# Patient Record
Sex: FEMALE | Race: WHITE | NOT HISPANIC OR LATINO | Employment: FULL TIME | URBAN - METROPOLITAN AREA
[De-identification: names, ages, dates, MRNs, and addresses within clinical notes are randomized per-mention and may not be internally consistent; named-entity substitution may affect disease eponyms.]

---

## 2017-01-03 ENCOUNTER — TRANSFERRED RECORDS (OUTPATIENT)
Dept: HEALTH INFORMATION MANAGEMENT | Facility: CLINIC | Age: 47
End: 2017-01-03

## 2017-01-05 ENCOUNTER — OFFICE VISIT (OUTPATIENT)
Dept: OTOLARYNGOLOGY | Facility: CLINIC | Age: 47
End: 2017-01-05
Payer: COMMERCIAL

## 2017-01-05 ENCOUNTER — TELEPHONE (OUTPATIENT)
Dept: OTOLARYNGOLOGY | Facility: CLINIC | Age: 47
End: 2017-01-05

## 2017-01-05 DIAGNOSIS — Z98.890 MOHS DEFECT OF NOSE: Primary | ICD-10-CM

## 2017-01-05 DIAGNOSIS — M95.0 MOHS DEFECT OF NOSE: Primary | ICD-10-CM

## 2017-01-05 PROCEDURE — 99204 OFFICE O/P NEW MOD 45 MIN: CPT | Performed by: OTOLARYNGOLOGY

## 2017-01-05 ASSESSMENT — PAIN SCALES - GENERAL: PAINLEVEL: NO PAIN (0)

## 2017-01-05 NOTE — PATIENT INSTRUCTIONS
Please contact our clinic if you have questions or if problems arise:    [place] office: [phone]  [facility] office: [phone]    If it is an urgent matter when the clinic is closed, please contact the [facility]  [phone] and ask to have Dr. Jorge Eric, or the ENT resident on-call paged. If it is a serious matter requiring immediate attention, please call 911 or go to your nearest emergency department.

## 2017-01-05 NOTE — Clinical Note
Please send letter in progress note section  to referring physician and PCP with appropriate letterhead.

## 2017-01-05 NOTE — PROGRESS NOTES
Homer Mendoza MD   [facility]  [address] [place] [address]    Dear Doctor Kelly,    Thank you for asking me to see your patient, Ms. Yenny Johnson, in consultation to evaluate her nasal tip defect after Mohs surgery.  Today I had the pleasure of seeing her at the Facial Plastic and Reconstructive Surgery Clinic in the Department of Otolaryngology at [facility].    CLINICAL SUMMARY:   Diagnoses:  Left nasal tip defect from basal cell carcinoma, s/p Mohs surgery by Dr. Mendoza.     Comorbidities: None  Pertinent medications: None  Photographs: UM consents signed January 5, 2017.  Other: Mother of 4, 2 grandchildren 1 and 5 yo, boy and girl. MA in lung transplantation at [employer] [facility].    Care Checklist:   _She requested time to consider her options but is leaning towards a skin graft or local flap (bilobe flap).    _Mrs. Johnson will call us to initiate surgery scheduling.     Wound Care Instructions:     Please keep your facial wound covered at all times with ointment to keep the wound healthy. Dryness and crusting means the wound is unhealthy. You many need to apply ointment every 2 hours while you are awake to keep the wound constantly moist. If the wound is near your eyes, use Erythromycin Ophthalmic Ointment (ointment that is safe to get into the eyes). Otherwise, if it is away from your eyes, use Vaseline on the wound. Make sure the wound is always moist and covered with ointment.    You can choose to wear a dressing or bandage to camoflauge the wound, but a dressing or bandage is not necessary unless you work in a dirty or penny environment. Covering the wound at all times with ointment to maintain moisture is necessary. If you wear a dressing or bandage, check under the dressing frequently to make sure the wound does not dry out.      You can shower and let the wound get wet in 48 hours. Do not scrub the wound. After your  shower, gently pat the wound dry with a clean towel and apply more ointment.    Patients are often concerned about whether an open facial wound could get infected. It is very rare that an open wound gets a severe bacterial infection because bacteria can only sit on the surface of the wound and cannot penetrate into the deeper tissue leading to problems. Wounds commonly build up yellow or gray debris and appear infected even when they are not. This yellow or gray debris are waste products from the healing process combined with ointment. The best way to stop even the appearance of an infection is to keep your wound constantly moist and let the shower gently remove this debris once a day.      A more common type of infection is a minor fungal infection that results from keeping the wound moist with ointment. This is like diaper rash around your wound. Patients know that they have a fungal infection when they start experiencing severe itching, and discomfort around the wound, and see discrete red patches away from the wound (called satellite lesions). If you suspect you have any type of infection, please call us.     Please contact our clinic if you have questions or if problems arise: [facility] office: [phone]. If it is an urgent matter when the clinic is closed, please contact the [facility]  [phone] and ask to have Dr. Jorge Eric, or the ENT resident on-call paged. If it is a serious matter requiring immediate attention, please call [phone] or go to your nearest emergency department.      MEDICAL DECISION MAKING:      Nasal defect after Mohs surgery. The reconstructive options of healing by secondary intention versus skin grafting versus local flap reconstruction were described in detail.  After carefully considering the options, she requested time to consider her options but is leaning towards a skin graft or local flap (bilobe flap). She did not find the aesthetic result from healing  by secondary intention, nor the time needed to fully heal a wound secondarily to be acceptable. She will call us to initiate surgery scheduling.      It has been a pleasure to participate in the care of Ms. Johnson.  Thank you for this kind referral.     Sincerely,    Jorge Eric MD    Division of Facial Plastic and Reconstructive Surgery,   Department of Otolaryngology  [facility]   ____________________________________________________          HISTORY OF PRESENT ILLNESS and SKIN CANCER QUESTIONAAIRE:  As you know, Ms. Johnson is an46 year old-year-old female who presents with a facial defect after Mohs surgery.     Review of the Mohs or cancer removal documentation shows:   Date of Mohs surgery or skin cancer removal: 1/3/17.  Cancer type: basal cell carcinoma.  Margins: tumor free margins were obtained,  How would you rate your pain since your surgery? 0 (No pain)  Provider- How would you rate your pain since surgery? 0 (No pain)    Have you had any significant bleeding since your surgery? No  Provider- Have you had any significant bleeding since your surgery? No    Have you had any significant discharge since your surgery? No  Provider- Have you had any significant discharge since your surgery? No    Have you had any fevers since your surgery? No  Provider- Have you had any fevers since your surgery? No    No: Do you currently smoke?   Provider- Do you currently smoke? No    No: Have you had previous facial reconstruction?   Provider- Have you had previous facial reconstruction? No    What was at the cancer site before the removal? bleeding sore  How long had you noticed the lesion or growth prior to having the removal? 4 month(s)  Did that lesion grow? No  Have you had a lot of sun exposure in the past? Yes    Do you remember blistering sunburns anywhere on your body? Yes  Have you used a tanning bed in the past? No    Have you smoked in the past?Yes  Have you had  radiation to your head or neck in the past? No  Have you had previous skin cancers? Yes    For a defect site near or on the nose:   No: Did you have troubles breathing through your nose before Mohs surgery or cancer removal?   No: Any problems breathing through your nose after Mohs surgery or cancer removal?    Provider- Any problems breathing through your nose after Mohs surgery or cancer removal?  No but you have a cold right now. But she is pretty sure she has had no change in nasal function.        REVIEW OF SYSTEMS: a 12 system review was performed by the patient care staff:    Do you currently have or have you ever had in the past:    No: Complications with sedation or anesthesia.  No: Use blood thinners. No   No: Any heart problems.   No: Chest pain.   No: A pacemaker.  No: Problems with excessive bleeding or a bleeding disorder.  No: Problems with blood clots or a clotting disorder.   No: Sleep apnea or sleep with a CPAP machine.       No: Excessive scarring.   No: Night sweats.   No: Fevers.   No: Double vision.   No: Vision loss.   No: Snoring.   No: Difficulty breathing through your nose.   No: Runny nose.   No: Sneezing.   No: Itchy eyes.   No: Itchy throat.   No: Face pain.   No: Face weakness.   No: Face numbness.   No: Difficulty swallowing.   No: Pain with swallowing.,   No: Difficulty hearing or hearing loss.   No: Difficulty urinating.   No: Anxiety.   No: Depression.     FAMILY HISTORY:  No: Family history of excessive bleeding or a bleeding disorder.    No: Family history of blood clots or a clotting disorder.    Yes: Family history of skin cancer.    Family History   Problem Relation Age of Onset     C.A.D. Father 67     MI     Hypertension Father      Alcohol/Drug Father      Allergies Maternal Grandmother      DIABETES Maternal Grandmother      DIABETES Paternal Grandfather      Allergies Brother      Asthma Brother      CANCER No family hx of      Cardiovascular Mother      CHF      Coronary  Artery Disease Mother      Hyperlipidemia Mother      DIABETES Brother      Coronary Artery Disease Brother      Asthma Brother      Obesity Sister      DIABETES Brother      Coronary Artery Disease Brother      Asthma Brother      Obesity Sister        PAST MEDICAL HISTORY:   Past Medical History   Diagnosis Date     GERD (gastroesophageal reflux disease) 11/2005     EGD     PMDD (premenstrual dysphoric disorder)      HDL lipoprotein deficiency      Anemia      Obesity 3/29/2012     Stress incontinence, female 3/29/2012     Elevated rheumatoid factor 12/12/2012     Hypothyroidism      Lumbar disc herniation      Degeneration of lumbar or lumbosacral intervertebral disc      Depressive disorder      Cancer (H)      BCC of nose       PAST SURGICAL HISTORY:   Past Surgical History   Procedure Laterality Date     Cholecystectomy, laporoscopic  1995     Tubal ligation       C/section, classical       Biopsy       Hysterectomy, pap no longer indicated  09/23/2015       SOCIAL HISTORY:   Social History   Substance Use Topics     Smoking status: Former Smoker -- 1.00 packs/day for 14 years     Types: Cigarettes     Quit date: 01/01/1998     Smokeless tobacco: Never Used     Alcohol Use: No       ALLERGIES: Codeine sulfate and Gabapentin    MEDICATIONS:   Current Outpatient Prescriptions   Medication Sig Dispense Refill     cyclobenzaprine (FLEXERIL) 10 MG tablet Take 1 tablet (10 mg) by mouth 3 times daily as needed for muscle spasms . No further refills until follow-up appointment 30 tablet 0     albuterol (PROAIR HFA, PROVENTIL HFA, VENTOLIN HFA) 108 (90 BASE) MCG/ACT inhaler Inhale 1-2 puffs into the lungs every 4 hours as needed for shortness of breath / dyspnea 1 Inhaler 0     FLUoxetine (PROZAC) 20 MG capsule Take 3 capsules (60 mg) by mouth daily 270 capsule 3     levothyroxine (SYNTHROID, LEVOTHROID) 50 MCG tablet Take 1 tablet (50 mcg) by mouth daily 90 tablet 3     Cholecalciferol (VITAMIN D) 2000 UNITS CAPS Take  1 tablet by mouth daily.         Defect size per Mohs record or operative report: 11mm x 7mm    Patient Care Staff Signature: Tanisha Medeiros RN  ______________________________________________    Provider- Review of systems, FH, PMH, PSH, SH, ALL, and Medications taken by the patient care staff was reviewed by me: Jorge Eric      Provider- PHYSICAL EXAMINATION:  CONSTITUTIONAL:  No apparent distress.  Pleasant affect.  Normal ability to communicate.  CRANIOFACIAL:  Normocephalic, atraumatic.    SKIN:  11x7mm defect.   Subunits involved: nasal tip subunit centered to the left of midline.   Nearby landmarks: 5 mm from left soft tissue triangle nostril edge.   Depth: through dermis.  Surrounding skin is viable. No bleeding.    EYES:  Extraocular muscles intact.  EARS:  Normal auricles.  Tympanic membranes clear bilaterally.  NOSE: No external nasal valve collapse.  Slight septal deviation.  No polyps or purulence.  ORAL CAVITY AND OROPHARYNX: no lesions on inspection.  NECK:  The parotid is soft, without masses.  Supple laryngeal landmarks.  LYMPHATIC:  No palpable lymphadenopathy.  CARDIOVASCULAR:  Carotid pulses are palpable bilaterally.  NEUROLOGIC:  Facial nerve intact.  RESPIRATORY:  Normal respiratory effort.  No stridor.  Voice strong.      Provider-: PREOPERATIVE COUNSELING: An extensive preoperative discussion was held. The patient stated she understood the risks, benefits, alternatives and limitations of the procedure. The risks including but not limited to bleeding, infection, damage to surrounding structures, numbness, weakness, cancer recurrence, chronic pain, poor aesthetic result, partial or total skin loss, distortion of surrounding facial structures, and unforeseen complications related to surgery or anesthesia were described. I emphasized the risks of partial or total skin loss at the surgical sites. She also understands the possibility of distortion of surrounding facial structures including her  nostril margin which may result in alar retraction or nasal congestion. I discussed the limitations of this procedure in that the donor site will have anticipated scars and complications can occur at the donor site.  She understands that more skin may need to be excised during the reconstruction. We discussed how additional surgeries may be needed to obtain an optimal result.    I described how no reconstructive effort will be able to restore her to her exact precancer condition. We also acknowledged that facial asymmetries will be present after the reconstruction. The patient stated she had her questions answered to her satisfaction.

## 2017-01-05 NOTE — NURSING NOTE
"Yenny Johnson's goals for this visit include:   Chief Complaint   Patient presents with     Nose Problem     nasal mohs 1/3 ref Mendoza       She requests these members of her care team be copied on today's visit information:  Cee Biggs      PCP: Cee Biggs    Referring Provider:  No referring provider defined for this encounter.    Chief Complaint   Patient presents with     Nose Problem     nasal mohs 1/3 ref Mendoza       Initial LMP 08/07/2015 Estimated body mass index is 31.78 kg/(m^2) as calculated from the following:    Height as of 10/27/16: 1.664 m (5' 5.5\").    Weight as of 10/27/16: 87.998 kg (194 lb).  BP completed using cuff size: NA (Not Taken)    Do you need any medication refills at today's visit? No    Tanisha Medeiros RN    "

## 2017-01-06 NOTE — TELEPHONE ENCOUNTER
"Procedure: left Stage 1 left Nasal  reconstruction with local flaps, full thickness skin graft: from either neck or post auricular skin, complex closure and wound preperation  Facility: [facility], [facility] or [facility]  Length: 3.5 hour(s)  Surgeon:Jorge Eric Jr, MD  Anesthesia: Local with MAC  Diagnosis: Mohs Defect  Out Patient or AM admit:  (Same day)  BMI:Estimated body mass index is 31.78 kg/(m^2) as calculated from the following:    Height as of 10/27/16: 1.664 m (5' 5.5\").    Weight as of 10/27/16: 87.998 kg (194 lb). (If over 43 for general or 45 for MAC cannot be scheduled at [facility])   Pre-op Appointments needed: History & Physical within 30 days of surgery  Post-op appointments needed: Mohs Defect1 week   needed:No   Surgery packet/instructions given to patient?:  Yes  Pre op teaching done:  Yes  Lens Orders Needed: No   Referring provider:   Special Considerations:           "

## 2017-01-06 NOTE — TELEPHONE ENCOUNTER
Date Scheduled: 1-19-17 at 7:00am  Facility: [facility]  Surgeon: Dr. Eric   Post-op appointment scheduled: 1-26-17   scheduled?: No  Surgery packet/instructions confirmed received?  Yes, mailed  Special Considerations:

## 2017-01-10 ENCOUNTER — OFFICE VISIT (OUTPATIENT)
Dept: FAMILY MEDICINE | Facility: CLINIC | Age: 47
End: 2017-01-10
Payer: COMMERCIAL

## 2017-01-10 VITALS
OXYGEN SATURATION: 99 % | HEART RATE: 107 BPM | HEIGHT: 66 IN | WEIGHT: 197 LBS | BODY MASS INDEX: 31.66 KG/M2 | DIASTOLIC BLOOD PRESSURE: 80 MMHG | RESPIRATION RATE: 16 BRPM | SYSTOLIC BLOOD PRESSURE: 122 MMHG | TEMPERATURE: 97.1 F

## 2017-01-10 DIAGNOSIS — M95.0 MOHS DEFECT OF NASAL TIP: ICD-10-CM

## 2017-01-10 DIAGNOSIS — Z01.818 PREOP GENERAL PHYSICAL EXAM: Primary | ICD-10-CM

## 2017-01-10 DIAGNOSIS — Z98.890 MOHS DEFECT OF NASAL TIP: ICD-10-CM

## 2017-01-10 DIAGNOSIS — Z12.31 VISIT FOR SCREENING MAMMOGRAM: ICD-10-CM

## 2017-01-10 PROCEDURE — 99214 OFFICE O/P EST MOD 30 MIN: CPT | Performed by: FAMILY MEDICINE

## 2017-01-10 NOTE — MR AVS SNAPSHOT
After Visit Summary   1/10/2017    Yenny Johnson    MRN: [id number]           Patient Information     Date Of Birth          1970        Visit Information        Provider Department      1/10/2017 1:40 PM Cee Biggs MD [facility]        Today's Diagnoses     Preop general physical exam    -  1     Mohs defect of nasal tip         Visit for screening mammogram           Care Instructions    [facility]    If you have any questions regarding to your visit please contact your care team:       Team Red:   Clinic Hours Telephone Number   Dr. Cee Pulliam  (pediatrics)  Enedina Coello NP 7am-7pm  Monday - Thursday   7am-5pm  Fridays  [phone] (fax)    Stephanie CASAS  [phone]   Urgent Care - [place] and [place] Monday-Friday  [place] - 11am-8pm  Saturday-Sunday  Both sites - 9am-5pm  [phone] - [facility]  [phone] - [place]       What options do I have for visits at the clinic other than the traditional office visit?  To expand how we care for you, many of our providers are utilizing electronic visits (e-visits) and telephone visits, when medically appropriate, for interactions with their patients rather than a visit in the clinic.   We also offer nurse visits for many medical concerns. Just like any other service, we will bill your insurance company for this type of visit based on time spent on the phone with your provider. Not all insurance companies cover these visits. Please check with your medical insurance if this type of visit is covered. You will be responsible for any charges that are not paid by your insurance.      E-visits via [employer]:  generally incur a $35.00 fee.  Telephone visits:  Time spent on the phone: *charged based on time that is spent on the phone in increments of 10 minutes. Estimated cost:   5-10 mins $30.00   11-20 mins. $59.00   21-30 mins. $85.00     As  always, Thank you for trusting us with your health care needs!              Before Your Surgery      Call your surgeon if there is any change in your health. This includes signs of a cold or flu (such as a sore throat, runny nose, cough, rash or fever).    Do not smoke, drink alcohol or take over the counter medicine (unless your surgeon or primary care doctor tells you to) for the 24 hours before and after surgery.    If you take prescribed drugs: Follow your doctor s orders about which medicines to take and which to stop until after surgery.    Eating and drinking prior to surgery: follow the instructions from your surgeon    Take a shower or bath the night before surgery. Use the soap your surgeon gave you to gently clean your skin. If you do not have soap from your surgeon, use your regular soap. Do not shave or scrub the surgery site.  Wear clean pajamas and have clean sheets on your bed.         Follow-ups after your visit        Follow-up notes from your care team     Return in about 8 months (around 9/10/2017) for physical (fasting labs up to one week prior).      Your next 10 appointments already scheduled     Jan 19, 2017   Procedure with Jorge Eric MD   [facility] (--)    [address] [place] [id number] [phone]            Jan 26, 2017 12:30 PM   [facility] Visit with Jorge Eric MD   [facility]    [address] [place] [id number] [phone]              Future tests that were ordered for you today     Open Future Orders        Priority Expected Expires Ordered    *MA Screening Digital Bilateral Routine  1/10/2018 1/10/2017            Who to contact     If you have questions or need follow up information about today's clinic visit or your schedule please contact [facility] directly at [phone].  Normal or non-critical lab and imaging results will be communicated to you by  "MyChart, letter or phone within 4 business days after the clinic has received the results. If you do not hear from us within 7 days, please contact the clinic through [employer] or phone. If you have a critical or abnormal lab result, we will notify you by phone as soon as possible.  Submit refill requests through [employer] or call your pharmacy and they will forward the refill request to us. Please allow 3 business days for your refill to be completed.          Additional Information About Your Visit        [employer] Information     [employer] gives you secure access to your electronic health record. If you see a primary care provider, you can also send messages to your care team and make appointments. If you have questions, please call your primary care clinic.  If you do not have a primary care provider, please call [phone] and they will assist you.        Care EveryWhere ID     This is your Care EveryWhere ID. This could be used by other organizations to access your [facility] medical records  [id number]        Your Vitals Were     Pulse Temperature Respirations    107 97.1  F (36.2  C) 16    Height BMI (Body Mass Index) Pulse Oximetry    5' 5.5\" (1.664 m) 32.27 kg/m2 99%    Last Period Breastfeeding?       08/07/2015 No        Blood Pressure from Last 3 Encounters:   01/10/17 122/80   10/27/16 126/78   05/12/16 132/86    Weight from Last 3 Encounters:   01/10/17 197 lb (89.359 kg)   10/27/16 194 lb (87.998 kg)   05/12/16 198 lb (89.812 kg)               Primary Care Provider Office Phone # Fax #    Cee Biggs [phone]       [facility] [address] [facility] [address]        Thank you!     Thank you for choosing [facility]  for your care. Our goal is always to provide you with excellent care. Hearing back from our patients is one way we can continue to improve our services. Please take a few minutes to complete the written survey that you may receive in the " mail after your visit with us. Thank you!             Your Updated Medication List - Protect others around you: Learn how to safely use, store and throw away your medicines at www.disposemymeds.org.          This list is accurate as of: 1/10/17  2:12 PM.  Always use your most recent med list.                   Brand Name Dispense Instructions for use    albuterol 108 (90 BASE) MCG/ACT Inhaler    PROAIR HFA/PROVENTIL HFA/VENTOLIN HFA    1 Inhaler    Inhale 1-2 puffs into the lungs every 4 hours as needed for shortness of breath / dyspnea       cyclobenzaprine 10 MG tablet    FLEXERIL    30 tablet    Take 1 tablet (10 mg) by mouth 3 times daily as needed for muscle spasms . No further refills until follow-up appointment       FLUoxetine 20 MG capsule    PROzac    270 capsule    Take 3 capsules (60 mg) by mouth daily       levothyroxine 50 MCG tablet    SYNTHROID/LEVOTHROID    90 tablet    Take 1 tablet (50 mcg) by mouth daily       vitamin D 2000 UNITS Caps      Take 1 tablet by mouth daily.

## 2017-01-10 NOTE — PROGRESS NOTES
[facility]  [address] [place] [id number]  [phone]  Dept: [phone]    PRE-OP EVALUATION:  Today's date: 1/10/2017    Yenny Johnson (: 1970) presents for pre-operative evaluation assessment as requested by Dr. Jorge Eric.  She requires evaluation and anesthesia risk assessment prior to undergoing surgery/procedure for treatment of Mohs nasal tip defect.  Proposed procedure: Mohs repair    Date of Surgery/ Procedure: 17  Time of Surgery/ Procedure: 6:00 am  Hospital/Surgical Facility: [facility]  Fax number for surgical facility:   Primary Physician: Cee Biggs  Type of Anesthesia Anticipated: Local    Patient has a Health Care Directive or Living Will:  NO    1. NO - Do you have a history of heart attack, stroke, stent, bypass or surgery on an artery in the head, neck, heart or legs?  2. NO - Do you ever have any pain or discomfort in your chest?  3. NO - Do you have a history of  Heart Failure?  4. NO - Are you troubled by shortness of breath when: walking on the level, up a slight hill or at night?  5. NO - Do you currently have a cold, bronchitis or other respiratory infection?  6. NO - Do you have a cough, shortness of breath or wheezing?  7. NO - Do you sometimes get pains in the calves of your legs when you walk?  8. NO - Do you or anyone in your family have previous history of blood clots?  9. NO - Do you or does anyone in your family have a serious bleeding problem such as prolonged bleeding following surgeries or cuts?  10. YES - HAVE YOU EVER HAD PROBLEMS WITH ANEMIA OR BEEN TOLD TO TAKE IRON PILLS?    11. YES - HAVE YOU HAD ANY ABNORMAL BLOOD LOSS SUCH AS BLACK, TARRY OR BLOODY STOOLS, OR ABNORMAL VAGINAL BLEEDING?    12. NO - Have you ever had a blood transfusion?  13. NO - Have you or any of your relatives ever had problems with anesthesia?  14. NO - Do you have sleep apnea, excessive snoring or daytime drowsiness?  15. NO - Do you  have any prosthetic heart valves?  16. NO - Do you have prosthetic joints?  17. NO - Is there any chance that you may be pregnant?    HPI:                                                    Yenny Johnson is a 46 year old female who presents with nasal tip defect from prior Mohs surgery. Symptom onset has been sudden, unchanged for a time period of 7  days. Severity is described as mild. Course of her symptoms over time is unchanged.    See problem list for active medical problems.  Problems all longstanding and stable, except as noted/documented.  See ROS for pertinent symptoms related to these conditions.                                                                                                  .    MEDICAL HISTORY:                                                      Patient Active Problem List    Diagnosis Date Noted     Stress incontinence, female 03/29/2012     Priority: High     Mohs defect of nose 01/05/2017     Priority: Medium     Lumbar disc herniation      Priority: Medium     Trial of injection       Hypothyroidism      Priority: Medium     Obesity 03/29/2012     Priority: Medium     Hypertriglyceridemia 05/13/2013     Priority: Low     CARDIOVASCULAR SCREENING; LDL GOAL LESS THAN 160 10/31/2010     Priority: Low     PMDD (premenstrual dysphoric disorder)      Priority: Low     GERD (gastroesophageal reflux disease) 11/01/2005     Priority: Low     EGD        Past Medical History   Diagnosis Date     GERD (gastroesophageal reflux disease) 11/2005     EGD     PMDD (premenstrual dysphoric disorder)      HDL lipoprotein deficiency      Anemia      Obesity 3/29/2012     Stress incontinence, female 3/29/2012     Elevated rheumatoid factor 12/12/2012     Hypothyroidism      Lumbar disc herniation      Degeneration of lumbar or lumbosacral intervertebral disc      Depressive disorder      BCC (basal cell carcinoma of skin)      Past Surgical History   Procedure Laterality Date     Cholecystectomy,  laporoscopic  1995     Tubal ligation       C/section, classical       Biopsy       Hysterectomy, pap no longer indicated  09/23/2015     Current Outpatient Prescriptions   Medication Sig Dispense Refill     cyclobenzaprine (FLEXERIL) 10 MG tablet Take 1 tablet (10 mg) by mouth 3 times daily as needed for muscle spasms . No further refills until follow-up appointment 30 tablet 0     albuterol (PROAIR HFA, PROVENTIL HFA, VENTOLIN HFA) 108 (90 BASE) MCG/ACT inhaler Inhale 1-2 puffs into the lungs every 4 hours as needed for shortness of breath / dyspnea 1 Inhaler 0     FLUoxetine (PROZAC) 20 MG capsule Take 3 capsules (60 mg) by mouth daily 270 capsule 3     levothyroxine (SYNTHROID, LEVOTHROID) 50 MCG tablet Take 1 tablet (50 mcg) by mouth daily 90 tablet 3     Cholecalciferol (VITAMIN D) 2000 UNITS CAPS Take 1 tablet by mouth daily.       OTC products: None, except as noted above    Allergies   Allergen Reactions     Codeine Sulfate      Disorientation, muscle weakness     Gabapentin      sleepiness      Latex Allergy: NO    Social History   Substance Use Topics     Smoking status: Former Smoker -- 1.00 packs/day for 14 years     Types: Cigarettes     Quit date: 01/01/1998     Smokeless tobacco: Never Used     Alcohol Use: No     History   Drug Use No     Social History     Social History     Marital Status:      Spouse Name: Pasquale     Number of Children: 4     Years of Education: 15     Occupational History     sales, floor, overnight stocking [employer]      [employer]  Other     MA Student     Social History Main Topics     Smoking status: Former Smoker -- 1.00 packs/day for 14 years     Types: Cigarettes     Quit date: 01/01/1998     Smokeless tobacco: Never Used     Alcohol Use: No     Drug Use: No     Sexual Activity:     Partners: Male     Birth Control/ Protection: Female Surgical      Comment: tubal ligation     Other Topics Concern     Parent/Sibling W/ Cabg, Mi Or Angioplasty Before 65f 55m?  "Yes      Service No     Blood Transfusions No     Caffeine Concern No     Occupational Exposure No     Hobby Hazards No     Sleep Concern No     Stress Concern No     Weight Concern No     Special Diet No     Back Care No     Exercise Yes     Bike Helmet No     Seat Belt Yes     Self-Exams Yes     Social History Narrative    .    4 children.    Does .     Family History   Problem Relation Age of Onset     C.A.D. Father 67     MI     Hypertension Father      Alcohol/Drug Father      Allergies Maternal Grandmother      DIABETES Maternal Grandmother      DIABETES Paternal Grandfather      Allergies Brother      Asthma Brother      CANCER No family hx of      Cardiovascular Mother      CHF      Coronary Artery Disease Mother      Hyperlipidemia Mother      DIABETES Brother      Coronary Artery Disease Brother      Asthma Brother      Obesity Sister      DIABETES Brother      Coronary Artery Disease Brother      Asthma Brother      Obesity Sister       REVIEW OF SYSTEMS:                                                    C: NEGATIVE for fever, chills, change in weight  INTEGUMENTARY/SKIN: see HPI   E: NEGATIVE for vision changes or irritation  E/M: NEGATIVE for ear, mouth and throat problems  R: NEGATIVE for significant cough or SOB  CV: NEGATIVE for chest pain, palpitations or peripheral edema  GI: NEGATIVE for nausea, abdominal pain, heartburn, or change in bowel habits  : NEGATIVE for frequency, dysuria, or hematuria  MUSCULOSKELETAL: low back pain   N: NEGATIVE for weakness, dizziness or paresthesias  E: NEGATIVE for temperature intolerance, skin/hair changes  H: NEGATIVE for bleeding problems  P: NEGATIVE for changes in mood or affect  Complete ROS otherwise negative.     EXAM:                                                    /80 mmHg  Pulse 107  Temp(Src) 97.1  F (36.2  C)  Resp 16  Ht 5' 5.5\" (1.664 m)  Wt 197 lb (89.359 kg)  BMI 32.27 kg/m2  SpO2 99%  LMP 08/07/2015  " Breastfeeding? No    GENERAL APPEARANCE: alert and no distress     EYES: EOMI,- PERRL     HENT: ear canals and TM's normal and nose and mouth without ulcers or lesions     NECK: no adenopathy, no asymmetry, masses, or scars and thyroid normal to palpation     RESP: lungs clear to auscultation - no rales, rhonchi or wheezes     CV: regular rates and rhythm, normal S1 S2, no S3 or S4 and no murmur, click or rub -     ABDOMEN:  soft, nontender, no HSM or masses and bowel sounds normal     MS: extremities normal- no gross deformities noted, no evidence of inflammation in joints, FROM in all extremities.     SKIN: no suspicious lesions or rashes     NEURO: Normal strength and tone, sensory exam grossly normal, mentation intact and speech normal     PSYCH: mentation appears normal. and affect normal/bright     LYMPHATICS: No axillary, cervical, inguinal, or supraclavicular nodes    DIAGNOSTICS:                                                    EKG: 10/27/16  Sinus  Rhythm   WITHIN NORMAL LIMITS  Cee Biggs MD    Recent Labs   Lab Test  10/27/16   1527  05/12/16   1222   11/18/14   1618   HGB  12.5  12.4   < >  10.9*   PLT  234  281   --   335   NA  139   --    --   135   POTASSIUM  4.0   --    --   4.5   CR  1.08*   --    --   0.94    < > = values in this interval not displayed.        IMPRESSION:                                                    Reason for surgery/procedure: Moh's defect of nasal tip   Diagnosis/reason for consult: same     The proposed surgical procedure is considered LOW risk.    REVISED CARDIAC RISK INDEX  The patient has the following serious cardiovascular risks for perioperative complications such as (MI, PE, VFib and 3  AV Block):  No serious cardiac risks  INTERPRETATION: 0 risks: Class I (very low risk - 0.4% complication rate)    The patient has the following additional risks for perioperative complications:  No identified additional risks      ICD-10-CM    1. Preop general physical  exam Z01.818    2. Mohs defect of nasal tip M95.0    3. Visit for screening mammogram Z12.31 *MA Screening Digital Bilateral       RECOMMENDATIONS:                                                    --Patient is to take all scheduled medications on the day of surgery EXCEPT for modifications listed below.    APPROVAL GIVEN to proceed with proposed procedure, without further diagnostic evaluation       Signed Electronically by: Cee Biggs MD    Copy of this evaluation report is provided to requesting physician.    [facility] Preop Guidelines

## 2017-01-10 NOTE — NURSING NOTE
"Chief Complaint   Patient presents with     Pre-Op Exam       Initial /80 mmHg  Pulse 107  Temp(Src) 97.1  F (36.2  C)  Resp 16  Ht 5' 5.5\" (1.664 m)  Wt 197 lb (89.359 kg)  BMI 32.27 kg/m2  SpO2 99%  LMP 08/07/2015 Estimated body mass index is 32.27 kg/(m^2) as calculated from the following:    Height as of this encounter: 5' 5.5\" (1.664 m).    Weight as of this encounter: 197 lb (89.359 kg).  BP completed using cuff size: large right arm    Jackie Zepeda. MA      "

## 2017-01-10 NOTE — PATIENT INSTRUCTIONS
[facility]    If you have any questions regarding to your visit please contact your care team:       Team Red:   Clinic Hours Telephone Number   Dr. Cee Pulliam  (pediatrics)  Enedina Coello NP 7am-7pm  Monday - Thursday   7am-5pm  Fridays  [phone] (fax)    Stephanie CASAS  [phone]   Urgent Care - [place] and [place] Monday-Friday  [place] - 11am-8pm  Saturday-Sunday  Both sites - 9am-5pm  [phone] - [facility]  [phone] - [place]       What options do I have for visits at the clinic other than the traditional office visit?  To expand how we care for you, many of our providers are utilizing electronic visits (e-visits) and telephone visits, when medically appropriate, for interactions with their patients rather than a visit in the clinic.   We also offer nurse visits for many medical concerns. Just like any other service, we will bill your insurance company for this type of visit based on time spent on the phone with your provider. Not all insurance companies cover these visits. Please check with your medical insurance if this type of visit is covered. You will be responsible for any charges that are not paid by your insurance.      E-visits via [employer]:  generally incur a $35.00 fee.  Telephone visits:  Time spent on the phone: *charged based on time that is spent on the phone in increments of 10 minutes. Estimated cost:   5-10 mins $30.00   11-20 mins. $59.00   21-30 mins. $85.00     As always, Thank you for trusting us with your health care needs!              Before Your Surgery      Call your surgeon if there is any change in your health. This includes signs of a cold or flu (such as a sore throat, runny nose, cough, rash or fever).    Do not smoke, drink alcohol or take over the counter medicine (unless your surgeon or primary care doctor tells you to) for the 24 hours before and after surgery.    If you take  prescribed drugs: Follow your doctor s orders about which medicines to take and which to stop until after surgery.    Eating and drinking prior to surgery: follow the instructions from your surgeon    Take a shower or bath the night before surgery. Use the soap your surgeon gave you to gently clean your skin. If you do not have soap from your surgeon, use your regular soap. Do not shave or scrub the surgery site.  Wear clean pajamas and have clean sheets on your bed.

## 2017-01-18 ENCOUNTER — ANESTHESIA EVENT (OUTPATIENT)
Dept: SURGERY | Facility: AMBULATORY SURGERY CENTER | Age: 47
End: 2017-01-18
Payer: COMMERCIAL

## 2017-01-18 ASSESSMENT — LIFESTYLE VARIABLES: TOBACCO_USE: 1

## 2017-01-19 ENCOUNTER — SURGERY (OUTPATIENT)
Age: 47
End: 2017-01-19
Payer: COMMERCIAL

## 2017-01-19 ENCOUNTER — HOSPITAL ENCOUNTER (OUTPATIENT)
Facility: AMBULATORY SURGERY CENTER | Age: 47
Discharge: HOME | End: 2017-01-19
Attending: OTOLARYNGOLOGY | Admitting: OTOLARYNGOLOGY
Payer: COMMERCIAL

## 2017-01-19 ENCOUNTER — ANESTHESIA (OUTPATIENT)
Dept: SURGERY | Facility: AMBULATORY SURGERY CENTER | Age: 47
End: 2017-01-19
Payer: COMMERCIAL

## 2017-01-19 VITALS
TEMPERATURE: 97.7 F | DIASTOLIC BLOOD PRESSURE: 66 MMHG | RESPIRATION RATE: 18 BRPM | OXYGEN SATURATION: 97 % | SYSTOLIC BLOOD PRESSURE: 107 MMHG

## 2017-01-19 DIAGNOSIS — M95.0 MOHS DEFECT OF NOSE: Primary | ICD-10-CM

## 2017-01-19 DIAGNOSIS — Z98.890 MOHS DEFECT OF NOSE: Primary | ICD-10-CM

## 2017-01-19 PROCEDURE — 15004 WOUND PREP F/N/HF/G: CPT

## 2017-01-19 PROCEDURE — G8907 PT DOC NO EVENTS ON DISCHARG: HCPCS

## 2017-01-19 PROCEDURE — 15260 FTH/GFT FR N/E/E/L 20 SQCM/<: CPT | Performed by: OTOLARYNGOLOGY

## 2017-01-19 PROCEDURE — 15004 WOUND PREP F/N/HF/G: CPT | Mod: 51 | Performed by: OTOLARYNGOLOGY

## 2017-01-19 PROCEDURE — G8916 PT W IV AB GIVEN ON TIME: HCPCS

## 2017-01-19 PROCEDURE — 88302 TISSUE EXAM BY PATHOLOGIST: CPT | Performed by: OTOLARYNGOLOGY

## 2017-01-19 PROCEDURE — 15260 FTH/GFT FR N/E/E/L 20 SQCM/<: CPT

## 2017-01-19 RX ORDER — HYDROMORPHONE HYDROCHLORIDE 1 MG/ML
.3-.5 INJECTION, SOLUTION INTRAMUSCULAR; INTRAVENOUS; SUBCUTANEOUS EVERY 10 MIN PRN
Status: CANCELLED | OUTPATIENT
Start: 2017-01-19

## 2017-01-19 RX ORDER — PROPOFOL 10 MG/ML
INJECTION, EMULSION INTRAVENOUS CONTINUOUS PRN
Status: DISCONTINUED | OUTPATIENT
Start: 2017-01-19 | End: 2017-01-19

## 2017-01-19 RX ORDER — SODIUM CHLORIDE, SODIUM LACTATE, POTASSIUM CHLORIDE, CALCIUM CHLORIDE 600; 310; 30; 20 MG/100ML; MG/100ML; MG/100ML; MG/100ML
INJECTION, SOLUTION INTRAVENOUS CONTINUOUS
Status: DISCONTINUED | OUTPATIENT
Start: 2017-01-19 | End: 2017-01-19 | Stop reason: HOSPADM

## 2017-01-19 RX ORDER — MEPERIDINE HYDROCHLORIDE 25 MG/ML
12.5 INJECTION INTRAMUSCULAR; INTRAVENOUS; SUBCUTANEOUS
Status: CANCELLED | OUTPATIENT
Start: 2017-01-19

## 2017-01-19 RX ORDER — ONDANSETRON 4 MG/1
4 TABLET, ORALLY DISINTEGRATING ORAL EVERY 30 MIN PRN
Status: CANCELLED | OUTPATIENT
Start: 2017-01-19

## 2017-01-19 RX ORDER — FENTANYL CITRATE 50 UG/ML
INJECTION, SOLUTION INTRAMUSCULAR; INTRAVENOUS PRN
Status: DISCONTINUED | OUTPATIENT
Start: 2017-01-19 | End: 2017-01-19

## 2017-01-19 RX ORDER — LIDOCAINE HYDROCHLORIDE 20 MG/ML
INJECTION, SOLUTION INFILTRATION; PERINEURAL PRN
Status: DISCONTINUED | OUTPATIENT
Start: 2017-01-19 | End: 2017-01-19

## 2017-01-19 RX ORDER — MUPIROCIN 20 MG/G
OINTMENT TOPICAL
Qty: 22 G | Refills: 1 | Status: SHIPPED | OUTPATIENT
Start: 2017-01-19 | End: 2017-06-12

## 2017-01-19 RX ORDER — LABETALOL HYDROCHLORIDE 5 MG/ML
10 INJECTION, SOLUTION INTRAVENOUS
Status: CANCELLED | OUTPATIENT
Start: 2017-01-19

## 2017-01-19 RX ORDER — NALOXONE HYDROCHLORIDE 0.4 MG/ML
.1-.4 INJECTION, SOLUTION INTRAMUSCULAR; INTRAVENOUS; SUBCUTANEOUS
Status: CANCELLED | OUTPATIENT
Start: 2017-01-19 | End: 2017-01-20

## 2017-01-19 RX ORDER — SODIUM CHLORIDE, SODIUM LACTATE, POTASSIUM CHLORIDE, CALCIUM CHLORIDE 600; 310; 30; 20 MG/100ML; MG/100ML; MG/100ML; MG/100ML
INJECTION, SOLUTION INTRAVENOUS CONTINUOUS
Status: CANCELLED | OUTPATIENT
Start: 2017-01-19

## 2017-01-19 RX ORDER — LIDOCAINE 40 MG/G
CREAM TOPICAL
Status: DISCONTINUED | OUTPATIENT
Start: 2017-01-19 | End: 2017-01-19 | Stop reason: HOSPADM

## 2017-01-19 RX ORDER — ALBUTEROL SULFATE 0.83 MG/ML
2.5 SOLUTION RESPIRATORY (INHALATION) EVERY 4 HOURS PRN
Status: CANCELLED | OUTPATIENT
Start: 2017-01-19

## 2017-01-19 RX ORDER — OXYCODONE HYDROCHLORIDE 5 MG/1
5 TABLET ORAL EVERY 4 HOURS PRN
Qty: 30 TABLET | Refills: 0 | Status: SHIPPED | OUTPATIENT
Start: 2017-01-19 | End: 2017-04-03

## 2017-01-19 RX ORDER — ONDANSETRON 2 MG/ML
4 INJECTION INTRAMUSCULAR; INTRAVENOUS EVERY 30 MIN PRN
Status: CANCELLED | OUTPATIENT
Start: 2017-01-19

## 2017-01-19 RX ORDER — ONDANSETRON 2 MG/ML
INJECTION INTRAMUSCULAR; INTRAVENOUS PRN
Status: DISCONTINUED | OUTPATIENT
Start: 2017-01-19 | End: 2017-01-19

## 2017-01-19 RX ORDER — MUPIROCIN 20 MG/G
OINTMENT TOPICAL PRN
Status: DISCONTINUED | OUTPATIENT
Start: 2017-01-19 | End: 2017-01-19 | Stop reason: HOSPADM

## 2017-01-19 RX ORDER — CEPHALEXIN 500 MG/1
500 CAPSULE ORAL 4 TIMES DAILY
Qty: 28 CAPSULE | Refills: 0 | Status: SHIPPED | OUTPATIENT
Start: 2017-01-19 | End: 2017-01-26

## 2017-01-19 RX ORDER — FENTANYL CITRATE 50 UG/ML
25-50 INJECTION, SOLUTION INTRAMUSCULAR; INTRAVENOUS
Status: CANCELLED | OUTPATIENT
Start: 2017-01-19

## 2017-01-19 RX ORDER — CEFAZOLIN SODIUM 2 G/100ML
2 INJECTION, SOLUTION INTRAVENOUS
Status: COMPLETED | OUTPATIENT
Start: 2017-01-19 | End: 2017-01-19

## 2017-01-19 RX ORDER — DEXAMETHASONE SODIUM PHOSPHATE 10 MG/ML
10 INJECTION, SOLUTION INTRAMUSCULAR; INTRAVENOUS ONCE
Status: COMPLETED | OUTPATIENT
Start: 2017-01-19 | End: 2017-01-19

## 2017-01-19 RX ORDER — PROPOFOL 10 MG/ML
INJECTION, EMULSION INTRAVENOUS PRN
Status: DISCONTINUED | OUTPATIENT
Start: 2017-01-19 | End: 2017-01-19

## 2017-01-19 RX ADMIN — FENTANYL CITRATE 50 MCG: 50 INJECTION, SOLUTION INTRAMUSCULAR; INTRAVENOUS at 07:30

## 2017-01-19 RX ADMIN — PROPOFOL 50 MG: 10 INJECTION, EMULSION INTRAVENOUS at 07:41

## 2017-01-19 RX ADMIN — LIDOCAINE HYDROCHLORIDE 40 MG: 20 INJECTION, SOLUTION INFILTRATION; PERINEURAL at 07:28

## 2017-01-19 RX ADMIN — DEXAMETHASONE SODIUM PHOSPHATE 10 MG: 10 INJECTION, SOLUTION INTRAMUSCULAR; INTRAVENOUS at 07:56

## 2017-01-19 RX ADMIN — SODIUM CHLORIDE, SODIUM LACTATE, POTASSIUM CHLORIDE, CALCIUM CHLORIDE: 600; 310; 30; 20 INJECTION, SOLUTION INTRAVENOUS at 06:37

## 2017-01-19 RX ADMIN — MUPIROCIN 2 G: 20 OINTMENT TOPICAL at 09:35

## 2017-01-19 RX ADMIN — PROPOFOL 50 MG: 10 INJECTION, EMULSION INTRAVENOUS at 07:28

## 2017-01-19 RX ADMIN — CEFAZOLIN SODIUM 2 G: 2 INJECTION, SOLUTION INTRAVENOUS at 07:22

## 2017-01-19 RX ADMIN — PROPOFOL 50 MG: 10 INJECTION, EMULSION INTRAVENOUS at 07:47

## 2017-01-19 RX ADMIN — ONDANSETRON 4 MG: 2 INJECTION INTRAMUSCULAR; INTRAVENOUS at 09:36

## 2017-01-19 RX ADMIN — Medication 1 MEQ: at 07:50

## 2017-01-19 RX ADMIN — PROPOFOL 45 MCG/KG/MIN: 10 INJECTION, EMULSION INTRAVENOUS at 07:50

## 2017-01-19 RX ADMIN — PROPOFOL 50 MG: 10 INJECTION, EMULSION INTRAVENOUS at 07:44

## 2017-01-19 NOTE — PROGRESS NOTES
CLINICAL SUMMARY:   Diagnoses:  Left nasal tip defect from basal cell carcinoma, s/p Mohs surgery by Dr. Mendoza.   -1/19/17: stage 1- full thickness skin graft, donor = left postauricular (path = pending).    Comorbidities: None  Pertinent medications: None  Photographs:  consents signed January 5, 2017.  Connection: Mother of 4, 2 grandchildren 1 and 3 yo, boy and girl. [place] in [place] [facility] [place]    Other: anterior midline half mattress sutures. Postauricular dermabond.  Care Checklist:    _RTC next week for bolster removal.  _Path from 1/19/17.    Jorge Eric

## 2017-01-19 NOTE — ANESTHESIA POSTPROCEDURE EVALUATION
Patient: Yenny Johnson    REPAIR MOHS (Left Face)  Additional InformationProcedure(s):  Stage 1 left nasal reconstruction with wound preparation, local flaps, full thickness skin graft from or posauricular skin, complex closure - Wound Class: I-Clean    Diagnosis:mohs defect  Diagnosis Additional Information: No value filed.    Anesthesia Type:  MAC    Note:  Anesthesia Post Evaluation    Patient location during evaluation: Phase 2  Patient participation: Able to fully participate in evaluation  Level of consciousness: awake  Pain management: adequate  Airway patency: patent  Cardiovascular status: acceptable  Respiratory status: acceptable  Hydration status: acceptable  PONV: none     Anesthetic complications: None    Comments: Tolerating PO intake well.        Last vitals:  Filed Vitals:    01/19/17 1000 01/19/17 1015 01/19/17 1025   BP: 129/79 107/65 107/66   Temp:      Resp: 18 18 18   SpO2: 96% 95% 97%       Electronically Signed By: Lucio Aguilar MD  January 19, 2017  10:55 AM

## 2017-01-19 NOTE — PROGRESS NOTES
She has considered what surgery she wants. We reviewed the options of skin graft vs bilobe flap in detail. She wants a skin graft and understands the risks and trade-offs. I emphasized the risk of pin-cushioning of small surface area skin grafts and the expected outcome of skin with different color and texture. She accepts these limitations and risks. Jorge Eric

## 2017-01-19 NOTE — IP AVS SNAPSHOT
[facility] [place]    [address] [place] [address]    Phone:  [phone]                                       After Visit Summary   1/19/2017    Yenny Johnson    MRN: [id number]           After Visit Summary Signature Page     I have received my discharge instructions, and my questions have been answered. I have discussed any challenges I see with this plan with the nurse or doctor.    ..........................................................................................................................................  Patient/Patient Representative Signature      ..........................................................................................................................................  Patient Representative Print Name and Relationship to Patient    ..................................................               ................................................  Date                                            Time    ..........................................................................................................................................  Reviewed by Signature/Title    ...................................................              ..............................................  Date                                                            Time

## 2017-01-19 NOTE — DISCHARGE INSTRUCTIONS
"Please review Dr. Eric's Discharge Packet \"Patient Instructions for Facial Surgery.\"    Follow up with Dr. Eric in about 1 week at [facility]. Call [phone] to make an appointment.    Please contact Dr. Eric directly on his cell phone [phone] if you have questions or concerns.    [facility]  Same-Day Surgery   Adult Discharge Orders & Instructions   For 24 hours after surgery  1. Get plenty of rest.  A responsible adult must stay with you for at least 24 hours after you leave the hospital.   2. Do not drive or use heavy equipment.  If you have weakness or tingling, don't drive or use heavy equipment until this feeling goes away.  3. Do not drink alcohol.  4. Avoid strenuous or risky activities.  Ask for help when climbing stairs.   5. You may feel lightheaded.  IF so, sit for a few minutes before standing.  Have someone help you get up.   6. If you have nausea (feel sick to your stomach): Drink only clear liquids such as apple juice, ginger ale, broth or 7-Up.  Rest may also help.  Be sure to drink enough fluids.  Move to a regular diet as you feel able.  7. You may have a slight fever. Call the doctor if your fever is over 100 F (37.7 C) (taken under the tongue) or lasts longer than 24 hours.  8. You may have a dry mouth, a sore throat, muscle aches or trouble sleeping.  These should go away after 24 hours.  9. Do not make important or legal decisions.   Call your doctor for any of the followin.  Signs of infection (fever, growing tenderness at the surgery site, a large amount of drainage or bleeding, severe pain, foul-smelling drainage, redness, swelling).    2. It has been over 8 to 10 hours since surgery and you are still not able to urinate (pass water).    3.  Headache for over 24 hours.    4.  Numbness, tingling or weakness the day after surgery (if you had spinal anesthesia).  To contact Dr. Eric call his cell phone at [phone]    "

## 2017-01-19 NOTE — BRIEF OP NOTE
[facility] Brief Operative Note    Pre-operative diagnosis: Left nasal wound   Post-operative diagnosis: Same   Procedure: Stage 1 left nasal reconstruction with wound preparation and skin graft from left postauricular skin   Surgeon: Jorge Eric   Assistant(s): None   Anesthesia: Conscious sedation   Estimated blood loss: Minimal   Total IV fluids: (See anesthesia record)   Blood transfusion: No transfusion was given during surgery   Total urine output: (See anesthesia record)   Drains: None   Specimens: To path   Implants: None   Findings: See dictated operative report for full details   Complications: None.   Condition: Stable.   Comments: See dictated operative report for full details

## 2017-01-19 NOTE — OP NOTE
DATE: 1/19/17  ATTENDING SURGEON: Jorge Eric MD     PREOPERATIVE DIAGNOSES:   1. Left nasal tip defect 9x8 mm.     POSTOPERATIVE DIAGNOSES:   1. Left nasal tip defect 9x8 mm.   2. Left nasal tip defect after wound preparation for reconstruction 12x13 mm.     PROCEDURE:  1. Left nasal tip wound preparation for reconstruction [id number].  2. Full thickness skin graft harvested from the left postauricular skin and transferred to the left nasal tip wound [id number].    OPERATIVE FINDINGS: Left nasal tip defect with viable surrounding skin and granulation tissue at the base of the defect.  At the end of the procedure the bolster was secure. Complete hemostasis was obtained. No evidence of hematoma.    INDICATIONS: Ms. Yenny Johnson is a 46 year old-year-old female who recently underwent excision of a cutaneous malignancy using the Mohs micrographic technique. Tumor-free margins were obtained. He was referred for reconstruction of this defect. An extensive preoperative discussion was held. The patient stated she understood the risks, benefits, alternatives and limitations of the procedure. Ms. Johnson also stated she had her questions answered to her satisfaction. She wished to proceed with surgery.     DESCRIPTION OF PROCEDURE: After informed consent was obtained and placed in the chart, the patient was brought to operating room, placed in a supine position. After successful induction of conscious sedation, the patient was prepped and draped in the standard sterile fashion, which included injection of 1% lidocaine with 1:100,000 epinephrine at the proposed operative sites. The left postauricular skin was chosen as the donor site because it had no hair-bearing skin and had a relatively close color match to her recipient site skin color and tone with adequate skin laxity for primary closure.   A timeout was performed. The correct patient and procedure were verified. It was also confirmed that she was wearing  functional pneumo boots, antibiotics were given, her pressure points were adequately protected, and her eyes were prepared with ophthalmic preparation and protected with wet Ray-Karla gauze throughout the procedure.   Procedure began with preparation of the left nasal tip wound for reconstruction. A 15 blade scalpel was used to perform circumferential scar release around the periphery of the wound. Additional left nasal tip subunit skin was also excised and sent for routine pathology. Excess granulation tissue was excised from the base of the defect. This resulted in a vascularized base and periphery of the wound, which was ready for reconstruction.   A template was then made of the wound. It was transferred to the left postauricular donor site. A fusiform design was marked around the template oriented along the relaxed skin tension lines of this region. A 15 blade scalpel was used to incise around the periphery of the fusiform marking. The skin graft was elevated in the subcutaneous plane with a 15 blade scalpel. This skin graft was placed in saline on the back bench. Attention then turned towards closure of the donor site. Complete hemostasis was obtained with bipolar cautery. The incision was then closed in multiple layers using multiple interrupted 3-0 Vicryl sutures in the deepest layer, multiple interrupted 4-0 Vicryl sutures in the dermal layer, and running subcuticular 4-0 Monocryl sutures in the superficial layer and then dermabond. Steri-Strips were applied over the wound. At the end of this portion of the procedure, there was no evidence of hematoma. Complete hemostasis was obtained. All tissue was viable.   The fatty tissue on the deep aspect of the graft was thinned until viable dermis was observed on the deep aspect. This was transferred to the left nasal tip defect. It was trimmed to the appropriate size and inset around the periphery using multiple interrupted 6-0 chromic sutures. The midline closure  used half mattress sutures. A bolster was then applied using Xeroform gauze and 5-0 nylon tie-over sutures. The bolster was secure. Complete hemostasis was obtained. There is no evidence of hematoma. The patient was returned to the care of the anesthesia service in stable condition.   Postoperative written and verbal instructions were given in detail. She will follow up next week for bolster removal. She has my direct number to call if questions or problems arise.   JUDI BARBA MD

## 2017-01-19 NOTE — IP AVS SNAPSHOT
"                  MRN:[id number]                      After Visit Summary   1/19/2017    Yenny Johnson    MRN: [id number]           Thank you!     Thank you for choosing [facility] for your care. Our goal is always to provide you with excellent care. Hearing back from our patients is one way we can continue to improve our services. Please take a few minutes to complete the written survey that you may receive in the mail after you visit with us. Thank you!        Patient Information     Date Of Birth          1970        About your hospital stay     You were admitted on:  January 19, 2017 You last received care in the:  [facility]    You were discharged on:  January 19, 2017       Who to Call     For medical emergencies, please call 911.  For non-urgent questions about your medical care, please call your primary care provider or clinic, [phone]  For questions related to your surgery, please call your surgery clinic        Attending Provider     Provider    Jorge Eric MD       Primary Care Provider Office Phone # Fax #    Cee Biggs [phone]       [address] [address]        Your next 10 appointments already scheduled     Jan 26, 2017 12:30 PM   Return Visit with Jorge Eric MD   [facility] ([facility])    [address] [place] [id number] [phone]              Further instructions from your care team       Please review Dr. Eric's Discharge Packet \"Patient Instructions for Facial Surgery.\"    Follow up with Dr. Eric in about 1 week at [facility]. Call [phone] to make an appointment.    Please contact Dr. Eric directly on his cell phone [phone] if you have questions or concerns.    [facility]  Same-Day Surgery   Adult Discharge Orders & Instructions   For 24 hours after surgery  1. Get plenty of " rest.  A responsible adult must stay with you for at least 24 hours after you leave the hospital.   2. Do not drive or use heavy equipment.  If you have weakness or tingling, don't drive or use heavy equipment until this feeling goes away.  3. Do not drink alcohol.  4. Avoid strenuous or risky activities.  Ask for help when climbing stairs.   5. You may feel lightheaded.  IF so, sit for a few minutes before standing.  Have someone help you get up.   6. If you have nausea (feel sick to your stomach): Drink only clear liquids such as apple juice, ginger ale, broth or 7-Up.  Rest may also help.  Be sure to drink enough fluids.  Move to a regular diet as you feel able.  7. You may have a slight fever. Call the doctor if your fever is over 100 F (37.7 C) (taken under the tongue) or lasts longer than 24 hours.  8. You may have a dry mouth, a sore throat, muscle aches or trouble sleeping.  These should go away after 24 hours.  9. Do not make important or legal decisions.   Call your doctor for any of the followin.  Signs of infection (fever, growing tenderness at the surgery site, a large amount of drainage or bleeding, severe pain, foul-smelling drainage, redness, swelling).    2. It has been over 8 to 10 hours since surgery and you are still not able to urinate (pass water).    3.  Headache for over 24 hours.    4.  Numbness, tingling or weakness the day after surgery (if you had spinal anesthesia).  To contact Dr. Eric call his cell phone at [phone]      Pending Results     No orders found from 2017 to 2017.            Admission Information        Provider Department Dept Phone    2017 Jorge Eric MD Mg Preop/Phase [phone]      Your Vitals Were     Blood Pressure Temperature Respirations Pulse Oximetry Last Period       92/67 mmHg 97.7  F (36.5  C) (Temporal) 18 97% 2015       [employer] Information     [employer] gives you secure access to your electronic health record. If you  see a primary care provider, you can also send messages to your care team and make appointments. If you have questions, please call your primary care clinic.  If you do not have a primary care provider, please call [phone] and they will assist you.        Care EveryWhere ID     This is your Care EveryWhere ID. This could be used by other organizations to access your [facility] medical records  [id number]           Review of your medicines      START taking        Dose / Directions    cephALEXin 500 MG capsule   Commonly known as:  KEFLEX   Used for:  Mohs defect of nose        Dose:  500 mg   Take 1 capsule (500 mg) by mouth 4 times daily for 7 days   Quantity:  28 capsule   Refills:  0       mupirocin 2 % ointment   Commonly known as:  BACTROBAN   Used for:  Mohs defect of nose        Apply topically every 2 hours (while awake) Follow instructions for ointment use on your discharge instructions from Dr. Eric   Quantity:  22 g   Refills:  1       oxyCODONE 5 MG IR tablet   Commonly known as:  ROXICODONE   Used for:  Mohs defect of nose        Dose:  5 mg   Take 1 tablet (5 mg) by mouth every 4 hours as needed for moderate to severe pain   Quantity:  30 tablet   Refills:  0         CONTINUE these medicines which have NOT CHANGED        Dose / Directions    albuterol 108 (90 BASE) MCG/ACT Inhaler   Commonly known as:  PROAIR HFA/PROVENTIL HFA/VENTOLIN HFA   Used for:  SOB (shortness of breath)        Dose:  1-2 puff   Inhale 1-2 puffs into the lungs every 4 hours as needed for shortness of breath / dyspnea   Quantity:  1 Inhaler   Refills:  0       cyclobenzaprine 10 MG tablet   Commonly known as:  FLEXERIL   Used for:  Lumbar disc herniation        Dose:  10 mg   Take 1 tablet (10 mg) by mouth 3 times daily as needed for muscle spasms . No further refills until follow-up appointment   Quantity:  30 tablet   Refills:  0       FLUoxetine 20 MG capsule   Commonly known as:  PROzac   Used for:  PMDD (premenstrual  dysphoric disorder)        Dose:  60 mg   Take 3 capsules (60 mg) by mouth daily   Quantity:  270 capsule   Refills:  3       levothyroxine 50 MCG tablet   Commonly known as:  SYNTHROID/LEVOTHROID   Used for:  Goiter, Acquired hypothyroidism        Dose:  50 mcg   Take 1 tablet (50 mcg) by mouth daily   Quantity:  90 tablet   Refills:  3       vitamin D 2000 UNITS Caps        Dose:  1 tablet   Take 1 tablet by mouth daily.   Refills:  0            Where to get your medicines      Some of these will need a paper prescription and others can be bought over the counter. Ask your nurse if you have questions.     Bring a paper prescription for each of these medications    - cephALEXin 500 MG capsule  - mupirocin 2 % ointment  - oxyCODONE 5 MG IR tablet             Protect others around you: Learn how to safely use, store and throw away your medicines at www.disposemymeds.org.             Medication List: This is a list of all your medications and when to take them. Check marks below indicate your daily home schedule. Keep this list as a reference.      Medications           Morning Afternoon Evening Bedtime As Needed    albuterol 108 (90 BASE) MCG/ACT Inhaler   Commonly known as:  PROAIR HFA/PROVENTIL HFA/VENTOLIN HFA   Inhale 1-2 puffs into the lungs every 4 hours as needed for shortness of breath / dyspnea                                cephALEXin 500 MG capsule   Commonly known as:  KEFLEX   Take 1 capsule (500 mg) by mouth 4 times daily for 7 days                                cyclobenzaprine 10 MG tablet   Commonly known as:  FLEXERIL   Take 1 tablet (10 mg) by mouth 3 times daily as needed for muscle spasms . No further refills until follow-up appointment                                FLUoxetine 20 MG capsule   Commonly known as:  PROzac   Take 3 capsules (60 mg) by mouth daily                                levothyroxine 50 MCG tablet   Commonly known as:  SYNTHROID/LEVOTHROID   Take 1 tablet (50 mcg) by mouth  daily                                mupirocin 2 % ointment   Commonly known as:  BACTROBAN   Apply topically every 2 hours (while awake) Follow instructions for ointment use on your discharge instructions from Dr. Eric   Last time this was given:  2 g on 1/19/2017  9:35 AM                                oxyCODONE 5 MG IR tablet   Commonly known as:  ROXICODONE   Take 1 tablet (5 mg) by mouth every 4 hours as needed for moderate to severe pain                                vitamin D 2000 UNITS Caps   Take 1 tablet by mouth daily.

## 2017-01-19 NOTE — ANESTHESIA CARE TRANSFER NOTE
Patient: Yenny Johnson    REPAIR MOHS (Left Face)  Additional InformationProcedure(s):  Stage 1 left nasal reconstruction with wound preparation, local flaps, full thickness skin graft from or posauricular skin, complex closure - Wound Class: I-Clean    Diagnosis: mohs defect  Diagnosis Additional Information: No value filed.    Anesthesia Type:   MAC     Note:  Airway :Room Air  Patient transferred to:Phase II  Comments: To Phase II. Report to RN.  VSS Resp status stable.      Vitals: (Last set prior to Anesthesia Care Transfer)              Electronically Signed By: KAY Bartlett CRNA  January 19, 2017  9:44 AM

## 2017-01-23 LAB — COPATH REPORT: NORMAL

## 2017-01-26 ENCOUNTER — OFFICE VISIT (OUTPATIENT)
Dept: OTOLARYNGOLOGY | Facility: CLINIC | Age: 47
End: 2017-01-26
Payer: COMMERCIAL

## 2017-01-26 DIAGNOSIS — M95.0 MOHS DEFECT OF NOSE: Primary | ICD-10-CM

## 2017-01-26 DIAGNOSIS — Z98.890 MOHS DEFECT OF NOSE: Primary | ICD-10-CM

## 2017-01-26 PROCEDURE — 99024 POSTOP FOLLOW-UP VISIT: CPT | Performed by: OTOLARYNGOLOGY

## 2017-01-26 ASSESSMENT — PAIN SCALES - GENERAL: PAINLEVEL: NO PAIN (0)

## 2017-01-26 NOTE — PATIENT INSTRUCTIONS
Patient Instructions:   Discuss the following instructions with the patient:    Now that the bolster is removed, keep the skin graft and any other areas of reconstruction covered at all times with ointment. If the reconstruction site is near your eyes, use Erythromycin Ophthalmic Ointment (ointment that is safe to get into the eyes). Otherwise, use Vaseline on the reconstruction site. Make sure the skin graft is constantly moist. Dryness could injure the skin graft.    If the incision of the donor site is completely sealed, you can stop putting Vaseline on this incision    Avoid any trauma to the reconstructed area including letting glasses or sunglasses rest on the reconstructed skin or laying on the surgery sites.     Continue to limit activities that would raise your blood pressure (bending over, heavy lifting, or strenuous activity) until 1 month after surgery in order to avoid bleeding under your reconstructed skin. One month after surgery, you can gradually resume exercise and strenuous activity, starting slow at first and increasing to full activities over two weeks.     Do not put a dressing over the reconstructed areas, especially the skin graft. One concern is that the skin graft would dry to the dressing and would be torn off with removal of the dressing.     No showering or letting the skin graft get wet for 2 weeks after the date of your surgery.     The very top layer of the skin graft often sloughs-off (a process known as superficial epidermolysis). This sloughed skin is very thin and looks like wet toilet paper.But the underlying layers are alive and healthy. Please contact our office if a thicker area of skin has sloughed off.    Also contact us if your pain suddenly worsens, the skin bubbles up, becomes hard, or darkens/bruises suddenly.    Please contact our clinic if you have questions or if problems arise: [place] office: [phone]. If it is an urgent matter when the clinic is closed,  please contact the [facility]  [phone] and ask to have Dr. Jorge Eric, or the ENT resident on-call paged. If it is a serious matter requiring immediate attention, please call 911 or go to your nearest emergency department.    We usually arrange a visit to check your reconstruction site in 1-2 weeks, and then Dr. Eric will check your reconstruction 6-8 weeks after your surgery. We can schedule those visits at this time. If you would like Dr. Eric to see your reconstruction sooner or if you have specific concerns that are best addressed by Dr. Eric, please let us know so we can arrange a sooner visit with Dr. Eric.

## 2017-01-26 NOTE — PROGRESS NOTES
"CLINICAL SUMMARY:   Diagnoses:  Left nasal tip defect from basal cell carcinoma, s/p Mohs surgery by Dr. Mendoza.    -1/19/17: stage 1- full thickness skin graft, donor = left postauricular (path = benign skin).    Comorbidities: None  Pertinent medications: None  Photographs: [facility] consents signed January 5, 2017.  Connection: Mother of 4, 2 grandchildren 1 and 5 yo, boy and girl. MA in lung transplantation at [employer] [facility] [employer].     Other: anterior midline half mattress sutures. Postauricular dermabond.  Care Checklist:    _RTC next week.        Protocol for visit 5-11 days after Skin Graft Reconstruction    \"How have you felt since surgery?\" fine.    \"Any concerns or issues since surgery?\"  1) Patient concern #1: none  2) Patient concern #2: none  3) Patient concern #3: none    No: Has your overall health changed since surgery?    No: Chest pain.  No: Trouble breathing through your mouth.  No: Calf or leg pain.    No: Significant bleeding that has not stopped?  No: Discharge from your wounds?  No: Fevers?    Pain: \"After the first 24-48 hours after surgery, we expect your pain to stay the same or slowly decrease,\"   No: \"has your pain suddenly increased?\" (this is a concerning for a hematoma.)  How would you rate your pain at this time? 0 (No pain)    Swelling: \"Some swelling is expected after surgery,\"  No: \"has your swelling suddenly increased?\" (this is concerning for a hematoma.)    Bruising: \"Some bruising is expected after surgery,\"  No: \"has bruising suddenly appeared or worsened quickly?\" (this is concerning for a hematoma.)    Functional Changes:  No: Troubles closing your eyes, eye dryness, or eye pain.   No: Nasal congestion that is new since surgery.  No: Worsened hearing since surgery.    Assess the patient's overall appearance:  No: The patient appears in distress.  No: The patient has abnormal noisy breathing (stridor).  No: The patients voice is hoarse or abnormal.    [facility] Protocol Instructions:    If the Care " Checklist instructs you to remove the bolster, remove the bolster at this time.     Remove the sutures that hold on the bolster (usually black nylon sutures).     Do not remove the sutures that hold the skin graft itself to the surrounding skin (usually resorbable sutures).     With the bolster removed,  No: The skin graft appears to have lifted off with the bolster or is missing.  No: There appears to be fluid or a bubble under the graft.  No: The skin graft has lifted off the wound bed.  No: There is bleeding from the skin graft site.  No: There is discharge or signs of infection at the skin graft site.    Assess the skin surrounding the skin graft and any additional areas of reconstruction.  No: Incisions from additional reconstructed areas have pulled apart.  No: There is bleeding.  No: There is discharge or signs of infection.    Assess all surgical sites for proper wound care.  No: The surgical site(s) appears dry and crusted indicating the need for more ointment use.    Assess for a hematoma around the skin graft or areas of additional reconstruction:  No: The skin has dark black or purple bruising.  No: The skin is hard or tense to the touch.  No: The skin is extremely tender.    Donor Site Protocol Instructions:    If there is wound tape or steri-strips over the donor site, remove the tape.    If there are visible sutures in at the donor site, remove the sutures.      Assess the donor site:  No: The donor site incision has pulled apart.  No: There is bleeding from the donor site.  No: There is discharge or signs of infection at the donor site.    Assess for a hematoma at the donor site:  No: The skin of the donor site has dark black or purple bruising.  No: The skin of the donor site is hard or tense to the touch.  No: The skin of the donor site is extremely tender.    Assess all skin (donor and reconstruction site) for signs of fungal infection or allergic reaction:  No: Have you had severe itching?  No:  "The skin has satellite areas of redness (separate and distinct red spots beyond the usual redness at the edges of every reconstruction site.)     Pathology Review Protocol Instructions:    Review any pathology from the time of surgery. May need Care Everywhere to access the pathology report from another electronic medical record.    Paste the pathology result immediately below this line.        SPECIMEN(S):   A: Skin, left nasal tip posterior   B: Skin, left nasal tip anterior inferior     FINAL DIAGNOSIS:   A.  Skin, nasal tip, left, posterior:   - Ulcer, scar and granulomatous inflammation, negative for malignancy -   (see description)     B.  Skin, nasal tip, left, anterior inferior:   - Ulcer and scar, negative for malignancy - (see description)     No: The pathology result showed a malignancy or cancer.      No: \"Would you like to see Dr. Eric sooner than the planned 6-8 week visit?\"    Ask the patient: \"How do these instructions sound? Do I need to add anything or make any changes to the instructions to make them more understandable? (Add or make changes to the instructions section now.)      Reporting Instructions:    If \"YES\" was answered to any of the questions above or you have other concerns, page Dr. Eric.to report these findings.    Document the discussion below:  Discussion with Dr. Eric: yes    Signature: Tanisha Medeiros RN  I have seen and examined the patient and agree with the nursing assessment. Any additions or addendums by me to the assessment are documented above.    Overall the patient appears healthy: NAD, no stridor, voice strong.  The surgical site(s): all tissue is 100% viable, no evidence of hematoma or infection, incisions c/d/i.  The graft appears to have a full take and the donor site is very healthy.  Jorge Eric      "

## 2017-01-26 NOTE — NURSING NOTE
"Yenny Johnson's goals for this visit include:   Chief Complaint   Patient presents with     Nose Problem     1 week postop nasal repair       She requests these members of her care team be copied on today's visit information: Cee Biggs      PCP: Cee Biggs    Referring Provider:  No referring provider defined for this encounter.    Chief Complaint   Patient presents with     Nose Problem     1 week postop nasal repair       Initial LMP 08/07/2015 Estimated body mass index is 32.27 kg/(m^2) as calculated from the following:    Height as of 1/10/17: 1.664 m (5' 5.5\").    Weight as of 1/10/17: 89.359 kg (197 lb).  BP completed using cuff size: NA (Not Taken)    Do you need any medication refills at today's visit? No    Tanisha Medeiros RN    "

## 2017-01-30 ENCOUNTER — TELEPHONE (OUTPATIENT)
Dept: OTOLARYNGOLOGY | Facility: CLINIC | Age: 47
End: 2017-01-30

## 2017-02-02 ENCOUNTER — OFFICE VISIT (OUTPATIENT)
Dept: OTOLARYNGOLOGY | Facility: CLINIC | Age: 47
End: 2017-02-02
Payer: COMMERCIAL

## 2017-02-02 DIAGNOSIS — M95.0 MOHS DEFECT OF NOSE: Primary | ICD-10-CM

## 2017-02-02 DIAGNOSIS — Z98.890 MOHS DEFECT OF NOSE: Primary | ICD-10-CM

## 2017-02-02 PROCEDURE — 99024 POSTOP FOLLOW-UP VISIT: CPT | Performed by: OTOLARYNGOLOGY

## 2017-02-02 ASSESSMENT — PAIN SCALES - GENERAL: PAINLEVEL: NO PAIN (0)

## 2017-02-02 NOTE — PROGRESS NOTES
"CLINICAL SUMMARY:   Diagnoses:  Left nasal tip defect from basal cell carcinoma, s/p Mohs surgery by Dr. Mendoza.    -1/19/17: stage 1- full thickness skin graft, donor = left postauricular (path = benign skin).    Comorbidities: None  Pertinent medications: None  Photographs: [facility] consents signed January 5, 2017.  Connection: Mother of 4, 2 grandchildren 1 and 5 yo, boy and girl. MA in lung transplantation at [facility] [place].     Other: anterior midline half mattress sutures. Postauricular dermabond.  Care Checklist:    _RTC 6-8 weeks.          Protocol for visit 12-24 days after Skin Graft Reconstruction    \"How have you felt since our last visit?\" \"Just fine, no problems at all\"..    \"Any concerns or issues since our last visit?\"  1) Patient concern #1: none  2) Patient concern #2: none  3) Patient concern #3: none    No: Has your overall health changed since our last visit?    No: Significant bleeding that has not stopped?  No: Discharge from your wounds?  No: Fevers?    Pain: \"After the first 24-48 hours after surgery, we expect your pain to stay the same or slowly decrease,\"   No: \"has your pain suddenly increased since our last visit?\" (this is concerning for a hematoma.)  How would you rate your pain at this time? 0 (No pain)    Swelling: \"Some swelling is still expected after surgery,\"  No: \"has your swelling suddenly increased since our last visit?\" (this is concerning for a hematoma.)    Bruising: \"Some bruising is expected after surgery and we expect that bruising to change colors as the bruising goes away,\"  No: \"has bruising suddenly darkened or worsened quickly?\" (this is concerning for a hematoma.)    Functional Changes:  No: Troubles closing your eyes, eye dryness, or eye pain.   No: Nasal congestion that is new since surgery.  No: Worsened hearing since surgery.    Assess the patient's overall appearance:  No: The patient appears in distress.  No: The patient has abnormal noisy breathing (stridor).  No: The " patients voice is hoarse or abnormal.    Skin Graft Suture Protocol Instructions:    Resorbable sutures hold the skin graft to the surrounding skin. If those sutures have not resorbed, remove them at this visit.    No: The skin graft is missing.  No: There appears to be fluid or a bubble under the graft.  No: The skin graft has lifted off the wound bed.  No: There is bleeding from the skin graft site.  No: There is discharge or signs of infection at the skin graft site.    Assess the skin surrounding the skin graft and any additional reconstructed areas.  No: Incisions from additional reconstructed areas have pulled apart.  No: There is bleeding.  No: There is discharge or signs of infection.    Assess for a hematoma around the skin graft or areas of additional reconstruction:  No: The skin has dark black or purple bruising.  No: The skin is hard or tense to the touch.  No: The skin is extremely tender.    Donor Site Protocol Instructions:    If there is wound tape or steri-strips over the donor site, remove the tape.    If there are visible sutures in at the donor site, remove the sutures.      Assess the donor site:  No: The donor site incision has pulled apart.  No: There is bleeding from the donor site.  No: There is discharge or signs of infection at the donor site.    Assess for a hematoma at the donor site:  No: The skin of the donor site has dark black or purple bruising.  No: The skin of the donor site is hard or tense to the touch.  No: mildly tender at lowest point of incision: The skin of the donor site is extremely tender.    Assess all surgical sites for proper wound care.  No: The surgical site(s) appears dry and crusted indicating the need for more ointment use.    Assess all skin (donor and reconstruction site) for signs of fungal infection or allergic reaction:  No: Have you had severe itching?  No: The skin has satellite areas of redness (separate and distinct red spots beyond the usual redness at  "the edges of every reconstruction site.)         No: \"Would you like to see Dr. Eric sooner than the planned 6-8 week visit?\"    Ask the patient: \"How do these instructions sound? Do I need to add anything or make any changes to the instructions to make them more understandable? (Add or make changes to the instructions section now.)        Reporting Instructions:    If \"YES\" was answered to any of the questions above or you have other concerns, page Dr. Eric.to report these findings.    Document the discussion below:  Discussion with Dr. Eric: N/A    Signature: Sosa Cowan        I have seen and examined the patient and agree with the nursing assessment. Any additions or addendums by me to the assessment are documented above.    Overall the patient appears healthy: NAD, no stridor, voice strong.  The surgical site(s): all tissue is 100% viable, no evidence of hematoma or infection, incisions c/d/i.  Overall is a 100% take with good contour. RTC 6-8 weeks. See checklist.   Jorge Eric      "

## 2017-02-02 NOTE — PATIENT INSTRUCTIONS
Patient Instructions:   Discuss the following instructions with the patient:    Keep the skin graft covered at all times with ointment. If the reconstruction site is near your eyes, use Erythromycin Ophthalmic Ointment (ointment that is safe to get into the eyes). Otherwise, use Vaseline on the skin graft. Make sure the skin graft is constantly moist. Dryness could injure the skin graft.    Keep the donor site covered at all times with Vaseline for 1 more week.    Avoid any trauma to the reconstructed area including letting glasses or sunglasses rest on the reconstructed skin or laying on the surgery sites.     Continue to limit activities that would raise your blood pressure (bending over, heavy lifting, or strenuous activity) until 1 month after surgery in order to avoid bleeding under your reconstructed skin. One month after surgery, you can gradually resume exercise and strenuous activity, starting slow at first and increasing to full activities over two weeks.     Do not put a dressing over the reconstructed areas, especially the skin graft. One concern is that the skin graft would dry to the dressing and would be torn off with removal of the dressing.     You can shower or letting the skin graft get wet. Do not scrub the skin graft or any incisions. After your shower, gently pat the skin graft dry with a clean towel and apply more Vaseline.    The very top layer of the skin graft often sloughs-off (a process known as superficial epidermolysis). This sloughed skin is very thin and looks like wet toilet paper.But the underlying layers are alive and healthy. Please contact our office if a thicker area of skin has sloughed off.    Also contact us if your pain suddenly worsens, the skin bubbles up, becomes hard, or darkens/bruises suddenly.    Please contact our clinic if you have questions or if problems arise: [place] office: [phone]. If it is an urgent matter when the clinic is closed, please  contact the [facility]  [phone] and ask to have Dr. Jorge Eric, or the ENT resident on-call paged. If it is a serious matter requiring immediate attention, please call 911 or go to your nearest emergency department.    We usually arrange for Dr. Eric to check your reconstruction 6-8 weeks after your surgery. We can schedule that visit today. If you would like Dr. Eric to see your reconstruction sooner or if you have specific concerns that are best addressed by Dr. Eric, please let us know so we can arrange a sooner visit with Dr. Eric.

## 2017-03-17 ENCOUNTER — RADIANT APPOINTMENT (OUTPATIENT)
Dept: MAMMOGRAPHY | Facility: CLINIC | Age: 47
End: 2017-03-17
Payer: COMMERCIAL

## 2017-03-17 DIAGNOSIS — E78.1 HYPERTRIGLYCERIDEMIA: ICD-10-CM

## 2017-03-17 DIAGNOSIS — Z12.31 VISIT FOR SCREENING MAMMOGRAM: ICD-10-CM

## 2017-03-17 DIAGNOSIS — R79.82 ELEVATED C-REACTIVE PROTEIN: ICD-10-CM

## 2017-03-17 DIAGNOSIS — R06.02 SOB (SHORTNESS OF BREATH): ICD-10-CM

## 2017-03-17 LAB
ANION GAP SERPL CALCULATED.3IONS-SCNC: 8 MMOL/L (ref 3–14)
BUN SERPL-MCNC: 15 MG/DL (ref 7–30)
CALCIUM SERPL-MCNC: 8.5 MG/DL (ref 8.5–10.1)
CHLORIDE SERPL-SCNC: 105 MMOL/L (ref 94–109)
CHOLEST SERPL-MCNC: 209 MG/DL
CO2 SERPL-SCNC: 27 MMOL/L (ref 20–32)
CREAT SERPL-MCNC: 1.15 MG/DL (ref 0.52–1.04)
GFR SERPL CREATININE-BSD FRML MDRD: 51 ML/MIN/1.7M2
GLUCOSE SERPL-MCNC: 93 MG/DL (ref 70–99)
HDLC SERPL-MCNC: 37 MG/DL
LDLC SERPL CALC-MCNC: ABNORMAL MG/DL
LDLC SERPL DIRECT ASSAY-MCNC: 120 MG/DL
NONHDLC SERPL-MCNC: 172 MG/DL
POTASSIUM SERPL-SCNC: 3.8 MMOL/L (ref 3.4–5.3)
SODIUM SERPL-SCNC: 140 MMOL/L (ref 133–144)
TRIGL SERPL-MCNC: 405 MG/DL

## 2017-03-17 PROCEDURE — 86140 C-REACTIVE PROTEIN: CPT | Performed by: FAMILY MEDICINE

## 2017-03-17 PROCEDURE — 80061 LIPID PANEL: CPT | Performed by: FAMILY MEDICINE

## 2017-03-17 PROCEDURE — G0202 SCR MAMMO BI INCL CAD: HCPCS | Mod: TC

## 2017-03-17 PROCEDURE — 36415 COLL VENOUS BLD VENIPUNCTURE: CPT | Performed by: FAMILY MEDICINE

## 2017-03-17 PROCEDURE — 83721 ASSAY OF BLOOD LIPOPROTEIN: CPT | Mod: 59 | Performed by: FAMILY MEDICINE

## 2017-03-17 PROCEDURE — 80048 BASIC METABOLIC PNL TOTAL CA: CPT | Performed by: FAMILY MEDICINE

## 2017-03-20 ENCOUNTER — MYC MEDICAL ADVICE (OUTPATIENT)
Dept: FAMILY MEDICINE | Facility: CLINIC | Age: 47
End: 2017-03-20

## 2017-03-20 DIAGNOSIS — R79.82 ELEVATED C-REACTIVE PROTEIN: Primary | ICD-10-CM

## 2017-03-20 PROBLEM — M95.0 MOHS DEFECT OF NOSE: Status: RESOLVED | Noted: 2017-01-05 | Resolved: 2017-03-20

## 2017-03-20 PROBLEM — Z98.890 MOHS DEFECT OF NOSE: Status: RESOLVED | Noted: 2017-01-05 | Resolved: 2017-03-20

## 2017-03-21 LAB — CRP SERPL-MCNC: 3.1 MG/L (ref 0–8)

## 2017-03-21 NOTE — TELEPHONE ENCOUNTER
Yes we can add, please future test and we will release and process ..thank you.  Tova CRYSTAL/andre

## 2017-03-21 NOTE — PROGRESS NOTES
"Yenny,    Your kidney test is some worse. Avoid medications like ibuprofen, aleve, Excedrin, or higher doses than 81 mg of aspirin. You do not have diabetes. Exercise and low-calorie balanced diet including at least 5 servings of fruits and/or vegetables daily can lower total cholesterol and improve \"good\" HDL cholesterol. Let's discuss these results at your upcoming appointment.     Cee Biggs MD    The 10-year ASCVD risk score (Luis Albertorodolfo HAWKINS Jr., et al., 2013) is: 1.2%    Values used to calculate the score:      Age: 46 years      Sex: Female      Is Non- : No      Diabetic: No      Tobacco smoker: No      Systolic Blood Pressure: 107 mmHg      Is Prescribed Antihypertensives: No      HDL Cholesterol: 37 mg/dL      Total Cholesterol: 209 mg/dL  "

## 2017-04-03 ENCOUNTER — TELEPHONE (OUTPATIENT)
Dept: FAMILY MEDICINE | Facility: CLINIC | Age: 47
End: 2017-04-03

## 2017-04-03 ENCOUNTER — MYC MEDICAL ADVICE (OUTPATIENT)
Dept: FAMILY MEDICINE | Facility: CLINIC | Age: 47
End: 2017-04-03

## 2017-04-03 DIAGNOSIS — M51.26 LUMBAR DISC HERNIATION: Primary | ICD-10-CM

## 2017-04-03 DIAGNOSIS — M51.26 LUMBAR DISC HERNIATION: ICD-10-CM

## 2017-04-03 RX ORDER — METHYLPREDNISOLONE 4 MG
4 TABLET, DOSE PACK ORAL 2 TIMES DAILY
Qty: 21 TABLET | Refills: 0 | Status: SHIPPED | OUTPATIENT
Start: 2017-04-03 | End: 2017-04-03

## 2017-04-03 RX ORDER — CYCLOBENZAPRINE HCL 10 MG
10 TABLET ORAL 3 TIMES DAILY PRN
Qty: 30 TABLET | Refills: 2 | Status: SHIPPED | OUTPATIENT
Start: 2017-04-03 | End: 2017-12-27

## 2017-04-03 RX ORDER — METHYLPREDNISOLONE 4 MG
TABLET, DOSE PACK ORAL
Qty: 21 TABLET | Refills: 0 | Status: SHIPPED | OUTPATIENT
Start: 2017-04-03 | End: 2017-06-12

## 2017-04-03 NOTE — TELEPHONE ENCOUNTER
Signed Prescriptions:                        Disp   Refills    methylPREDNISolone (MEDROL DOSEPAK) 4 MG t*21 tab*0        Sig: Follow package instructions  Authorizing Provider: JERAMY FIELDS

## 2017-04-03 NOTE — TELEPHONE ENCOUNTER
Reason for Call:  Other prescription    Detailed comments: [facility] called to get clarification on the directions for the prescription Medrol Dosepak_ [facility] pharmacy is having problems with their phones please resend via E-scribe    Phone Number Patient can be reached at: Other phone number:  [phone]    Best Time: anytime    Can we leave a detailed message on this number? No    Call taken on 4/3/2017 at 12:46 PM by Cristina Kaba

## 2017-04-04 NOTE — TELEPHONE ENCOUNTER
Called pharmacy and medication was filled and patient has already picked it up.    Re Blackwood CMA ([facility])

## 2017-04-05 ENCOUNTER — MYC MEDICAL ADVICE (OUTPATIENT)
Dept: FAMILY MEDICINE | Facility: CLINIC | Age: 47
End: 2017-04-05

## 2017-04-06 ENCOUNTER — RADIANT APPOINTMENT (OUTPATIENT)
Dept: MRI IMAGING | Facility: CLINIC | Age: 47
End: 2017-04-06
Attending: FAMILY MEDICINE
Payer: COMMERCIAL

## 2017-04-06 DIAGNOSIS — M51.26 LUMBAR DISC HERNIATION: ICD-10-CM

## 2017-04-06 PROCEDURE — 72148 MRI LUMBAR SPINE W/O DYE: CPT | Mod: TC

## 2017-05-29 DIAGNOSIS — E03.9 ACQUIRED HYPOTHYROIDISM: ICD-10-CM

## 2017-05-29 DIAGNOSIS — E04.9 GOITER: ICD-10-CM

## 2017-05-30 RX ORDER — LEVOTHYROXINE SODIUM 50 UG/1
TABLET ORAL
Qty: 90 TABLET | Refills: 0 | Status: SHIPPED | OUTPATIENT
Start: 2017-05-30 | End: 2017-08-06

## 2017-05-30 NOTE — TELEPHONE ENCOUNTER
levothyroxine (SYNTHROID, LEVOTHROID) 50 MCG tablet     Last Written Prescription Date: 5/12/16  Last Quantity: 90, # refills: 3  Last Office Visit with [facility], [facility] or [facility] prescribing provider: 1/10/17        TSH   Date Value Ref Range Status   09/22/2016 1.79 0.40 - 4.00 mU/L Final

## 2017-05-30 NOTE — TELEPHONE ENCOUNTER
Prescription approved per [facility] [facility].  Maddy Galvan, RN - BC

## 2017-06-12 ENCOUNTER — TELEPHONE (OUTPATIENT)
Dept: ANESTHESIOLOGY | Facility: CLINIC | Age: 47
End: 2017-06-12

## 2017-06-12 ENCOUNTER — PRE VISIT (OUTPATIENT)
Dept: ANESTHESIOLOGY | Facility: CLINIC | Age: 47
End: 2017-06-12

## 2017-06-12 ENCOUNTER — OFFICE VISIT (OUTPATIENT)
Dept: NEUROSURGERY | Facility: CLINIC | Age: 47
End: 2017-06-12

## 2017-06-12 VITALS
HEIGHT: 66 IN | HEART RATE: 92 BPM | WEIGHT: 198 LBS | DIASTOLIC BLOOD PRESSURE: 81 MMHG | SYSTOLIC BLOOD PRESSURE: 115 MMHG | BODY MASS INDEX: 31.82 KG/M2

## 2017-06-12 DIAGNOSIS — M53.3 CHRONIC LEFT SI JOINT PAIN: Primary | ICD-10-CM

## 2017-06-12 DIAGNOSIS — G89.29 CHRONIC LEFT SI JOINT PAIN: Primary | ICD-10-CM

## 2017-06-12 RX ORDER — OXYCODONE HYDROCHLORIDE 5 MG/1
TABLET ORAL
COMMUNITY
Start: 2017-01-19 | End: 2017-06-12

## 2017-06-12 ASSESSMENT — PAIN SCALES - GENERAL: PAINLEVEL: MODERATE PAIN (4)

## 2017-06-12 ASSESSMENT — ENCOUNTER SYMPTOMS
DECREASED APPETITE: 0
BACK PAIN: 1
POLYDIPSIA: 0
NIGHT SWEATS: 0
INCREASED ENERGY: 1
ALTERED TEMPERATURE REGULATION: 0
FEVER: 0
CHILLS: 0
WEAKNESS: 0
STIFFNESS: 1
NUMBNESS: 0
LOSS OF CONSCIOUSNESS: 0
DIZZINESS: 0
TINGLING: 0
MUSCLE CRAMPS: 1
FATIGUE: 1
WEIGHT GAIN: 1
HALLUCINATIONS: 0
MUSCLE WEAKNESS: 0
MYALGIAS: 1
POLYPHAGIA: 0
DISTURBANCES IN COORDINATION: 1
WEIGHT LOSS: 0
SPEECH CHANGE: 0
SEIZURES: 0
ARTHRALGIAS: 1
PARALYSIS: 0
MEMORY LOSS: 0
NECK PAIN: 1
HEADACHES: 1
TREMORS: 0
JOINT SWELLING: 0

## 2017-06-12 NOTE — MR AVS SNAPSHOT
After Visit Summary   6/12/2017    Yenny Johnson    MRN: [id number]           Patient Information     Date Of Birth          1970        Visit Information        Provider Department      6/12/2017 7:45 AM Adonay Kee MD [facility] Neurosurgery        Today's Diagnoses     Chronic left SI joint pain    -  1       Follow-ups after your visit        Additional Services     [facility]       Your provider has referred you to: [facility]. Please call [phone] to make an appointment.     Clinic is located: [facility] [address] [place] [address] [id number] 4th [place] [address]      Please complete the following questions:    Procedure/Referral: Procedure Only -  Procedure or injection only - please call the [facility] at [phone] to schedule:   Joint Injection: Left SI Joint    Please be aware that coverage of these services is subject to the terms and limitations of your health insurance plan.  Call member services at your health plan with any benefit or coverage questions.      Please bring the following with you to your appointment or have sent to the [facility]:    (1) Any X-Rays, CTs or MRIs which have been performed that are not in Epic.  Contact the facility where they were done to arrange for  prior to your scheduled appointment.  Any new CT, MRI or other procedures ordered by your specialist must be performed at a [facility] facility or coordinated by your clinic's referral office.    (2) List of current medications   (3) This referral request   (4) Any documents/labs given to you for this referral                  Your next 10 appointments already scheduled     Jun 28, 2017   Procedure with Benigno Willams MD   [facility] ([facility])    [address] [place] [address]  5th Floor  [facility] [id number] [phone]     "       Located in the [facility] at [address] [place], [place] [id number].   parking is very convenient and highly recommended.  is a $6 flat rate fee.  Both  and self parkers should enter the main arrival plaza from [facility]; parking attendants will direct you based on your parking preference.              Who to contact     Please call your clinic at [phone] to:    Ask questions about your health    Make or cancel appointments    Discuss your medicines    Learn about your test results    Speak to your doctor   If you have compliments or concerns about an experience at your clinic, or if you wish to file a complaint, please contact [facility] Patient Relations at [phone] or email us at [email or web address]         Additional Information About Your Visit        WanderflyharBluechilli Information     Julep gives you secure access to your electronic health record. If you see a primary care provider, you can also send messages to your care team and make appointments. If you have questions, please call your primary care clinic.  If you do not have a primary care provider, please call [phone] and they will assist you.      Julep is an electronic gateway that provides easy, online access to your medical records. With Julep, you can request a clinic appointment, read your test results, renew a prescription or communicate with your care team.     To access your existing account, please contact your [facility] Clinic or call [phone] for assistance.        Care EveryWhere ID     This is your Care EveryWhere ID. This could be used by other organizations to access your [place] medical records  [id number]        Your Vitals Were     Pulse Height Last Period BMI (Body Mass Index)          92 1.676 m (5' 6\") 08/07/2015 31.96 kg/m2         Blood Pressure from Last 3 Encounters:   06/13/17 (!) 136/97   06/12/17 115/81 "   01/19/17 107/66    Weight from Last 3 Encounters:   06/13/17 89.8 kg (198 lb)   06/12/17 89.8 kg (198 lb)   01/10/17 89.4 kg (197 lb)                 Today's Medication Changes          These changes are accurate as of: 6/12/17 11:59 PM.  If you have any questions, ask your nurse or doctor.               Stop taking these medicines if you haven't already. Please contact your care team if you have questions.     methylPREDNISolone 4 MG tablet   Commonly known as:  MEDROL DOSEPAK   Stopped by:  Adonay Kee MD           mupirocin 2 % ointment   Commonly known as:  BACTROBAN   Stopped by:  Adonay Kee MD           oxyCODONE 5 MG IR tablet   Commonly known as:  ROXICODONE   Stopped by:  Adonay Kee MD           vitamin D 2000 UNITS Caps   Stopped by:  Adonay Kee MD                    Primary Care Provider Office Phone # Fax #    Cee Biggs [phone]       [address] [address]        Equal Access to Services     [facility]: Hadii aad ku hadasho Soomaali, waaxda luqadaha, qaybta kaalmada adeegyada, mercedes varma hayahsann kita paige . So [facility] [phone].    ATENCIÓN: Si habla español, tiene a victoria disposición servicios gratuitos de asistencia lingüística. Llame al [phone].    We comply with applicable federal civil rights laws and [place] laws. We do not discriminate on the basis of race, color, national origin, age, disability sex, sexual orientation or gender identity.            Thank you!     Thank you for choosing [facility] NEUROSURGERY  for your care. Our goal is always to provide you with excellent care. Hearing back from our patients is one way we can continue to improve our services. Please take a few minutes to complete the written survey that you may receive in the mail after your visit with us. Thank you!             Your Updated Medication List - Protect others around you: Learn how to  safely use, store and throw away your medicines at www.disposemymeds.org.          This list is accurate as of: 6/12/17 11:59 PM.  Always use your most recent med list.                   Brand Name Dispense Instructions for use Diagnosis    albuterol 108 (90 BASE) MCG/ACT Inhaler    PROAIR HFA/PROVENTIL HFA/VENTOLIN HFA    1 Inhaler    Inhale 1-2 puffs into the lungs every 4 hours as needed for shortness of breath / dyspnea    SOB (shortness of breath)       cyclobenzaprine 10 MG tablet    FLEXERIL    30 tablet    Take 1 tablet (10 mg) by mouth 3 times daily as needed for muscle spasms    Lumbar disc herniation       levothyroxine 50 MCG tablet    SYNTHROID/LEVOTHROID    90 tablet    TAKE ONE TABLET BY MOUTH ONE TIME DAILY    Goiter, Acquired hypothyroidism

## 2017-06-12 NOTE — TELEPHONE ENCOUNTER
1.  Date/reason for appt: 6/13/17 8:40AM SI Joint pain  2.  Referring provider: Dr. Kee   3.  Call to patient (Yes / No - short description): No, pt was referred.   4.  Previous care at / records requested from:  [facility] Chilango MILLER -- Recs are in Epic / Images in PACS   [facility] Laisha Biggs MD -- Recs are in Epic / Images in PACS

## 2017-06-12 NOTE — PROGRESS NOTES
Please see dictated note #[id number].  Answers for HPI/ROS submitted by the patient on 6/12/2017   General Symptoms: Yes  Skin Symptoms: No  HENT Symptoms: No  EYE SYMPTOMS: No  HEART SYMPTOMS: No  LUNG SYMPTOMS: No  INTESTINAL SYMPTOMS: No  URINARY SYMPTOMS: No  GYNECOLOGIC SYMPTOMS: No  BREAST SYMPTOMS: No  SKELETAL SYMPTOMS: Yes  BLOOD SYMPTOMS: No  NERVOUS SYSTEM SYMPTOMS: Yes  MENTAL HEALTH SYMPTOMS: No  Fever: No  Loss of appetite: No  Weight loss: No  Weight gain: Yes  Fatigue: Yes  Night sweats: No  Chills: No  Increased stress: No  Excessive hunger: No  Excessive thirst: No  Feeling hot or cold when others believe the temperature is normal: No  Loss of height: No  Post-operative complications: No  Surgical site pain: No  Hallucinations: No  Change in or Loss of Energy: Yes  Hyperactivity: No  Confusion: No  Back pain: Yes  Muscle aches: Yes  Neck pain: Yes  Swollen joints: No  Joint pain: Yes  Bone pain: Yes  Muscle cramps: Yes  Muscle weakness: No  Joint stiffness: Yes  Bone fracture: No  Trouble with coordination: Yes  Dizziness or trouble with balance: No  Fainting or black-out spells: No  Memory loss: No  Headache: Yes  Seizures: No  Speech problems: No  Tingling: No  Tremor: No  Weakness: No  Difficulty walking: No  Paralysis: No  Numbness: No

## 2017-06-12 NOTE — TELEPHONE ENCOUNTER
Reminder call placed to pt.   Pt reminded of Provider, date and time of the appointment.   Pt was asked to arrive 15 minutes early to fill out the questionnaires.   Clinic phone number provided if pt needed to reschedule.     Mailbox was full unable to leave .     Abby Cortes, BAIRON

## 2017-06-12 NOTE — LETTER
2017       RE: Yenny Johnson  [address] [facility] [address] [facility] [address]     Dear Colleague,    Thank you for referring your patient, Yenny Johnson, to the [facility] NEUROSURGERY at [facility]. Please see a copy of my visit note below.    Please see dictated note #[id number].    HISTORY OF PRESENT ILLNESS:  Ms. Johnson is a 46-year-old female seen in Neurosurgery Clinic today for evaluation of low back pain.  The pain started approximately 2 years ago when she was getting out of a car and experienced acute onset of low back pain that radiates in varying degrees of intensity into her left thigh.  She also experiences intermittent episodes of shooting pain into her left buttock.  She has not experienced any weakness, though she does experience intermittent paresthesias in her bilateral feet in a stocking glove distribution.  In addition of the low back pain, which is her most prominent symptom, she experiences intermittent bouts of what she describes as aching pain in her left femur to her knee. This is a deep-seated pain.      She has undergone epidural steroid injections which may or may not have been helpful, as well as physical therapy and chiropractic therapy, which have slightly alleviated her symptoms.  She notes that NSAIDs are particularly effective in alleviated her pain; however, given her worsening renal function, she was advised not to take this medication.      PAST MEDICAL HISTORY:   1.  Stress incontinence.   2.  Obesity.   3.  Hypothyroidism.   4.  Hyperlipidemia.   5.  GERD,   6.  Elevated rheumatoid factor   7.  Elevated fasting glucose   8.  Depressive disorder.   9.  CKD stage III.   10.  Basal cell carcinoma of the skin.   11.  Anemia.      PAST SURGICAL HISTORY:   1.  Tubal ligation.   2.  Mohs surgery.   3.  Hysterectomy   4.  Cholecystectomy.   5.  , classical.      SOCIAL HISTORY:  She has a 14-pack-year history of smoking, denies  alcohol use, denies drug use.  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      MEDICATIONS:   1.  Levothyroxine.   2.  Cyclobenzaprine.   3.  Albuterol.   4.  Fluoxetine.      ALLERGIES:  Codeine, gabapentin.      PHYSICAL EXAMINATION:   GENERAL:  This is a middle-aged female sitting in exam chair in no acute distress.   STRENGTH:  5/5 and symmetric in the lower extremities.   REFLEXES:  2/4 and symmetric throughout the lower extremities.   SENSATION:  Intact to pinprick throughout the lower extremities.   GAIT:  Normal.   STRAIGHT LEG RAISE:  Negative.   CHARITO:  Negative.    MUSCULOSKELETAL:  Palpation of the midline and the paraspinous region of the lumbar spine demonstrates no focal tenderness nor does palpation in the left or right SI joints.      IMAGING:  MRI imaging of the lumbar spine acquired 04/06/2017 was reviewed and demonstrates a disk herniation at the level of L5-S1 with no clear impingement of the traversing nerve roots.   Moderate foraminal stenosis is noted at L5-S1, left greater than right.  Otherwise, no significant central stenosis or other levels of significant foraminal stenosis.      ASSESSMENT:  Ms. Johnson is a 46-year-old female with low back pain and pain in her buttock which is not clearly radicular in nature.  Given the location of her symptoms, as well as lack of findings on MRI imaging, we recommend further evaluation to determine what subsequent therapies may be most beneficial to her      PLAN:   1.  Referral for left SI joint injection.  If it is helpful, we will consider referral to Dr. Desir for further evaluation.   2.  If SI joint injection is not helpful, we will refer for left L5-S1 facet block.     I saw the patient with the resident.  I have reviewed and edited the resident note and agree with the plan of care.        SHEYLA BAUM MD       As dictated by SRINIVAS MELTON MD, PHD, PGY1 neurosurgery resident.

## 2017-06-12 NOTE — NURSING NOTE
Chief Complaint   Patient presents with     Consult     Lumbar disc herniation/EPIC/PACS     Eugenie horan

## 2017-06-13 ENCOUNTER — OFFICE VISIT (OUTPATIENT)
Dept: ANESTHESIOLOGY | Facility: CLINIC | Age: 47
End: 2017-06-13

## 2017-06-13 ENCOUNTER — TELEPHONE (OUTPATIENT)
Dept: ANESTHESIOLOGY | Facility: CLINIC | Age: 47
End: 2017-06-13

## 2017-06-13 VITALS
WEIGHT: 198 LBS | HEIGHT: 66 IN | HEART RATE: 75 BPM | SYSTOLIC BLOOD PRESSURE: 136 MMHG | BODY MASS INDEX: 31.82 KG/M2 | OXYGEN SATURATION: 99 % | DIASTOLIC BLOOD PRESSURE: 97 MMHG

## 2017-06-13 DIAGNOSIS — M79.18 MYOFASCIAL PAIN SYNDROME: ICD-10-CM

## 2017-06-13 DIAGNOSIS — M47.819 FACET ARTHROPATHY: Primary | ICD-10-CM

## 2017-06-13 RX ORDER — METHOCARBAMOL 500 MG/1
500-1000 TABLET, FILM COATED ORAL 3 TIMES DAILY PRN
Qty: 240 TABLET | Refills: 1 | Status: SHIPPED | OUTPATIENT
Start: 2017-06-13 | End: 2018-01-25

## 2017-06-13 ASSESSMENT — ENCOUNTER SYMPTOMS
LOSS OF CONSCIOUSNESS: 0
DISTURBANCES IN COORDINATION: 1
TINGLING: 0
SPEECH CHANGE: 0
WEAKNESS: 0
HEADACHES: 1
NUMBNESS: 0
PARALYSIS: 0
SEIZURES: 0
DIZZINESS: 0
MEMORY LOSS: 0

## 2017-06-13 ASSESSMENT — ANXIETY QUESTIONNAIRES
4. TROUBLE RELAXING: NOT AT ALL
GAD7 TOTAL SCORE: 0
2. NOT BEING ABLE TO STOP OR CONTROL WORRYING: NOT AT ALL
6. BECOMING EASILY ANNOYED OR IRRITABLE: NOT AT ALL
GAD7 TOTAL SCORE: 0
5. BEING SO RESTLESS THAT IT IS HARD TO SIT STILL: NOT AT ALL
3. WORRYING TOO MUCH ABOUT DIFFERENT THINGS: NOT AT ALL
7. FEELING AFRAID AS IF SOMETHING AWFUL MIGHT HAPPEN: NOT AT ALL
GAD7 TOTAL SCORE: 0
7. FEELING AFRAID AS IF SOMETHING AWFUL MIGHT HAPPEN: NOT AT ALL
1. FEELING NERVOUS, ANXIOUS, OR ON EDGE: NOT AT ALL

## 2017-06-13 ASSESSMENT — PAIN SCALES - GENERAL: PAINLEVEL: SEVERE PAIN (7)

## 2017-06-13 NOTE — MR AVS SNAPSHOT
After Visit Summary   6/13/2017    Yenny Johnson    MRN: [id number]           Patient Information     Date Of Birth          1970        Visit Information        Provider Department      6/13/2017 8:40 AM Benigno Willams MD [facility]        Today's Diagnoses     Facet arthropathy (H)    -  1    Myofascial pain syndrome          Care Instructions    1. Schedule for procedure    2. Stop taking flexeril. Start taking muscle relaxer robaxin    Take 500-1000mg up to three times per day as needed.    3. Stop diclofenac gel    4. Make an appointment with Antonio Srinivasan for pain physical therapy      Please call Daya at [phone] to schedule your procedure. She is available Monday - Friday from 9-5:30pm, if she is unavailable to take your call, please leave her a voice message with your name, birth date, and phone number and she will call you back.    Your procedure: Bilateral lumbar medial branch nerve blocks    On the day of the procedure  1. Arrive 1 hour earlier than your scheduled time, to the [facility]  Address: [address] [place] [address], [place] [address]  2. Check in on the 5th floor for your procedure    A nurse will call you 1 hour after your procedure to follow up.    If you must reschedule your procedure more than two times, you must follow up in clinic before rescheduling again.      Patient Pre-Procedure Instructions    CAUTION - FAILURE TO FOLLOW THESE PRE-PROCEDURE INSTRUCTIONS WILL RESULT IN YOUR PROCEDURE BEING RESCHEDULED.      Pregnancy  If you are pregnant, or think that you may be pregnant, please notify our staff. This may or may not affect the ability to perform the procedure.     You MUST have a  TO TAKE YOU HOME after your procedure. Transportation by Taxi or Para-transit must have a responsible adult accompany you home (other than the ). Travel by bus or light rail is not acceptable  transportation. You must provide your 's full name and contact number at time of check in.   Fasting Protocol You may have NOTHING SOLID TO EAT FOR 8 HOURS prior to arrival at the procedure area.   Broth and candy are considered solid food and require an eight hour fast.   You may have CLEAR LIQUIDS UP TO 2 HOURS prior to arrival at the clinic.   Clear liquids include water, clear fruit juice (no pulp) carbonated beverages, ice, black coffee, black tea (no milk or cream), chewing gum (un-swallowed), and/or clear jello (no fruit or milk). No alcohol containing beverages.   Medications If you take any medications,  DO NOT STOP. Take your medications as usual the morning of your procedure with a sip of water AT LEAST 2 HOURS PRIOR TO ARRIVAL.    Antibiotics If you are currently taking antibiotics, you must complete the entire dose 7 days prior to your scheduled procedure. You must be clear of any signs or symptoms of infection. If you begin antibiotics, please contact our clinic for instructions.   Fever, Chills, or Rash If you experience a fever of higher than 100 degrees, chills, rash, or open wounds during the one week prior to your procedure, please call the clinic.         Medication Hold List  **Patients under Cardiology/Neurology care should consult their provider prior to the pain procedure to verify pre-procedure medication instructions. The information below contains general guidelines.**    Blood Thinners If you are taking daily ASPIRIN, PLAVIX, OR OTHER BLOOD THINNERS SUCH AS COUMADIN/WARFARIN, we will need your prescribing doctor to sign a release permitting you to stop these medications. Once approved by your prescribing doctor - STOP ALL BLOOD THINNERS BASED ON THE TIME TABLE BELOW PRIOR TO YOUR PROCEDURE. If you have been instructed to stop WARFARIN(COUMADIN), you must have an INR lab drawn the day before your procedure. . Your INR must be within normal limits before we can perform your injection.  MEDICATIONS CAN BE RESTARTED AFTER YOUR PROCEDURE.    14 DAY HOLD  Ticlid (ticlopidine)    10 DAY HOLD  Effient (Prasugel)    3 DAY HOLD  Xarelto (rivaroxaban) 7 DAY HOLD  Anacin, Bufferin, Ecotrin, Excedrin, Aggrenox (Aspirin)  Brilinta (ticagrelor)  Coumadin (Warfarin)  Pradexa (Dabigatran)  Elmiron (Pentosan)  Plavix (Clopidogrel Bisulfate)  Pletal (Cilostazol)    24 DAY HOLD  Lovenox (enoxaparin)  Agrylin (Anagrelide)        Non-steroidal Anti-inflammatories (NSAIDs) DO NOT TAKE any non-steroidal anti-inflammatory medications (NSAIDs) listed on the table below. MEDICATIONS CAN BE RESTARTED AFTER YOUR PROCEDURE. Celebrex is OK to take and does not need to be discontinued.     Medications to stop:  3 DAY HOLD  Advil, Motrin (Ibuprofen)  Arthrotec (diciofenac sodium/misoprostol)  Clinoril (Sulindac)  Indocin (Indomethacin)  Lodine (Etodolac)  Toradol (Ketorolac)  Vicoprofen (Hydrocodone and Ibuprofen)  Voltaren (Diclotenac)    14 DAY HOLD  Daypro (Oxaprozin)  Feldene (Piroxicam)   7 DAY HOLD  Aleve (Naproxen sodium)  Darvon compound (contains aspirin)  Naprosyn (Naproxen)  Norgesic Forte (contains aspirin)  Mobic (Meloxicam)  Oruvall (Ketoprofen)  Percodan (contains aspirin)  Relafen (Nabumetone)  Salsalate  Trilisate  Vitamin E (more than 400 mg per day)  Any medication containing aspirin                To speak with a nurse, schedule/reschedule/cancel a clinic appointment, or request a medication refill call: [phone]     You can also reach us by [employer]: [email or web address]            Follow-ups after your visit        Additional Services     PAIN PT EVAL AND TREAT                 Future tests that were ordered for you today     Open Future Orders        Priority Expected Expires Ordered    [facility] Routine 6/16/2017 6/12/2018 6/12/2017            Who to contact     Please call your clinic at [phone] to:    Ask questions about your health    Make or  "cancel appointments    Discuss your medicines    Learn about your test results    Speak to your doctor   If you have compliments or concerns about an experience at your clinic, or if you wish to file a complaint, please contact [facility] Patient Relations at [phone] or email us at [email or web address]         Additional Information About Your Visit        [employer] Information     [employer] gives you secure access to your electronic health record. If you see a primary care provider, you can also send messages to your care team and make appointments. If you have questions, please call your primary care clinic.  If you do not have a primary care provider, please call [phone] and they will assist you.      [employer] is an electronic gateway that provides easy, online access to your medical records. With [employer], you can request a clinic appointment, read your test results, renew a prescription or communicate with your care team.     To access your existing account, please contact your [facility] or call [phone] for assistance.        Care EveryWhere ID     This is your Care EveryWhere ID. This could be used by other organizations to access your [place] medical records  [id number]        Your Vitals Were     Pulse Height Last Period Pulse Oximetry BMI (Body Mass Index)       75 1.676 m (5' 6\") 08/07/2015 99% 31.96 kg/m2        Blood Pressure from Last 3 Encounters:   06/13/17 (!) 136/97   06/12/17 115/81   01/19/17 107/66    Weight from Last 3 Encounters:   06/13/17 89.8 kg (198 lb)   06/12/17 89.8 kg (198 lb)   01/10/17 89.4 kg (197 lb)              We Performed the Following     PAIN PT EVAL AND TREAT     [facility]ody-Operative Worksheet - Lumbar Medial Branch Nerve Block Injection          Today's Medication Changes          These changes are accurate as of: 6/13/17  9:30 AM.  If you have any questions, ask your nurse or doctor.             "   Start taking these medicines.        Dose/Directions    diclofenac 1 % Gel topical gel   Commonly known as:  VOLTAREN   Used for:  Myofascial pain syndrome, Facet arthropathy (H)        Apply 4 grams to knees or 2 grams to hands four times daily using enclosed dosing card.   Quantity:  100 g   Refills:  1       methocarbamol 500 MG tablet   Commonly known as:  ROBAXIN   Used for:  Facet arthropathy (H), Myofascial pain syndrome        Dose:  500-1000 mg   Take 1-2 tablets (500-1,000 mg) by mouth 3 times daily as needed for muscle spasms   Quantity:  240 tablet   Refills:  1            Where to get your medicines      These medications were sent to [id number] [place] - [address]  [address] [place] [address]     Phone:  [phone]     methocarbamol 500 MG tablet         Call your pharmacy to confirm that your medication is ready for pickup. It may take up to 24 hours for them to receive the prescription. If the prescription is not ready within 3 business days, please contact your clinic or your provider.     We will let you know when these medications are ready. If you don't hear back within 3 business days, please contact us.     diclofenac 1 % Gel topical gel                Primary Care Provider Office Phone # Fax #    Cee Biggs [phone]       [facility] [address] [address] [address]        Thank you!     Thank you for choosing [facility]  for your care. Our goal is always to provide you with excellent care. Hearing back from our patients is one way we can continue to improve our services. Please take a few minutes to complete the written survey that you may receive in the mail after your visit with us. Thank you!             Your Updated Medication List - Protect others around you: Learn how to safely use, store and throw away your medicines at www.disposemymeds.org.          This list is  accurate as of: 6/13/17  9:30 AM.  Always use your most recent med list.                   Brand Name Dispense Instructions for use    albuterol 108 (90 BASE) MCG/ACT Inhaler    PROAIR HFA/PROVENTIL HFA/VENTOLIN HFA    1 Inhaler    Inhale 1-2 puffs into the lungs every 4 hours as needed for shortness of breath / dyspnea       cyclobenzaprine 10 MG tablet    FLEXERIL    30 tablet    Take 1 tablet (10 mg) by mouth 3 times daily as needed for muscle spasms       diclofenac 1 % Gel topical gel    VOLTAREN    100 g    Apply 4 grams to knees or 2 grams to hands four times daily using enclosed dosing card.       FLUoxetine 20 MG capsule    PROzac    270 capsule    Take 3 capsules (60 mg) by mouth daily       levothyroxine 50 MCG tablet    SYNTHROID/LEVOTHROID    90 tablet    TAKE ONE TABLET BY MOUTH ONE TIME DAILY       methocarbamol 500 MG tablet    ROBAXIN    240 tablet    Take 1-2 tablets (500-1,000 mg) by mouth 3 times daily as needed for muscle spasms

## 2017-06-13 NOTE — PATIENT INSTRUCTIONS
1. Schedule for procedure    2. Stop taking flexeril. Start taking muscle relaxer robaxin    Take 500-1000mg up to three times per day as needed.    3. Start diclofenac gel    4. Make an appointment with Antonio Srinivasan for pain physical therapy, [phone]      Please call Daya at [phone] to schedule your procedure. She is available Monday - Friday from 9-5:30pm, if she is unavailable to take your call, please leave her a voice message with your name, birth date, and phone number and she will call you back.    Your procedure: Bilateral lumbar medial branch nerve blocks    On the day of the procedure  1. Arrive 1 hour earlier than your scheduled time, to the [facility]  Address: [address] [place] [address], [place] [address]  2. Check in on the 5th floor for your procedure    A nurse will call you 1 hour after your procedure to follow up.    If you must reschedule your procedure more than two times, you must follow up in clinic before rescheduling again.      Patient Pre-Procedure Instructions    CAUTION - FAILURE TO FOLLOW THESE PRE-PROCEDURE INSTRUCTIONS WILL RESULT IN YOUR PROCEDURE BEING RESCHEDULED.      Pregnancy  If you are pregnant, or think that you may be pregnant, please notify our staff. This may or may not affect the ability to perform the procedure.     You MUST have a  TO TAKE YOU HOME after your procedure. Transportation by Taxi or Para-transit must have a responsible adult accompany you home (other than the ). Travel by bus or light rail is not acceptable transportation. You must provide your 's full name and contact number at time of check in.   Fasting Protocol You may have NOTHING SOLID TO EAT FOR 8 HOURS prior to arrival at the procedure area.   Broth and candy are considered solid food and require an eight hour fast.   You may have CLEAR LIQUIDS UP TO 2 HOURS prior to arrival at the clinic.   Clear liquids include water, clear fruit juice (no  pulp) carbonated beverages, ice, black coffee, black tea (no milk or cream), chewing gum (un-swallowed), and/or clear jello (no fruit or milk). No alcohol containing beverages.   Medications If you take any medications,  DO NOT STOP. Take your medications as usual the morning of your procedure with a sip of water AT LEAST 2 HOURS PRIOR TO ARRIVAL.    Antibiotics If you are currently taking antibiotics, you must complete the entire dose 7 days prior to your scheduled procedure. You must be clear of any signs or symptoms of infection. If you begin antibiotics, please contact our clinic for instructions.   Fever, Chills, or Rash If you experience a fever of higher than 100 degrees, chills, rash, or open wounds during the one week prior to your procedure, please call the clinic.         Medication Hold List  **Patients under Cardiology/Neurology care should consult their provider prior to the pain procedure to verify pre-procedure medication instructions. The information below contains general guidelines.**    Blood Thinners If you are taking daily ASPIRIN, PLAVIX, OR OTHER BLOOD THINNERS SUCH AS COUMADIN/WARFARIN, we will need your prescribing doctor to sign a release permitting you to stop these medications. Once approved by your prescribing doctor - STOP ALL BLOOD THINNERS BASED ON THE TIME TABLE BELOW PRIOR TO YOUR PROCEDURE. If you have been instructed to stop WARFARIN(COUMADIN), you must have an INR lab drawn the day before your procedure. . Your INR must be within normal limits before we can perform your injection. MEDICATIONS CAN BE RESTARTED AFTER YOUR PROCEDURE.    14 DAY HOLD  Ticlid (ticlopidine)    10 DAY HOLD  Effient (Prasugel)    3 DAY HOLD  Xarelto (rivaroxaban) 7 DAY HOLD  Anacin, Bufferin, Ecotrin, Excedrin, Aggrenox (Aspirin)  Brilinta (ticagrelor)  Coumadin (Warfarin)  Pradexa (Dabigatran)  Elmiron (Pentosan)  Plavix (Clopidogrel Bisulfate)  Pletal (Cilostazol)    24 DAY HOLD  Lovenox  (enoxaparin)  Agrylin (Anagrelide)        Non-steroidal Anti-inflammatories (NSAIDs) DO NOT TAKE any non-steroidal anti-inflammatory medications (NSAIDs) listed on the table below. MEDICATIONS CAN BE RESTARTED AFTER YOUR PROCEDURE. Celebrex is OK to take and does not need to be discontinued.     Medications to stop:  3 DAY HOLD  Advil, Motrin (Ibuprofen)  Arthrotec (diciofenac sodium/misoprostol)  Clinoril (Sulindac)  Indocin (Indomethacin)  Lodine (Etodolac)  Toradol (Ketorolac)  Vicoprofen (Hydrocodone and Ibuprofen)  Voltaren (Diclotenac)    14 DAY HOLD  Daypro (Oxaprozin)  Feldene (Piroxicam)   7 DAY HOLD  Aleve (Naproxen sodium)  Darvon compound (contains aspirin)  Naprosyn (Naproxen)  Norgesic Forte (contains aspirin)  Mobic (Meloxicam)  Oruvall (Ketoprofen)  Percodan (contains aspirin)  Relafen (Nabumetone)  Salsalate  Trilisate  Vitamin E (more than 400 mg per day)  Any medication containing aspirin                To speak with a nurse, schedule/reschedule/cancel a clinic appointment, or request a medication refill call: [phone]     You can also reach us by [employer]: [email or web address]

## 2017-06-13 NOTE — LETTER
6/13/2017       RE: Yenny Johnson  [address] [place] [address]     Dear Colleague,    Thank you for referring your patient, Yenny Johnson, to the [facility] [place] [facility] FOR [facility]HENSIVE PAIN MANAGEMENT at [facility]. Please see a copy of my visit note below.    Pain Management Center Consultation    Date of visit: 6/13/2017    Primary Care Provider: Dr. Cee Biggs    Consulting Physician: Dr. Kee     Reason for consultation: Sacroiliac joint pain    History of Present Illness: Yenny Johnson is a 46 year old female with history of obesity, GERD, hypothyroid, CKD, stress incontinence, who presents for initial evaluation. The patient reports that her pain started insidiously several years ago and worsened 2-3 years ago. She had 2x L5 TFESI on 1/4/16 and 3/14/16 by Dr. Buckner that were not beneficial. She got chiropractic and PT which were somewhat beneficial. She saw Russ German yesterday who referred her to our clinic to consider a Left SI joint injection.    Pain location: low back, L>R. Radiates on left side to buttocks and rarely thigh  Quality and timing of pain: aching  Pain rating: intensity ranges from 4/10 to 7/10, and Averages 5/10 on a 0-10 scale.  Aggravating factors include: walking and twisting  Relieving factors include: rest  Denies red flags including: bowel or bladder symptoms, fever, chills, saddle anesthesia, profound motor loss, history of cancer, history of immune compromise, weight loss.     Current pain treatments include:   1. Flexeril 10 TID, not beneficial, causes drowsiness  2. Ibuprofen prn    Previous pain treatments included:  Yenny Johnson has been seen at a pain clinic in the past.  Ger Reyes for procedure  Medications: Lidocaine patch-not beneficial  PT: yes  Psychology: no  Acupuncture: no  Chiropractic care: yes  TENS Unit: no  Injections: 2x L5 TFESI on 1/4/16 and 3/14/16 by Dr. Buckner that were not  beneficial  Surgery: no    Diagnostic Tests:  Lumbar MRI completed on 4/6/17 showed:    FINDINGS:  Five lumbar vertebrae are assumed. Fairly prominent  degenerative disc disease at L5-S1.      Findings by specific level:     There is facet degenerative change as follows: Minimal left L4-L5,  mild bilateral L5-S1.     L2-L3, L3-L4: No disc herniation or stenosis.     L4-L5: Disc bulging without central stenosis. The right neural foramen  appears adequate. Mild left foraminal narrowing.     L5-S1: Mild-moderate central disc protrusion. There is disc bulging  elsewhere and some osteophytosis. There is congenital tapering of the  thecal sac without significant thecal sac compression. There is  moderate stenosis at the medial aspects of both neural foramina.      IMPRESSION:  1. The findings appear similar to the previous exam, except for  increased disc space narrowing at L5-S1.  2. L5-S1: Mild-moderate central disc protrusion. Moderate bilateral  foraminal stenosis medially.    Review of [place] Prescription Monitoring Program (): Today I have also reviewed the patient's history of controlled substance use, as provided by [place] licensed pharmacies and prescriber dispensers.     Review of Pain Questionnaire: Please see the [facility] [place] health questionnaire, which the patient completed and reviewed with me in detail.    Review of Electronic Chart: Today I have also reviewed available medical information in the patient's medical record at [facility] ([facility]), including relevant provider notes, laboratory work, and imaging.     Past Medical History:  Past Medical History:   Diagnosis Date     Anemia      BCC (basal cell carcinoma of skin)      CKD (chronic kidney disease) stage 3, GFR 30-59 ml/min      Degeneration of lumbar or lumbosacral intervertebral disc      Depressive disorder      Elevated fasting glucose      Elevated rheumatoid factor 12/12/2012     GERD (gastroesophageal reflux disease)  11/2005    EGD     Hyperlipidemia LDL goal <100      Hypothyroidism      Lumbar disc herniation      Obesity 3/29/2012     PMDD (premenstrual dysphoric disorder)      Stress incontinence, female 3/29/2012       Past Surgical History:  Past Surgical History:   Procedure Laterality Date     BIOPSY       C/SECTION, CLASSICAL       CHOLECYSTECTOMY, LAPOROSCOPIC  1995     HYSTERECTOMY, PAP NO LONGER INDICATED  09/23/2015     REPAIR MOHS Left 1/19/2017    Procedure: REPAIR MOHS;  Surgeon: Jorge Eric MD;  Location: [facility] OR     TUBAL LIGATION         Medications:  Current Outpatient Prescriptions   Medication Sig Dispense Refill     levothyroxine (SYNTHROID/LEVOTHROID) 50 MCG tablet TAKE ONE TABLET BY MOUTH ONE TIME DAILY 90 tablet 0     FLUoxetine (PROZAC) 20 MG capsule Take 3 capsules (60 mg) by mouth daily 270 capsule 3     cyclobenzaprine (FLEXERIL) 10 MG tablet Take 1 tablet (10 mg) by mouth 3 times daily as needed for muscle spasms 30 tablet 2     albuterol (PROAIR HFA, PROVENTIL HFA, VENTOLIN HFA) 108 (90 BASE) MCG/ACT inhaler Inhale 1-2 puffs into the lungs every 4 hours as needed for shortness of breath / dyspnea 1 Inhaler 0       Allergies:     Allergies   Allergen Reactions     Codeine Sulfate      Disorientation, muscle weakness     Gabapentin      sleepiness       Social History:  Social History     Social History     Marital status:      Spouse name: Pasquale     Number of children: 4     Years of education: 15     Occupational History     sales, floor, overnight stocking [employer]      [employer]  Other     [employer] Student     Social History Main Topics     Smoking status: Former Smoker     Packs/day: 1.00     Years: 14.00     Types: Cigarettes     Quit date: 1/1/1998     Smokeless tobacco: Never Used     Alcohol use No     Drug use: No     Sexual activity: Yes     Partners: Male     Birth control/ protection: Female Surgical      Comment: tubal ligation     Other Topics Concern     Parent/Sibling  W/ Cabg, Mi Or Angioplasty Before 65f 55m? Yes      Service No     Blood Transfusions No     Caffeine Concern No     Occupational Exposure No     Hobby Hazards No     Sleep Concern No     Stress Concern No     Weight Concern No     Special Diet No     Back Care No     Exercise Yes     Bike Helmet No     Seat Belt Yes     Self-Exams Yes     Social History Narrative    .    4 children.    Does .     History of chemical dependency treatment: no  Home situation: lives with their family    Family history:  Family History   Problem Relation Age of Onset     C.A.D. Father 67     MI     Hypertension Father      Alcohol/Drug Father      Cardiovascular Mother      CHF      Coronary Artery Disease Mother      Hyperlipidemia Mother      Allergies Maternal Grandmother      DIABETES Maternal Grandmother      DIABETES Paternal Grandfather      Allergies Brother      Asthma Brother      DIABETES Brother      Coronary Artery Disease Brother      Asthma Brother      Obesity Sister      CANCER No family hx of        Review of Systems:   General: negative for fever or chills, weight loss  Eyes and Head: negative for headache, dizziness, vision changes  Ears/Nose/Throat: negative for nose bleeds, hearing loss, sinus infection, earache, and ringing in ears  Immune system: negative for immune deficiency, infections  Skin: negative for itching, rash  Hematologic: negative for anemia, bleeding problems  Respiratory: negative for cough, shortness of breath  Cardiovascular: negative for leg swelling, high blood pressure, chest pain  Gastrointestinal: negative for nausea or vomiting, constipation, diarrhea  Endocrine: negative for diabetes, osteoporosis  Musculoskeletal: negative except as discussed in the HPI above  Urologic: negative for urinary urgency, incontinence  Neurologic: negative for seizure, tremor  Mental health:Negative for: suicidal thoughts    Physical Exam:  Vitals:    06/13/17 0854   BP: (!) 136/97  "  Pulse: 75   SpO2: 99%   Weight: 89.8 kg (198 lb)   Height: 1.676 m (5' 6\")     Exam:  Constitutional: healthy, alert and no distress  Head: normocephalic. Atraumatic.   Eyes: no redness or jaundice noted   ENT: oropharnx normal.  MMM.  Neck supple.    Cardiovascular: RRR no m/g/r   Respiratory: clear   Gastrointestinal: soft, non-tender, normoactive bowel sounds   : deferred  Skin: no suspicious lesions or rashes  Psychiatric: mentation appears normal and affect normal/bright    Musculoskeletal exam:  Gait/Station/Posture: normal  Lumbar spine: normal  Bilateral lumbar paraspinal tenderness  Lumbar facet loading test positive biolateral  Straight leg exam: negative  Jaspal's maneuver: negative  Sacroiliac (SI) joint tenderness: minimal positve left  Piriformis tenderness: mild positive left    Neurologic exam:  Motor:  5/5 UE and LE strength    Reflexes:     Biceps:     R:  2/4 L: 2/4   Brachioradialis   R:  2/4 L: 2/4   Triceps:  R:  2/4 L: 2/4   Patella:  R:  2/4 L: 2/4   Achilles:  R:  2/4 L: 2/4   Sensory:  (upper and lower extremities):   Light touch: normal    Allodynia: absent    Hyperalgesia: absent       Assessment:  1. Chronic lower back predominantly axial pain likely related to lumbar spondylosis with facet arthropathy and myofascial pain syndrome. There is also a minor component of left SI joint dysfunction and piriformis muscle syndrome.  2. CKD  3. GERD  4. Hypothyroid    Plan:  Diagnosis reviewed, treatment option addressed, and risk/benefits discussed.  Self-care instructions given.  I am recommending a multidisciplinary treatment plan to help this patient better manage her pain.      1. Physical Therapy: Ordered Pain PT.   2. Clinical Health Psychologist to address issues of relaxation, behavioral change, coping style, and other factors important to improvement: NO  3. Medication Management:   1. Stop flexeril due to side-effect of drowsiness.  2. Start Robaxin 500-1000 mg TID PRN  3. Start " Voltaren gel  4. Further procedures recommended: (1)Ordered bilateral L3,4,5 MBB/RFA (2) Consider left SIJ injection and piriformis injection if MBB are not diagnostic.  5. Follow up: as scheduled for MBB.      Holger Ford MD  Pain Fellow  [facility]    Total time spent was 60 minutes, and more than 50% of face to face time was spent in counseling and/or coordination of care regarding principles of multidisciplinary care, medication management, and treatment options as discussed above.  I saw and examined the patient with this fellow and agree with the findings and the plan of care as documented in the fellow's note.  Benigno Willams IV, MD  Attending Pain Physician

## 2017-06-13 NOTE — NURSING NOTE
After visit summary given and reviewed with Yenny.    We discussed pre-procedure instructions for a bilateral lumbar medial branch nerve blocks. Patient advised to hold medications according to protocol, NPO, .    Yenny states her understanding.

## 2017-06-13 NOTE — PROGRESS NOTES
HISTORY OF PRESENT ILLNESS:  Ms. Johnson is a 46-year-old female seen in Neurosurgery Clinic today for evaluation of low back pain.  The pain started approximately 2 years ago when she was getting out of a car and experienced acute onset of low back pain that radiates in varying degrees of intensity into her left thigh.  She also experiences intermittent episodes of shooting pain into her left buttock.  She has not experienced any weakness, though she does experience intermittent paresthesias in her bilateral feet in a stocking glove distribution.  In addition of the low back pain, which is her most prominent symptom, she experiences intermittent bouts of what she describes as aching pain in her left femur to her knee. This is a deep-seated pain.      She has undergone epidural steroid injections which may or may not have been helpful, as well as physical therapy and chiropractic therapy, which have slightly alleviated her symptoms.  She notes that NSAIDs are particularly effective in alleviated her pain; however, given her worsening renal function, she was advised not to take this medication.      PAST MEDICAL HISTORY:   1.  Stress incontinence.   2.  Obesity.   3.  Hypothyroidism.   4.  Hyperlipidemia.   5.  GERD,   6.  Elevated rheumatoid factor   7.  Elevated fasting glucose   8.  Depressive disorder.   9.  CKD stage III.   10.  Basal cell carcinoma of the skin.   11.  Anemia.      PAST SURGICAL HISTORY:   1.  Tubal ligation.   2.  Mohs surgery.   3.  Hysterectomy   4.  Cholecystectomy.   5.  , classical.      SOCIAL HISTORY:  She has a 14-pack-year history of smoking, denies alcohol use, denies drug use.  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      MEDICATIONS:   1.  Levothyroxine.   2.  Cyclobenzaprine.   3.  Albuterol.   4.  Fluoxetine.      ALLERGIES:  Codeine, gabapentin.      PHYSICAL EXAMINATION:   GENERAL:  This is a middle-aged female sitting in exam  chair in no acute distress.   STRENGTH:  5/5 and symmetric in the lower extremities.   REFLEXES:  2/4 and symmetric throughout the lower extremities.   SENSATION:  Intact to pinprick throughout the lower extremities.   GAIT:  Normal.   STRAIGHT LEG RAISE:  Negative.   CHARITO:  Negative.    MUSCULOSKELETAL:  Palpation of the midline and the paraspinous region of the lumbar spine demonstrates no focal tenderness nor does palpation in the left or right SI joints.      IMAGING:  MRI imaging of the lumbar spine acquired 2017 was reviewed and demonstrates a disk herniation at the level of L5-S1 with no clear impingement of the traversing nerve roots.   Moderate foraminal stenosis is noted at L5-S1, left greater than right.  Otherwise, no significant central stenosis or other levels of significant foraminal stenosis.      ASSESSMENT:  Ms. Correa is a 46-year-old female with low back pain and pain in her buttock which is not clearly radicular in nature.  Given the location of her symptoms, as well as lack of findings on MRI imaging, we recommend further evaluation to determine what subsequent therapies may be most beneficial to her      PLAN:   1.  Referral for left SI joint injection.  If it is helpful, we will consider referral to Dr. Desir for further evaluation.   2.  If SI joint injection is not helpful, we will refer for left L5-S1 facet block.     I saw the patient with the resident.  I have reviewed and edited the resident note and agree with the plan of care.      MD SHEYLA Henderson MD       As dictated by SRINIVAS MELTON MD, PHD, PGY1 neurosurgery resident.             D: 2017 17:44   T: 2017 08:11   MT: NOREEN      Name:     TIBURCIO CORREA   MRN:      [id number]        Account:      [id number]   :      1970           Service Date: 2017      Document: [id number]

## 2017-06-13 NOTE — PROGRESS NOTES
[facility] Consultation    Date of visit: 6/13/2017    Primary Care Provider: Dr. Cee Biggs    Consulting Physician: Dr. Kee     Reason for consultation: Sacroiliac joint pain    History of Present Illness: Yenny Johnson is a 46 year old female with history of obesity, GERD, hypothyroid, CKD, stress incontinence, who presents for initial evaluation. The patient reports that her pain started insidiously several years ago and worsened 2-3 years ago. She had 2x L5 TFESI on 1/4/16 and 3/14/16 by Dr. Buckner that were not beneficial. She got chiropractic and PT which were somewhat beneficial. She saw Russ German yesterday who referred her to our clinic to consider a Left SI joint injection.    Pain location: low back, L>R. Radiates on left side to buttocks and rarely thigh  Quality and timing of pain: aching  Pain rating: intensity ranges from 4/10 to 7/10, and Averages 5/10 on a 0-10 scale.  Aggravating factors include: walking and twisting  Relieving factors include: rest  Denies red flags including: bowel or bladder symptoms, fever, chills, saddle anesthesia, profound motor loss, history of cancer, history of immune compromise, weight loss.     Current pain treatments include:   1. Flexeril 10 TID, not beneficial, causes drowsiness  2. Ibuprofen prn    Previous pain treatments included:  Yenny Johnson has been seen at a pain clinic in the past.  Ger Reyes for procedure  Medications: Lidocaine patch-not beneficial  PT: yes  Psychology: no  Acupuncture: no  Chiropractic care: yes  TENS Unit: no  Injections: 2x L5 TFESI on 1/4/16 and 3/14/16 by Dr. Buckner that were not beneficial  Surgery: no    Diagnostic Tests:  Lumbar MRI completed on 4/6/17 showed:    FINDINGS:  Five lumbar vertebrae are assumed. Fairly prominent  degenerative disc disease at L5-S1.      Findings by specific level:     There is facet degenerative change as follows: Minimal left L4-L5,  mild bilateral L5-S1.     L2-L3,  L3-L4: No disc herniation or stenosis.     L4-L5: Disc bulging without central stenosis. The right neural foramen  appears adequate. Mild left foraminal narrowing.     L5-S1: Mild-moderate central disc protrusion. There is disc bulging  elsewhere and some osteophytosis. There is congenital tapering of the  thecal sac without significant thecal sac compression. There is  moderate stenosis at the medial aspects of both neural foramina.         IMPRESSION:  1. The findings appear similar to the previous exam, except for  increased disc space narrowing at L5-S1.  2. L5-S1: Mild-moderate central disc protrusion. Moderate bilateral  foraminal stenosis medially.    Review of [place] Prescription Monitoring Program (): Today I have also reviewed the patient's history of controlled substance use, as provided by [place] licensed pharmacies and prescriber dispensers.     Review of Pain Questionnaire: Please see the [facility] [place] health questionnaire, which the patient completed and reviewed with me in detail.    Review of Electronic Chart: Today I have also reviewed available medical information in the patient's medical record at [place] ([facility]), including relevant provider notes, laboratory work, and imaging.     Past Medical History:  Past Medical History:   Diagnosis Date     Anemia      BCC (basal cell carcinoma of skin)      CKD (chronic kidney disease) stage 3, GFR 30-59 ml/min      Degeneration of lumbar or lumbosacral intervertebral disc      Depressive disorder      Elevated fasting glucose      Elevated rheumatoid factor 12/12/2012     GERD (gastroesophageal reflux disease) 11/2005    EGD     Hyperlipidemia LDL goal <100      Hypothyroidism      Lumbar disc herniation      Obesity 3/29/2012     PMDD (premenstrual dysphoric disorder)      Stress incontinence, female 3/29/2012       Past Surgical History:  Past Surgical History:   Procedure Laterality Date     BIOPSY       C/SECTION, CLASSICAL        CHOLECYSTECTOMY, LAPOROSCOPIC  1995     HYSTERECTOMY, PAP NO LONGER INDICATED  09/23/2015     REPAIR MOHS Left 1/19/2017    Procedure: REPAIR MOHS;  Surgeon: Jorge Eric MD;  Location: [facility] OR     TUBAL LIGATION         Medications:  Current Outpatient Prescriptions   Medication Sig Dispense Refill     levothyroxine (SYNTHROID/LEVOTHROID) 50 MCG tablet TAKE ONE TABLET BY MOUTH ONE TIME DAILY 90 tablet 0     FLUoxetine (PROZAC) 20 MG capsule Take 3 capsules (60 mg) by mouth daily 270 capsule 3     cyclobenzaprine (FLEXERIL) 10 MG tablet Take 1 tablet (10 mg) by mouth 3 times daily as needed for muscle spasms 30 tablet 2     albuterol (PROAIR HFA, PROVENTIL HFA, VENTOLIN HFA) 108 (90 BASE) MCG/ACT inhaler Inhale 1-2 puffs into the lungs every 4 hours as needed for shortness of breath / dyspnea 1 Inhaler 0       Allergies:     Allergies   Allergen Reactions     Codeine Sulfate      Disorientation, muscle weakness     Gabapentin      sleepiness       Social History:  Social History     Social History     Marital status:      Spouse name: Pasquale     Number of children: 4     Years of education: 15     Occupational History     sales, floor, overnight stocking [employer]      [employer]  Other     [employer] Student     Social History Main Topics     Smoking status: Former Smoker     Packs/day: 1.00     Years: 14.00     Types: Cigarettes     Quit date: 1/1/1998     Smokeless tobacco: Never Used     Alcohol use No     Drug use: No     Sexual activity: Yes     Partners: Male     Birth control/ protection: Female Surgical      Comment: tubal ligation     Other Topics Concern     Parent/Sibling W/ Cabg, Mi Or Angioplasty Before 65f 55m? Yes      Service No     Blood Transfusions No     Caffeine Concern No     Occupational Exposure No     Hobby Hazards No     Sleep Concern No     Stress Concern No     Weight Concern No     Special Diet No     Back Care No     Exercise Yes     Bike Helmet No     Seat Belt  "Yes     Self-Exams Yes     Social History Narrative    .    4 children.    Does .     History of chemical dependency treatment: no  Home situation: lives with their family    Family history:  Family History   Problem Relation Age of [place]     C.A.D. Father 67     MI     Hypertension Father      Alcohol/Drug Father      Cardiovascular Mother      CHF      Coronary Artery Disease Mother      Hyperlipidemia Mother      Allergies Maternal Grandmother      DIABETES Maternal Grandmother      DIABETES Paternal Grandfather      Allergies Brother      Asthma Brother      DIABETES Brother      Coronary Artery Disease Brother      Asthma Brother      Obesity Sister      CANCER No family hx of        Review of Systems:   General: negative for fever or chills, weight loss  Eyes and Head: negative for headache, dizziness, vision changes  Ears/Nose/Throat: negative for nose bleeds, hearing loss, sinus infection, earache, and ringing in ears  Immune system: negative for immune deficiency, infections  Skin: negative for itching, rash  Hematologic: negative for anemia, bleeding problems  Respiratory: negative for cough, shortness of breath  Cardiovascular: negative for leg swelling, high blood pressure, chest pain  Gastrointestinal: negative for nausea or vomiting, constipation, diarrhea  Endocrine: negative for diabetes, osteoporosis  Musculoskeletal: negative except as discussed in the HPI above  Urologic: negative for urinary urgency, incontinence  Neurologic: negative for seizure, tremor  Mental health:Negative for: suicidal thoughts    Physical Exam:  Vitals:    06/13/17 0854   BP: (!) 136/97   Pulse: 75   SpO2: 99%   Weight: 89.8 kg (198 lb)   Height: 1.676 m (5' 6\")     Exam:  Constitutional: healthy, alert and no distress  Head: normocephalic. Atraumatic.   Eyes: no redness or jaundice noted   ENT: oropharnx normal.  MMM.  Neck supple.    Cardiovascular: RRR no m/g/r   Respiratory: clear   Gastrointestinal: " soft, non-tender, normoactive bowel sounds   : deferred  Skin: no suspicious lesions or rashes  Psychiatric: mentation appears normal and affect normal/bright    Musculoskeletal exam:  Gait/Station/Posture: normal  Lumbar spine: normal  Bilateral lumbar paraspinal tenderness  Lumbar facet loading test positive biolateral  Straight leg exam: negative  Jaspal's maneuver: negative  Sacroiliac (SI) joint tenderness: minimal positve left  Piriformis tenderness: mild positive left    Neurologic exam:  Motor:  5/5 UE and LE strength    Reflexes:     Biceps:     R:  2/4 L: 2/4   Brachioradialis   R:  2/4 L: 2/4   Triceps:  R:  2/4 L: 2/4   Patella:  R:  2/4 L: 2/4   Achilles:  R:  2/4 L: 2/4   Sensory:  (upper and lower extremities):   Light touch: normal    Allodynia: absent    Hyperalgesia: absent       Assessment:  1. Chronic lower back predominantly axial pain likely related to lumbar spondylosis with facet arthropathy and myofascial pain syndrome. There is also a minor component of left SI joint dysfunction and piriformis muscle syndrome.  2. CKD  3. GERD  4. Hypothyroid    Plan:  Diagnosis reviewed, treatment option addressed, and risk/benefits discussed.  Self-care instructions given.  I am recommending a multidisciplinary treatment plan to help this patient better manage her pain.      1. Physical Therapy: Ordered Pain PT.   2. Clinical Health Psychologist to address issues of relaxation, behavioral change, coping style, and other factors important to improvement: NO  3. Medication Management:   1. Stop flexeril due to side-effect of drowsiness.  2. Start Robaxin 500-1000 mg TID PRN  3. Start Voltaren gel  4. Further procedures recommended: (1)Ordered bilateral L3,4,5 MBB/RFA (2) Consider left SIJ injection and piriformis injection if MBB are not diagnostic.  5. Follow up: as scheduled for MBB.      Holger Ford MD  Pain Fellow  [facility]    Total time spent was 60 minutes, and more than 50% of face  to face time was spent in counseling and/or coordination of care regarding principles of multidisciplinary care, medication management, and treatment options as discussed above.  I saw and examined the patient with this fellow and agree with the findings and the plan of care as documented in the fellow's note.  Benigno Willams IV, MD  Attending Pain Physician

## 2017-06-13 NOTE — TELEPHONE ENCOUNTER
[facility] Prior Authorization Team   Phone: [phone]  Fax: [phone]    PRIOR AUTHORIZATION EPA DENIED    Medication: diclofenac (VOLTAREN) 1 % GEL topical gel - denied    Denial Date: 6/13/2017    Denial Rational: diagnosis is not an approved indication for the use of this medication              Appeal Information: [facility] [phone]

## 2017-06-14 ASSESSMENT — ANXIETY QUESTIONNAIRES: GAD7 TOTAL SCORE: 0

## 2017-06-21 DIAGNOSIS — F32.81 PMDD (PREMENSTRUAL DYSPHORIC DISORDER): ICD-10-CM

## 2017-06-22 NOTE — TELEPHONE ENCOUNTER
Prescription approved per [facility] Refill Protocol.  Gianna Musa RN

## 2017-06-22 NOTE — TELEPHONE ENCOUNTER
FLUoxetine (PROZAC) 20 MG capsule     Last Written Prescription Date: 5/12/16  Last Fill Quantity: 270, # refills: 3  Last Office Visit with G primary care provider:  1/10/17        Last PHQ-9 score on record= No flowsheet data found.

## 2017-06-28 ENCOUNTER — SURGERY (OUTPATIENT)
Age: 47
End: 2017-06-28

## 2017-06-28 ENCOUNTER — CARE COORDINATION (OUTPATIENT)
Dept: ANESTHESIOLOGY | Facility: CLINIC | Age: 47
End: 2017-06-28

## 2017-06-28 ENCOUNTER — HOSPITAL ENCOUNTER (OUTPATIENT)
Facility: AMBULATORY SURGERY CENTER | Age: 47
End: 2017-06-28

## 2017-06-28 VITALS
HEART RATE: 79 BPM | BODY MASS INDEX: 31.34 KG/M2 | RESPIRATION RATE: 16 BRPM | HEIGHT: 66 IN | TEMPERATURE: 97.7 F | OXYGEN SATURATION: 99 % | DIASTOLIC BLOOD PRESSURE: 97 MMHG | SYSTOLIC BLOOD PRESSURE: 131 MMHG | WEIGHT: 195 LBS

## 2017-06-28 DIAGNOSIS — M47.819 FACET ARTHROPATHY: Primary | ICD-10-CM

## 2017-06-28 RX ORDER — BUPIVACAINE HYDROCHLORIDE 2.5 MG/ML
INJECTION, SOLUTION EPIDURAL; INFILTRATION; INTRACAUDAL PRN
Status: DISCONTINUED | OUTPATIENT
Start: 2017-06-28 | End: 2017-06-28 | Stop reason: HOSPADM

## 2017-06-28 RX ORDER — LIDOCAINE HYDROCHLORIDE 10 MG/ML
INJECTION, SOLUTION EPIDURAL; INFILTRATION; INTRACAUDAL; PERINEURAL PRN
Status: DISCONTINUED | OUTPATIENT
Start: 2017-06-28 | End: 2017-06-28 | Stop reason: HOSPADM

## 2017-06-28 RX ADMIN — BUPIVACAINE HYDROCHLORIDE 6 ML: 2.5 INJECTION, SOLUTION EPIDURAL; INFILTRATION; INTRACAUDAL at 07:51

## 2017-06-28 RX ADMIN — LIDOCAINE HYDROCHLORIDE 10 ML: 10 INJECTION, SOLUTION EPIDURAL; INFILTRATION; INTRACAUDAL; PERINEURAL at 07:52

## 2017-06-28 NOTE — PROGRESS NOTES
Purpose of call: Follow up after Dr. Willams performed lumbar medial branch nerve blocks performed 06/28/17  Spoke with: Yenny    Percentage of *** pain relief after procedure: ***%    Follow up: ***

## 2017-06-28 NOTE — DISCHARGE INSTRUCTIONS
Home Care Instructions after a Medial Branch Block      In a medial branch block, a local anesthetic (numbing medicine) is injected near the medial branch nerve. This stops the transmission of pain signals from the facet joint. If this reduces your pain and helps you move  normal, it may tell the doctor which facet joint is causing the pain. This procedure is a diagnostic procedure and is typically short lasting. With this injection a steroid to increase the longevity of the blocks effect may or may not be used.    Activity  -You may resume most normal activity levels with the exception of strenuous activity. It is important for us to know if your pain with normal activity is relieved after this injection.  -DO NOT shower for 24 hours  -DO NOT remove bandaid for 24 hours    Pain  -You may experience soreness at the injection site for one or two days  -You may use an ice pack for 20 minutes every 2 hours for the first 24 hours  -You may use a heating pad after the first 24 hours  -You may use Tylenol (acetaminophen) every 4 hours or other pain medicines as     directed by your physician    You may experience numbness radiating into your legs or arms (depending on the procedure location). This numbness may last several hours. Until sensation returns to normal; please use caution in walking, climbing stairs, and stepping out of your vehicle, etc.    DID YOU RECEIVE SEDATION TODAY?  No        DID YOU RECEIVE STEROIDS TODAY?    NO          PLEASE KEEP TRACK OF YOUR SYMPTOMS AND NOTE YOUR IMPROVEMENT FOR YOUR DOCTOR.     Please contact us if you have:  -Severe pain  -Fever more than 101.5 degrees Fahrenheit  -Signs of infection at the injection site (redness, swelling, or drainage)    If you have questions, please contact our office at [phone] between the hours of 7:00 am and 3:00 pm Monday through Friday. After office hours you can contact the on call provider by dialing [phone]. If you need immediate  attention, we recommend that you go to a hospital emergency room or dial 911.

## 2017-06-28 NOTE — IP AVS SNAPSHOT
MRN:[id number]                      After Visit Summary   6/28/2017    Yenny Johnson    MRN: [id number]           Thank you!     Thank you for choosing [facility] for your care. Our goal is always to provide you with excellent care. Hearing back from our patients is one way we can continue to improve our services. Please take a few minutes to complete the written survey that you may receive in the mail after you visit with us. Thank you!        Patient Information     Date Of Birth          1970        About your hospital stay     You were admitted on:  June 28, 2017 [facility]    You were discharged on:  June 28, 2017       Who to Call     For medical emergencies, please call 911.  For non-urgent questions about your medical care, please call your primary care provider or clinic, [phone]  For questions related to your surgery, please call your surgery clinic        Attending Provider     Provider Specialty    Benigno Willams MD Anesthesiology       Primary Care Provider Office Phone # Fax #    Cee Biggs [phone]      Further instructions from your care team       Home Care Instructions after a Medial Branch Block      In a medial branch block, a local anesthetic (numbing medicine) is injected near the medial branch nerve. This stops the transmission of pain signals from the facet joint. If this reduces your pain and helps you move  normal, it may tell the doctor which facet joint is causing the pain. This procedure is a diagnostic procedure and is typically short lasting. With this injection a steroid to increase the longevity of the blocks effect may or may not be used.    Activity  -You may resume most normal activity levels with the exception of strenuous activity. It is important for us to know if your pain with normal activity is relieved after this injection.  -DO NOT shower for 24 hours  -DO  "NOT remove bandaid for 24 hours    Pain  -You may experience soreness at the injection site for one or two days  -You may use an ice pack for 20 minutes every 2 hours for the first 24 hours  -You may use a heating pad after the first 24 hours  -You may use Tylenol (acetaminophen) every 4 hours or other pain medicines as     directed by your physician    You may experience numbness radiating into your legs or arms (depending on the procedure location). This numbness may last several hours. Until sensation returns to normal; please use caution in walking, climbing stairs, and stepping out of your vehicle, etc.    DID YOU RECEIVE SEDATION TODAY?  No        DID YOU RECEIVE STEROIDS TODAY?    NO          PLEASE KEEP TRACK OF YOUR SYMPTOMS AND NOTE YOUR IMPROVEMENT FOR YOUR DOCTOR.     Please contact us if you have:  -Severe pain  -Fever more than 101.5 degrees Fahrenheit  -Signs of infection at the injection site (redness, swelling, or drainage)    If you have questions, please contact our office at [phone] between the hours of 7:00 am and 3:00 pm Monday through Friday. After office hours you can contact the on call provider by dialing [phone]. If you need immediate attention, we recommend that you go to a hospital emergency room or dial [phone].      Pending Results     Date and Time Order Name Status Description    6/28/2017 0656 XR SURGERY DANIEL FLUORO LESS THAN 5 MIN W STILLS In process             Admission Information     Date & Time Provider Department Dept. Phone    6/28/2017 Benigno Willams MD [facility] [phone]      Your Vitals Were     Blood Pressure Pulse Temperature Respirations Height Weight    152/97 79 97.7  F (36.5  C) (Oral) 16 1.676 m (5' 6\") 88.5 kg (195 lb)    Last Period Pulse Oximetry BMI (Body Mass Index)             08/07/2015 99% 31.47 kg/m2         [employer] Information     [employer] gives you secure access to your electronic health record. If you see a " primary care provider, you can also send messages to your care team and make appointments. If you have questions, please call your primary care clinic.  If you do not have a primary care provider, please call [phone] and they will assist you.      [email or web address] is an electronic gateway that provides easy, online access to your medical records. With [email or web address], you can request a clinic appointment, read your test results, renew a prescription or communicate with your care team.     To access your existing account, please contact your [facility] or call [phone] for assistance.        Care EveryWhere ID     This is your Care EveryWhere ID. This could be used by other organizations to access your [facility] medical records  [id number]        Equal Access to Services     GUSTAVO ARROYO : Lissette Wadsworth, pelon delacruz, jimmy ramirez, mercedes flores. So [facility] [phone].    ATENCIÓN: Si habla español, tiene a victoria disposición servicios gratuitos de asistencia lingüística. Llame al [phone].    We comply with applicable federal civil rights laws and [place] laws. We do not discriminate on the basis of race, color, national origin, age, disability sex, sexual orientation or gender identity.               Review of your medicines      UNREVIEWED medicines. Ask your doctor about these medicines        Dose / Directions    albuterol 108 (90 BASE) MCG/ACT Inhaler   Commonly known as:  PROAIR HFA/PROVENTIL HFA/VENTOLIN HFA   Used for:  SOB (shortness of breath)        Dose:  1-2 puff   Inhale 1-2 puffs into the lungs every 4 hours as needed for shortness of breath / dyspnea   Quantity:  1 Inhaler   Refills:  0       cyclobenzaprine 10 MG tablet   Commonly known as:  FLEXERIL   Used for:  Lumbar disc herniation        Dose:  10 mg   Take 1 tablet (10 mg) by mouth 3 times daily as needed for muscle spasms   Quantity:  30 tablet   Refills:  2        FLUoxetine 20 MG capsule   Commonly known as:  PROzac   Used for:  PMDD (premenstrual dysphoric disorder)        TAKE THREE CAPSULES BY MOUTH DAILY   Quantity:  270 capsule   Refills:  1       levothyroxine 50 MCG tablet   Commonly known as:  SYNTHROID/LEVOTHROID   Used for:  Goiter, Acquired hypothyroidism        TAKE ONE TABLET BY MOUTH ONE TIME DAILY   Quantity:  90 tablet   Refills:  0       methocarbamol 500 MG tablet   Commonly known as:  ROBAXIN   Used for:  Facet arthropathy, Myofascial pain syndrome        Dose:  500-1000 mg   Take 1-2 tablets (500-1,000 mg) by mouth 3 times daily as needed for muscle spasms   Quantity:  240 tablet   Refills:  1                Protect others around you: Learn how to safely use, store and throw away your medicines at www.disposemymeds.org.             Medication List: This is a list of all your medications and when to take them. Check marks below indicate your daily home schedule. Keep this list as a reference.      Medications           Morning Afternoon Evening Bedtime As Needed    albuterol 108 (90 BASE) MCG/ACT Inhaler   Commonly known as:  PROAIR HFA/PROVENTIL HFA/VENTOLIN HFA   Inhale 1-2 puffs into the lungs every 4 hours as needed for shortness of breath / dyspnea                                cyclobenzaprine 10 MG tablet   Commonly known as:  FLEXERIL   Take 1 tablet (10 mg) by mouth 3 times daily as needed for muscle spasms                                FLUoxetine 20 MG capsule   Commonly known as:  PROzac   TAKE THREE CAPSULES BY MOUTH DAILY                                levothyroxine 50 MCG tablet   Commonly known as:  SYNTHROID/LEVOTHROID   TAKE ONE TABLET BY MOUTH ONE TIME DAILY                                methocarbamol 500 MG tablet   Commonly known as:  ROBAXIN   Take 1-2 tablets (500-1,000 mg) by mouth 3 times daily as needed for muscle spasms

## 2017-06-28 NOTE — IP AVS SNAPSHOT
[facility]    [address] [place] [id number]    Phone:  [phone]    Fax:  [phone]                                       After Visit Summary   6/28/2017    Yenny Johnson    MRN: [id number]           After Visit Summary Signature Page     I have received my discharge instructions, and my questions have been answered. I have discussed any challenges I see with this plan with the nurse or doctor.    ..........................................................................................................................................  Patient/Patient Representative Signature      ..........................................................................................................................................  Patient Representative Print Name and Relationship to Patient    ..................................................               ................................................  Date                                            Time    ..........................................................................................................................................  Reviewed by Signature/Title    ...................................................              ..............................................  Date                                                            Time

## 2017-06-28 NOTE — PROGRESS NOTES
Purpose of call: Follow up after lumbar medial branch nerve blocks performed this morning, 06/28/17  Spoke with: Yenny    Percentage of low back pain relief after procedure: 98%    Follow up: Pt instructed to call to schedule second dx nerve block     Fela Smyth, RN, BSN

## 2017-08-03 NOTE — PROCEDURES
Diagnostic Medial Branch Block    The patient s identity, the procedure to be performed and the specific site of the procedure was verified in accordance with [facility] Protocol.  Diagnosis: Facet Arthropathy  Pre-procedure Pain Score: 7/10           Procedure Note:  Informed consent was obtained.  The patient was positioned comfortably in the prone position.  There was no evidence of infection at the sites of needle insertion.  The patient was prepped and draped in a sterile fashion.  Skeletal landmarks were identified with the fluoroscope guidance.  25 gauge spinal needles were then placed at the junction of the superior articulating process and the transverse process for lumbar and thoracic levels and centrally on the lateral mass in the cervical region.  Medication was then injected after negative aspiration. The patient was then observed for 30 minutes.  No complications were noted.      Levels:Bilateral   L3/4/ALA  Medication (per site): 0.5cc   0.25%  Bupivacaine    Post Procedure Pain Score: 3/10    The patient was given discharge instructions and verbalizes understanding, including understanding of those signs and symptoms that would require emergency care.     Plan:   The patient will fill out a pain diary for the remainder of the day and will either fax, email or bring it to the pain clinic.      Counseling: Greater than 50% of this patient visit was spent in counseling the patient regarding the treatment of their pain, coordinating their overall treatment plan and assessing their progress.

## 2017-08-06 DIAGNOSIS — E04.9 GOITER: ICD-10-CM

## 2017-08-06 DIAGNOSIS — E03.9 ACQUIRED HYPOTHYROIDISM: ICD-10-CM

## 2017-08-07 RX ORDER — LEVOTHYROXINE SODIUM 50 UG/1
TABLET ORAL
Qty: 90 TABLET | Refills: 0 | Status: SHIPPED | OUTPATIENT
Start: 2017-08-07 | End: 2017-11-05

## 2017-08-07 NOTE — TELEPHONE ENCOUNTER
levothyroxine (SYNTHROID/LEVOTHROID) 50 MCG tablet     Last Written Prescription Date: 5/30/2017  Last Quantity: 90, # refills: 0  Last Office Visit with G, P or [facility] prescribing provider: 1/10/2017   Next 5 appointments (look out 90 days)     Aug 10, 2017 11:20 AM CDT   Office Visit with Cee Biggs MD   [facility]    [address] [address] [phone]                   TSH   Date Value Ref Range Status   09/22/2016 1.79 0.40 - 4.00 mU/L Final

## 2017-08-07 NOTE — TELEPHONE ENCOUNTER
Prescription approved per [facility] Protocol.  Patient due for labs for further refills.

## 2017-08-09 ENCOUNTER — HOSPITAL ENCOUNTER (OUTPATIENT)
Facility: AMBULATORY SURGERY CENTER | Age: 47
End: 2017-08-09

## 2017-08-09 ENCOUNTER — SURGERY (OUTPATIENT)
Age: 47
End: 2017-08-09

## 2017-08-09 VITALS
DIASTOLIC BLOOD PRESSURE: 83 MMHG | WEIGHT: 195 LBS | BODY MASS INDEX: 32.49 KG/M2 | OXYGEN SATURATION: 95 % | HEIGHT: 65 IN | RESPIRATION RATE: 16 BRPM | HEART RATE: 79 BPM | TEMPERATURE: 98 F | SYSTOLIC BLOOD PRESSURE: 136 MMHG

## 2017-08-09 RX ORDER — BUPIVACAINE HYDROCHLORIDE 2.5 MG/ML
INJECTION, SOLUTION EPIDURAL; INFILTRATION; INTRACAUDAL PRN
Status: DISCONTINUED | OUTPATIENT
Start: 2017-08-09 | End: 2017-08-09 | Stop reason: HOSPADM

## 2017-08-09 RX ORDER — LIDOCAINE HYDROCHLORIDE 10 MG/ML
INJECTION, SOLUTION EPIDURAL; INFILTRATION; INTRACAUDAL; PERINEURAL PRN
Status: DISCONTINUED | OUTPATIENT
Start: 2017-08-09 | End: 2017-08-09 | Stop reason: HOSPADM

## 2017-08-09 RX ADMIN — LIDOCAINE HYDROCHLORIDE 4 ML: 10 INJECTION, SOLUTION EPIDURAL; INFILTRATION; INTRACAUDAL; PERINEURAL at 08:54

## 2017-08-09 RX ADMIN — BUPIVACAINE HYDROCHLORIDE 3 ML: 2.5 INJECTION, SOLUTION EPIDURAL; INFILTRATION; INTRACAUDAL at 08:54

## 2017-08-09 NOTE — DISCHARGE INSTRUCTIONS
Home Care Instructions after a Medial Branch Block      In a medial branch block, a local anesthetic (numbing medicine) is injected near the medial branch nerve. This stops the transmission of pain signals from the facet joint. If this reduces your pain and helps you move  normal, it may tell the doctor which facet joint is causing the pain. This procedure is a diagnostic procedure and is typically short lasting. With this injection a steroid to increase the longevity of the blocks effect may or may not be used.    Activity  -You may resume most normal activity levels with the exception of strenuous activity. It is important for us to know if your pain with normal activity is relieved after this injection.  -DO NOT shower for 24 hours  -DO NOT remove bandaid for 24 hours    Pain  -You may experience soreness at the injection site for one or two days  -You may use an ice pack for 20 minutes every 2 hours for the first 24 hours  -You may use a heating pad after the first 24 hours  -You may use Tylenol (acetaminophen) every 4 hours or other pain medicines as     directed by your physician    You may experience numbness radiating into your legs or arms (depending on the procedure location). This numbness may last several hours. Until sensation returns to normal; please use caution in walking, climbing stairs, and stepping out of your vehicle, etc.    DID YOU RECEIVE SEDATION TODAY?  No      DID YOU RECEIVE STEROIDS TODAY?  Yes    Common side effects of steroids:  Not everyone will experience corticosteroid side effects. If side effects are experienced, they will gradually subside in the 7-10 day period following an injection. Most common side effects include:  -Flushed face and/or chest  -Feeling of warmth, particularly in the face but could be an overall feeling of warmth  -Increased blood sugar in diabetic patients  -Menstrual irregularities my occur. If taking hormone-based birth control an alternate method of birth  control is recommended  -Sleep disturbances and/or mood swings are possible  -Leg cramps      PLEASE KEEP TRACK OF YOUR SYMPTOMS AND NOTE YOUR IMPROVEMENT FOR YOUR DOCTOR.     Please contact us if you have:  -Severe pain  -Fever more than 101.5 degrees Fahrenheit  -Signs of infection at the injection site (redness, swelling, or drainage)    If you have questions, please contact our office at [phone] between the hours of 7:00 am and 3:00 pm Monday through Friday. After office hours you can contact the on call provider by dialing [phone]. If you need immediate attention, we recommend that you go to a hospital emergency room or dial [phone].

## 2017-08-09 NOTE — IP AVS SNAPSHOT
[facility]    [address] [place] [address]    Phone:  [phone]    Fax:  [phone]                                       After Visit Summary   8/9/2017    Yenny Johnson    MRN: [id number]           After Visit Summary Signature Page     I have received my discharge instructions, and my questions have been answered. I have discussed any challenges I see with this plan with the nurse or doctor.    ..........................................................................................................................................  Patient/Patient Representative Signature      ..........................................................................................................................................  Patient Representative Print Name and Relationship to Patient    ..................................................               ................................................  Date                                            Time    ..........................................................................................................................................  Reviewed by Signature/Title    ...................................................              ..............................................  Date                                                            Time

## 2017-08-09 NOTE — PROCEDURES
Diagnostic Medial Branch Block    The patient s identity, the procedure to be performed and the specific site of the procedure was verified in accordance with [facility] Protocol.  Diagnosis:   Pre-procedure Pain Score: 5/10           Procedure Note:  Informed consent was obtained.  The patient was positioned comfortably in the prone position.  There was no evidence of infection at the sites of needle insertion.  The patient was prepped and draped in a sterile fashion.  Skeletal landmarks were identified with the fluoroscope guidance.  20 gauge spinal needles were then placed at the junction of the superior articulating process and the transverse process for lumbar and thoracic levels and centrally on the lateral mass in the cervical region.  Medication was then injected after negative aspiration. The patient was then observed for 30 minutes.  No complications were noted.      Levels:Bilateral   L4/L5/ALA  Medication (per site): 3cc   0.25%  Bupivacaine    Post Procedure Pain Score: 0/10    The patient was given discharge instructions and verbalizes understanding, including understanding of those signs and symptoms that would require emergency care.     Plan:   The patient will fill out a pain diary for the remainder of the day and will either fax, email or bring it to the pain clinic.      Counseling: Greater than 50% of this patient visit was spent in counseling the patient regarding the treatment of their pain, coordinating their overall treatment plan and assessing their progress.    Idania Brooks MD  Pain Medicine Fellow  [facility]

## 2017-08-09 NOTE — IP AVS SNAPSHOT
MRN:[id number]                      After Visit Summary   8/9/2017    Yenny Johnson    MRN: [id number]           Thank you!     Thank you for choosing [facility] for your care. Our goal is always to provide you with excellent care. Hearing back from our patients is one way we can continue to improve our services. Please take a few minutes to complete the written survey that you may receive in the mail after you visit with us. Thank you!        Patient Information     Date Of Birth          1970        About your hospital stay     You were admitted on:  August 9, 2017 You last received care in the:  [facility]    You were discharged on:  August 9, 2017       Who to Call     For medical emergencies, please call 911.  For non-urgent questions about your medical care, please call your primary care provider or clinic, [phone]  For questions related to your surgery, please call your surgery clinic        Attending Provider     Provider Specialty    Benigno Willams MD Anesthesiology       Primary Care Provider Office Phone # Fax #    Cee Biggs [phone]      Your next 10 appointments already scheduled     Aug 09, 2017   Procedure with Benigno Willams MD   [facility] ([facility])    [address] [place] [address]  [address] [facility] [address] [phone]           Located in the Clinics and Surgery Center at [address] [place] [address].   parking is very convenient and highly recommended.  is a $6 flat rate fee.  Both  and self parkers should enter the main arrival plaza from [facility]; parking attendants will direct you based on your parking preference.            Aug 10, 2017 11:20 AM CDT   Office Visit with Cee Biggs MD   [facility]    [address] [facility] [address] [phone]            Bring a current list of meds and any records pertaining to this visit. For Physicals, please bring immunization records and any forms needing to be filled out. Please arrive 10 minutes early to complete paperwork.              Further instructions from your care team       Home Care Instructions after a Medial Branch Block      In a medial branch block, a local anesthetic (numbing medicine) is injected near the medial branch nerve. This stops the transmission of pain signals from the facet joint. If this reduces your pain and helps you move  normal, it may tell the doctor which facet joint is causing the pain. This procedure is a diagnostic procedure and is typically short lasting. With this injection a steroid to increase the longevity of the blocks effect may or may not be used.    Activity  -You may resume most normal activity levels with the exception of strenuous activity. It is important for us to know if your pain with normal activity is relieved after this injection.  -DO NOT shower for 24 hours  -DO NOT remove bandaid for 24 hours    Pain  -You may experience soreness at the injection site for one or two days  -You may use an ice pack for 20 minutes every 2 hours for the first 24 hours  -You may use a heating pad after the first 24 hours  -You may use Tylenol (acetaminophen) every 4 hours or other pain medicines as     directed by your physician    You may experience numbness radiating into your legs or arms (depending on the procedure location). This numbness may last several hours. Until sensation returns to normal; please use caution in walking, climbing stairs, and stepping out of your vehicle, etc.    DID YOU RECEIVE SEDATION TODAY?  No      DID YOU RECEIVE STEROIDS TODAY?  Yes    Common side effects of steroids:  Not everyone will experience corticosteroid side effects. If side effects are experienced, they will gradually subside in the 7-10 day period following an injection. Most common side effects  "include:  -Flushed face and/or chest  -Feeling of warmth, particularly in the face but could be an overall feeling of warmth  -Increased blood sugar in diabetic patients  -Menstrual irregularities my occur. If taking hormone-based birth control an alternate method of birth control is recommended  -Sleep disturbances and/or mood swings are possible  -Leg cramps      PLEASE KEEP TRACK OF YOUR SYMPTOMS AND NOTE YOUR IMPROVEMENT FOR YOUR DOCTOR.     Please contact us if you have:  -Severe pain  -Fever more than 101.5 degrees Fahrenheit  -Signs of infection at the injection site (redness, swelling, or drainage)    If you have questions, please contact our office at [phone] between the hours of 7:00 am and 3:00 pm Monday through Friday. After office hours you can contact the on call provider by dialing [phone]. If you need immediate attention, we recommend that you go to a hospital emergency room or dial 911.      Pending Results     No orders found from 8/7/2017 to 8/10/2017.            Admission Information     Date & Time Provider Department Dept. Phone    8/9/2017 Benigno Willams MD [facility] [phone]      Your Vitals Were     Blood Pressure Pulse Temperature Respirations Height Weight    121/80 78 98  F (36.7  C) (Oral) 16 1.651 m (5' 5\") 88.5 kg (195 lb)    Last Period Pulse Oximetry BMI (Body Mass Index)             08/07/2015 97% 32.45 kg/m2         [facility] Information     [facility] gives you secure access to your electronic health record. If you see a primary care provider, you can also send messages to your care team and make appointments. If you have questions, please call your primary care clinic.  If you do not have a primary care provider, please call [phone] and they will assist you.      [facility] is an electronic gateway that provides easy, online access to your medical records. With [facility], you can request a clinic appointment, read your test results, renew " a prescription or communicate with your care team.     To access your existing account, please contact your [facility] or call [phone] for assistance.        Care EveryWhere ID     This is your Care EveryWhere ID. This could be used by other organizations to access your [place] medical records  [id number]        Equal Access to Services     GUSTAVO ARROYO : Hadii aad ku hadasho Soomaali, waaxda luqadaha, qaybta kaalmada ashley, mercedes serratoshantalvaleria flores. So [facility] [phone].    ATENCIÓN: Si habla español, tiene a victoria disposición servicios gratuitos de asistencia lingüística. Llame al [phone].    We comply with applicable federal civil rights laws and [place] laws. We do not discriminate on the basis of race, color, national origin, age, disability sex, sexual orientation or gender identity.               Review of your medicines      UNREVIEWED medicines. Ask your doctor about these medicines        Dose / Directions    albuterol 108 (90 BASE) MCG/ACT Inhaler   Commonly known as:  PROAIR HFA/PROVENTIL HFA/VENTOLIN HFA   Used for:  SOB (shortness of breath)        Dose:  1-2 puff   Inhale 1-2 puffs into the lungs every 4 hours as needed for shortness of breath / dyspnea   Quantity:  1 Inhaler   Refills:  0       cyclobenzaprine 10 MG tablet   Commonly known as:  FLEXERIL   Used for:  Lumbar disc herniation        Dose:  10 mg   Take 1 tablet (10 mg) by mouth 3 times daily as needed for muscle spasms   Quantity:  30 tablet   Refills:  2       FLUoxetine 20 MG capsule   Commonly known as:  PROzac   Used for:  PMDD (premenstrual dysphoric disorder)        TAKE THREE CAPSULES BY MOUTH DAILY   Quantity:  270 capsule   Refills:  1       levothyroxine 50 MCG tablet   Commonly known as:  SYNTHROID/LEVOTHROID   Used for:  Goiter, Acquired hypothyroidism        TAKE ONE TABLET BY MOUTH ONE TIME DAILY   Quantity:  90 tablet   Refills:  0       methocarbamol 500 MG  tablet   Commonly known as:  ROBAXIN   Used for:  Facet arthropathy, Myofascial pain syndrome        Dose:  500-1000 mg   Take 1-2 tablets (500-1,000 mg) by mouth 3 times daily as needed for muscle spasms   Quantity:  240 tablet   Refills:  1                Protect others around you: Learn how to safely use, store and throw away your medicines at www.disposemymeds.org.             Medication List: This is a list of all your medications and when to take them. Check marks below indicate your daily home schedule. Keep this list as a reference.      Medications           Morning Afternoon Evening Bedtime As Needed    albuterol 108 (90 BASE) MCG/ACT Inhaler   Commonly known as:  PROAIR HFA/PROVENTIL HFA/VENTOLIN HFA   Inhale 1-2 puffs into the lungs every 4 hours as needed for shortness of breath / dyspnea                                cyclobenzaprine 10 MG tablet   Commonly known as:  FLEXERIL   Take 1 tablet (10 mg) by mouth 3 times daily as needed for muscle spasms                                FLUoxetine 20 MG capsule   Commonly known as:  PROzac   TAKE THREE CAPSULES BY MOUTH DAILY                                levothyroxine 50 MCG tablet   Commonly known as:  SYNTHROID/LEVOTHROID   TAKE ONE TABLET BY MOUTH ONE TIME DAILY                                methocarbamol 500 MG tablet   Commonly known as:  ROBAXIN   Take 1-2 tablets (500-1,000 mg) by mouth 3 times daily as needed for muscle spasms

## 2017-08-10 ENCOUNTER — RADIANT APPOINTMENT (OUTPATIENT)
Dept: CT IMAGING | Facility: CLINIC | Age: 47
End: 2017-08-10
Attending: FAMILY MEDICINE
Payer: COMMERCIAL

## 2017-08-10 ENCOUNTER — OFFICE VISIT (OUTPATIENT)
Dept: FAMILY MEDICINE | Facility: CLINIC | Age: 47
End: 2017-08-10
Payer: COMMERCIAL

## 2017-08-10 VITALS
WEIGHT: 198.4 LBS | OXYGEN SATURATION: 99 % | TEMPERATURE: 97.1 F | BODY MASS INDEX: 31.88 KG/M2 | SYSTOLIC BLOOD PRESSURE: 122 MMHG | HEART RATE: 84 BPM | DIASTOLIC BLOOD PRESSURE: 84 MMHG | HEIGHT: 66 IN

## 2017-08-10 DIAGNOSIS — N18.30 CKD (CHRONIC KIDNEY DISEASE) STAGE 3, GFR 30-59 ML/MIN (H): ICD-10-CM

## 2017-08-10 DIAGNOSIS — J45.20 INTERMITTENT ASTHMA, UNCOMPLICATED: ICD-10-CM

## 2017-08-10 DIAGNOSIS — R51.9 ACUTE INTRACTABLE HEADACHE, UNSPECIFIED HEADACHE TYPE: Primary | ICD-10-CM

## 2017-08-10 DIAGNOSIS — R51.9 ACUTE INTRACTABLE HEADACHE, UNSPECIFIED HEADACHE TYPE: ICD-10-CM

## 2017-08-10 PROCEDURE — 99214 OFFICE O/P EST MOD 30 MIN: CPT | Performed by: FAMILY MEDICINE

## 2017-08-10 PROCEDURE — 70450 CT HEAD/BRAIN W/O DYE: CPT | Mod: TC

## 2017-08-10 RX ORDER — ALBUTEROL SULFATE 90 UG/1
1-2 AEROSOL, METERED RESPIRATORY (INHALATION) EVERY 4 HOURS PRN
Qty: 1 INHALER | Refills: 5 | Status: SHIPPED | OUTPATIENT
Start: 2017-08-10 | End: 2019-08-30

## 2017-08-10 RX ORDER — OXYCODONE AND ACETAMINOPHEN 5; 325 MG/1; MG/1
1 TABLET ORAL EVERY 4 HOURS PRN
Qty: 2 TABLET | Refills: 0 | Status: SHIPPED | OUTPATIENT
Start: 2017-08-10 | End: 2018-01-25

## 2017-08-10 NOTE — LETTER
My Asthma Action Plan  Name: Yenny Johnson   YOB: 1970  Date: 8/10/2017   My doctor: Cee Biggs MD   My clinic: [facility]        My Control Medicine:    My Rescue Medicine: Albuterol (Proair/Ventolin/Proventil) inhaler     My Asthma Severity: intermittent  Avoid your asthma triggers: upper respiratory infections               GREEN ZONE   Good Control    I feel good    No cough or wheeze    Can work, sleep and play without asthma symptoms       Take your asthma control medicine every day.     1. If exercise triggers your asthma, take your rescue medication    15 minutes before exercise or sports, and    During exercise if you have asthma symptoms  2. Spacer to use with inhaler: If you have a spacer, make sure to use it with your inhaler             YELLOW ZONE Getting Worse  I have ANY of these:    I do not feel good    Cough or wheeze    Chest feels tight    Wake up at night   1. Keep taking your Green Zone medications  2. Start taking your rescue medicine:    every 20 minutes for up to 1 hour. Then every 4 hours for 24-48 hours.  3. If you stay in the Yellow Zone for more than 12-24 hours, contact your doctor.  4. If you do not return to the Green Zone in 12-24 hours or you get worse, start taking your oral steroid medicine if prescribed by your provider.           RED ZONE Medical Alert - Get Help  I have ANY of these:    I feel awful    Medicine is not helping    Breathing getting harder    Trouble walking or talking    Nose opens wide to breathe       1. Take your rescue medicine NOW  2. If your provider has prescribed an oral steroid medicine, start taking it NOW  3. Call your doctor NOW  4. If you are still in the Red Zone after 20 minutes and you have not reached your doctor:    Take your rescue medicine again and    Call 911 or go to the emergency room right away    See your regular doctor within 2 weeks of an Emergency Room or Urgent Care visit for follow-up treatment.         Electronically signed by: Cee Biggs, August 10, 2017    Annual Reminders:  Meet with Asthma Educator,  Flu Shot in the Fall, consider Pneumonia Vaccination for patients with asthma (aged 19 and older).    Pharmacy: [facility] [address] [place] [address]                    Asthma Triggers  How To Control Things That Make Your Asthma Worse    Triggers are things that make your asthma worse.  Look at the list below to help you find your triggers and what you can do about them.  You can help prevent asthma flare-ups by staying away from your triggers.      Trigger                                                          What you can do   Cigarette Smoke  Tobacco smoke can make asthma worse. Do not allow smoking in your home, car or around you.  Be sure no one smokes at a child s day care or school.  If you smoke, ask your health care provider for ways to help you quit.  Ask family members to quit too.  Ask your health care provider for a referral to Quit Plan to help you quit smoking, or call 9-764-098-PLAN.     Colds, Flu, Bronchitis  These are common triggers of asthma. Wash your hands often.  Don t touch your eyes, nose or mouth.  Get a flu shot every year.     Dust Mites  These are tiny bugs that live in cloth or carpet. They are too small to see. Wash sheets and blankets in hot water every week.   Encase pillows and mattress in dust mite proof covers.  Avoid having carpet if you can. If you have carpet, vacuum weekly.   Use a dust mask and HEPA vacuum.   Pollen and Outdoor Mold  Some people are allergic to trees, grass, or weed pollen, or molds. Try to keep your windows closed.  Limit time out doors when pollen count is high.   Ask you health care provider about taking medicine during allergy season.     Animal Dander  Some people are allergic to skin flakes, urine or saliva from pets with fur or feathers. Keep pets with fur or feathers out of your home.    If you can t keep the pet  outdoors, then keep the pet out of your bedroom.  Keep the bedroom door closed.  Keep pets off cloth furniture and away from stuffed toys.     Mice, Rats, and Cockroaches  Some people are allergic to the waste from these pests.   Cover food and garbage.  Clean up spills and food crumbs.  Store grease in the refrigerator.   Keep food out of the bedroom.   Indoor Mold  This can be a trigger if your home has high moisture. Fix leaking faucets, pipes, or other sources of water.   Clean moldy surfaces.  Dehumidify basement if it is damp and smelly.   Smoke, Strong Odors, and Sprays  These can reduce air quality. Stay away from strong odors and sprays, such as perfume, powder, hair spray, paints, smoke incense, paint, cleaning products, candles and new carpet.   Exercise or Sports  Some people with asthma have this trigger. Be active!  Ask your doctor about taking medicine before sports or exercise to prevent symptoms.    Warm up for 5-10 minutes before and after sports or exercise.     Other Triggers of Asthma  Cold air:  Cover your nose and mouth with a scarf.  Sometimes laughing or crying can be a trigger.  Some medicines and food can trigger asthma.

## 2017-08-10 NOTE — PATIENT INSTRUCTIONS
[facility]    If you have any questions regarding to your visit please contact your care team:       Team Red:   Clinic Hours Telephone Number   Dr. Cee Coello, NP   7am-7pm  Monday - Thursday   7am-5pm  Fridays  [phone]  (Appointment scheduling available 24/7)    Questions about your visit?   Team Line  [phone]   [facility] [place] [facility] Monday-Friday Saturday-Sunday- 9am-5pm   [place] - 5pm-9pm Monday-Friday Saturday-Sunday- 9am-5pm  [phone] - [place]   [phone] - [place]       What options do I have for visits at the clinic other than the traditional office visit?  To expand how we care for you, many of our providers are utilizing electronic visits (e-visits) and telephone visits, when medically appropriate, for interactions with their patients rather than a visit in the clinic.   We also offer nurse visits for many medical concerns. Just like any other service, we will bill your insurance company for this type of visit based on time spent on the phone with your provider. Not all insurance companies cover these visits. Please check with your medical insurance if this type of visit is covered. You will be responsible for any charges that are not paid by your insurance.      E-visits via [employer]:  generally incur a $35.00 fee.  Telephone visits:  Time spent on the phone: *charged based on time that is spent on the phone in increments of 10 minutes. Estimated cost:   5-10 mins $30.00   11-20 mins. $59.00   21-30 mins. $85.00     Use [employer] (secure email communication and access to your chart) to send your primary care provider a message or make an appointment. Ask someone on your Team how to sign up for [employer].  For a Price Quote for your services, please call our Consumer Price Line at [phone].      As always, Thank you for trusting us with your health care needs!

## 2017-08-10 NOTE — PROGRESS NOTES
Your results are normal. Call or return to clinic as needed if these symptoms worsen or fail to improve as anticipated.   Cee Biggs MD

## 2017-08-10 NOTE — PROGRESS NOTES
"  SUBJECTIVE:                                                    Yenny Johnson is a 47 year old female who presents to clinic today for the following health issues:      Headaches      Duration: 6 days    Description  Location: Left side   Character: throbbing pain, squeezing pain  Frequency:  Consistent   Duration:  6 days    Intensity:  Currently 7/10, At its worst 10/10    Accompanying signs and symptoms:    Precipitating or Alleviating factors:  Nausea/vomiting: sometimes  Dizziness: no  Weakness or numbness: no  Visual changes: none  Fever: no   Sinus or URI symptoms no     History  Head trauma: no   Family history of migraines: no  Previous tests for headaches: no  Neurologist evaluations: no  Able to do daily activities when headache present: YES- limited  Wake with headaches: YES  Daily pain medication use: no  Any changes in: none    Precipitating or Alleviating factors (light/sound/sleep/caffeine): none    Therapies tried and outcome: Ibuprofen (saturday) (Advil, Motrin)    Outcome - not effective  Frequent/daily pain medication use: no    ROS:  C: NEGATIVE for fever, chills, change in weight  I: NEGATIVE for worrisome rashes, moles or lesions  E: NEGATIVE for vision changes or irritation  E/M: NEGATIVE for ear, mouth and throat problems  RESP: intermittent wheezing and shortness of breath relieved by albuterol, triggered by exercise   CV: NEGATIVE for chest pain, palpitations or peripheral edema  GI: NEGATIVE for nausea  MUSCULOSKELETAL:back pain  N: NEGATIVE for weakness, dizziness or paresthesias    I have reviewed the patient's medical history in detail and updated the computerized patient record.     OBJECTIVE:     /84  Pulse 84  Temp 97.1  F (36.2  C) (Oral)  Ht 5' 5.55\" (1.665 m)  Wt 198 lb 6.4 oz (90 kg)  LMP 08/07/2015  SpO2 99%  BMI 32.46 kg/m2  Body mass index is 32.46 kg/(m^2).  GENERAL: alert, no distress and obese  EYES: Eyes grossly normal to inspection, PERRL and " conjunctivae and sclerae normal  HENT: ear canals and TM's normal, nose and mouth without ulcers or lesions  NECK: no adenopathy, no asymmetry, masses, or scars and thyroid normal to palpation  RESP: lungs clear to auscultation - no rales, rhonchi or wheezes  CV: regular rate and rhythm, normal S1 S2, no S3 or S4, no murmur, click or rub, no peripheral edema and peripheral pulses strong  MS: no gross musculoskeletal defects noted, no edema  NEURO: Normal strength and tone, mentation intact and speech normal  PSYCH: mentation appears normal, affect normal/bright    Diagnostic Test Results:  Results for orders placed or performed in visit on 08/09/17   XR Surgery DANIEL L/T 5 Min Fluoro    Narrative    This exam was marked as non-reportable because it will not be read by a   radiologist or a [place] non-radiologist provider.                 ASSESSMENT/PLAN:   (R51) Acute intractable headache, unspecified headache type  (primary encounter diagnosis)  Plan: CT Head w/o & w Contrast,         oxyCODONE-acetaminophen (PERCOCET) 5-325 MG per        tablet        Discussed risks and benefits of this medication. No further refills until follow-up appointment. Call or return to clinic as needed if these symptoms worsen or fail to improve as anticipated.     (N18.3) CKD (chronic kidney disease) stage 3, GFR 30-59 ml/min  Plan: avoid NSAIDs     (J45.20) Intermittent asthma, uncomplicated  Comment: new diagnosis   Plan: albuterol (PROAIR HFA/PROVENTIL HFA/VENTOLIN         HFA) 108 (90 BASE) MCG/ACT Inhaler        Discussed risks and benefits of this medication. Avoid triggers. Follow-up for worsening or persistence of symptoms. ACT q 6 months        See Patient Instructions    Cee Biggs MD  [facility]

## 2017-08-10 NOTE — MR AVS SNAPSHOT
After Visit Summary   8/10/2017    Yenny Johnson    MRN: [id number]           Patient Information     Date Of Birth          1970        Visit Information        Provider Department      8/10/2017 11:20 AM Cee Biggs MD [facility]        Today's Diagnoses     Acute intractable headache, unspecified headache type    -  1    CKD (chronic kidney disease) stage 3, GFR 30-59 ml/min        Intermittent asthma, uncomplicated          Care Instructions    [facility]    If you have any questions regarding to your visit please contact your care team:       Team Red:   Clinic Hours Telephone Number   Dr. Cee Coello, NP   7am-7pm  Monday - Thursday   7am-5pm  Fridays  [phone]  (Appointment scheduling available 24/7)    Questions about your visit?   Team Line  [phone]   [facility] [place] [facility] 11am-9pm Monday-Friday Saturday-Sunday- 9am-5pm   [place] - 5pm-9pm Monday-Friday Saturday-Sunday- 9am-5pm  [phone] - [facility]  [phone] - [place]       What options do I have for visits at the clinic other than the traditional office visit?  To expand how we care for you, many of our providers are utilizing electronic visits (e-visits) and telephone visits, when medically appropriate, for interactions with their patients rather than a visit in the clinic.   We also offer nurse visits for many medical concerns. Just like any other service, we will bill your insurance company for this type of visit based on time spent on the phone with your provider. Not all insurance companies cover these visits. Please check with your medical insurance if this type of visit is covered. You will be responsible for any charges that are not paid by your insurance.      E-visits via "[employer]":  generally incur a $35.00 fee.  Telephone visits:  Time spent on the phone: *charged based on time that is  spent on the phone in increments of 10 minutes. Estimated cost:   5-10 mins $30.00   11-20 mins. $59.00   21-30 mins. $85.00     Use [employer] (secure email communication and access to your chart) to send your primary care provider a message or make an appointment. Ask someone on your Team how to sign up for [employer].  For a Price Quote for your services, please call our Simple[employer] Line at [phone].      As always, Thank you for trusting us with your health care needs!            Follow-ups after your visit        Follow-up notes from your care team     Return in about 1 month (around 9/10/2017), or if symptoms worsen or fail to improve, for physical.      Future tests that were ordered for you today     Open Future Orders        Priority Expected Expires Ordered    CT Head w/o & w Contrast Routine  9/24/2017 8/10/2017            Who to contact     If you have questions or need follow up information about today's clinic visit or your schedule please contact [facility] directly at [phone].  Normal or non-critical lab and imaging results will be communicated to you by MyChart, letter or phone within 4 business days after the clinic has received the results. If you do not hear from us within 7 days, please contact the clinic through Petflowt or phone. If you have a critical or abnormal lab result, we will notify you by phone as soon as possible.  Submit refill requests through [employer] or call your pharmacy and they will forward the refill request to us. Please allow 3 business days for your refill to be completed.          Additional Information About Your Visit        [employer] Information     [employer] gives you secure access to your electronic health record. If you see a primary care provider, you can also send messages to your care team and make appointments. If you have questions, please call your primary care clinic.  If you do not have a primary care provider, please call [phone] and they  "will assist you.        Care EveryWhere ID     This is your Care EveryWhere ID. This could be used by other organizations to access your [place] medical records  [id number]        Your Vitals Were     Pulse Temperature Height Last Period Pulse Oximetry BMI (Body Mass Index)    84 97.1  F (36.2  C) (Oral) 5' 5.55\" (1.665 m) 08/07/2015 99% 32.46 kg/m2       Blood Pressure from Last 3 Encounters:   08/10/17 122/84   08/09/17 136/83   06/28/17 (!) 131/97    Weight from Last 3 Encounters:   08/10/17 198 lb 6.4 oz (90 kg)   08/09/17 195 lb (88.5 kg)   06/28/17 195 lb (88.5 kg)                 Today's Medication Changes          These changes are accurate as of: 8/10/17 12:32 PM.  If you have any questions, ask your nurse or doctor.               Start taking these medicines.        Dose/Directions    oxyCODONE-acetaminophen 5-325 MG per tablet   Commonly known as:  PERCOCET   Used for:  Acute intractable headache, unspecified headache type   Started by:  Cee Biggs MD        Dose:  1 tablet   Take 1 tablet by mouth every 4 hours as needed for pain . No further refills until follow-up appointment   Quantity:  2 tablet   Refills:  0            Where to get your medicines      These medications [id number] [place] - [address]  [address] [place] [address]     Phone:  [phone]     albuterol 108 (90 BASE) MCG/ACT Inhaler         Some of these will need a paper prescription and others can be bought over the counter.  Ask your nurse if you have questions.     Bring a paper prescription for each of these medications     oxyCODONE-acetaminophen 5-325 MG per tablet                Primary Care Provider Office Phone # Fax #    Cee Biggs [phone] [address]  [place] [address]        Equal Access to Services     GUSTAVO ARROYO AH: Hadii aad ku hadasho Soomaali, waaxda luqadaha, qaybta kaalmada adeegyada, mercedes paige " ah. So [facility] [phone].    ATENCIÓN: Si olvin fajardo, tiene a victoria disposición servicios gratuitos de asistencia lingüística. Dahiana thomason [phone].    We comply with applicable federal civil rights laws and [place] laws. We do not discriminate on the basis of race, color, national origin, age, disability sex, sexual orientation or gender identity.            Thank you!     Thank you for choosing [facility]  for your care. Our goal is always to provide you with excellent care. Hearing back from our patients is one way we can continue to improve our services. Please take a few minutes to complete the written survey that you may receive in the mail after your visit with us. Thank you!             Your Updated Medication List - Protect others around you: Learn how to safely use, store and throw away your medicines at www.disposemymeds.org.          This list is accurate as of: 8/10/17 12:32 PM.  Always use your most recent med list.                   Brand Name Dispense Instructions for use Diagnosis    albuterol 108 (90 BASE) MCG/ACT Inhaler    PROAIR HFA/PROVENTIL HFA/VENTOLIN HFA    1 Inhaler    Inhale 1-2 puffs into the lungs every 4 hours as needed for shortness of breath / dyspnea    Intermittent asthma, uncomplicated       cyclobenzaprine 10 MG tablet    FLEXERIL    30 tablet    Take 1 tablet (10 mg) by mouth 3 times daily as needed for muscle spasms    Lumbar disc herniation       FLUoxetine 20 MG capsule    PROzac    270 capsule    TAKE THREE CAPSULES BY MOUTH DAILY    PMDD (premenstrual dysphoric disorder)       levothyroxine 50 MCG tablet    SYNTHROID/LEVOTHROID    90 tablet    TAKE ONE TABLET BY MOUTH ONE TIME DAILY    Goiter, Acquired hypothyroidism       methocarbamol 500 MG tablet    ROBAXIN    240 tablet    Take 1-2 tablets (500-1,000 mg) by mouth 3 times daily as needed for muscle spasms    Facet arthropathy, Myofascial pain syndrome       oxyCODONE-acetaminophen 5-325 MG per tablet     PERCOCET    2 tablet    Take 1 tablet by mouth every 4 hours as needed for pain . No further refills until follow-up appointment    Acute intractable headache, unspecified headache type

## 2017-08-10 NOTE — NURSING NOTE
"Chief Complaint   Patient presents with     Headache       Initial /84  Pulse 84  Temp 97.1  F (36.2  C) (Oral)  Ht 5' 5.55\" (1.665 m)  Wt 198 lb 6.4 oz (90 kg)  LMP 08/07/2015  SpO2 99%  BMI 32.46 kg/m2 Estimated body mass index is 32.46 kg/(m^2) as calculated from the following:    Height as of this encounter: 5' 5.55\" (1.665 m).    Weight as of this encounter: 198 lb 6.4 oz (90 kg).  Medication Reconciliation: complete     Maura English MA    "

## 2017-08-14 ENCOUNTER — MYC MEDICAL ADVICE (OUTPATIENT)
Dept: FAMILY MEDICINE | Facility: CLINIC | Age: 47
End: 2017-08-14

## 2017-08-14 DIAGNOSIS — R51.9 INTRACTABLE HEADACHE, UNSPECIFIED CHRONICITY PATTERN, UNSPECIFIED HEADACHE TYPE: Primary | ICD-10-CM

## 2017-08-14 RX ORDER — METHYLPREDNISOLONE 4 MG
TABLET, DOSE PACK ORAL
Qty: 21 TABLET | Refills: 0 | Status: SHIPPED | OUTPATIENT
Start: 2017-08-14 | End: 2018-01-25

## 2017-08-15 ENCOUNTER — TELEPHONE (OUTPATIENT)
Dept: FAMILY MEDICINE | Facility: CLINIC | Age: 47
End: 2017-08-15

## 2017-08-15 NOTE — LETTER
August 16, 2017      Yenny Johnson  [address] [place] [address]      Dear Yenny,     Your clinic record indicates that you are due for an asthma update. We have a survey tool called an ACT (or Asthma Control Test) we use to measure the level of control of your asthma. Please complete the enclosed questionnaire and mail it back to us in the self-addressed stamped envelope.     If you have questions about this letter please contact your provider.     Sincerely,    Your [facility]

## 2017-08-15 NOTE — TELEPHONE ENCOUNTER
Send AAP with outreach letter for follow-up ACT for new diagnosis of asthma - 1 month   Cee Biggs MD

## 2017-08-15 NOTE — LETTER
My Asthma Action Plan  Name: Yenny Johnson   YOB: 1970  Date: 8/15/2017   My doctor: Cee Biggs MD   My clinic: [facility]        My Control Medicine:    My Rescue Medicine: Albuterol (Proair/Ventolin/Proventil) inhaler     My Asthma Severity: intermittent  Avoid your asthma triggers: upper respiratory infections               GREEN ZONE   Good Control    I feel good    No cough or wheeze    Can work, sleep and play without asthma symptoms       Take your asthma control medicine every day.     1. If exercise triggers your asthma, take your rescue medication    15 minutes before exercise or sports, and    During exercise if you have asthma symptoms  2. Spacer to use with inhaler: If you have a spacer, make sure to use it with your inhaler             YELLOW ZONE Getting Worse  I have ANY of these:    I do not feel good    Cough or wheeze    Chest feels tight    Wake up at night   1. Keep taking your Green Zone medications  2. Start taking your rescue medicine:    every 20 minutes for up to 1 hour. Then every 4 hours for 24-48 hours.  3. If you stay in the Yellow Zone for more than 12-24 hours, contact your doctor.  4. If you do not return to the Green Zone in 12-24 hours or you get worse, start taking your oral steroid medicine if prescribed by your provider.           RED ZONE Medical Alert - Get Help  I have ANY of these:    I feel awful    Medicine is not helping    Breathing getting harder    Trouble walking or talking    Nose opens wide to breathe       1. Take your rescue medicine NOW  2. If your provider has prescribed an oral steroid medicine, start taking it NOW  3. Call your doctor NOW  4. If you are still in the Red Zone after 20 minutes and you have not reached your doctor:    Take your rescue medicine again and    Call 911 or go to the emergency room right away    See your regular doctor within 2 weeks of an Emergency Room or Urgent Care visit for follow-up treatment.         Electronically signed by: Cee Biggs, August 15, 2017    Annual Reminders:  Meet with Asthma Educator,  Flu Shot in the Fall, consider Pneumonia Vaccination for patients with asthma (aged 19 and older).    Pharmacy: [facility] [address] [place] [address]                    Asthma Triggers  How To Control Things That Make Your Asthma Worse    Triggers are things that make your asthma worse.  Look at the list below to help you find your triggers and what you can do about them.  You can help prevent asthma flare-ups by staying away from your triggers.      Trigger                                                          What you can do   Cigarette Smoke  Tobacco smoke can make asthma worse. Do not allow smoking in your home, car or around you.  Be sure no one smokes at a child s day care or school.  If you smoke, ask your health care provider for ways to help you quit.  Ask family members to quit too.  Ask your health care provider for a referral to Quit Plan to help you quit smoking, or call 6-099-619-PLAN.     Colds, Flu, Bronchitis  These are common triggers of asthma. Wash your hands often.  Don t touch your eyes, nose or mouth.  Get a flu shot every year.     Dust Mites  These are tiny bugs that live in cloth or carpet. They are too small to see. Wash sheets and blankets in hot water every week.   Encase pillows and mattress in dust mite proof covers.  Avoid having carpet if you can. If you have carpet, vacuum weekly.   Use a dust mask and HEPA vacuum.   Pollen and Outdoor Mold  Some people are allergic to trees, grass, or weed pollen, or molds. Try to keep your windows closed.  Limit time out doors when pollen count is high.   Ask you health care provider about taking medicine during allergy season.     Animal Dander  Some people are allergic to skin flakes, urine or saliva from pets with fur or feathers. Keep pets with fur or feathers out of your home.    If you can t keep the pet  outdoors, then keep the pet out of your bedroom.  Keep the bedroom door closed.  Keep pets off cloth furniture and away from stuffed toys.     Mice, Rats, and Cockroaches  Some people are allergic to the waste from these pests.   Cover food and garbage.  Clean up spills and food crumbs.  Store grease in the refrigerator.   Keep food out of the bedroom.   Indoor Mold  This can be a trigger if your home has high moisture. Fix leaking faucets, pipes, or other sources of water.   Clean moldy surfaces.  Dehumidify basement if it is damp and smelly.   Smoke, Strong Odors, and Sprays  These can reduce air quality. Stay away from strong odors and sprays, such as perfume, powder, hair spray, paints, smoke incense, paint, cleaning products, candles and new carpet.   Exercise or Sports  Some people with asthma have this trigger. Be active!  Ask your doctor about taking medicine before sports or exercise to prevent symptoms.    Warm up for 5-10 minutes before and after sports or exercise.     Other Triggers of Asthma  Cold air:  Cover your nose and mouth with a scarf.  Sometimes laughing or crying can be a trigger.  Some medicines and food can trigger asthma.

## 2017-08-15 NOTE — TELEPHONE ENCOUNTER
Patient was in to see Dr. Biggs on 08-10-17 and has the diagnoses of Asthma. Patient is due for an ACT and AAP. Please complete. Thank you.

## 2017-08-15 NOTE — LETTER
My Asthma Action Plan  Name: Yenny Johnson   YOB: 1970  Date: 8/16/2017   My doctor: Cee Biggs MD   My clinic: [facility]        My Control Medicine: none  My Rescue Medicine: Albuterol (Proair/Ventolin/Proventil) inhaler .   My Asthma Severity: intermittent                 GREEN ZONE   Good Control    I feel good    No cough or wheeze    Can work, sleep and play without asthma symptoms       Take your asthma control medicine every day.     1. If exercise triggers your asthma, take your rescue medication    15 minutes before exercise or sports, and    During exercise if you have asthma symptoms  2. Spacer to use with inhaler: If you have a spacer, make sure to use it with your inhaler             YELLOW ZONE Getting Worse  I have ANY of these:    I do not feel good    Cough or wheeze    Chest feels tight    Wake up at night   1. Keep taking your Green Zone medications  2. Start taking your rescue medicine:    every 20 minutes for up to 1 hour. Then every 4 hours for 24-48 hours.  3. If you stay in the Yellow Zone for more than 12-24 hours, contact your doctor.  4. If you do not return to the Green Zone in 12-24 hours or you get worse, start taking your oral steroid medicine if prescribed by your provider.           RED ZONE Medical Alert - Get Help  I have ANY of these:    I feel awful    Medicine is not helping    Breathing getting harder    Trouble walking or talking    Nose opens wide to breathe       1. Take your rescue medicine NOW  2. If your provider has prescribed an oral steroid medicine, start taking it NOW  3. Call your doctor NOW  4. If you are still in the Red Zone after 20 minutes and you have not reached your doctor:    Take your rescue medicine again and    Call 911 or go to the emergency room right away    See your regular doctor within 2 weeks of an Emergency Room or Urgent Care visit for follow-up treatment.        Electronically signed by: Abimael Garcia  August 16, 2017    Annual Reminders:  Meet with Asthma Educator,  Flu Shot in the Fall, consider Pneumonia Vaccination for patients with asthma (aged 19 and older).    Pharmacy: [facility] [id number] [place] [address]                    Asthma Triggers  How To Control Things That Make Your Asthma Worse    Triggers are things that make your asthma worse.  Look at the list below to help you find your triggers and what you can do about them.  You can help prevent asthma flare-ups by staying away from your triggers.      Trigger                                                          What you can do   Cigarette Smoke  Tobacco smoke can make asthma worse. Do not allow smoking in your home, car or around you.  Be sure no one smokes at a child s day care or school.  If you smoke, ask your health care provider for ways to help you quit.  Ask family members to quit too.  Ask your health care provider for a referral to Quit Plan to help you quit smoking, or call 5-935-759-PLAN.     Colds, Flu, Bronchitis  These are common triggers of asthma. Wash your hands often.  Don t touch your eyes, nose or mouth.  Get a flu shot every year.     Dust Mites  These are tiny bugs that live in cloth or carpet. They are too small to see. Wash sheets and blankets in hot water every week.   Encase pillows and mattress in dust mite proof covers.  Avoid having carpet if you can. If you have carpet, vacuum weekly.   Use a dust mask and HEPA vacuum.   Pollen and Outdoor Mold  Some people are allergic to trees, grass, or weed pollen, or molds. Try to keep your windows closed.  Limit time out doors when pollen count is high.   Ask you health care provider about taking medicine during allergy season.     Animal Dander  Some people are allergic to skin flakes, urine or saliva from pets with fur or feathers. Keep pets with fur or feathers out of your home.    If you can t keep the pet outdoors, then keep the pet out of your bedroom.  Keep  the bedroom door closed.  Keep pets off cloth furniture and away from stuffed toys.     Mice, Rats, and Cockroaches  Some people are allergic to the waste from these pests.   Cover food and garbage.  Clean up spills and food crumbs.  Store grease in the refrigerator.   Keep food out of the bedroom.   Indoor Mold  This can be a trigger if your home has high moisture. Fix leaking faucets, pipes, or other sources of water.   Clean moldy surfaces.  Dehumidify basement if it is damp and smelly.   Smoke, Strong Odors, and Sprays  These can reduce air quality. Stay away from strong odors and sprays, such as perfume, powder, hair spray, paints, smoke incense, paint, cleaning products, candles and new carpet.   Exercise or Sports  Some people with asthma have this trigger. Be active!  Ask your doctor about taking medicine before sports or exercise to prevent symptoms.    Warm up for 5-10 minutes before and after sports or exercise.     Other Triggers of Asthma  Cold air:  Cover your nose and mouth with a scarf.  Sometimes laughing or crying can be a trigger.  Some medicines and food can trigger asthma.

## 2017-08-23 DIAGNOSIS — M47.819 FACET ARTHROPATHY: Primary | ICD-10-CM

## 2017-09-12 ENCOUNTER — SURGERY (OUTPATIENT)
Age: 47
End: 2017-09-12

## 2017-09-12 ENCOUNTER — HOSPITAL ENCOUNTER (OUTPATIENT)
Facility: AMBULATORY SURGERY CENTER | Age: 47
End: 2017-09-12

## 2017-09-12 VITALS
TEMPERATURE: 97.3 F | WEIGHT: 195 LBS | DIASTOLIC BLOOD PRESSURE: 79 MMHG | OXYGEN SATURATION: 98 % | HEIGHT: 66 IN | RESPIRATION RATE: 16 BRPM | SYSTOLIC BLOOD PRESSURE: 117 MMHG | BODY MASS INDEX: 31.34 KG/M2

## 2017-09-12 RX ORDER — TRIAMCINOLONE ACETONIDE 40 MG/ML
INJECTION, SUSPENSION INTRA-ARTICULAR; INTRAMUSCULAR PRN
Status: DISCONTINUED | OUTPATIENT
Start: 2017-09-12 | End: 2017-09-12 | Stop reason: HOSPADM

## 2017-09-12 RX ORDER — LIDOCAINE 40 MG/G
CREAM TOPICAL
Status: DISCONTINUED | OUTPATIENT
Start: 2017-09-12 | End: 2017-09-13 | Stop reason: HOSPADM

## 2017-09-12 RX ORDER — BUPIVACAINE HYDROCHLORIDE 2.5 MG/ML
INJECTION, SOLUTION INFILTRATION; PERINEURAL PRN
Status: DISCONTINUED | OUTPATIENT
Start: 2017-09-12 | End: 2017-09-12 | Stop reason: HOSPADM

## 2017-09-12 RX ORDER — NALOXONE HYDROCHLORIDE 0.4 MG/ML
.1-.4 INJECTION, SOLUTION INTRAMUSCULAR; INTRAVENOUS; SUBCUTANEOUS
Status: DISCONTINUED | OUTPATIENT
Start: 2017-09-12 | End: 2017-09-13 | Stop reason: HOSPADM

## 2017-09-12 RX ORDER — FENTANYL CITRATE 50 UG/ML
25-50 INJECTION, SOLUTION INTRAMUSCULAR; INTRAVENOUS EVERY 5 MIN PRN
Status: DISCONTINUED | OUTPATIENT
Start: 2017-09-12 | End: 2017-09-13 | Stop reason: HOSPADM

## 2017-09-12 RX ORDER — FLUMAZENIL 0.1 MG/ML
0.2 INJECTION, SOLUTION INTRAVENOUS
Status: DISCONTINUED | OUTPATIENT
Start: 2017-09-12 | End: 2017-09-13 | Stop reason: HOSPADM

## 2017-09-12 RX ORDER — LIDOCAINE HYDROCHLORIDE 10 MG/ML
INJECTION, SOLUTION EPIDURAL; INFILTRATION; INTRACAUDAL; PERINEURAL PRN
Status: DISCONTINUED | OUTPATIENT
Start: 2017-09-12 | End: 2017-09-12 | Stop reason: HOSPADM

## 2017-09-12 RX ADMIN — FENTANYL CITRATE 50 MCG: 50 INJECTION, SOLUTION INTRAMUSCULAR; INTRAVENOUS at 13:14

## 2017-09-12 RX ADMIN — TRIAMCINOLONE ACETONIDE 40 MG: 40 INJECTION, SUSPENSION INTRA-ARTICULAR; INTRAMUSCULAR at 14:01

## 2017-09-12 RX ADMIN — LIDOCAINE HYDROCHLORIDE 13 ML: 10 INJECTION, SOLUTION EPIDURAL; INFILTRATION; INTRACAUDAL; PERINEURAL at 13:57

## 2017-09-12 RX ADMIN — BUPIVACAINE HYDROCHLORIDE 6.5 ML: 2.5 INJECTION, SOLUTION INFILTRATION; PERINEURAL at 14:00

## 2017-09-12 NOTE — OP NOTE
Patient: Yenny Johnson Age: 47 year old   MRN: [id number] Attending: Dr. Gomes     Date of Visit: September 12, 2017      PAIN MEDICINE CLINIC PROCEDURE NOTE    ATTENDING CLINICIAN:    Benigno Willams MD    ASSISTANT CLINICIAN:  Gómez Prather DO    PREPROCEDURE DIAGNOSES:  1. Lumbar facet arthropathy   2. Chronic low back pain     POSTPROCEDURE DIAGNOSES:  1. Lumbar facet arthropathy   2. Chronic low back pain     PROCEDURE(S) PERFORMED:  1. Bilateral L3 and L4 medial branch nerve and L5 dorsal ramus nerve radiofrequency ablation.  2.  Fluoroscopic guidance for the above procedures    ANESTHESIA:  Versed 2mg, Fentanyl 50mcg and Local.    BLOOD LOSS:  Minimal.    DRAINS AND SPECIMENS:  None.    COMPLICATIONS:  None.    INDICATIONS:    Yenny Johnson is a 47 year old female with a history of low back pain secondary to lumar facet joint arthopathy . The patient previously had good response to diagnostic block of medial branch nerves L3,4,5 bilaterally. The patient stated that the patient was in their usual state of health and denied recent anticoagulant use or recent infections.  Therefore, the plan is to perform above mentioned procedure.     Procedure Details:    The patient was met in the procedure room, where the patient was identified by name, medical record number and date of birth.  All of the patient s last minute questions were answered. Written informed consent was obtained and saved in the electronic medical record, after the risks, benefits, and alternatives were discussed with the patient.      A formal time-out procedure was performed by Dr. Willams, as per protocol, including patient name, title of procedure, and site of procedure, and all in the room concurred.  Routine monitors were applied.      The patient was placed in the prone position on the procedure room table.  All pressure points were checked and comfortably padded.  Routine monitors were placed.  Vital signs were stable.    A  chlorhexidine prep was completed followed by sterile draping per standard procedure.     We identified each lumbar vertebral body after first identifying the S1 vertebrae.  The fluroscope was positioned over the sacral ala for the bilateral L5 medial branches. Skin and subcutaneous tissues overlying this area were anesthetized with a 1% lidocaine.  A 100 millimeter, 22-gauge RFA was guided under fluoroscopy to the sacral ala bilaterally. Lateral fluoroscopic image was obtained to check the correct needle depth.  At that point, the stylet was removed from the RF needle and an RFA probe was then inserted. Sensory testing was appropriately responsive.  Motor testing was initiated with appropriate multifidus responses.  There was no motor response in her lower extremities. A 1ml solution of kenalog and 0.5%bupivacaine was injected. Ablation was then carried out at 80 degrees Celsius for 90 seconds.     The fluoroscope was then obliqued towards the patient's right in order to identify the pedicles of the left L4 and L5.  The targets were recognized at the junction of the transverse process and the superior articular process. Skin and subcutaneous tissues overlying this area were anesthetized with a 1% lidocaine.  Starting at L4,  under fluoroscopic guidance, we directed the radiofrequency 100 millimeter, 22-gauge RFA needle through the skin and subcutaneous tissues  until osseous contact was achieved.  At this point, the needle was walked off the junction of the transverse process and the superior articular process.The procedure  was then repeated for the L5 on the right. Lateral fluoroscopic image was obtained to check the correct needle depth.  At that point, the stylet was removed from the RF needle and an RFA probe was then inserted. Sensory testing was appropriately responsive.  Motor testing was initiated with appropriate multifidus responses.  There was no motor response in her lower extremities. A 1ml solution of  kenalog and 0.5%bupivacaine was injected.  Ablation was then carried out at 80 degrees Celsius for 90 seconds. This same procedure was carried out on the left side.    Light pressure was held at the puncture site(s) to prevent ecchymosis and oozing.  The patient's skin was cleansed, and hemostasis was confirmed.  Band-aids were applied to the needle injection site(s).      Condition:    The patient tolerated the procedure well and was monitored for approximately 15 minutes afterward in the post procedure area.  There were no immediate post procedure complications noted.  The patient was then discharged to home as per protocol.     Patient condition  Stable.    Pre-procedure pain score: 9/10  Post-procedure pain score: 0/10        Sedation:      Performed by: Gómez Prather and Benigno Willams    Indication:  Sedation is required to allow above mentioned procedure     Consent:  Consent obtained from the patient Yenny Johnson after discussing the risks, benefits and alternatives.    PO Intake:  Appropriately NPO for procedure    Lungs: Lungs Clear with good breath sounds bilaterally.     Heart: Normal heart sounds and rate    Focused history and physical completed prior to procedure. I have reviewed the lab findings, diagnostic data, medications, and the plan for sedation. I have determined this patient to be an appropriate candidate for the planned sedation and procedure and have reassessed the patient IMMEDIATELY PRIOR to sedation and procedure.      Sedation Post Procedure Summary:    Prior to the start of the procedure and with procedural staff participation, I verbally confirmed the patient s identity using two indicators, relevant allergies, that the procedure was appropriate and matched the consent or emergent situation, and that the correct equipment/implants were available. Immediately prior to starting the procedure I conducted the Time Out with the procedural staff and re-confirmed the patient s  name, procedure, and site/side. (The Joint Commission universal protocol was followed.)  Yes      Sedatives: Fentanyl 50mcg and Midazolam (Versed) 2ml    Vital signs, airway, End Tidal CO2 and pulse oximetry were monitored and remained stable throughout the procedure and sedation was maintained until the procedure was complete.  The patient was monitored by staff until sedation discharge criteria were met.    Patient tolerance: Patient tolerated the procedure well with no immediate complications.    Time of sedation in minutes:  40 minutes from beginning to end of physician one to one monitoring.    I was present in the room for the entire procedure.    Benigno Willams

## 2017-09-12 NOTE — IP AVS SNAPSHOT
[facility]    [address] [place] [id number]    Phone:  [phone]    Fax:  [phone]                                       After Visit Summary   9/12/2017    Yenny Johnson    MRN: [id number]           After Visit Summary Signature Page     I have received my discharge instructions, and my questions have been answered. I have discussed any challenges I see with this plan with the nurse or doctor.    ..........................................................................................................................................  Patient/Patient Representative Signature      ..........................................................................................................................................  Patient Representative Print Name and Relationship to Patient    ..................................................               ................................................  Date                                            Time    ..........................................................................................................................................  Reviewed by Signature/Title    ...................................................              ..............................................  Date                                                            Time

## 2017-09-12 NOTE — IP AVS SNAPSHOT
MRN:[id number]                      After Visit Summary   9/12/2017    Yenny Johnson    MRN: [id number]           Thank you!     Thank you for choosing [facility] for your care. Our goal is always to provide you with excellent care. Hearing back from our patients is one way we can continue to improve our services. Please take a few minutes to complete the written survey that you may receive in the mail after you visit with us. Thank you!        Patient Information     Date Of Birth          1970        About your hospital stay     You were admitted on:  September 12, 2017 [facility]    You were discharged on:  September 12, 2017       Who to Call     For medical emergencies, please call 911.  For non-urgent questions about your medical care, please call your primary care provider or clinic, [phone]  For questions related to your surgery, please call your surgery clinic        Attending Provider     Provider Specialty    Benigno Willams MD Anesthesiology       Primary Care Provider Office Phone # Fax #    Cee Biggs [phone]      Further instructions from your care team       Home Care Instructions after a Radio Frequency Ablation    A radiofrequency ablation refers to a procedure that destroys the functionality of the nerve using radiofrequency energy. There are two primary types of radiofrequency ablation: A medial branch neurotomy (ablation) affects the nerves carrying pain from the facet joints, while a lateral branch neurotomy (ablation) affects nerves that carry pain from the sacroiliac joints. The physician uses x-ray guidance (fluoroscopy) to direct a special (radiofrequency) needle alongside the medial or lateral branch nerves. A small amount of electrical current is often carefully passed through the needle to assure it is next to the target nerve and a safe distance from other nerves. This  current should briefly recreate the usual pain and cause a muscle twitch in the neck or back. The targeted nerves will then be numbed to minimize pain while the lesion is being created. The radiofrequency waves are introduced to heat the tip of the needle and a heat lesion is created on the nerve to disrupt the nerve's ability to send pain signals. Please follow the aftercare instructions regarding your procedure.    Activity  -Rest today  -Do not work today  -Resume normal activity in 2-3 days  -No heavy lifting, turning or twisting for 24 hours  -DO NOT shower or soak in tub for 24 hours  -DO NOT remove bandaid for 24 hours    Pain  -You may experience soreness at the injection site for one or two days  -You may use an ice pack for 20 minutes every 2 hours for the first 24 hours, longer if needed for comfort, do not use heat  -You may use Tylenol (acetaminophen) every 4 hours or other pain medicines as     directed by your physician  -If other pain medications are prescribed by your physician, please follow dosing instructions carefully.    What to expect  You may experience numbness radiating into your legs or arms (depending on the procedure location). This numbness may last several hours. Until sensation returns to normal, please use caution in walking, climbing stairs, and stepping out of your vehicle, etc. Relief of your initial symptoms can take up to 4 weeks to feel better, this is normal and due to the healing process. The procedure site may feel like a deep burn. Using ice will greatly minimize this discomfort. Do not use numbing creams or patches over your injection sites immediately following your procedure. Please keep injection sites covered, clean and dry for 24 hours.    DID YOU RECEIVE SEDATION TODAY?  Yes    Safety  Sedation medicine, if given, may remain active for many hours. It is important for the next 24 hours that you do not:  -Drive a car  -Operate machines or power tools  -Consume alcohol,  "including beer  -Sign any important papers or legal documents  -Please have a responsible adult with you for 24 hours following any sedation    DID YOU RECEIVE STEROIDS TODAY?  Yes    Common side effects of steroids:  Not everyone will experience corticosteroid side effects. If side effects are experienced, they will gradually subside in the 7-10 day period following an injection. Most common side effects include:  -Flushed face and/or chest  -Feeling of warmth, particularly in the face but could be an overall feeling of warmth  -Increased blood sugar in diabetic patients  -Menstrual irregularities my occur. If taking hormone-based birth control an alternate method of birth control is recommended  -Sleep disturbances and/or mood swings are possible  -Leg cramps      Please contact us if you have:  -Severe pain  -Fever more than 101.5 degrees Fahrenheit  -Signs of infection at the injection site (redness, swelling, or drainage)    If you have questions, please contact our office at [phone] between the hours of 7:00 am and 3:00 pm Monday through Friday. After office hours you can contact the on call provider by dialing [phone]. If you need immediate attention, we recommend that you go to a hospital emergency room or dial 911.    Pending Results     Date and Time Order Name Status Description    9/12/2017 1307 XR SURGERY DANIEL FLUORO LESS THAN 5 MIN W STILLS In process             Admission Information     Date & Time Provider Department Dept. Phone    9/12/2017 Benigno Willams MD [facility] [phone]      Your Vitals Were     Blood Pressure Temperature Respirations Height Weight Last Period    133/91 97.3  F (36.3  C) (Oral) 18 1.676 m (5' 6\") 88.5 kg (195 lb) 08/07/2015    Pulse Oximetry BMI (Body Mass Index)                99% 31.47 kg/m2          Partigihart Information     [employer] gives you secure access to your electronic health record. If you see a primary care provider, you " can also send messages to your care team and make appointments. If you have questions, please call your primary care clinic.  If you do not have a primary care provider, please call [phone] and they will assist you.      [employer] is an electronic gateway that provides easy, online access to your medical records. With [employer], you can request a clinic appointment, read your test results, renew a prescription or communicate with your care team.     To access your existing account, please contact your [facility] or call [phone] for assistance.        Care EveryWhere ID     This is your Care EveryWhere ID. This could be used by other organizations to access your [place] medical records  [id number]        Equal Access to Services     GUSTAVO ARROYO : Lissette Wadsworth, pelon delacruz, jimmy ramirez, mercedes flores. So [facility] [phone].    ATENCIÓN: Si habla español, tiene a victoria disposición servicios gratuitos de asistencia lingüística. Llame al [phone].    We comply with applicable federal civil rights laws and [place] laws. We do not discriminate on the basis of race, color, national origin, age, disability sex, sexual orientation or gender identity.               Review of your medicines      UNREVIEWED medicines. Ask your doctor about these medicines        Dose / Directions    albuterol 108 (90 BASE) MCG/ACT Inhaler   Commonly known as:  PROAIR HFA/PROVENTIL HFA/VENTOLIN HFA   Used for:  Intermittent asthma, uncomplicated        Dose:  1-2 puff   Inhale 1-2 puffs into the lungs every 4 hours as needed for shortness of breath / dyspnea   Quantity:  1 Inhaler   Refills:  5       cyclobenzaprine 10 MG tablet   Commonly known as:  FLEXERIL   Used for:  Lumbar disc herniation        Dose:  10 mg   Take 1 tablet (10 mg) by mouth 3 times daily as needed for muscle spasms   Quantity:  30 tablet   Refills:  2       FLUoxetine  20 MG capsule   Commonly known as:  PROzac   Used for:  PMDD (premenstrual dysphoric disorder)        TAKE THREE CAPSULES BY MOUTH DAILY   Quantity:  270 capsule   Refills:  1       levothyroxine 50 MCG tablet   Commonly known as:  SYNTHROID/LEVOTHROID   Used for:  Goiter, Acquired hypothyroidism        TAKE ONE TABLET BY MOUTH ONE TIME DAILY   Quantity:  90 tablet   Refills:  0       methocarbamol 500 MG tablet   Commonly known as:  ROBAXIN   Used for:  Facet arthropathy, Myofascial pain syndrome        Dose:  500-1000 mg   Take 1-2 tablets (500-1,000 mg) by mouth 3 times daily as needed for muscle spasms   Quantity:  240 tablet   Refills:  1       methylPREDNISolone 4 MG tablet   Commonly known as:  MEDROL DOSEPAK   Used for:  Intractable headache, unspecified chronicity pattern, unspecified headache type        Follow package instructions   Quantity:  21 tablet   Refills:  0       oxyCODONE-acetaminophen 5-325 MG per tablet   Commonly known as:  PERCOCET   Used for:  Acute intractable headache, unspecified headache type        Dose:  1 tablet   Take 1 tablet by mouth every 4 hours as needed for pain . No further refills until follow-up appointment   Quantity:  2 tablet   Refills:  0                Protect others around you: Learn how to safely use, store and throw away your medicines at www.disposemymeds.org.             Medication List: This is a list of all your medications and when to take them. Check marks below indicate your daily home schedule. Keep this list as a reference.      Medications           Morning Afternoon Evening Bedtime As Needed    albuterol 108 (90 BASE) MCG/ACT Inhaler   Commonly known as:  PROAIR HFA/PROVENTIL HFA/VENTOLIN HFA   Inhale 1-2 puffs into the lungs every 4 hours as needed for shortness of breath / dyspnea                                cyclobenzaprine 10 MG tablet   Commonly known as:  FLEXERIL   Take 1 tablet (10 mg) by mouth 3 times daily as needed for muscle spasms                                 FLUoxetine 20 MG capsule   Commonly known as:  PROzac   TAKE THREE CAPSULES BY MOUTH DAILY                                levothyroxine 50 MCG tablet   Commonly known as:  SYNTHROID/LEVOTHROID   TAKE ONE TABLET BY MOUTH ONE TIME DAILY                                methocarbamol 500 MG tablet   Commonly known as:  ROBAXIN   Take 1-2 tablets (500-1,000 mg) by mouth 3 times daily as needed for muscle spasms                                methylPREDNISolone 4 MG tablet   Commonly known as:  MEDROL DOSEPAK   Follow package instructions                                oxyCODONE-acetaminophen 5-325 MG per tablet   Commonly known as:  PERCOCET   Take 1 tablet by mouth every 4 hours as needed for pain . No further refills until follow-up appointment

## 2017-09-12 NOTE — DISCHARGE INSTRUCTIONS
Home Care Instructions after a Radio Frequency Ablation    A radiofrequency ablation refers to a procedure that destroys the functionality of the nerve using radiofrequency energy. There are two primary types of radiofrequency ablation: A medial branch neurotomy (ablation) affects the nerves carrying pain from the facet joints, while a lateral branch neurotomy (ablation) affects nerves that carry pain from the sacroiliac joints. The physician uses x-ray guidance (fluoroscopy) to direct a special (radiofrequency) needle alongside the medial or lateral branch nerves. A small amount of electrical current is often carefully passed through the needle to assure it is next to the target nerve and a safe distance from other nerves. This current should briefly recreate the usual pain and cause a muscle twitch in the neck or back. The targeted nerves will then be numbed to minimize pain while the lesion is being created. The radiofrequency waves are introduced to heat the tip of the needle and a heat lesion is created on the nerve to disrupt the nerve's ability to send pain signals. Please follow the aftercare instructions regarding your procedure.    Activity  -Rest today  -Do not work today  -Resume normal activity in 2-3 days  -No heavy lifting, turning or twisting for 24 hours  -DO NOT shower or soak in tub for 24 hours  -DO NOT remove bandaid for 24 hours    Pain  -You may experience soreness at the injection site for one or two days  -You may use an ice pack for 20 minutes every 2 hours for the first 24 hours, longer if needed for comfort, do not use heat  -You may use Tylenol (acetaminophen) every 4 hours or other pain medicines as     directed by your physician  -If other pain medications are prescribed by your physician, please follow dosing instructions carefully.    What to expect  You may experience numbness radiating into your legs or arms (depending on the procedure location). This numbness may last several  hours. Until sensation returns to normal, please use caution in walking, climbing stairs, and stepping out of your vehicle, etc. Relief of your initial symptoms can take up to 4 weeks to feel better, this is normal and due to the healing process. The procedure site may feel like a deep burn. Using ice will greatly minimize this discomfort. Do not use numbing creams or patches over your injection sites immediately following your procedure. Please keep injection sites covered, clean and dry for 24 hours.    DID YOU RECEIVE SEDATION TODAY?  Yes    Safety  Sedation medicine, if given, may remain active for many hours. It is important for the next 24 hours that you do not:  -Drive a car  -Operate machines or power tools  -Consume alcohol, including beer  -Sign any important papers or legal documents  -Please have a responsible adult with you for 24 hours following any sedation    DID YOU RECEIVE STEROIDS TODAY?  Yes    Common side effects of steroids:  Not everyone will experience corticosteroid side effects. If side effects are experienced, they will gradually subside in the 7-10 day period following an injection. Most common side effects include:  -Flushed face and/or chest  -Feeling of warmth, particularly in the face but could be an overall feeling of warmth  -Increased blood sugar in diabetic patients  -Menstrual irregularities my occur. If taking hormone-based birth control an alternate method of birth control is recommended  -Sleep disturbances and/or mood swings are possible  -Leg cramps      Please contact us if you have:  -Severe pain  -Fever more than 101.5 degrees Fahrenheit  -Signs of infection at the injection site (redness, swelling, or drainage)    If you have questions, please contact our office at [phone] between the hours of 7:00 am and 3:00 pm Monday through Friday. After office hours you can contact the on call provider by dialing [phone]. If you need immediate attention, we recommend that  you go to a hospital emergency room or dial 911.

## 2017-10-18 ENCOUNTER — TELEPHONE (OUTPATIENT)
Dept: FAMILY MEDICINE | Facility: CLINIC | Age: 47
End: 2017-10-18

## 2017-10-18 NOTE — LETTER
October 18, 2017          Yenny Johnson,  [address] [place] [address]        Dear Yenny Johnson      Monitoring and managing your preventative and chronic health conditions are very important to us. Our records indicate that you have not scheduled for Asthma Check in  which was recommended by Dr. Biggs      If you have received your health care elsewhere, please call the clinic so the information can be documented in your chart.    Please call [phone] or message us through your [employer] account to schedule an appointment or provide information for your chart.     Feel free to contact us if you have any questions or concerns!    I look forward to seeing you and working with you on your health care needs.     Sincerely,         Cee Biggs / jonnie

## 2017-10-18 NOTE — TELEPHONE ENCOUNTER
Panel Management Review      Patient has the following on her problem list:   Asthma review   No flowsheet data found.   1. Is Asthma diagnosis on the Problem List? Yes    2. Is Asthma listed on Health Maintenance? Yes    3. Patient is due for:  AAP        Composite cancer screening  Chart review shows that this patient is due/due soon for the following None  Summary:    Patient is due/failing the following:   AAP    Action needed:   Patient needs office visit for Asthma.    Type of outreach:    Sent letter.    Questions for provider review:    None                                                                                                                                    Sonia FLORES MA

## 2017-11-05 DIAGNOSIS — E03.9 ACQUIRED HYPOTHYROIDISM: ICD-10-CM

## 2017-11-05 DIAGNOSIS — N18.30 CKD (CHRONIC KIDNEY DISEASE) STAGE 3, GFR 30-59 ML/MIN (H): Primary | ICD-10-CM

## 2017-11-05 DIAGNOSIS — E78.5 HYPERLIPIDEMIA LDL GOAL <100: ICD-10-CM

## 2017-11-07 RX ORDER — LEVOTHYROXINE SODIUM 50 UG/1
TABLET ORAL
Qty: 30 TABLET | Refills: 0 | Status: SHIPPED | OUTPATIENT
Start: 2017-11-07 | End: 2017-12-08

## 2017-11-07 NOTE — TELEPHONE ENCOUNTER
Routing refill request to provider for review/approval because:  Labs not current:  TSH     Saida Salinas RN

## 2017-11-07 NOTE — TELEPHONE ENCOUNTER
Signed Prescriptions:                        Disp   Refills    levothyroxine (SYNTHROID/LEVOTHROID) 50 MC*30 tab*0        Sig: TAKE ONE TABLET BY MOUTH ONE TIME DAILY  Authorizing Provider: JERAMY FIELDS    No further refills until follow-up appointment

## 2017-12-08 DIAGNOSIS — E03.9 ACQUIRED HYPOTHYROIDISM: ICD-10-CM

## 2017-12-08 RX ORDER — LEVOTHYROXINE SODIUM 50 UG/1
TABLET ORAL
Qty: 30 TABLET | Refills: 0 | Status: SHIPPED | OUTPATIENT
Start: 2017-12-08 | End: 2018-01-12

## 2017-12-08 NOTE — TELEPHONE ENCOUNTER
Routing refill request to provider for review/approval because:  Dilma given x1 and patient did not follow up, please advise  Labs not current:  TSH      Maddy Galvan RN - BC

## 2017-12-19 ENCOUNTER — CARE COORDINATION (OUTPATIENT)
Dept: ANESTHESIOLOGY | Facility: CLINIC | Age: 47
End: 2017-12-19

## 2017-12-19 NOTE — PROGRESS NOTES
LPN called pt to follow up after procedure:    Procedure Performed: Bilateral Lumbar RFA  Doctor: Екатерина  Diagnosis: Low back pain  Date of Procedure: 9/12/17   Patient stated their % of Overall Pain relief: 100%  Comments:     Pt reports- 100 % pain relief at the area of the injection. Pt stated that they now have pain in the area below the injection site.        Follow up: Pt was scheduled to follow up with Ghada Saenz for evaluation on 12/27/17    Lyndsay Engel LPN

## 2017-12-27 ENCOUNTER — OFFICE VISIT (OUTPATIENT)
Dept: ANESTHESIOLOGY | Facility: CLINIC | Age: 47
End: 2017-12-27
Payer: COMMERCIAL

## 2017-12-27 VITALS
SYSTOLIC BLOOD PRESSURE: 146 MMHG | WEIGHT: 199 LBS | DIASTOLIC BLOOD PRESSURE: 88 MMHG | HEIGHT: 66 IN | RESPIRATION RATE: 16 BRPM | BODY MASS INDEX: 31.98 KG/M2 | HEART RATE: 97 BPM

## 2017-12-27 DIAGNOSIS — M51.26 LUMBAR DISC HERNIATION: ICD-10-CM

## 2017-12-27 DIAGNOSIS — M53.3 SACROILIAC JOINT PAIN: Primary | ICD-10-CM

## 2017-12-27 RX ORDER — CYCLOBENZAPRINE HCL 10 MG
10 TABLET ORAL 3 TIMES DAILY PRN
Qty: 30 TABLET | Refills: 2 | Status: SHIPPED | OUTPATIENT
Start: 2017-12-27 | End: 2018-08-14

## 2017-12-27 ASSESSMENT — ANXIETY QUESTIONNAIRES
7. FEELING AFRAID AS IF SOMETHING AWFUL MIGHT HAPPEN: NOT AT ALL
1. FEELING NERVOUS, ANXIOUS, OR ON EDGE: NOT AT ALL
2. NOT BEING ABLE TO STOP OR CONTROL WORRYING: NOT AT ALL
7. FEELING AFRAID AS IF SOMETHING AWFUL MIGHT HAPPEN: NOT AT ALL
3. WORRYING TOO MUCH ABOUT DIFFERENT THINGS: NOT AT ALL
GAD7 TOTAL SCORE: 2
5. BEING SO RESTLESS THAT IT IS HARD TO SIT STILL: NOT AT ALL
GAD7 TOTAL SCORE: 2
4. TROUBLE RELAXING: SEVERAL DAYS
6. BECOMING EASILY ANNOYED OR IRRITABLE: SEVERAL DAYS
GAD7 TOTAL SCORE: 2

## 2017-12-27 ASSESSMENT — PAIN SCALES - GENERAL: PAINLEVEL: MILD PAIN (2)

## 2017-12-27 NOTE — LETTER
12/27/2017     RE: Yenny Johnson  [address] [place] [address]     Dear Colleague,    Thank you for referring your patient, Yenny Johnson, to the [facility] at [facility]. Please see a copy of my visit note below.    Date of visit: 12/27/2017    Chief complaint:   Chief Complaint   Patient presents with     Pain Management     New Consult      Original subjective:  Yenny Johnson is a 47 year old female last seen by my colleague, Benigno Willams, on 6/13/17.    Yenny Johnson is a 46 year old female with history of obesity, GERD, hypothyroid, CKD, stress incontinence, who presents for initial evaluation. The patient reports that her pain started insidiously several years ago and worsened 2-3 years ago. She had 2x L5 TFESI on 1/4/16 and 3/14/16 by Dr. Buckner that were not beneficial. She got chiropractic and PT which were somewhat beneficial. She saw Russ German yesterday who referred her to our clinic to consider a Left SI joint injection.     Pain location: low back, L>R. Radiates on left side to buttocks and rarely thigh  Quality and timing of pain: aching  Pain rating: intensity ranges from 4/10 to 7/10, and Averages 5/10 on a 0-10 scale.  Aggravating factors include: walking and twisting  Relieving factors include: rest  Denies red flags including: bowel or bladder symptoms, fever, chills, saddle anesthesia, profound motor loss, history of cancer, history of immune compromise, weight loss.      Current pain treatments include:   1. Flexeril 10 TID, not beneficial, causes drowsiness  2. Ibuprofen prn     Previous pain treatments included:  Yenny Johnson has been seen at a pain clinic in the past.  Ger Reyes for procedure  Medications: Lidocaine patch-not beneficial  PT: yes  Psychology: no  Acupuncture: no  Chiropractic care: yes  TENS Unit: no  Injections: 2x L5 TFESI on 1/4/16 and 3/14/16 by  Dr. Buckner that were not beneficial  Surgery: no     Diagnostic Tests:  Lumbar MRI completed on 4/6/17 showed:     FINDINGS:  Five lumbar vertebrae are assumed. Fairly prominent  degenerative disc disease at L5-S1.       Findings by specific level:      There is facet degenerative change as follows: Minimal left L4-L5,  mild bilateral L5-S1.      L2-L3, L3-L4: No disc herniation or stenosis.      L4-L5: Disc bulging without central stenosis. The right neural foramen  appears adequate. Mild left foraminal narrowing.      L5-S1: Mild-moderate central disc protrusion. There is disc bulging  elsewhere and some osteophytosis. There is congenital tapering of the  thecal sac without significant thecal sac compression. There is  moderate stenosis at the medial aspects of both neural foramina.        IMPRESSION:  1. The findings appear similar to the previous exam, except for  increased disc space narrowing at L5-S1.  2. L5-S1: Mild-moderate central disc protrusion. Moderate bilateral  foraminal stenosis medially.    Recommendations/plan at the last visit included:  Diagnosis reviewed, treatment option addressed, and risk/benefits discussed.  Self-care instructions given.  I am recommending a multidisciplinary treatment plan to help this patient better manage her pain.       3. Physical Therapy: Ordered Pain PT.   4. Clinical Health Psychologist to address issues of relaxation, behavioral change, coping style, and other factors important to improvement: NO  5. Medication Management:   1. Stop flexeril due to side-effect of drowsiness.  2. Start Robaxin 500-1000 mg TID PRN  3. Start Voltaren gel  6. Further procedures recommended: (1)Ordered bilateral L3,4,5 MBB/RFA (2) Consider left SIJ injection and piriformis injection if MBB are not diagnostic.  7. Follow up: as scheduled for MBB.    Since her last visit, Yenny Johnson reports:  -She underwent bilateral RFA of L3, L4, L5 on 9/12/17, which she obtained 100% relief of  "low lumbar pain.    -She states she has had persistent pain \"just below\" the area of ablation; she verbalizes this as her \"SI pain\", onset 3-4 years ago. R>L. Denies radiation of pain to buttocks; does describe right thigh \"ache\". She has had previous sacroiliac joint injections, approximately three years ago, and never obtained any relief. She questions if there is a similar ablative procedure that can be done for sacroiliac pain.  -Physical therapy last completed approximately one year ago.    -Chiropractic manipulation worsened pain.     Medications:  Current Outpatient Prescriptions   Medication Sig Dispense Refill     FLUoxetine (PROZAC) 20 MG capsule TAKE THREE CAPSULES BY MOUTH DAILY 270 capsule 1     levothyroxine (SYNTHROID/LEVOTHROID) 50 MCG tablet TAKE ONE TABLET BY MOUTH ONE TIME DAILY 30 tablet 0     methylPREDNISolone (MEDROL DOSEPAK) 4 MG tablet Follow package instructions 21 tablet 0     albuterol (PROAIR HFA/PROVENTIL HFA/VENTOLIN HFA) 108 (90 BASE) MCG/ACT Inhaler Inhale 1-2 puffs into the lungs every 4 hours as needed for shortness of breath / dyspnea 1 Inhaler 5     oxyCODONE-acetaminophen (PERCOCET) 5-325 MG per tablet Take 1 tablet by mouth every 4 hours as needed for pain . No further refills until follow-up appointment 2 tablet 0     methocarbamol (ROBAXIN) 500 MG tablet Take 1-2 tablets (500-1,000 mg) by mouth 3 times daily as needed for muscle spasms 240 tablet 1     cyclobenzaprine (FLEXERIL) 10 MG tablet Take 1 tablet (10 mg) by mouth 3 times daily as needed for muscle spasms 30 tablet 2       Medical History: any changes in medical history since they were last seen? No    Review of Systems:  The 14 system ROS was reviewed from the intake questionnaire; results are listed at end of note.     Physical Exam:  Blood pressure 146/88, pulse 97, resp. rate 16, height 1.676 m (5' 6\"), weight 90.3 kg (199 lb), last menstrual period 08/07/2015, not currently breastfeeding.  General: No apparent " distress  Psych: Mood and affect appropriate.   Gait: Normal  MSK exam: Lumbar Spine:    No spinous process tenderness with palpation of lumbar vertebrae. Right SI joint tenderness.  FROM with flexion, extension, and lateral bending without pain.  Strength 5/5 bilaterally: dorsiflexion, plantarflexion, hamstrings, quadriceps.  Patellar reflexes 2+ bilaterally.   Negative straight leg raise. Mildly positive right-sided Fabers.   Gait is isacc; symmetrical.    Assessment:   Yenny Johnson is a 47 year old female who is seen today at the pain clinic for sacroiliac joint pain.    Plan:  1. Interventions: Bilateral lateral branch blocks, diagnostic, ordered; the procedure was reviewed in depth with patient today and all questions were answered.   2. Physical Therapy:  Order placed aquatic physical therapy; discussed the importance of completing prior to RFA.   3. Follow up: Return in 3-6 months or as needed dependent on efficacy of diagnostic blocks.     Total time spent was 20 minutes, and more than 50% of face to face time was spent in counseling and/or coordination of care regarding plan of care.    KAY Atkinson, CARMELITA-BC  Pain Management  Department of Interventional Pain Management and Anesthesiology   [facility]    Answers for HPI/ROS submitted by the patient on 12/27/2017   YARI 7 TOTAL SCORE: 2

## 2017-12-27 NOTE — PROGRESS NOTES
Date of visit: 12/27/2017    Chief complaint:   Chief Complaint   Patient presents with     Pain Management     New Consult        Original subjective:  Yenny Johnson is a 47 year old female last seen by my colleague, Benigno Willams, on 6/13/17.    Yenny Johnson is a 46 year old female with history of obesity, GERD, hypothyroid, CKD, stress incontinence, who presents for initial evaluation. The patient reports that her pain started insidiously several years ago and worsened 2-3 years ago. She had 2x L5 TFESI on 1/4/16 and 3/14/16 by Dr. Buckner that were not beneficial. She got chiropractic and PT which were somewhat beneficial. She saw Russ German yesterday who referred her to our clinic to consider a Left SI joint injection.     Pain location: low back, L>R. Radiates on left side to buttocks and rarely thigh  Quality and timing of pain: aching  Pain rating: intensity ranges from 4/10 to 7/10, and Averages 5/10 on a 0-10 scale.  Aggravating factors include: walking and twisting  Relieving factors include: rest  Denies red flags including: bowel or bladder symptoms, fever, chills, saddle anesthesia, profound motor loss, history of cancer, history of immune compromise, weight loss.      Current pain treatments include:   1. Flexeril 10 TID, not beneficial, causes drowsiness  2. Ibuprofen prn     Previous pain treatments included:  Yenny Johnson has been seen at a pain clinic in the past.  Ger Reyes for procedure  Medications: Lidocaine patch-not beneficial  PT: yes  Psychology: no  Acupuncture: no  Chiropractic care: yes  TENS Unit: no  Injections: 2x L5 TFESI on 1/4/16 and 3/14/16 by Dr. Buckner that were not beneficial  Surgery: no     Diagnostic Tests:  Lumbar MRI completed on 4/6/17 showed:     FINDINGS:  Five lumbar vertebrae are assumed. Fairly prominent  degenerative disc disease at L5-S1.       Findings by specific level:      There is facet degenerative change as follows: Minimal left  "L4-L5,  mild bilateral L5-S1.      L2-L3, L3-L4: No disc herniation or stenosis.      L4-L5: Disc bulging without central stenosis. The right neural foramen  appears adequate. Mild left foraminal narrowing.      L5-S1: Mild-moderate central disc protrusion. There is disc bulging  elsewhere and some osteophytosis. There is congenital tapering of the  thecal sac without significant thecal sac compression. There is  moderate stenosis at the medial aspects of both neural foramina.          IMPRESSION:  1. The findings appear similar to the previous exam, except for  increased disc space narrowing at L5-S1.  2. L5-S1: Mild-moderate central disc protrusion. Moderate bilateral  foraminal stenosis medially.    Recommendations/plan at the last visit included:  Diagnosis reviewed, treatment option addressed, and risk/benefits discussed.  Self-care instructions given.  I am recommending a multidisciplinary treatment plan to help this patient better manage her pain.       3. Physical Therapy: Ordered Pain PT.   4. Clinical Health Psychologist to address issues of relaxation, behavioral change, coping style, and other factors important to improvement: NO  5. Medication Management:   1. Stop flexeril due to side-effect of drowsiness.  2. Start Robaxin 500-1000 mg TID PRN  3. Start Voltaren gel  6. Further procedures recommended: (1)Ordered bilateral L3,4,5 MBB/RFA (2) Consider left SIJ injection and piriformis injection if MBB are not diagnostic.  7. Follow up: as scheduled for MBB.    Since her last visit, Yenny Johnson reports:  -She underwent bilateral RFA of L3, L4, L5 on 9/12/17, which she obtained 100% relief of low lumbar pain.    -She states she has had persistent pain \"just below\" the area of ablation; she verbalizes this as her \"SI pain\", onset 3-4 years ago. R>L. Denies radiation of pain to buttocks; does describe right thigh \"ache\". She has had previous sacroiliac joint injections, approximately three years ago, " "and never obtained any relief. She questions if there is a similar ablative procedure that can be done for sacroiliac pain.  -Physical therapy last completed approximately one year ago.    -Chiropractic manipulation worsened pain.       Medications:  Current Outpatient Prescriptions   Medication Sig Dispense Refill     FLUoxetine (PROZAC) 20 MG capsule TAKE THREE CAPSULES BY MOUTH DAILY 270 capsule 1     levothyroxine (SYNTHROID/LEVOTHROID) 50 MCG tablet TAKE ONE TABLET BY MOUTH ONE TIME DAILY 30 tablet 0     methylPREDNISolone (MEDROL DOSEPAK) 4 MG tablet Follow package instructions 21 tablet 0     albuterol (PROAIR HFA/PROVENTIL HFA/VENTOLIN HFA) 108 (90 BASE) MCG/ACT Inhaler Inhale 1-2 puffs into the lungs every 4 hours as needed for shortness of breath / dyspnea 1 Inhaler 5     oxyCODONE-acetaminophen (PERCOCET) 5-325 MG per tablet Take 1 tablet by mouth every 4 hours as needed for pain . No further refills until follow-up appointment 2 tablet 0     methocarbamol (ROBAXIN) 500 MG tablet Take 1-2 tablets (500-1,000 mg) by mouth 3 times daily as needed for muscle spasms 240 tablet 1     cyclobenzaprine (FLEXERIL) 10 MG tablet Take 1 tablet (10 mg) by mouth 3 times daily as needed for muscle spasms 30 tablet 2       Medical History: any changes in medical history since they were last seen? No    Review of Systems:  The 14 system ROS was reviewed from the intake questionnaire; results are listed at end of note.     Physical Exam:  Blood pressure 146/88, pulse 97, resp. rate 16, height 1.676 m (5' 6\"), weight 90.3 kg (199 lb), last menstrual period 08/07/2015, not currently breastfeeding.  General: No apparent distress  Psych: Mood and affect appropriate.   Gait: Normal  MSK exam: Lumbar Spine:    No spinous process tenderness with palpation of lumbar vertebrae. Right SI joint tenderness.  FROM with flexion, extension, and lateral bending without pain.  Strength 5/5 bilaterally: dorsiflexion, plantarflexion, " hamstrings, quadriceps.  Patellar reflexes 2+ bilaterally.   Negative straight leg raise. Mildly positive right-sided Fabers.   Gait is isacc; symmetrical.      Assessment:   Yenny Johnson is a 47 year old female who is seen today at the pain clinic for sacroiliac joint pain.    Plan:  1. Interventions: Bilateral lateral branch blocks, diagnostic, ordered; the procedure was reviewed in depth with patient today and all questions were answered.   2. Physical Therapy:  Order placed aquatic physical therapy; discussed the importance of completing prior to RFA.    Addendum: Due to continuance of patient's symptoms and pain preventing her from completing PT as ordered, I would recommend she proceed with RFA; pool PT will be ordered after appropriate pain alleviation is obtained.   3. Follow up: Return in 3-6 months or as needed dependent on efficacy of diagnostic blocks.     Total time spent was 20 minutes, and more than 50% of face to face time was spent in counseling and/or coordination of care regarding plan of care.    KAY Atkinson, CARMELITA-BC  Pain Management  Department of Interventional Pain Management and Anesthesiology   [facility]        Answers for HPI/ROS submitted by the patient on 12/27/2017   YARI 7 TOTAL SCORE: 2

## 2017-12-27 NOTE — PATIENT INSTRUCTIONS
1. PT- Aquatic Therapy referral placed. Our recommendation is that you go 2-3 times a week for 6-8 weeks.   If you have not heard from the scheduling office within 2 business days, please call [phone] for all locations    2. Schedule your procedure.     Please call Daya at [phone] to schedule your procedure. She is available Monday - Friday from 9-5:30pm, if she is unavailable to take your call, please leave her a voice message with your name, birth date, and phone number and she will call you back.    Your procedure: Bilateral Lateral Branch diagnostic nerve blocks.     On the day of the procedure  1. Arrive 1 hour earlier than your scheduled time, to the [facility]  Address: [address] [place] [address], [place] [address]  2. Check in on the 5th floor for your procedure    A nurse will call you Within 24 hours after your procedure to follow up.    If you must reschedule your procedure more than two times, you must follow up in clinic before rescheduling again.      Patient Pre-Procedure Instructions    CAUTION - FAILURE TO FOLLOW THESE PRE-PROCEDURE INSTRUCTIONS WILL RESULT IN YOUR PROCEDURE BEING RESCHEDULED.      Pregnancy  If you are pregnant, or think that you may be pregnant, please notify our staff. This may or may not affect the ability to perform the procedure.     You MUST have a  TO TAKE YOU HOME after your procedure. Transportation by Taxi or Para-transit must have a responsible adult accompany you home (other than the ). Travel by bus or light rail is not acceptable transportation. You must provide your 's full name and contact number at time of check in.   Fasting Protocol You may have NOTHING SOLID TO EAT FOR 8 HOURS prior to arrival at the procedure area.   Broth and candy are considered solid food and require an eight hour fast.   You may have CLEAR LIQUIDS UP TO 2 HOURS prior to arrival at the clinic.   Clear liquids include water, clear fruit  juice (no pulp) carbonated beverages, ice, black coffee, black tea (no milk or cream), chewing gum (un-swallowed), and/or clear jello (no fruit or milk). No alcohol containing beverages.   Medications If you take any medications,  DO NOT STOP. Take your medications as usual the morning of your procedure with a sip of water AT LEAST 2 HOURS PRIOR TO ARRIVAL.    Antibiotics If you are currently taking antibiotics, you must complete the entire dose 7 days prior to your scheduled procedure. You must be clear of any signs or symptoms of infection. If you begin antibiotics, please contact our clinic for instructions.   Fever, Chills, or Rash If you experience a fever of higher than 100 degrees, chills, rash, or open wounds during the one week prior to your procedure, please call the clinic.         Medication Hold List  **Patients under Cardiology/Neurology care should consult their provider prior to the pain procedure to verify pre-procedure medication instructions. The information below contains general guidelines.**    Blood Thinners If you are taking daily ASPIRIN, PLAVIX, OR OTHER BLOOD THINNERS SUCH AS COUMADIN/WARFARIN, we will need your prescribing doctor to sign a release permitting you to stop these medications. Once approved by your prescribing doctor - STOP ALL BLOOD THINNERS BASED ON THE TIME TABLE BELOW PRIOR TO YOUR PROCEDURE. If you have been instructed to stop WARFARIN(COUMADIN), you must have an INR lab drawn the day before your procedure. . Your INR must be within normal limits before we can perform your injection. MEDICATIONS CAN BE RESTARTED AFTER YOUR PROCEDURE.    14 DAY HOLD  Ticlid (ticlopidine)    10 DAY HOLD  Effient (Prasugel)    3 DAY HOLD  Xarelto (rivaroxaban) 7 DAY HOLD  Anacin, Bufferin, Ecotrin, Excedrin, Aggrenox (Aspirin)  Brilinta (ticagrelor)  Coumadin (Warfarin)  Pradexa (Dabigatran)  Elmiron (Pentosan)  Plavix (Clopidogrel Bisulfate)  Pletal (Cilostazol)    24 DAY HOLD  Lovenox  (enoxaparin)  Agrylin (Anagrelide)        Non-steroidal Anti-inflammatories (NSAIDs) DO NOT TAKE any non-steroidal anti-inflammatory medications (NSAIDs) listed on the table below. MEDICATIONS CAN BE RESTARTED AFTER YOUR PROCEDURE. Celebrex is OK to take and does not need to be discontinued.     Medications to stop:  3 DAY HOLD  Advil, Motrin (Ibuprofen)  Arthrotec (diciofenac sodium/misoprostol)  Clinoril (Sulindac)  Indocin (Indomethacin)  Lodine (Etodolac)  Toradol (Ketorolac)  Vicoprofen (Hydrocodone and Ibuprofen)  Voltaren (Diclotenac)    14 DAY HOLD  Daypro (Oxaprozin)  Feldene (Piroxicam)   7 DAY HOLD  Aleve (Naproxen sodium)  Darvon compound (contains aspirin)  Naprosyn (Naproxen)  Norgesic Forte (contains aspirin)  Mobic (Meloxicam)  Oruvall (Ketoprofen)  Percodan (contains aspirin)  Relafen (Nabumetone)  Salsalate  Trilisate  Vitamin E (more than 400 mg per day)  Any medication containing aspirin                To speak with a nurse, schedule/reschedule/cancel a clinic appointment, or request a medication refill call: [phone]     You can also reach us by [employer] [email or web address]    For refills, please call on Monday, 1 week before your medication runs out. The doctors are not always in clinic, so this gives us time to get your prescriptions ready.  Please let us know the name of the medication you are requesting a refill of.

## 2017-12-27 NOTE — MR AVS SNAPSHOT
After Visit Summary   12/27/2017    Yenny Johnson    MRN: [id number]           Patient Information     Date Of Birth          1970        Visit Information        Provider Department      12/27/2017 11:30 AM Ghada Saenz APRN [facility]        Today's Diagnoses     Sacroiliac joint pain    -  1      Care Instructions    1. PT- Aquatic Therapy referral placed. Our recommendation is that you go 2-3 times a week for 6-8 weeks.   If you have not heard from the scheduling office within 2 business days, please call [phone] for all locations    2. Schedule your procedure.     Please call Daya at [phone] to schedule your procedure. She is available Monday - Friday from 9-5:30pm, if she is unavailable to take your call, please leave her a voice message with your name, birth date, and phone number and she will call you back.    Your procedure: Bilateral Lateral Branch diagnostic nerve blocks.     On the day of the procedure  1. Arrive 1 hour earlier than your scheduled time, to the [facility]  Address: [address] [place] [address], [place] [address]  2. Check in on the 5th floor for your procedure    A nurse will call you Within 24 hours after your procedure to follow up.    If you must reschedule your procedure more than two times, you must follow up in clinic before rescheduling again.      Patient Pre-Procedure Instructions    CAUTION - FAILURE TO FOLLOW THESE PRE-PROCEDURE INSTRUCTIONS WILL RESULT IN YOUR PROCEDURE BEING RESCHEDULED.      Pregnancy  If you are pregnant, or think that you may be pregnant, please notify our staff. This may or may not affect the ability to perform the procedure.     You MUST have a  TO TAKE YOU HOME after your procedure. Transportation by Taxi or Para-transit must have a responsible adult accompany you home (other than the ). Travel by bus or light rail is not acceptable  transportation. You must provide your 's full name and contact number at time of check in.   Fasting Protocol You may have NOTHING SOLID TO EAT FOR 8 HOURS prior to arrival at the procedure area.   Broth and candy are considered solid food and require an eight hour fast.   You may have CLEAR LIQUIDS UP TO 2 HOURS prior to arrival at the clinic.   Clear liquids include water, clear fruit juice (no pulp) carbonated beverages, ice, black coffee, black tea (no milk or cream), chewing gum (un-swallowed), and/or clear jello (no fruit or milk). No alcohol containing beverages.   Medications If you take any medications,  DO NOT STOP. Take your medications as usual the morning of your procedure with a sip of water AT LEAST 2 HOURS PRIOR TO ARRIVAL.    Antibiotics If you are currently taking antibiotics, you must complete the entire dose 7 days prior to your scheduled procedure. You must be clear of any signs or symptoms of infection. If you begin antibiotics, please contact our clinic for instructions.   Fever, Chills, or Rash If you experience a fever of higher than 100 degrees, chills, rash, or open wounds during the one week prior to your procedure, please call the clinic.         Medication Hold List  **Patients under Cardiology/Neurology care should consult their provider prior to the pain procedure to verify pre-procedure medication instructions. The information below contains general guidelines.**    Blood Thinners If you are taking daily ASPIRIN, PLAVIX, OR OTHER BLOOD THINNERS SUCH AS COUMADIN/WARFARIN, we will need your prescribing doctor to sign a release permitting you to stop these medications. Once approved by your prescribing doctor - STOP ALL BLOOD THINNERS BASED ON THE TIME TABLE BELOW PRIOR TO YOUR PROCEDURE. If you have been instructed to stop WARFARIN(COUMADIN), you must have an INR lab drawn the day before your procedure. . Your INR must be within normal limits before we can perform your injection.  MEDICATIONS CAN BE RESTARTED AFTER YOUR PROCEDURE.    14 DAY HOLD  Ticlid (ticlopidine)    10 DAY HOLD  Effient (Prasugel)    3 DAY HOLD  Xarelto (rivaroxaban) 7 DAY HOLD  Anacin, Bufferin, Ecotrin, Excedrin, Aggrenox (Aspirin)  Brilinta (ticagrelor)  Coumadin (Warfarin)  Pradexa (Dabigatran)  Elmiron (Pentosan)  Plavix (Clopidogrel Bisulfate)  Pletal (Cilostazol)    24 DAY HOLD  Lovenox (enoxaparin)  Agrylin (Anagrelide)        Non-steroidal Anti-inflammatories (NSAIDs) DO NOT TAKE any non-steroidal anti-inflammatory medications (NSAIDs) listed on the table below. MEDICATIONS CAN BE RESTARTED AFTER YOUR PROCEDURE. Celebrex is OK to take and does not need to be discontinued.     Medications to stop:  3 DAY HOLD  Advil, Motrin (Ibuprofen)  Arthrotec (diciofenac sodium/misoprostol)  Clinoril (Sulindac)  Indocin (Indomethacin)  Lodine (Etodolac)  Toradol (Ketorolac)  Vicoprofen (Hydrocodone and Ibuprofen)  Voltaren (Diclotenac)    14 DAY HOLD  Daypro (Oxaprozin)  Feldene (Piroxicam)   7 DAY HOLD  Aleve (Naproxen sodium)  Darvon compound (contains aspirin)  Naprosyn (Naproxen)  Norgesic Forte (contains aspirin)  Mobic (Meloxicam)  Oruvall (Ketoprofen)  Percodan (contains aspirin)  Relafen (Nabumetone)  Salsalate  Trilisate  Vitamin E (more than 400 mg per day)  Any medication containing aspirin                To speak with a nurse, schedule/reschedule/cancel a clinic appointment, or request a medication refill call: [phone]     You can also reach us by [employer]: [email or web address]    For refills, please call on Monday, 1 week before your medication runs out. The doctors are not always in clinic, so this gives us time to get your prescriptions ready.  Please let us know the name of the medication you are requesting a refill of.                                     Follow-ups after your visit        Additional Services     PHYSICAL THERAPY REFERRAL       *This therapy referral will be filtered to a  "centralized scheduling office at [facility] and the patient will receive a call to schedule an appointment at a [place] location most convenient for them. *     [facility] provides Physical Therapy evaluation and treatment and many specialty services across the [place] system.  If requesting a specialty program, please choose from the list below.    If you have not heard from the scheduling office within 2 business days, please call [phone] for all locations, with the exception of [place], please call [phone].  Treatment: Evaluation & Treatment  Special Instructions/Modalities: Our recommendation is that you go 2-3 times a week for 6-8 weeks.   Special Programs: Aquatic Therapy ([place], [place] and [place] locations only)    Please be aware that coverage of these services is subject to the terms and limitations of your health insurance plan.  Call member services at your health plan with any benefit or coverage questions.      **Note to Provider:  If you are referring outside of [place] for the therapy appointment, please list the name of the location in the \"special instructions\" above, print the referral and give to the patient to schedule the appointment.                  Who to contact     Please call your clinic at [phone] to:    Ask questions about your health    Make or cancel appointments    Discuss your medicines    Learn about your test results    Speak to your doctor   If you have compliments or concerns about an experience at your clinic, or if you wish to file a complaint, please contact [facility] Patient Relations at [phone] or email us at [email or web address]         Additional Information About Your Visit        MyChart Information     [employer] gives you secure access to your electronic health record. If you see a primary care provider, you can also send messages to your care team and make " "appointments. If you have questions, please call your primary care clinic.  If you do not have a primary care provider, please call [phone] and they will assist you.      [employer] is an electronic gateway that provides easy, online access to your medical records. With [employer], you can request a clinic appointment, read your test results, renew a prescription or communicate with your care team.     To access your existing account, please contact your [facility] or call [phone] for assistance.        Care EveryWhere ID     This is your Care EveryWhere ID. This could be used by other organizations to access your [facility] medical records  [id number]        Your Vitals Were     Pulse Respirations Height Last Period BMI (Body Mass Index)       97 16 1.676 m (5' 6\") 08/07/2015 32.12 kg/m2        Blood Pressure from Last 3 Encounters:   12/27/17 146/88   09/12/17 117/79   08/10/17 122/84    Weight from Last 3 Encounters:   12/27/17 90.3 kg (199 lb)   09/12/17 88.5 kg (195 lb)   08/10/17 90 kg (198 lb 6.4 oz)              We Performed the Following     Rody-Operative Worksheet (Pain)     PHYSICAL THERAPY REFERRAL        Primary Care Provider Office Phone # Fax #    Cee Biggs [phone] [address] [facility] [address]        Equal Access to Services     [facility]: Hadii paris arvizu hadasho Soconiali, waaxda luqadaha, qaybta kaalmada ademarisolda, mercedes paige . So [facility] [phone].    ATENCIÓN: Si habla español, tiene a victoria disposición servicios gratuitos de asistencia lingüística. Llame al [phone].    We comply with applicable federal civil rights laws and [place] laws. We do not discriminate on the basis of race, color, national origin, age, disability, sex, sexual orientation, or gender identity.            Thank you!     Thank you for choosing [facility]  for your care. Our " goal is always to provide you with excellent care. Hearing back from our patients is one way we can continue to improve our services. Please take a few minutes to complete the written survey that you may receive in the mail after your visit with us. Thank you!             Your Updated Medication List - Protect others around you: Learn how to safely use, store and throw away your medicines at www.disposemymeds.org.          This list is accurate as of: 12/27/17 11:50 AM.  Always use your most recent med list.                   Brand Name Dispense Instructions for use Diagnosis    albuterol 108 (90 BASE) MCG/ACT Inhaler    PROAIR HFA/PROVENTIL HFA/VENTOLIN HFA    1 Inhaler    Inhale 1-2 puffs into the lungs every 4 hours as needed for shortness of breath / dyspnea    Intermittent asthma, uncomplicated       cyclobenzaprine 10 MG tablet    FLEXERIL    30 tablet    Take 1 tablet (10 mg) by mouth 3 times daily as needed for muscle spasms    Lumbar disc herniation       FLUoxetine 20 MG capsule    PROzac    270 capsule    TAKE THREE CAPSULES BY MOUTH DAILY    PMDD (premenstrual dysphoric disorder)       levothyroxine 50 MCG tablet    SYNTHROID/LEVOTHROID    30 tablet    TAKE ONE TABLET BY MOUTH ONE TIME DAILY    Acquired hypothyroidism       methocarbamol 500 MG tablet    ROBAXIN    240 tablet    Take 1-2 tablets (500-1,000 mg) by mouth 3 times daily as needed for muscle spasms    Facet arthropathy, Myofascial pain syndrome       methylPREDNISolone 4 MG tablet    MEDROL DOSEPAK    21 tablet    Follow package instructions    Intractable headache, unspecified chronicity pattern, unspecified headache type       oxyCODONE-acetaminophen 5-325 MG per tablet    PERCOCET    2 tablet    Take 1 tablet by mouth every 4 hours as needed for pain . No further refills until follow-up appointment    Acute intractable headache, unspecified headache type

## 2017-12-28 ASSESSMENT — ANXIETY QUESTIONNAIRES: GAD7 TOTAL SCORE: 2

## 2018-01-16 ENCOUNTER — MYC MEDICAL ADVICE (OUTPATIENT)
Dept: ANESTHESIOLOGY | Facility: CLINIC | Age: 48
End: 2018-01-16

## 2018-01-16 DIAGNOSIS — M53.3 SACROILIAC JOINT PAIN: Primary | ICD-10-CM

## 2018-01-17 DIAGNOSIS — E03.9 ACQUIRED HYPOTHYROIDISM: ICD-10-CM

## 2018-01-17 DIAGNOSIS — E78.5 HYPERLIPIDEMIA LDL GOAL <100: ICD-10-CM

## 2018-01-17 DIAGNOSIS — N18.30 CKD (CHRONIC KIDNEY DISEASE) STAGE 3, GFR 30-59 ML/MIN (H): ICD-10-CM

## 2018-01-17 LAB
ANION GAP SERPL CALCULATED.3IONS-SCNC: 8 MMOL/L (ref 3–14)
BUN SERPL-MCNC: 13 MG/DL (ref 7–30)
CALCIUM SERPL-MCNC: 8.7 MG/DL (ref 8.5–10.1)
CHLORIDE SERPL-SCNC: 101 MMOL/L (ref 94–109)
CHOLEST SERPL-MCNC: 201 MG/DL
CO2 SERPL-SCNC: 26 MMOL/L (ref 20–32)
CREAT SERPL-MCNC: 0.95 MG/DL (ref 0.52–1.04)
CREAT UR-MCNC: 161 MG/DL
GFR SERPL CREATININE-BSD FRML MDRD: 63 ML/MIN/1.7M2
GLUCOSE SERPL-MCNC: 96 MG/DL (ref 70–99)
HDLC SERPL-MCNC: 37 MG/DL
HGB BLD-MCNC: 12.5 G/DL (ref 11.7–15.7)
LDLC SERPL CALC-MCNC: ABNORMAL MG/DL
LDLC SERPL DIRECT ASSAY-MCNC: 93 MG/DL
MICROALBUMIN UR-MCNC: 6 MG/L
MICROALBUMIN/CREAT UR: 3.75 MG/G CR (ref 0–25)
NONHDLC SERPL-MCNC: 164 MG/DL
POTASSIUM SERPL-SCNC: 4.2 MMOL/L (ref 3.4–5.3)
SODIUM SERPL-SCNC: 135 MMOL/L (ref 133–144)
T4 FREE SERPL-MCNC: 0.89 NG/DL (ref 0.76–1.46)
TRIGL SERPL-MCNC: 447 MG/DL
TSH SERPL DL<=0.005 MIU/L-ACNC: 4.06 MU/L (ref 0.4–4)

## 2018-01-17 PROCEDURE — 80048 BASIC METABOLIC PNL TOTAL CA: CPT | Performed by: FAMILY MEDICINE

## 2018-01-17 PROCEDURE — 84439 ASSAY OF FREE THYROXINE: CPT | Performed by: FAMILY MEDICINE

## 2018-01-17 PROCEDURE — 82043 UR ALBUMIN QUANTITATIVE: CPT | Performed by: FAMILY MEDICINE

## 2018-01-17 PROCEDURE — 84443 ASSAY THYROID STIM HORMONE: CPT | Performed by: FAMILY MEDICINE

## 2018-01-17 PROCEDURE — 85018 HEMOGLOBIN: CPT | Performed by: FAMILY MEDICINE

## 2018-01-17 PROCEDURE — 83721 ASSAY OF BLOOD LIPOPROTEIN: CPT | Mod: 59 | Performed by: FAMILY MEDICINE

## 2018-01-17 PROCEDURE — 80061 LIPID PANEL: CPT | Performed by: FAMILY MEDICINE

## 2018-01-17 PROCEDURE — 36415 COLL VENOUS BLD VENIPUNCTURE: CPT | Performed by: FAMILY MEDICINE

## 2018-01-17 NOTE — PROGRESS NOTES
Your results are normal.  Your final test results are pending.  Please check your chart again within 3 to 5 days. You will receive further instruction when your full test result panel is complete.    Cee Biggs MD

## 2018-01-17 NOTE — PROGRESS NOTES
Yenny,    Your thyroid dose is okay. Your triglycerides are very high. Eat healthy foods like fruits, vegetables, chicken, fish, nuts, and low fat yogurt. Drink water and milk instead of pop and juice. Avoid sweets. Exercise regularly.     Your blood glucose, anemia, urine and kidney tests are fine.     Cee Biggs MD

## 2018-01-25 ENCOUNTER — MYC MEDICAL ADVICE (OUTPATIENT)
Dept: FAMILY MEDICINE | Facility: CLINIC | Age: 48
End: 2018-01-25

## 2018-01-25 DIAGNOSIS — E03.9 ACQUIRED HYPOTHYROIDISM: Primary | ICD-10-CM

## 2018-01-25 RX ORDER — LEVOTHYROXINE SODIUM 75 UG/1
75 TABLET ORAL DAILY
Qty: 90 TABLET | Refills: 3 | Status: SHIPPED | OUTPATIENT
Start: 2018-01-25 | End: 2018-08-14

## 2018-01-25 NOTE — TELEPHONE ENCOUNTER
Dr. Biggs,  Please see [employer] message below and advise. Thanks.    Lyndsay Lassiter RN  [facility]

## 2018-02-07 ENCOUNTER — SURGERY (OUTPATIENT)
Age: 48
End: 2018-02-07

## 2018-02-07 ENCOUNTER — HOSPITAL ENCOUNTER (OUTPATIENT)
Facility: AMBULATORY SURGERY CENTER | Age: 48
End: 2018-02-07
Attending: ANESTHESIOLOGY
Payer: COMMERCIAL

## 2018-02-07 ENCOUNTER — RADIANT APPOINTMENT (OUTPATIENT)
Dept: RADIOLOGY | Facility: AMBULATORY SURGERY CENTER | Age: 48
End: 2018-02-07
Attending: ANESTHESIOLOGY
Payer: COMMERCIAL

## 2018-02-07 VITALS
RESPIRATION RATE: 16 BRPM | OXYGEN SATURATION: 99 % | BODY MASS INDEX: 31.34 KG/M2 | WEIGHT: 195 LBS | SYSTOLIC BLOOD PRESSURE: 140 MMHG | HEIGHT: 66 IN | TEMPERATURE: 98.1 F | DIASTOLIC BLOOD PRESSURE: 79 MMHG

## 2018-02-07 DIAGNOSIS — R52 PAIN: ICD-10-CM

## 2018-02-07 RX ORDER — BUPIVACAINE HYDROCHLORIDE 5 MG/ML
INJECTION, SOLUTION EPIDURAL; INTRACAUDAL PRN
Status: DISCONTINUED | OUTPATIENT
Start: 2018-02-07 | End: 2018-02-07 | Stop reason: HOSPADM

## 2018-02-07 RX ORDER — LIDOCAINE HYDROCHLORIDE 10 MG/ML
INJECTION, SOLUTION EPIDURAL; INFILTRATION; INTRACAUDAL; PERINEURAL PRN
Status: DISCONTINUED | OUTPATIENT
Start: 2018-02-07 | End: 2018-02-07 | Stop reason: HOSPADM

## 2018-02-07 RX ADMIN — BUPIVACAINE HYDROCHLORIDE 15 ML: 5 INJECTION, SOLUTION EPIDURAL; INTRACAUDAL at 10:04

## 2018-02-07 RX ADMIN — LIDOCAINE HYDROCHLORIDE 20 ML: 10 INJECTION, SOLUTION EPIDURAL; INFILTRATION; INTRACAUDAL; PERINEURAL at 10:04

## 2018-02-07 NOTE — DISCHARGE INSTRUCTIONS
Home Care Instructions after a Lateral Branch Block      In a lateral branch block, a local anesthetic (numbing medicine) is injected near sensory nerves which carry pain signals to the brain. This procedure is a diagnostic procedure and is typically short lasting. With this injection a steroid to increase the longevity of the blocks effect may or may not be used.    Activity  -You may resume most normal activity levels with the exception of strenuous activity. It is important for us to know if your pain with normal activity is relieved after this injection.  -DO NOT shower for 24 hours  -DO NOT remove bandaid for 24 hours    Pain  -You may experience soreness at the injection site for one or two days  -You may use an ice pack for 20 minutes every 2 hours for the first 24 hours  -You may use a heating pad after the first 24 hours  -You may use Tylenol (acetaminophen) every 4 hours or other pain medicines as     directed by your physician    You may experience numbness radiating into your legs or arms (depending on the procedure location). This numbness may last several hours. Until sensation returns to normal; please use caution in walking, climbing stairs, and stepping out of your vehicle, etc.    DID YOU RECEIVE SEDATION TODAY?  No    If you received sedation please follow these additional safety measures.  Sedation medicine, if given, may remain active for many hours. It is important for the next 24 hours that you do not:  -Drive a car  -Operate machines or power tools  -Consume alcohol, including beer  -Sign any important papers or legal documents    DID YOU RECEIVE STEROIDS TODAY?  No    Common side effects of steroids:  Not everyone will experience corticosteroid side effects. If side effects are experienced, they will gradually subside in the 7-10 day period following an injection. Most common side effects include:  -Flushed face and/or chest  -Feeling of warmth, particularly in the face but could be an  overall feeling of warmth  -Increased blood sugar in diabetic patients  -Menstrual irregularities my occur. If taking hormone-based birth control an alternate method of birth control is recommended  -Sleep disturbances and/or mood swings are possible  -Leg cramps      PLEASE KEEP TRACK OF YOUR SYMPTOMS AND NOTE YOUR IMPROVEMENT FOR YOUR DOCTOR.     Please contact us if you have:  -Severe pain  -Fever more than 101.5 degrees Fahrenheit  -Signs of infection at the injection site (redness, swelling, or drainage)    If you have questions, please contact our office at [phone] between the hours of 7:00 am and 3:00 pm Monday through Friday. After office hours you can contact the on call provider by dialing [phone]. If you need immediate attention, we recommend that you go to a hospital emergency room or dial [phone].

## 2018-02-07 NOTE — IP AVS SNAPSHOT
[facility]    [address] [place] [id number]    Phone:  [phone]    Fax:  [phone]                                       After Visit Summary   2/7/2018    Yenny Johnson    MRN: [id number]           After Visit Summary Signature Page     I have received my discharge instructions, and my questions have been answered. I have discussed any challenges I see with this plan with the nurse or doctor.    ..........................................................................................................................................  Patient/Patient Representative Signature      ..........................................................................................................................................  Patient Representative Print Name and Relationship to Patient    ..................................................               ................................................  Date                                            Time    ..........................................................................................................................................  Reviewed by Signature/Title    ...................................................              ..............................................  Date                                                            Time

## 2018-02-07 NOTE — IP AVS SNAPSHOT
MRN:[id number]                      After Visit Summary   2/7/2018    Yenny Johnson    MRN: [id number]           Thank you!     Thank you for choosing [facility] for your care. Our goal is always to provide you with excellent care. Hearing back from our patients is one way we can continue to improve our services. Please take a few minutes to complete the written survey that you may receive in the mail after you visit with us. Thank you!        Patient Information     Date Of Birth          1970        About your hospital stay     You were admitted on:  February 7, 2018 [facility]    You were discharged on:  February 7, 2018       Who to Call     For medical emergencies, please call 911.  For non-urgent questions about your medical care, please call your primary care provider or clinic, [phone]  For questions related to your surgery, please call your surgery clinic        Attending Provider     Provider Edison Kemp MD Anesthesiology       Primary Care Provider Office Phone # Fax #    Cee Biggs [phone]      Further instructions from your care team       Home Care Instructions after a Lateral Branch Block      In a lateral branch block, a local anesthetic (numbing medicine) is injected near sensory nerves which carry pain signals to the brain. This procedure is a diagnostic procedure and is typically short lasting. With this injection a steroid to increase the longevity of the blocks effect may or may not be used.    Activity  -You may resume most normal activity levels with the exception of strenuous activity. It is important for us to know if your pain with normal activity is relieved after this injection.  -DO NOT shower for 24 hours  -DO NOT remove bandaid for 24 hours    Pain  -You may experience soreness at the injection site for one or two days  -You may use an ice  pack for 20 minutes every 2 hours for the first 24 hours  -You may use a heating pad after the first 24 hours  -You may use Tylenol (acetaminophen) every 4 hours or other pain medicines as     directed by your physician    You may experience numbness radiating into your legs or arms (depending on the procedure location). This numbness may last several hours. Until sensation returns to normal; please use caution in walking, climbing stairs, and stepping out of your vehicle, etc.    DID YOU RECEIVE SEDATION TODAY?  No    If you received sedation please follow these additional safety measures.  Sedation medicine, if given, may remain active for many hours. It is important for the next 24 hours that you do not:  -Drive a car  -Operate machines or power tools  -Consume alcohol, including beer  -Sign any important papers or legal documents    DID YOU RECEIVE STEROIDS TODAY?  No    Common side effects of steroids:  Not everyone will experience corticosteroid side effects. If side effects are experienced, they will gradually subside in the 7-10 day period following an injection. Most common side effects include:  -Flushed face and/or chest  -Feeling of warmth, particularly in the face but could be an overall feeling of warmth  -Increased blood sugar in diabetic patients  -Menstrual irregularities my occur. If taking hormone-based birth control an alternate method of birth control is recommended  -Sleep disturbances and/or mood swings are possible  -Leg cramps      PLEASE KEEP TRACK OF YOUR SYMPTOMS AND NOTE YOUR IMPROVEMENT FOR YOUR DOCTOR.     Please contact us if you have:  -Severe pain  -Fever more than 101.5 degrees Fahrenheit  -Signs of infection at the injection site (redness, swelling, or drainage)    If you have questions, please contact our office at [phone] between the hours of 7:00 am and 3:00 pm Monday through Friday. After office hours you can contact the on call provider by dialing [phone]. If you  "need immediate attention, we recommend that you go to a hospital emergency room or dial 911.      Pending Results     Date and Time Order Name Status Description    2/7/2018 0854 XR SURGERY DANIEL FLUORO LESS THAN 5 MIN W STILLS In process             Admission Information     Date & Time Provider Department Dept. Phone    2/7/2018 Edison Sams MD [facility] [phone]      Your Vitals Were     Blood Pressure Temperature Respirations Height Weight Last Period    137/95 98.1  F (36.7  C) (Temporal) 29 1.676 m (5' 6\") 88.5 kg (195 lb) 08/07/2015    Pulse Oximetry BMI (Body Mass Index)                100% 31.47 kg/m2          [facility] [employer] Information     [facility] [employer] gives you secure access to your electronic health record. If you see a primary care provider, you can also send messages to your care team and make appointments. If you have questions, please call your primary care clinic.  If you do not have a primary care provider, please call [phone] and they will assist you.      [facility] [employer] is an electronic gateway that provides easy, online access to your medical records. With [facility] [employer], you can request a clinic appointment, read your test results, renew a prescription or communicate with your care team.     To access your existing account, please contact your [facility] or call [phone] for assistance.        Care EveryWhere ID     This is your Care EveryWhere ID. This could be used by other organizations to access your [facility] medical records  [id number]        Equal Access to Services     GUSTAVO ARROYO : Hadii paris joséo Soart, waaxda luqadaha, qaybta kaalmada adeegyada, waxay kojo dee adecarlie paige . So [facility] [phone].    ATENCIÓN: Si habla español, tiene a victoria disposición servicios gratuitos de asistencia lingüística. Llame al [phone].    We comply with applicable federal civil rights laws and [place] laws. We " do not discriminate on the basis of race, color, national origin, age, disability, sex, sexual orientation, or gender identity.               Review of your medicines      UNREVIEWED medicines. Ask your doctor about these medicines        Dose / Directions    albuterol 108 (90 BASE) MCG/ACT Inhaler   Commonly known as:  PROAIR HFA/PROVENTIL HFA/VENTOLIN HFA   Used for:  Intermittent asthma, uncomplicated        Dose:  1-2 puff   Inhale 1-2 puffs into the lungs every 4 hours as needed for shortness of breath / dyspnea   Quantity:  1 Inhaler   Refills:  5       cyclobenzaprine 10 MG tablet   Commonly known as:  FLEXERIL   Used for:  Lumbar disc herniation        Dose:  10 mg   Take 1 tablet (10 mg) by mouth 3 times daily as needed for muscle spasms   Quantity:  30 tablet   Refills:  2       FLUoxetine 20 MG capsule   Commonly known as:  PROzac   Used for:  PMDD (premenstrual dysphoric disorder)        TAKE THREE CAPSULES BY MOUTH DAILY   Quantity:  270 capsule   Refills:  1       levothyroxine 75 MCG tablet   Commonly known as:  SYNTHROID/LEVOTHROID   Used for:  Acquired hypothyroidism        Dose:  75 mcg   Take 1 tablet (75 mcg) by mouth daily   Quantity:  90 tablet   Refills:  3                Protect others around you: Learn how to safely use, store and throw away your medicines at www.disposemymeds.org.             Medication List: This is a list of all your medications and when to take them. Check marks below indicate your daily home schedule. Keep this list as a reference.      Medications           Morning Afternoon Evening Bedtime As Needed    albuterol 108 (90 BASE) MCG/ACT Inhaler   Commonly known as:  PROAIR HFA/PROVENTIL HFA/VENTOLIN HFA   Inhale 1-2 puffs into the lungs every 4 hours as needed for shortness of breath / dyspnea                                cyclobenzaprine 10 MG tablet   Commonly known as:  FLEXERIL   Take 1 tablet (10 mg) by mouth 3 times daily as needed for muscle spasms                                 FLUoxetine 20 MG capsule   Commonly known as:  PROzac   TAKE THREE CAPSULES BY MOUTH DAILY                                levothyroxine 75 MCG tablet   Commonly known as:  SYNTHROID/LEVOTHROID   Take 1 tablet (75 mcg) by mouth daily

## 2018-02-09 DIAGNOSIS — M53.3 SACROILIAC JOINT PAIN: Primary | ICD-10-CM

## 2018-02-10 NOTE — OP NOTE
PROCEDURE:  BILATERAL S1, 2, 3 Lateral Branch Blocks and L5 Dorsal Ramus Block    DIAGNOSIS: sacroiliitis    Procedure Details: The patient was met in the procedure room, where the patient was identified by name, medical record number and date of birth.  All of the patient's last minute questions were answered. Written informed consent was obtained and saved in the electronic medical record, after the risks, benefits, and alternatives were discussed with the patient.  A formal time-out procedure was performed as per protocol, including patient name, title of procedure, and site of procedure, and all in the room concurred.  Routine monitors were applied.      The patient was placed in the prone position on the procedure room table.  All pressure points were checked and comfortably padded.  Routine monitors were placed.  Vital signs were stable. A Betadine prep was completed followed by sterile draping per standard procedure. We at first identified each lumbar vertebral body and sacrum.  Fluoroscope was then obliqued towards the patient's BILATERAL side in order to identify the sacral ala on the patient's BILATERAL side.  The first target was recognized at the junction of the superior articular process and sacral ala for L5 medial branch. Skin and subcutaneous tissues overlying this area were anesthetized with 1% lidocaine.  Under fluoroscopic guidance, we then directed the 3.5 inch, 22-gauge spinal needle through the skin and subcutaneous tissues  until osseous contact was achieved.  At this point, the needle was walked off laterally over the sacral ala. Lateral fluoroscopic image was obtained to check the correct needle depth. Then, I directed similar spinal needles at a location just lateral to each of the S1, S2 and S3 neural foramen, starting at the 2 o'clock position lateral to the S1 foramen on the right, and 10 o'clock lateral to the S1 foramen on the left and spaced 1cm apart inferiorly to cover a line from  the aforementioned position down to the 5 o'clock position lateral to the S3 foramen on the right, and down to the 7 o'clock position lateral to the foramen on the left. Lateral fluoroscopic image was obtained to check the correct needle depth.   I then injected 1.5 mL of bupivacaine 0.5% into each needle. All needles were removed. Bleeding was nil. Dressings were applied.        Sedation:      Performed by: Edison Sams    Indication:  Sedation is required to allow above mentioned procedure     Consent:  Consent obtained from the patient Yenny Johnson after discussing the risks, benefits and alternatives.    PO Intake:  Appropriately NPO for procedure    Lungs: Lungs Clear with good breath sounds bilaterally.     Heart: Normal heart sounds and rate    Focused history and physical completed prior to procedure. I have reviewed the lab findings, diagnostic data, medications, and the plan for sedation. I have determined this patient to be an appropriate candidate for the planned sedation and procedure and have reassessed the patient IMMEDIATELY PRIOR to sedation and procedure.      Sedation Post Procedure Summary:    Prior to the start of the procedure and with procedural staff participation, I verbally confirmed the patient s identity using two indicators, relevant allergies, that the procedure was appropriate and matched the consent or emergent situation, and that the correct equipment/implants were available. Immediately prior to starting the procedure I conducted the Time Out with the procedural staff and re-confirmed the patient s name, procedure, and site/side. (The Joint Commission universal protocol was followed.)  Yes      Sedatives: Fentanyl and Midazolam (Versed)    Vital signs, airway, End Tidal CO2 and pulse oximetry were monitored and remained stable throughout the procedure and sedation was maintained until the procedure was complete.  The patient was monitored by staff until sedation discharge  criteria were met.    Patient tolerance: Patient tolerated the procedure well with no immediate complications.    Time of sedation in minutes:  35 minutes from beginning to end of physician one to one monitoring.

## 2018-02-14 ENCOUNTER — TELEPHONE (OUTPATIENT)
Dept: ANESTHESIOLOGY | Facility: CLINIC | Age: 48
End: 2018-02-14

## 2018-02-14 NOTE — TELEPHONE ENCOUNTER
Patient called back to schedule procedure with Dr. Sams. She is scheduled for 3/14/2018. I let her know that a nurse would call her a couple days prior to go over arrival instructions.

## 2018-03-09 ENCOUNTER — SURGERY (OUTPATIENT)
Age: 48
End: 2018-03-09

## 2018-03-09 ENCOUNTER — HOSPITAL ENCOUNTER (OUTPATIENT)
Facility: AMBULATORY SURGERY CENTER | Age: 48
End: 2018-03-09
Attending: ANESTHESIOLOGY
Payer: COMMERCIAL

## 2018-03-09 ENCOUNTER — RADIANT APPOINTMENT (OUTPATIENT)
Dept: RADIOLOGY | Facility: AMBULATORY SURGERY CENTER | Age: 48
End: 2018-03-09
Attending: ANESTHESIOLOGY
Payer: COMMERCIAL

## 2018-03-09 VITALS
RESPIRATION RATE: 14 BRPM | SYSTOLIC BLOOD PRESSURE: 143 MMHG | HEIGHT: 66 IN | BODY MASS INDEX: 31.82 KG/M2 | OXYGEN SATURATION: 98 % | DIASTOLIC BLOOD PRESSURE: 83 MMHG | TEMPERATURE: 98.5 F | WEIGHT: 198 LBS

## 2018-03-09 DIAGNOSIS — M54.9 SPINE PAIN: ICD-10-CM

## 2018-03-09 RX ORDER — BUPIVACAINE HYDROCHLORIDE 2.5 MG/ML
INJECTION, SOLUTION EPIDURAL; INFILTRATION; INTRACAUDAL PRN
Status: DISCONTINUED | OUTPATIENT
Start: 2018-03-09 | End: 2018-03-09 | Stop reason: HOSPADM

## 2018-03-09 RX ORDER — LIDOCAINE HYDROCHLORIDE 10 MG/ML
INJECTION, SOLUTION EPIDURAL; INFILTRATION; INTRACAUDAL; PERINEURAL PRN
Status: DISCONTINUED | OUTPATIENT
Start: 2018-03-09 | End: 2018-03-09 | Stop reason: HOSPADM

## 2018-03-09 RX ADMIN — BUPIVACAINE HYDROCHLORIDE 8 ML: 2.5 INJECTION, SOLUTION EPIDURAL; INFILTRATION; INTRACAUDAL at 09:12

## 2018-03-09 RX ADMIN — LIDOCAINE HYDROCHLORIDE 10 ML: 10 INJECTION, SOLUTION EPIDURAL; INFILTRATION; INTRACAUDAL; PERINEURAL at 09:12

## 2018-03-09 NOTE — IP AVS SNAPSHOT
[facility]    [address] [place] [id number]    Phone:  [phone]    Fax:  [phone]                                       After Visit Summary   3/9/2018    Yenny Johnson    MRN: [id number]           After Visit Summary Signature Page     I have received my discharge instructions, and my questions have been answered. I have discussed any challenges I see with this plan with the nurse or doctor.    ..........................................................................................................................................  Patient/Patient Representative Signature      ..........................................................................................................................................  Patient Representative Print Name and Relationship to Patient    ..................................................               ................................................  Date                                            Time    ..........................................................................................................................................  Reviewed by Signature/Title    ...................................................              ..............................................  Date                                                            Time

## 2018-03-09 NOTE — DISCHARGE INSTRUCTIONS
Home Care Instructions after a Lateral Branch Block      In a lateral branch block, a local anesthetic (numbing medicine) is injected near sensory nerves which carry pain signals to the brain. This procedure is a diagnostic procedure and is typically short lasting. With this injection a steroid to increase the longevity of the blocks effect may or may not be used.    Activity  -You may resume most normal activity levels with the exception of strenuous activity. It is important for us to know if your pain with normal activity is relieved after this injection.  -DO NOT shower for 24 hours  -DO NOT remove bandaid for 24 hours    Pain  -You may experience soreness at the injection site for one or two days  -You may use an ice pack for 20 minutes every 2 hours for the first 24 hours  -You may use a heating pad after the first 24 hours  -You may use Tylenol (acetaminophen) every 4 hours or other pain medicines as     directed by your physician    You may experience numbness radiating into your legs or arms (depending on the procedure location). This numbness may last several hours. Until sensation returns to normal; please use caution in walking, climbing stairs, and stepping out of your vehicle, etc.    DID YOU RECEIVE SEDATION TODAY?  No    If you received sedation please follow these additional safety measures.  Sedation medicine, if given, may remain active for many hours. It is important for the next 24 hours that you do not:  -Drive a car  -Operate machines or power tools  -Consume alcohol, including beer  -Sign any important papers or legal documents    DID YOU RECEIVE STEROIDS TODAY?  No    Common side effects of steroids:  Not everyone will experience corticosteroid side effects. If side effects are experienced, they will gradually subside in the 7-10 day period following an injection. Most common side effects include:  -Flushed face and/or chest  -Feeling of warmth, particularly in the face but could be an  overall feeling of warmth  -Increased blood sugar in diabetic patients  -Menstrual irregularities my occur. If taking hormone-based birth control an alternate method of birth control is recommended  -Sleep disturbances and/or mood swings are possible  -Leg cramps      PLEASE KEEP TRACK OF YOUR SYMPTOMS AND NOTE YOUR IMPROVEMENT FOR YOUR DOCTOR.     Please contact us if you have:  -Severe pain  -Fever more than 101.5 degrees Fahrenheit  -Signs of infection at the injection site (redness, swelling, or drainage)    If you have questions, please contact our office at [phone] between the hours of 7:00 am and 3:00 pm Monday through Friday. After office hours you can contact the on call provider by dialing [phone]. If you need immediate attention, we recommend that you go to a hospital emergency room or dial [phone].

## 2018-03-09 NOTE — IP AVS SNAPSHOT
MRN:[id number]                      After Visit Summary   3/9/2018    Yenny Johnson    MRN: [id number]           Thank you!     Thank you for choosing [facility] for your care. Our goal is always to provide you with excellent care. Hearing back from our patients is one way we can continue to improve our services. Please take a few minutes to complete the written survey that you may receive in the mail after you visit with us. Thank you!        Patient Information     Date Of Birth          1970        About your hospital stay     You were admitted on:  March 9, 2018 [facility]    You were discharged on:  March 9, 2018       Who to Call     For medical emergencies, please call 911.  For non-urgent questions about your medical care, please call your primary care provider or clinic, [phone]  For questions related to your surgery, please call your surgery clinic        Attending Provider     Provider Edison Kemp MD Anesthesiology       Primary Care Provider Office Phone # Fax #    Cee Biggs [phone]      Further instructions from your care team       Home Care Instructions after a Lateral Branch Block      In a lateral branch block, a local anesthetic (numbing medicine) is injected near sensory nerves which carry pain signals to the brain. This procedure is a diagnostic procedure and is typically short lasting. With this injection a steroid to increase the longevity of the blocks effect may or may not be used.    Activity  -You may resume most normal activity levels with the exception of strenuous activity. It is important for us to know if your pain with normal activity is relieved after this injection.  -DO NOT shower for 24 hours  -DO NOT remove bandaid for 24 hours    Pain  -You may experience soreness at the injection site for one or two days  -You may use an ice pack  for 20 minutes every 2 hours for the first 24 hours  -You may use a heating pad after the first 24 hours  -You may use Tylenol (acetaminophen) every 4 hours or other pain medicines as     directed by your physician    You may experience numbness radiating into your legs or arms (depending on the procedure location). This numbness may last several hours. Until sensation returns to normal; please use caution in walking, climbing stairs, and stepping out of your vehicle, etc.    DID YOU RECEIVE SEDATION TODAY?  No    If you received sedation please follow these additional safety measures.  Sedation medicine, if given, may remain active for many hours. It is important for the next 24 hours that you do not:  -Drive a car  -Operate machines or power tools  -Consume alcohol, including beer  -Sign any important papers or legal documents    DID YOU RECEIVE STEROIDS TODAY?  No    Common side effects of steroids:  Not everyone will experience corticosteroid side effects. If side effects are experienced, they will gradually subside in the 7-10 day period following an injection. Most common side effects include:  -Flushed face and/or chest  -Feeling of warmth, particularly in the face but could be an overall feeling of warmth  -Increased blood sugar in diabetic patients  -Menstrual irregularities my occur. If taking hormone-based birth control an alternate method of birth control is recommended  -Sleep disturbances and/or mood swings are possible  -Leg cramps      PLEASE KEEP TRACK OF YOUR SYMPTOMS AND NOTE YOUR IMPROVEMENT FOR YOUR DOCTOR.     Please contact us if you have:  -Severe pain  -Fever more than 101.5 degrees Fahrenheit  -Signs of infection at the injection site (redness, swelling, or drainage)    If you have questions, please contact our office at [phone] between the hours of 7:00 am and 3:00 pm Monday through Friday. After office hours you can contact the on call provider by dialing [phone]. If you need  "immediate attention, we recommend that you go to a hospital emergency room or dial 911.      Pending Results     Date and Time Order Name Status Description    3/9/2018 0834 XR SURGERY DANIEL FLUORO LESS THAN 5 MIN W STILLS In process             Admission Information     Date & Time Provider Department Dept. Phone    3/9/2018 Edison Sams MD [facility] [phone]      Your Vitals Were     Blood Pressure Temperature Respirations Height Weight Last Period    139/95 98  F (36.7  C) (Temporal) 16 1.676 m (5' 6\") 89.8 kg (198 lb) 08/07/2015    Pulse Oximetry BMI (Body Mass Index)                98% 31.96 kg/m2          [employer] Information     [employer] gives you secure access to your electronic health record. If you see a primary care provider, you can also send messages to your care team and make appointments. If you have questions, please call your primary care clinic.  If you do not have a primary care provider, please call [phone] and they will assist you.      [employer] is an electronic gateway that provides easy, online access to your medical records. With [employer], you can request a clinic appointment, read your test results, renew a prescription or communicate with your care team.     To access your existing account, please contact your [facility] or call [phone] for assistance.        Care EveryWhere ID     This is your Care EveryWhere ID. This could be used by other organizations to access your [facility] medical records  [id number]        Equal Access to Services     GUSTAVO ARROYO : Hadii paris sanchez Soart, waaxda luqadaha, qaybta kaalmada kitayatai, waxay kojo dee adecarlie flores. So [facility] [phone].    ATENCIÓN: Si habla español, tiene a victoria disposición servicios gratuitos de asistencia lingüística. Llame al [phone].    We comply with applicable federal civil rights laws and [place] laws. We do not " discriminate on the basis of race, color, national origin, age, disability, sex, sexual orientation, or gender identity.               Review of your medicines      UNREVIEWED medicines. Ask your doctor about these medicines        Dose / Directions    albuterol 108 (90 BASE) MCG/ACT Inhaler   Commonly known as:  PROAIR HFA/PROVENTIL HFA/VENTOLIN HFA   Used for:  Intermittent asthma, uncomplicated        Dose:  1-2 puff   Inhale 1-2 puffs into the lungs every 4 hours as needed for shortness of breath / dyspnea   Quantity:  1 Inhaler   Refills:  5       cyclobenzaprine 10 MG tablet   Commonly known as:  FLEXERIL   Used for:  Lumbar disc herniation        Dose:  10 mg   Take 1 tablet (10 mg) by mouth 3 times daily as needed for muscle spasms   Quantity:  30 tablet   Refills:  2       FLUoxetine 20 MG capsule   Commonly known as:  PROzac   Used for:  PMDD (premenstrual dysphoric disorder)        TAKE THREE CAPSULES BY MOUTH DAILY   Quantity:  270 capsule   Refills:  1       levothyroxine 75 MCG tablet   Commonly known as:  SYNTHROID/LEVOTHROID   Used for:  Acquired hypothyroidism        Dose:  75 mcg   Take 1 tablet (75 mcg) by mouth daily   Quantity:  90 tablet   Refills:  3                Protect others around you: Learn how to safely use, store and throw away your medicines at www.disposemymeds.org.             Medication List: This is a list of all your medications and when to take them. Check marks below indicate your daily home schedule. Keep this list as a reference.      Medications           Morning Afternoon Evening Bedtime As Needed    albuterol 108 (90 BASE) MCG/ACT Inhaler   Commonly known as:  PROAIR HFA/PROVENTIL HFA/VENTOLIN HFA   Inhale 1-2 puffs into the lungs every 4 hours as needed for shortness of breath / dyspnea                                cyclobenzaprine 10 MG tablet   Commonly known as:  FLEXERIL   Take 1 tablet (10 mg) by mouth 3 times daily as needed for muscle spasms                                 FLUoxetine 20 MG capsule   Commonly known as:  PROzac   TAKE THREE CAPSULES BY MOUTH DAILY                                levothyroxine 75 MCG tablet   Commonly known as:  SYNTHROID/LEVOTHROID   Take 1 tablet (75 mcg) by mouth daily

## 2018-03-09 NOTE — OP NOTE
Patient: Yenny Johnson Age: 47 year old   MRN: [id number] Attending: Dr. Gomes     Date of Visit: March 9, 2018      PAIN MEDICINE CLINIC PROCEDURE NOTE    ATTENDING CLINICIAN:    Kristi Gomes MD    ASSISTANT CLINICIAN:  Brad Pearl DO    PREPROCEDURE DIAGNOSES:  1.  Sacroilliac joint arthopathy  2.  Chronic low back pain    POSTPROCEDURE DIAGNOSES:  1.  Sacroilliac joint arthopathy  2.  Chronic low back pain      PROCEDURE(S) PERFORMED:  1. Bilateral S1, 2, 3 Lateral Branchs block and L5 dorsal ramus nerve block   2.  Fluoroscopic guidance for the above procedures    ANESTHESIA:  Local.    BLOOD LOSS:  Minimal.    DRAINS AND SPECIMENS:  None.    COMPLICATIONS:  None.    INDICATIONS:  The patient is a 47 year old female with a history of low back pain secondary to sacroilitis. She reports pain mostly on the lower back around the sacroiliac joint.  Today she came in for diagnostic lateral branch nerve blocks.  The patient stated that the patient was in their usual state of health and denied recent anticoagulant use or recent infections.       Procedure Details:  The patient was met in the procedure room, where the patient was identified by name, medical record number and date of birth.  All of the patient s last minute questions were answered. Written informed consent was obtained and saved in the electronic medical record, after the risks, benefits, and alternatives were discussed with the patient.      A formal time-out procedure was performed as per protocol, including patient name, title of procedure, and site of procedure, and all in the room concurred.  Routine monitors were applied.      The patient was placed in the prone position on the procedure room table.  All pressure points were checked and comfortably padded.  Routine monitors were placed.  Vital signs were stable.    A chlorhexidine prep was completed followed by sterile draping per standard procedure.    We at first identified each lumbar  vertebral body and sacrum.  Fluoroscope was then obliqued towards the patient's right in order to identify the  sacral ala on the patient's right side.  The first target was recognized at the junction of the superior articular process and sacral ala for L5 medial branch. Skin and subcutaneous tissues overlying this area were anesthetized with a 1% lidocaine.  Under fluoroscopic guidance, we then directed the 3.5 inch, 22-gauge spinal needle through the skin and subcutaneous tissues  until osseous contact was achieved.  At this point, the needle was walked off laterally over the sacral ala. Lateral fluoroscopic image was obtained to check the correct needle depth.    Then we directed similar spinal needles at a location just lateral to each of the S1, S2 and S3 neural foramen, starting at the 2 o'clock position lateral to the S1 foramen and spaced 1cm apart inferiorly to cover a line from the aforementioned position down to the 5  o'clock position lateral to the S3 foramen .Lateral fluoroscopic image was obtained to check the correct needle depth.   We then injected 1 ml 0.25% bupivacaine into each needle. All needles were removed.     Light pressure was held at the puncture site(s) to prevent ecchymosis and oozing.  The patient's skin was cleansed, and hemostasis was confirmed.  Band-aids were applied to the needle injection site(s).      Condition:    The patient tolerated the procedure well and was monitored for approximately 15 minutes afterward in the post procedure area.  There were no immediate post procedure complications noted.  The patient was then discharged to home as per protocol.      Pre-procedure pain score: 3/10  Post-procedure pain score: 0/10

## 2018-03-13 ENCOUNTER — MYC MEDICAL ADVICE (OUTPATIENT)
Dept: FAMILY MEDICINE | Facility: CLINIC | Age: 48
End: 2018-03-13

## 2018-03-13 ENCOUNTER — CARE COORDINATION (OUTPATIENT)
Dept: ANESTHESIOLOGY | Facility: CLINIC | Age: 48
End: 2018-03-13

## 2018-03-13 DIAGNOSIS — M53.3 SACROILIAC JOINT PAIN: Primary | ICD-10-CM

## 2018-03-13 NOTE — PROGRESS NOTES
Purpose of call: Follow up after Bilateral S1, S2, S3 Lateral Branch Nerve Block and L5 Dorsal Ramus Nerve Block   Date of service: 3/9/18  Spoke with: Yenny    Location of pain: low back  Percentage of pain relief after procedure: 98% for 24 hours    Follow up: Pt advised to schedule second dx block

## 2018-03-16 ENCOUNTER — MYC MEDICAL ADVICE (OUTPATIENT)
Dept: FAMILY MEDICINE | Facility: CLINIC | Age: 48
End: 2018-03-16

## 2018-03-28 ENCOUNTER — TELEPHONE (OUTPATIENT)
Dept: ANESTHESIOLOGY | Facility: CLINIC | Age: 48
End: 2018-03-28

## 2018-03-28 DIAGNOSIS — M53.3 SACROILIAC JOINT PAIN: Primary | ICD-10-CM

## 2018-03-28 NOTE — PROGRESS NOTES
LPN returned pt's phone call to clinic. Pt was wanting to schedule their Bilateral RFA of Lateral Branches.     LPN placed order and gave pt the information to call.   Pt was informed that the pre-procedure instructions are the same as the ones that they have had for the past procedures. Pt Verbalized understanding and denied having questions.     LPN informed pt that an additional copy of the instructions will be sent to them via [employer].     Lyndsay Engel LPN

## 2018-04-03 ENCOUNTER — TELEPHONE (OUTPATIENT)
Dept: ANESTHESIOLOGY | Facility: CLINIC | Age: 48
End: 2018-04-03

## 2018-04-03 NOTE — TELEPHONE ENCOUNTER
Patient called Pain Clinic to confirm her procedure appointment for 5/16/18. She would like a call back and can be reached at [phone].

## 2018-04-03 NOTE — TELEPHONE ENCOUNTER
I called Yenny to get her scheduled for her procedure.     I have her scheduled on 5/2/2018 with Dr. Willams. She is aware that she needs to have a .

## 2018-04-04 ENCOUNTER — NURSE TRIAGE (OUTPATIENT)
Dept: NURSING | Facility: CLINIC | Age: 48
End: 2018-04-04

## 2018-04-04 NOTE — TELEPHONE ENCOUNTER
I [place] [facility] [phone], to find out the code needed for insurance billing of up coming May 2nd surgery.  Massiel Gutierrez RN-[facility]

## 2018-05-02 ENCOUNTER — HOSPITAL ENCOUNTER (OUTPATIENT)
Facility: AMBULATORY SURGERY CENTER | Age: 48
End: 2018-05-02
Payer: COMMERCIAL

## 2018-05-02 ENCOUNTER — RADIANT APPOINTMENT (OUTPATIENT)
Dept: RADIOLOGY | Facility: AMBULATORY SURGERY CENTER | Age: 48
End: 2018-05-02
Payer: COMMERCIAL

## 2018-05-02 ENCOUNTER — SURGERY (OUTPATIENT)
Age: 48
End: 2018-05-02

## 2018-05-02 VITALS
HEIGHT: 66 IN | DIASTOLIC BLOOD PRESSURE: 90 MMHG | WEIGHT: 188 LBS | RESPIRATION RATE: 16 BRPM | OXYGEN SATURATION: 97 % | HEART RATE: 70 BPM | TEMPERATURE: 97.7 F | SYSTOLIC BLOOD PRESSURE: 139 MMHG | BODY MASS INDEX: 30.22 KG/M2

## 2018-05-02 DIAGNOSIS — M53.3 SACRO ILIAL PAIN: ICD-10-CM

## 2018-05-02 RX ORDER — LIDOCAINE HYDROCHLORIDE 10 MG/ML
INJECTION, SOLUTION EPIDURAL; INFILTRATION; INTRACAUDAL; PERINEURAL PRN
Status: DISCONTINUED | OUTPATIENT
Start: 2018-05-02 | End: 2018-05-02 | Stop reason: HOSPADM

## 2018-05-02 RX ORDER — DIAZEPAM 5 MG
5 TABLET ORAL ONCE
Status: COMPLETED | OUTPATIENT
Start: 2018-05-02 | End: 2018-05-02

## 2018-05-02 RX ORDER — BUPIVACAINE HYDROCHLORIDE 5 MG/ML
INJECTION, SOLUTION PERINEURAL PRN
Status: DISCONTINUED | OUTPATIENT
Start: 2018-05-02 | End: 2018-05-02 | Stop reason: HOSPADM

## 2018-05-02 RX ADMIN — DIAZEPAM 5 MG: 5 TABLET ORAL at 07:16

## 2018-05-02 RX ADMIN — LIDOCAINE HYDROCHLORIDE 20 ML: 10 INJECTION, SOLUTION EPIDURAL; INFILTRATION; INTRACAUDAL; PERINEURAL at 08:24

## 2018-05-02 RX ADMIN — BUPIVACAINE HYDROCHLORIDE 7 ML: 5 INJECTION, SOLUTION PERINEURAL at 08:23

## 2018-05-02 NOTE — DISCHARGE INSTRUCTIONS
Home Care Instructions after a Radio Frequency Ablation    A radiofrequency ablation refers to a procedure that destroys the functionality of the nerve using radiofrequency energy. There are two primary types of radiofrequency ablation: A medial branch neurotomy (ablation) affects the nerves carrying pain from the facet joints, while a lateral branch neurotomy (ablation) affects nerves that carry pain from the sacroiliac joints. The physician uses x-ray guidance (fluoroscopy) to direct a special (radiofrequency) needle alongside the medial or lateral branch nerves. A small amount of electrical current is often carefully passed through the needle to assure it is next to the target nerve and a safe distance from other nerves. This current should briefly recreate the usual pain and cause a muscle twitch in the neck or back. The targeted nerves will then be numbed to minimize pain while the lesion is being created. The radiofrequency waves are introduced to heat the tip of the needle and a heat lesion is created on the nerve to disrupt the nerve's ability to send pain signals. Please follow the aftercare instructions regarding your procedure.    Activity  -Rest today  -Do not work today  -Resume normal activity in 2-3 days  -No heavy lifting, turning or twisting for 24 hours  -DO NOT shower or soak in tub for 24 hours  -DO NOT remove bandaid for 24 hours    Pain  -You may experience soreness at the injection site for one or two days  -You may use an ice pack for 20 minutes every 2 hours for the first 24 hours, longer if needed for comfort, do not use heat  -You may use Tylenol (acetaminophen) every 4 hours or other pain medicines as     directed by your physician  -If other pain medications are prescribed by your physician, please follow dosing instructions carefully.    What to expect  You may experience numbness radiating into your legs or arms (depending on the procedure location). This numbness may last several  hours. Until sensation returns to normal, please use caution in walking, climbing stairs, and stepping out of your vehicle, etc. Relief of your initial symptoms can take up to 4 weeks to feel better, this is normal and due to the healing process. The procedure site may feel like a deep burn. Using ice will greatly minimize this discomfort. Do not use numbing creams or patches over your injection sites immediately following your procedure. Please keep injection sites covered, clean and dry for 24 hours.    DID YOU RECEIVE SEDATION TODAY?  Yes    Safety  Sedation medicine, if given, may remain active for many hours. It is important for the next 24 hours that you do not:  -Drive a car  -Operate machines or power tools  -Consume alcohol, including beer  -Sign any important papers or legal documents  -Please have a responsible adult with you for 24 hours following any sedation    DID YOU RECEIVE STEROIDS TODAY?  Yes    Common side effects of steroids:  Not everyone will experience corticosteroid side effects. If side effects are experienced, they will gradually subside in the 7-10 day period following an injection. Most common side effects include:  -Flushed face and/or chest  -Feeling of warmth, particularly in the face but could be an overall feeling of warmth  -Increased blood sugar in diabetic patients  -Menstrual irregularities my occur. If taking hormone-based birth control an alternate method of birth control is recommended  -Sleep disturbances and/or mood swings are possible  -Leg cramps      Please contact us if you have:  -Severe pain  -Fever more than 101.5 degrees Fahrenheit  -Signs of infection at the injection site (redness, swelling, or drainage)    If you have questions, please contact our office at [phone] between the hours of 7:00 am and 3:00 pm Monday through Friday. After office hours you can contact the on call provider by dialing [phone]. If you need immediate attention, we recommend that  you go to a hospital emergency room or dial 911.

## 2018-05-02 NOTE — IP AVS SNAPSHOT
[facility]    [address] [place] [id number]    Phone:  [phone]    Fax:  [phone]                                       After Visit Summary   5/2/2018    Yenny Johnson    MRN: [id number]           After Visit Summary Signature Page     I have received my discharge instructions, and my questions have been answered. I have discussed any challenges I see with this plan with the nurse or doctor.    ..........................................................................................................................................  Patient/Patient Representative Signature      ..........................................................................................................................................  Patient Representative Print Name and Relationship to Patient    ..................................................               ................................................  Date                                            Time    ..........................................................................................................................................  Reviewed by Signature/Title    ...................................................              ..............................................  Date                                                            Time

## 2018-05-02 NOTE — IP AVS SNAPSHOT
MRN:[id number]                      After Visit Summary   5/2/2018    Yenny Johnson    MRN: [id number]           Thank you!     Thank you for choosing [facility] for your care. Our goal is always to provide you with excellent care. Hearing back from our patients is one way we can continue to improve our services. Please take a few minutes to complete the written survey that you may receive in the mail after you visit with us. Thank you!        Patient Information     Date Of Birth          1970        About your hospital stay     You were admitted on:  May 2, 2018 [facility]    You were discharged on:  May 2, 2018       Who to Call     For medical emergencies, please call 911.  For non-urgent questions about your medical care, please call your primary care provider or clinic, [phone]  For questions related to your surgery, please call your surgery clinic        Attending Provider     Provider Specialty    Benigno Willams MD Anesthesiology       Primary Care Provider Office Phone # Fax #    Cee Biggs [phone]      Further instructions from your care team       Home Care Instructions after a Radio Frequency Ablation    A radiofrequency ablation refers to a procedure that destroys the functionality of the nerve using radiofrequency energy. There are two primary types of radiofrequency ablation: A medial branch neurotomy (ablation) affects the nerves carrying pain from the facet joints, while a lateral branch neurotomy (ablation) affects nerves that carry pain from the sacroiliac joints. The physician uses x-ray guidance (fluoroscopy) to direct a special (radiofrequency) needle alongside the medial or lateral branch nerves. A small amount of electrical current is often carefully passed through the needle to assure it is next to the target nerve and a safe distance from other nerves. This current should  briefly recreate the usual pain and cause a muscle twitch in the neck or back. The targeted nerves will then be numbed to minimize pain while the lesion is being created. The radiofrequency waves are introduced to heat the tip of the needle and a heat lesion is created on the nerve to disrupt the nerve's ability to send pain signals. Please follow the aftercare instructions regarding your procedure.    Activity  -Rest today  -Do not work today  -Resume normal activity in 2-3 days  -No heavy lifting, turning or twisting for 24 hours  -DO NOT shower or soak in tub for 24 hours  -DO NOT remove bandaid for 24 hours    Pain  -You may experience soreness at the injection site for one or two days  -You may use an ice pack for 20 minutes every 2 hours for the first 24 hours, longer if needed for comfort, do not use heat  -You may use Tylenol (acetaminophen) every 4 hours or other pain medicines as     directed by your physician  -If other pain medications are prescribed by your physician, please follow dosing instructions carefully.    What to expect  You may experience numbness radiating into your legs or arms (depending on the procedure location). This numbness may last several hours. Until sensation returns to normal, please use caution in walking, climbing stairs, and stepping out of your vehicle, etc. Relief of your initial symptoms can take up to 4 weeks to feel better, this is normal and due to the healing process. The procedure site may feel like a deep burn. Using ice will greatly minimize this discomfort. Do not use numbing creams or patches over your injection sites immediately following your procedure. Please keep injection sites covered, clean and dry for 24 hours.    DID YOU RECEIVE SEDATION TODAY?  Yes    Safety  Sedation medicine, if given, may remain active for many hours. It is important for the next 24 hours that you do not:  -Drive a car  -Operate machines or power tools  -Consume alcohol, including  "beer  -Sign any important papers or legal documents  -Please have a responsible adult with you for 24 hours following any sedation    DID YOU RECEIVE STEROIDS TODAY?  Yes    Common side effects of steroids:  Not everyone will experience corticosteroid side effects. If side effects are experienced, they will gradually subside in the 7-10 day period following an injection. Most common side effects include:  -Flushed face and/or chest  -Feeling of warmth, particularly in the face but could be an overall feeling of warmth  -Increased blood sugar in diabetic patients  -Menstrual irregularities my occur. If taking hormone-based birth control an alternate method of birth control is recommended  -Sleep disturbances and/or mood swings are possible  -Leg cramps      Please contact us if you have:  -Severe pain  -Fever more than 101.5 degrees Fahrenheit  -Signs of infection at the injection site (redness, swelling, or drainage)    If you have questions, please contact our office at [phone] between the hours of 7:00 am and 3:00 pm Monday through Friday. After office hours you can contact the on call provider by dialing [phone]. If you need immediate attention, we recommend that you go to a hospital emergency room or dial [phone].    Pending Results     Date and Time Order Name Status Description    5/2/2018 0649 XR SURGERY DANIEL FLUORO LESS THAN 5 MIN W STILLS In process             Admission Information     Date & Time Provider Department Dept. Phone    5/2/2018 Benigno Willams MD [facility] [phone]      Your Vitals Were     Blood Pressure Pulse Temperature Respirations Height Weight    132/94 70 97.7  F (36.5  C) (Temporal) 16 1.676 m (5' 6\") 85.3 kg (188 lb)    Last Period Pulse Oximetry BMI (Body Mass Index)             08/07/2015 100% 30.34 kg/m2         MyChart Information     [email or web address] gives you secure access to your electronic health record. If you see a primary care provider, you " can also send messages to your care team and make appointments. If you have questions, please call your primary care clinic.  If you do not have a primary care provider, please call [phone] and they will assist you.      "[employer]" is an electronic gateway that provides easy, online access to your medical records. With "[employer]", you can request a clinic appointment, read your test results, renew a prescription or communicate with your care team.     To access your existing account, please contact your [facility] or call [phone] for assistance.        Care EveryWhere ID     This is your Care EveryWhere ID. This could be used by other organizations to access your [place] medical records  [id number]        Equal Access to Services     GUSTAVO ARROYO : Lissette Wadsworth, pelon delacruz, jimmy ramirez, mercedes flores. So [facility] [phone].    ATENCIÓN: Si habla español, tiene a victoria disposición servicios gratuitos de asistencia lingüística. Llame al [phone].    We comply with applicable federal civil rights laws and [place] laws. We do not discriminate on the basis of race, color, national origin, age, disability, sex, sexual orientation, or gender identity.               Review of your medicines      UNREVIEWED medicines. Ask your doctor about these medicines        Dose / Directions    albuterol 108 (90 Base) MCG/ACT Inhaler   Commonly known as:  PROAIR HFA/PROVENTIL HFA/VENTOLIN HFA   Used for:  Intermittent asthma, uncomplicated        Dose:  1-2 puff   Inhale 1-2 puffs into the lungs every 4 hours as needed for shortness of breath / dyspnea   Quantity:  1 Inhaler   Refills:  5       cyclobenzaprine 10 MG tablet   Commonly known as:  FLEXERIL   Used for:  Lumbar disc herniation        Dose:  10 mg   Take 1 tablet (10 mg) by mouth 3 times daily as needed for muscle spasms   Quantity:  30 tablet   Refills:  2       FLUoxetine  20 MG capsule   Commonly known as:  PROzac   Used for:  PMDD (premenstrual dysphoric disorder)        TAKE THREE CAPSULES BY MOUTH DAILY   Quantity:  270 capsule   Refills:  1       IBUPROFEN PO   Indication:  Mild to Moderate Pain        Dose:  400 mg   Take 400 mg by mouth 2 times daily as needed for moderate pain   Refills:  0       levothyroxine 75 MCG tablet   Commonly known as:  SYNTHROID/LEVOTHROID   Used for:  Acquired hypothyroidism        Dose:  75 mcg   Take 1 tablet (75 mcg) by mouth daily   Quantity:  90 tablet   Refills:  3                Protect others around you: Learn how to safely use, store and throw away your medicines at www.disposemymeds.org.             Medication List: This is a list of all your medications and when to take them. Check marks below indicate your daily home schedule. Keep this list as a reference.      Medications           Morning Afternoon Evening Bedtime As Needed    albuterol 108 (90 Base) MCG/ACT Inhaler   Commonly known as:  PROAIR HFA/PROVENTIL HFA/VENTOLIN HFA   Inhale 1-2 puffs into the lungs every 4 hours as needed for shortness of breath / dyspnea                                cyclobenzaprine 10 MG tablet   Commonly known as:  FLEXERIL   Take 1 tablet (10 mg) by mouth 3 times daily as needed for muscle spasms                                FLUoxetine 20 MG capsule   Commonly known as:  PROzac   TAKE THREE CAPSULES BY MOUTH DAILY                                IBUPROFEN PO   Take 400 mg by mouth 2 times daily as needed for moderate pain                                levothyroxine 75 MCG tablet   Commonly known as:  SYNTHROID/LEVOTHROID   Take 1 tablet (75 mcg) by mouth daily

## 2018-05-15 NOTE — PROCEDURES
Posterior Primary Ramus Radiofrequency Denervation    The patient s identity, the procedure to be performed and the specific site of the procedure was verified in accordance with [facility] Protocol.   Diagnosis: Lumbosacral Facet Arthropathy  Levels Blocked: bilateral L5/S1/S2/S3  Pre-Procedure Pain Score:8/10  Procedure Note:  Informed consent was obtained.  The patient was positioned comfortably in the prone position.  There was no evidence of infection at the site of needle insertion.  The patient was prepped and draped in a sterile fashion.  Skeletal landmarks were identified under fluoroscopic guidance.  At all insertion sites the skin were anesthetized with 1% lidocaine.  Standard insulated radiofrequency probe needles were inserted at the sites indicated.  Proper placement was determined both fluoroscopically and with test stimulation at each primary site with 50hz and 2hz stimulation.  No radicular stimulation was identified and no distal motor activity was noted. Radiofrequency denervation was performed at each site for 135 seconds at 90 degrees Centigrade. The patient was then observed for 30 minutes.  No complications were noted. The patient tolerated the procedure well and was released with post-procedure instructions. The patient was given discharge instructions and verbalizes understanding, including understanding of those signs and symptoms that would require emergency care.      Medications injected at each site post radio frequency denervation:  1ml from a 10ml mixture 0.25% Marcaine and with 40 mg Methylprednisolone    Intravenous line placed No.  Standard non-invasive monitors placed Yes.    Post-Procedure Pain Score:2/10    The patient was given discharge instructions and verbalizes understanding, including understanding of those signs and symptoms that would require emergency care.     Counseling: Greater than 50% of this patient visit was spent in counseling the  patient regarding the treatment of their pain, coordinating their overall treatment plan and assessing their progress.

## 2018-06-12 DIAGNOSIS — F32.81 PMDD (PREMENSTRUAL DYSPHORIC DISORDER): ICD-10-CM

## 2018-06-12 NOTE — TELEPHONE ENCOUNTER
Pending Prescriptions:                       Disp   Refills    FLUoxetine (PROZAC) 20 MG capsule [Pharma*270 ca*1            Sig: TAKE THREE CAPSULES BY MOUTH DAILY    Routing refill request to provider for review/approval because:  Drug not on the [facility] refill protocol, as medication is prescribed for PMDD. LOV 8/2017.     Lakeisha Ashby RN on 6/12/2018 at 9:36 AM

## 2018-06-22 ENCOUNTER — MEDICAL CORRESPONDENCE (OUTPATIENT)
Dept: HEALTH INFORMATION MANAGEMENT | Facility: CLINIC | Age: 48
End: 2018-06-22

## 2018-07-05 ENCOUNTER — TELEPHONE (OUTPATIENT)
Dept: FAMILY MEDICINE | Facility: CLINIC | Age: 48
End: 2018-07-05

## 2018-07-05 NOTE — LETTER
July 5, 2018          Yenny Johnson,  [address] [place] [address]        Dear Yenny Johnson      Monitoring and managing your preventative and chronic health conditions are very important to us. Our records indicate that you have not scheduled for Asthma Check  which was recommended by Dr. Biggs      If you have received your health care elsewhere, please call the clinic so the information can be documented in your chart.    Please call [phone] or message us through your [employer] account to schedule an appointment or provide information for your chart.     Feel free to contact us if you have any questions or concerns!    I look forward to seeing you and working with you on your health care needs.     Sincerely,         Cee Biggs / jonnie

## 2018-07-05 NOTE — TELEPHONE ENCOUNTER
Panel Management Review      Patient has the following on her problem list:   Asthma review   No flowsheet data found.   1. Is Asthma diagnosis on the Problem List? Yes    2. Is Asthma listed on Health Maintenance? Yes    3. Patient is due for:  ACT and AAP      Composite cancer screening  Chart review shows that this patient is due/due soon for the following None  Summary:    Patient is due/failing the following:   AAP and ACT    Action needed:   Patient needs office visit for Asthma check.    Type of outreach:    Sent letter.    Questions for provider review:    None                                                                                                                                    Sonia FLORES MA

## 2018-08-08 ASSESSMENT — ENCOUNTER SYMPTOMS
SORE THROAT: 0
WEAKNESS: 0
COUGH: 1
DIZZINESS: 0
FREQUENCY: 1
BREAST MASS: 0
ARTHRALGIAS: 1
CHILLS: 0
FEVER: 0
PARESTHESIAS: 0
SHORTNESS OF BREATH: 0
EYE PAIN: 0
HEARTBURN: 0
NERVOUS/ANXIOUS: 0
PALPITATIONS: 0
CONSTIPATION: 0
DYSURIA: 0
HEMATURIA: 0
HEADACHES: 0
DIARRHEA: 0
HEMATOCHEZIA: 0
ABDOMINAL PAIN: 0
JOINT SWELLING: 0
MYALGIAS: 0
NAUSEA: 0

## 2018-08-13 ASSESSMENT — ENCOUNTER SYMPTOMS
HEMATURIA: 0
NAUSEA: 0
SORE THROAT: 0
PARESTHESIAS: 0
DYSURIA: 0
WEAKNESS: 0
FREQUENCY: 1
BREAST MASS: 0
CONSTIPATION: 0
ABDOMINAL PAIN: 0
PALPITATIONS: 0
HEMATOCHEZIA: 0
HEARTBURN: 0
COUGH: 1
ARTHRALGIAS: 1
MYALGIAS: 0
NERVOUS/ANXIOUS: 0
JOINT SWELLING: 0
EYE PAIN: 0
DIZZINESS: 0
HEADACHES: 0
CHILLS: 0
FEVER: 0
DIARRHEA: 0
SHORTNESS OF BREATH: 0

## 2018-08-13 NOTE — PROGRESS NOTES
SUBJECTIVE:   CC: Yenny Johnson is an 48 year old woman  with PMDD and hx of CKD who presents for preventive health visit.     Patient also presents for follow-up of hypothyroidism, controlled on current medication.  Denies symptoms of hypo- or hyperthyroidism. Also presents for follow-up of GERD with some post nasal drip and throat clearing off medication.     Physical   Annual:     Getting at least 3 servings of Calcium per day:  Yes    Bi-annual eye exam:  Yes    Dental care twice a year:  Yes    Sleep apnea or symptoms of sleep apnea:  None    Diet:  Regular (no restrictions)    Frequency of exercise:  2-3 days/week    Duration of exercise:  30-45 minutes    Taking medications regularly:  Yes    Medication side effects:  Not applicable    Additional concerns today:  YES        Gerd, Endoscopy? Right ear pain    Today's PHQ-2 Score:   PHQ-2 (  [employer]) 2018   Q1: Little interest or pleasure in doing things 0   Q2: Feeling down, depressed or hopeless 0   PHQ-2 Score 0   Q1: Little interest or pleasure in doing things Not at all   Q2: Feeling down, depressed or hopeless Not at all   PHQ-2 Score 0       Abuse: Current or Past(Physical, Sexual or Emotional)- No  Do you feel safe in your environment - Yes    Social History   Substance Use Topics     Smoking status: Former Smoker     Packs/day: 1.00     Years: 14.00     Types: Cigarettes     Quit date: 1998     Smokeless tobacco: Never Used     Alcohol use No     Alcohol Use 2018   If you drink alcohol do you typically have greater than 3 drinks per day OR greater than 7 drinks per week? No       Reviewed orders with patient.  Reviewed health maintenance and updated orders accordingly - Yes  Patient Active Problem List   Diagnosis     PMDD (premenstrual dysphoric disorder)     Stress incontinence, female     Hypertriglyceridemia     GERD (gastroesophageal reflux disease)     Hypothyroidism     Lumbar disc herniation     CKD (chronic kidney  disease) stage 3, GFR 30-59 ml/min     Hyperlipidemia LDL goal <100     Elevated fasting glucose     Intermittent asthma     Past Surgical History:   Procedure Laterality Date     BIOPSY       C/SECTION, CLASSICAL       CHOLECYSTECTOMY, LAPOROSCOPIC  1995     DESTRUCTION OF PARAVERTEBRAL FACET LUMBAR / SACRAL SINGLE Bilateral 9/12/2017    Procedure: DESTRUCTION OF PARAVERTEBRAL FACET LUMBAR / SACRAL SINGLE;  Radiofrequency Ablation of the Lumbar Facets /bilateral  heating of nerves around spine bilatteraly for purpose of pain rekief;  Surgeon: Benigno Willams MD;  Location: [facility]     HYSTERECTOMY, PAP NO LONGER INDICATED  09/23/2015     INJECT EPIDURAL LUMBAR / SACRAL SINGLE Bilateral 8/9/2017    Procedure: INJECT EPIDURAL LUMBAR / SACRAL SINGLE;  bilateral lumbar medial branch block:injection of local anesthetic into area around spine for purpose of pain relief;  Surgeon: Benigno Willams MD;  Location: [facility]     INJECT PARAVERTEBRAL FACET JOINT LUMBAR / SACRAL FIRST Bilateral 6/28/2017    Procedure: INJECT PARAVERTEBRAL FACET JOINT LUMBAR / SACRAL FIRST;  injection of local anesthesthetic into area around spine for purpose of pain relief;  Surgeon: Benigno Willams MD;  Location: [facility]     INJECT PARAVERTEBRAL FACET JOINT LUMBAR / SACRAL THIRD Bilateral 3/9/2018    Procedure: INJECT PARAVERTEBRAL FACET JOINT LUMBAR / SACRAL THIRD;  Bilateral S1, S2, S3 Lateral Branch Nerve Block and L5 Dorsal Ramus Nerve Block;  Surgeon: Kristi Gomes MD;  Location: [facility]     INJECT SACROILIAC JOINT Bilateral 2/7/2018    Procedure: INJECT SACROILIAC JOINT;  Bilateral sacroiliac Lateral Branch Block;  Surgeon: Edison Sams MD;  Location: [facility]     RADIO FREQUENCY ABLATION / DESTRUCTION OF SACROILOAC JOINT LATERAL BRANCHES (S1/S2/S3) Bilateral 5/2/2018    Procedure: RADIO FREQUENCY ABLATION / DESTRUCTION OF SACROILOAC JOINT LATERAL BRANCHES (S1/S2/S3);  Bilateral Radiofrequency Ablation of the Lateral  Branches;  Surgeon: Benigno Willams MD;  Location: [facility]     REPAIR MOHS Left 1/19/2017    Procedure: REPAIR MOHS;  Surgeon: Jorge Eric MD;  Location: [facility]     TUBAL LIGATION         Social History   Substance Use Topics     Smoking status: Former Smoker     Packs/day: 1.00     Years: 14.00     Types: Cigarettes     Quit date: 1/1/1998     Smokeless tobacco: Never Used     Alcohol use No     Family History   Problem Relation Age of Onset     C.A.D. Father 67     MI     Hypertension Father      Alcohol/Drug Father      Cardiovascular Mother      CHF      Coronary Artery Disease Mother      Hyperlipidemia Mother      Allergies Maternal Grandmother      Diabetes Maternal Grandmother      Diabetes Paternal Grandfather      Allergies Brother      Asthma Brother      Diabetes Brother      Coronary Artery Disease Brother      Asthma Brother      Obesity Sister      Seasonal/Environmental Allergies Daughter      Cancer No family hx of          Current Outpatient Prescriptions   Medication Sig Dispense Refill     albuterol (PROAIR HFA/PROVENTIL HFA/VENTOLIN HFA) 108 (90 BASE) MCG/ACT Inhaler Inhale 1-2 puffs into the lungs every 4 hours as needed for shortness of breath / dyspnea 1 Inhaler 5     FLUoxetine (PROZAC) 20 MG capsule TAKE THREE CAPSULES BY MOUTH DAILY 270 capsule 3     fluticasone (FLONASE) 50 MCG/ACT spray Spray 1-2 sprays into both nostrils daily 1 Bottle 11     levothyroxine (SYNTHROID/LEVOTHROID) 75 MCG tablet Take 1 tablet (75 mcg) by mouth daily 90 tablet 3     [DISCONTINUED] levothyroxine (SYNTHROID/LEVOTHROID) 75 MCG tablet Take 1 tablet (75 mcg) by mouth daily 90 tablet 3     Allergies   Allergen Reactions     Codeine Sulfate      Disorientation, muscle weakness     Gabapentin      sleepiness       Patient under age 50, mutual decision reflected in health maintenance.      Pertinent mammograms are reviewed under the imaging tab.  History of abnormal Pap smear: Status post benign hysterectomy.  Health Maintenance and Surgical History updated.  PAP / HPV 3/29/2012   PAP NIL     Reviewed and updated as needed this visit by clinical staff  Tobacco  Allergies  Meds  Problems  Med Hx  Surg Hx  Fam Hx  Soc Hx          Reviewed and updated as needed this visit by Provider  Tobacco  Allergies  Meds  Problems  Surg Hx        Past Medical History:   Diagnosis Date     Anemia      BCC (basal cell carcinoma of skin)      CKD (chronic kidney disease) stage 3, GFR 30-59 ml/min      Degeneration of lumbar or lumbosacral intervertebral disc      Depressive disorder      Elevated fasting glucose      Elevated rheumatoid factor 12/12/2012     GERD (gastroesophageal reflux disease) 11/2005    EGD     Hyperlipidemia LDL goal <100      Hypertriglyceridemia      Hypothyroidism      Intermittent asthma      Intermittent asthma      Lumbar disc herniation      Obesity 3/29/2012     PMDD (premenstrual dysphoric disorder)      Stress incontinence, female 3/29/2012        Review of Systems   Constitutional: Negative for chills and fever.   HENT: Negative for congestion, ear pain, hearing loss and sore throat.    Eyes: Negative for pain and visual disturbance.   Respiratory: Positive for cough. Negative for shortness of breath.    Cardiovascular: Negative for chest pain, palpitations and peripheral edema.   Gastrointestinal: Negative for abdominal pain, constipation, diarrhea, heartburn, hematochezia and nausea.   Breasts:  Negative for tenderness, breast mass and discharge.   Genitourinary: Positive for frequency. Negative for dysuria, genital sores, hematuria, pelvic pain, urgency, vaginal bleeding and vaginal discharge.   Musculoskeletal: Positive for arthralgias. Negative for joint swelling and myalgias.   Skin: Negative for rash.   Neurological: Negative for dizziness, weakness, headaches and paresthesias.   Psychiatric/Behavioral: Negative for mood changes. The patient is not nervous/anxious.      CONSTITUTIONAL:  "POSITIVE for intentional weight loss  INTEGUMENTARY/SKIN: NEGATIVE for worrisome rashes, moles or lesions  EYES: NEGATIVE for vision changes or irritation  ENT: as above   RESP:Hx asthma  BREAST: NEGATIVE for masses, tenderness or discharge  CV: NEGATIVE for chest pain, palpitations or peripheral edema  GI: Hx GERD  : NEGATIVE for unusual urinary or vaginal symptoms. No vaginal bleeding.  MUSCULOSKELETAL: NEGATIVE for significant arthralgias or myalgia  NEURO: NEGATIVE for weakness, dizziness or paresthesias  ENDOCRINE: NEGATIVE for temperature intolerance, skin/hair changes  HEME/ALLERGY/IMMUNE: NEGATIVE for bleeding problems  PSYCHIATRIC: cyclic mood changes controlled with medication       OBJECTIVE:   /74  Pulse 73  Temp 98.2  F (36.8  C) (Oral)  Resp 10  Ht 5' 6\" (1.676 m)  Wt 177 lb (80.3 kg)  LMP 08/07/2015  SpO2 94%  Breastfeeding? No  BMI 28.57 kg/m2  Physical Exam  GENERAL: alert, no distress and over weight  EYES: Eyes grossly normal to inspection, PERRL and conjunctivae and sclerae normal  HENT: ear canals and TM's normal, nose and mouth without ulcers or lesions  NECK: no adenopathy, no asymmetry, masses, or scars and thyroid normal to palpation  RESP: lungs clear to auscultation - no rales, rhonchi or wheezes  BREAST: normal without masses, tenderness or nipple discharge and no palpable axillary masses or adenopathy  CV: regular rate and rhythm, normal S1 S2, no S3 or S4, no murmur, click or rub, no peripheral edema and peripheral pulses strong  ABDOMEN: soft, nontender, no hepatosplenomegaly, no masses and bowel sounds normal  MS: no gross musculoskeletal defects noted, no edema  SKIN: no suspicious lesions or rashes  NEURO: Normal strength and tone, mentation intact and speech normal  PSYCH: mentation appears normal, affect normal/bright    Diagnostic Test Results:  Results for orders placed or performed in visit on 08/14/18   Hemoglobin   Result Value Ref Range    Hemoglobin 12.9 " 11.7 - 15.7 g/dL       ASSESSMENT/PLAN:   (Z00.00) Routine history and physical examination of adult  (primary encounter diagnosis)  Plan: HIV Screening, *MA Screening Digital Bilateral          (F32.81) PMDD (premenstrual dysphoric disorder)  Comment: Well controlled with medications without side effects.   Plan: FLUoxetine (PROZAC) 20 MG capsule          (N18.3) CKD (chronic kidney disease) stage 3, GFR 30-59 ml/min  Plan: Basic metabolic panel, Hemoglobin, Albumin         Random Urine Quantitative with Creat Ratio          (E03.9) Acquired hypothyroidism  Comment: euthyroid on replacement   Plan: TSH with free T4 reflex, levothyroxine         (SYNTHROID/LEVOTHROID) 75 MCG tablet          (E78.5) Hyperlipidemia LDL goal <100  Plan: Lipid panel reflex to direct LDL Non-fasting          (K21.9) Gastroesophageal reflux disease without esophagitis  Plan: GASTROENTEROLOGY ADULT REF PROCEDURE ONLY          (J45.20) Mild intermittent asthma without complication  Plan: follow-up as needed     (R09.82) Post-nasal drip  Plan: fluticasone (FLONASE) 50 MCG/ACT spray        Consider allergy referral if symptoms persist       COUNSELING:  Reviewed preventive health counseling, as reflected in patient instructions  Special attention given to:        Regular exercise       Healthy diet/nutrition       Osteoporosis Prevention/Bone Health       HIV screeninx in teen years, 1x in adult years, and at intervals if high risk       (Rody)menopause management       The 10-year ASCVD risk score (Luis Alberto HAWKINS Jr, et al., 2013) is: 1.3%    Values used to calculate the score:      Age: 48 years      Sex: Female      Is Non- : No      Diabetic: No      Tobacco smoker: No      Systolic Blood Pressure: 110 mmHg      Is BP treated: No      HDL Cholesterol: 37 mg/dL      Total Cholesterol: 201 mg/dL    BP Readings from Last 1 Encounters:   18 110/74     Estimated body mass index is 28.57 kg/(m^2) as calculated from  "the following:    Height as of this encounter: 5' 6\" (1.676 m).    Weight as of this encounter: 177 lb (80.3 kg).      Weight management plan: Discussed healthy diet and exercise guidelines and patient will follow up in 12 months in clinic to re-evaluate.     reports that she quit smoking about 20 years ago. Her smoking use included Cigarettes. She has a 14.00 pack-year smoking history. She has never used smokeless tobacco.      Counseling Resources:  ATP IV Guidelines  Pooled Cohorts Equation Calculator  Breast Cancer Risk Calculator  FRAX Risk Assessment  ICSI Preventive Guidelines  Dietary Guidelines for Americans, 2010  USDA's MyPlate  ASA Prophylaxis  Lung CA Screening    Cee Biggs MD  [facility]  "

## 2018-08-14 ENCOUNTER — OFFICE VISIT (OUTPATIENT)
Dept: FAMILY MEDICINE | Facility: CLINIC | Age: 48
End: 2018-08-14
Payer: COMMERCIAL

## 2018-08-14 VITALS
WEIGHT: 177 LBS | RESPIRATION RATE: 10 BRPM | BODY MASS INDEX: 28.45 KG/M2 | SYSTOLIC BLOOD PRESSURE: 110 MMHG | HEIGHT: 66 IN | DIASTOLIC BLOOD PRESSURE: 74 MMHG | HEART RATE: 73 BPM | TEMPERATURE: 98.2 F | OXYGEN SATURATION: 94 %

## 2018-08-14 DIAGNOSIS — Z12.31 VISIT FOR SCREENING MAMMOGRAM: ICD-10-CM

## 2018-08-14 DIAGNOSIS — R09.82 POST-NASAL DRIP: ICD-10-CM

## 2018-08-14 DIAGNOSIS — E78.5 HYPERLIPIDEMIA LDL GOAL <100: ICD-10-CM

## 2018-08-14 DIAGNOSIS — E03.9 ACQUIRED HYPOTHYROIDISM: ICD-10-CM

## 2018-08-14 DIAGNOSIS — K21.9 GASTROESOPHAGEAL REFLUX DISEASE WITHOUT ESOPHAGITIS: ICD-10-CM

## 2018-08-14 DIAGNOSIS — N18.30 CKD (CHRONIC KIDNEY DISEASE) STAGE 3, GFR 30-59 ML/MIN (H): ICD-10-CM

## 2018-08-14 DIAGNOSIS — Z00.00 ROUTINE HISTORY AND PHYSICAL EXAMINATION OF ADULT: Primary | ICD-10-CM

## 2018-08-14 DIAGNOSIS — F32.81 PMDD (PREMENSTRUAL DYSPHORIC DISORDER): ICD-10-CM

## 2018-08-14 DIAGNOSIS — J45.20 MILD INTERMITTENT ASTHMA WITHOUT COMPLICATION: ICD-10-CM

## 2018-08-14 LAB
ANION GAP SERPL CALCULATED.3IONS-SCNC: 8 MMOL/L (ref 3–14)
BUN SERPL-MCNC: 21 MG/DL (ref 7–30)
CALCIUM SERPL-MCNC: 9.1 MG/DL (ref 8.5–10.1)
CHLORIDE SERPL-SCNC: 101 MMOL/L (ref 94–109)
CHOLEST SERPL-MCNC: 225 MG/DL
CO2 SERPL-SCNC: 29 MMOL/L (ref 20–32)
CREAT SERPL-MCNC: 0.98 MG/DL (ref 0.52–1.04)
CREAT UR-MCNC: 11 MG/DL
GFR SERPL CREATININE-BSD FRML MDRD: 61 ML/MIN/1.7M2
GLUCOSE SERPL-MCNC: 93 MG/DL (ref 70–99)
HDLC SERPL-MCNC: 31 MG/DL
HGB BLD-MCNC: 12.9 G/DL (ref 11.7–15.7)
HIV 1+2 AB+HIV1 P24 AG SERPL QL IA: NONREACTIVE
LDLC SERPL CALC-MCNC: 136 MG/DL
MICROALBUMIN UR-MCNC: <5 MG/L
MICROALBUMIN/CREAT UR: NORMAL MG/G CR (ref 0–25)
NONHDLC SERPL-MCNC: 194 MG/DL
POTASSIUM SERPL-SCNC: 4.1 MMOL/L (ref 3.4–5.3)
SODIUM SERPL-SCNC: 138 MMOL/L (ref 133–144)
TRIGL SERPL-MCNC: 290 MG/DL
TSH SERPL DL<=0.005 MIU/L-ACNC: 1.26 MU/L (ref 0.4–4)

## 2018-08-14 PROCEDURE — 84443 ASSAY THYROID STIM HORMONE: CPT | Performed by: FAMILY MEDICINE

## 2018-08-14 PROCEDURE — 82043 UR ALBUMIN QUANTITATIVE: CPT | Performed by: FAMILY MEDICINE

## 2018-08-14 PROCEDURE — 36415 COLL VENOUS BLD VENIPUNCTURE: CPT | Performed by: FAMILY MEDICINE

## 2018-08-14 PROCEDURE — 85018 HEMOGLOBIN: CPT | Performed by: FAMILY MEDICINE

## 2018-08-14 PROCEDURE — 80061 LIPID PANEL: CPT | Performed by: FAMILY MEDICINE

## 2018-08-14 PROCEDURE — 99396 PREV VISIT EST AGE 40-64: CPT | Performed by: FAMILY MEDICINE

## 2018-08-14 PROCEDURE — 80048 BASIC METABOLIC PNL TOTAL CA: CPT | Performed by: FAMILY MEDICINE

## 2018-08-14 PROCEDURE — 87389 HIV-1 AG W/HIV-1&-2 AB AG IA: CPT | Performed by: FAMILY MEDICINE

## 2018-08-14 RX ORDER — FLUTICASONE PROPIONATE 50 MCG
1-2 SPRAY, SUSPENSION (ML) NASAL DAILY
Qty: 1 BOTTLE | Refills: 11 | Status: SHIPPED | OUTPATIENT
Start: 2018-08-14 | End: 2019-08-30

## 2018-08-14 RX ORDER — LEVOTHYROXINE SODIUM 75 UG/1
75 TABLET ORAL DAILY
Qty: 90 TABLET | Refills: 3 | Status: SHIPPED | OUTPATIENT
Start: 2018-08-14 | End: 2019-08-30

## 2018-08-14 NOTE — LETTER
My Depression Action Plan  Name: Yenny Johnson   Date of Birth 1970  Date: 8/14/2018    My doctor: Cee Biggs   My clinic: [address]  [place] [address]  [phone]          GREEN    ZONE   Good Control    What it looks like:     Things are going generally well. You have normal up s and down s. You may even feel depressed from time to time, but bad moods usually last less than a day.   What you need to do:  1. Continue to care for yourself (see self care plan)  2. Check your depression survival kit and update it as needed  3. Follow your physician s recommendations including any medication.  4. Do not stop taking medication unless you consult with your physician first.           YELLOW         ZONE Getting Worse    What it looks like:     Depression is starting to interfere with your life.     It may be hard to get out of bed; you may be starting to isolate yourself from others.    Symptoms of depression are starting to last most all day and this has happened for several days.     You may have suicidal thoughts but they are not constant.   What you need to do:     1. Call your care team, your response to treatment will improve if you keep your care team informed of your progress. Yellow periods are signs an adjustment may need to be made.     2. Continue your self-care, even if you have to fake it!    3. Talk to someone in your support network    4. Open up your depression survival kit           RED    ZONE Medical Alert - Get Help    What it looks like:     Depression is seriously interfering with your life.     You may experience these or other symptoms: You can t get out of bed most days, can t work or engage in other necessary activities, you have trouble taking care of basic hygiene, or basic responsibilities, thoughts of suicide or death that will not go away, self-injurious behavior.     What you need to do:  1. Call your care team and  request a same-day appointment. If they are not available (weekends or after hours) call your local crisis line, emergency room or 911.            Depression Self Care Plan / Survival Kit    Self-Care for Depression  Here s the deal. Your body and mind are really not as separate as most people think.  What you do and think affects how you feel and how you feel influences what you do and think. This means if you do things that people who feel good do, it will help you feel better.  Sometimes this is all it takes.  There is also a place for medication and therapy depending on how severe your depression is, so be sure to consult with your medical provider and/ or Behavioral Health Consultant if your symptoms are worsening or not improving.     In order to better manage my stress, I will:    Exercise  Get some form of exercise, every day. This will help reduce pain and release endorphins, the  feel good  chemicals in your brain. This is almost as good as taking antidepressants!  This is not the same as joining a gym and then never going! (they count on that by the way ) It can be as simple as just going for a walk or doing some gardening, anything that will get you moving.      Hygiene   Maintain good hygiene (Get out of bed in the morning, Make your bed, Brush your teeth, Take a shower, and Get dressed like you were going to work, even if you are unemployed).  If your clothes don't fit try to get ones that do.    Diet  I will strive to eat foods that are good for me, drink plenty of water, and avoid excessive sugar, caffeine, alcohol, and other mood-altering substances.  Some foods that are helpful in depression are: complex carbohydrates, B vitamins, flaxseed, fish or fish oil, fresh fruits and vegetables.    Psychotherapy  I agree to participate in Individual Therapy (if recommended).    Medication  If prescribed medications, I agree to take them.  Missing doses can result in serious side effects.  I understand that  drinking alcohol, or other illicit drug use, may cause potential side effects.  I will not stop my medication abruptly without first discussing it with my provider.    Staying Connected With Others  I will stay in touch with my friends, family members, and my primary care provider/team.    Use your imagination  Be creative.  We all have a creative side; it doesn t matter if it s oil painting, sand castles, or mud pies! This will also kick up the endorphins.    Witness Beauty  (AKA stop and smell the roses) Take a look outside, even in mid-winter. Notice colors, textures. Watch the squirrels and birds.     Service to others  Be of service to others.  There is always someone else in need.  By helping others we can  get out of ourselves  and remember the really important things.  This also provides opportunities for practicing all the other parts of the program.    Humor  Laugh and be silly!  Adjust your TV habits for less news and crime-drama and more comedy.    Control your stress  Try breathing deep, massage therapy, biofeedback, and meditation. Find time to relax each day.     My support system    Clinic Contact:  Phone number:    Contact 1:  Phone number:    Contact 2:  Phone number:    Gnosticism/:  Phone number:    Therapist:  Phone number:    Local crisis center:    Phone number:    Other community support:  Phone number:

## 2018-08-14 NOTE — MR AVS SNAPSHOT
After Visit Summary   8/14/2018    Yenny Johnson    MRN: [id number]           Patient Information     Date Of Birth          1970        Visit Information        Provider Department      8/14/2018 9:20 AM Cee Biggs MD [facility]        Today's Diagnoses     Routine history and physical examination of adult    -  1    PMDD (premenstrual dysphoric disorder)        CKD (chronic kidney disease) stage 3, GFR 30-59 ml/min        Acquired hypothyroidism        Hyperlipidemia LDL goal <100        Gastroesophageal reflux disease without esophagitis        Mild intermittent asthma without complication        Post-nasal drip        Visit for screening mammogram          Care Instructions    [facility]    If you have any questions regarding to your visit please contact your care team:       Team Red:   Clinic Hours Telephone Number   Dr. Cee Coello, NP   7am-7pm  Monday - Thursday   7am-5pm  Fridays  [phone]  (Appointment scheduling available 24/7)    Questions about your recent visit?   Team Line  [phone]   Ur[facility] - [place] [facility] - 11am-9pm Monday-Friday Saturday-Sunday- 9am-5pm   [place] - 5pm-9pm Monday-Friday Saturday-Sunday- 9am-5pm  [phone] - [place]  [phone] - [place]       What options do I have for a visit other than an office visit? We offer electronic visits (e-visits) and telephone visits, when medically appropriate.  Please check with your medical insurance to see if these types of visits are covered, as you will be responsible for any charges that are not paid by your insurance.      You can use [employer] (secure electronic communication) to access to your chart, send your primary care provider a message, or make an appointment. Ask a team member how to get started.     For a price quote for your services, please call our Consumer Price Line at  [phone] or our Imaging Cost estimation line at [phone] (for imaging tests).    Sonia FLORES MA      Preventive Health Recommendations  Female Ages 40 to 49    Yearly exam:     See your health care provider every year in order to  1. Review health changes.   2. Discuss preventive care.    3. Review your medicines if your doctor prescribed any.      Get a Pap test every three years (unless you have an abnormal result and your provider advises testing more often).      If you get Pap tests with HPV test, you only need to test every 5 years, unless you have an abnormal result. You do not need a Pap test if your uterus was removed (hysterectomy) and you have not had cancer.      You should be tested each year for STDs (sexually transmitted diseases), if you're at risk.     Ask your doctor if you should have a mammogram.      Have a colonoscopy (test for colon cancer) if someone in your family has had colon cancer or polyps before age 50.       Have a cholesterol test every 5 years.       Have a diabetes test (fasting glucose) after age 45. If you are at risk for diabetes, you should have this test every 3 years.    Shots: Get a flu shot each year. Get a tetanus shot every 10 years.     Nutrition:     Eat at least 5 servings of fruits and vegetables each day.    Eat whole-grain bread, whole-wheat pasta and brown rice instead of white grains and rice.    Get adequate Calcium and Vitamin D.      Lifestyle    Exercise at least 150 minutes a week (an average of 30 minutes a day, 5 days a week). This will help you control your weight and prevent disease.    Limit alcohol to one drink per day.    No smoking.     Wear sunscreen to prevent skin cancer.    See your dentist every six months for an exam and cleaning.          Follow-ups after your visit        Additional Services     GASTROENTEROLOGY ADULT REF PROCEDURE ONLY       Last Lab Result: Creatinine (mg/dL)       Date                     Value                  01/17/2018               0.95             ----------  Body mass index is 28.57 kg/(m^2).     Needed:  No  Language:  English    Patient will be contacted to schedule procedure.     Please be aware that coverage of these services is subject to the terms and limitations of your health insurance plan.  Call member services at your health plan with any benefit or coverage questions.  Any procedures must be performed at a [facility] facility OR coordinated by your clinic's referral office.    Please bring the following with you to your appointment:    (1) Any X-Rays, CTs or MRIs which have been performed.  Contact the facility where they were done to arrange for  prior to your scheduled appointment.    (2) List of current medications   (3) This referral request   (4) Any documents/labs given to you for this referral                  Follow-up notes from your care team     Return in about 1 year (around 8/14/2019), or if symptoms worsen or fail to improve, for physical (fasting labs up to one week prior).      Future tests that were ordered for you today     Open Future Orders        Priority Expected Expires Ordered    *MA Screening Digital Bilateral Routine  8/14/2019 8/14/2018            Who to contact     If you have questions or need follow up information about today's clinic visit or your schedule please contact [facility] directly at [phone].  Normal or non-critical lab and imaging results will be communicated to you by MyChart, letter or phone within 4 business days after the clinic has received the results. If you do not hear from us within 7 days, please contact the clinic through MyChart or phone. If you have a critical or abnormal lab result, we will notify you by phone as soon as possible.  Submit refill requests through [employer] or call your pharmacy and they will forward the refill request to us. Please allow 3 business days for your refill to be completed.           "Additional Information About Your Visit        MyChart Information     [employer] gives you secure access to your electronic health record. If you see a primary care provider, you can also send messages to your care team and make appointments. If you have questions, please call your primary care clinic.  If you do not have a primary care provider, please call [phone] and they will assist you.        Care EveryWhere ID     This is your Care EveryWhere ID. This could be used by other organizations to access your [place] medical records  [id number]        Your Vitals Were     Pulse Temperature Respirations Height Last Period Pulse Oximetry    73 98.2  F (36.8  C) (Oral) 10 5' 6\" (1.676 m) 08/07/2015 94%    Breastfeeding? BMI (Body Mass Index)                No 28.57 kg/m2           Blood Pressure from Last 3 Encounters:   08/14/18 110/74   05/02/18 139/90   03/09/18 143/83    Weight from Last 3 Encounters:   08/14/18 177 lb (80.3 kg)   05/02/18 188 lb (85.3 kg)   03/09/18 198 lb (89.8 kg)              We Performed the Following     Albumin Random Urine Quantitative with Creat Ratio     Basic metabolic panel     GASTROENTEROLOGY ADULT REF PROCEDURE ONLY     Hemoglobin     HIV Screening     Lipid panel reflex to direct LDL Non-fasting     TSH with free T4 reflex          Today's Medication Changes          These changes are accurate as of 8/14/18  9:56 AM.  If you have any questions, ask your nurse or doctor.               Start taking these medicines.        Dose/Directions    fluticasone 50 MCG/ACT spray   Commonly known as:  FLONASE   Used for:  Post-nasal drip   Started by:  Cee Biggs MD        Dose:  1-2 spray   Spray 1-2 sprays into both nostrils daily   Quantity:  1 Bottle   Refills:  11            Where to get your medicines      These medications were sent to [place] [address] [place] [facility] [place] - [address] [facility]  [address] [facility] [facility] [place] [address]     Phone:  [phone]     " FLUoxetine 20 MG capsule    fluticasone 50 MCG/ACT spray    levothyroxine 75 MCG tablet                Primary Care Provider Office Phone # Fax #    Cee Biggs [phone]       [address] [facility] [address]        Equal Access to Services     [facility] : Hadii aad ku hadasho Soomaali, waaxda luqadaha, qaybta kaalmada adeegyada, waxay lauriein hayaan adecarlie kasiavaleria lavikki . So [facility] [phone].    ATENCIÓN: Si habla español, tiene a victoria disposición servicios gratuitos de asistencia lingüística. Dahiana al [phone].    We comply with applicable federal civil rights laws and [place] laws. We do not discriminate on the basis of race, color, national origin, age, disability, sex, sexual orientation, or gender identity.            Thank you!     Thank you for choosing [facility]  for your care. Our goal is always to provide you with excellent care. Hearing back from our patients is one way we can continue to improve our services. Please take a few minutes to complete the written survey that you may receive in the mail after your visit with us. Thank you!             Your Updated Medication List - Protect others around you: Learn how to safely use, store and throw away your medicines at www.disposemymeds.org.          This list is accurate as of 8/14/18  9:56 AM.  Always use your most recent med list.                   Brand Name Dispense Instructions for use Diagnosis    albuterol 108 (90 Base) MCG/ACT inhaler    PROAIR HFA/PROVENTIL HFA/VENTOLIN HFA    1 Inhaler    Inhale 1-2 puffs into the lungs every 4 hours as needed for shortness of breath / dyspnea    Intermittent asthma, uncomplicated       FLUoxetine 20 MG capsule    PROzac    270 capsule    TAKE THREE CAPSULES BY MOUTH DAILY    PMDD (premenstrual dysphoric disorder)       fluticasone 50 MCG/ACT spray    FLONASE    1 Bottle    Spray 1-2 sprays into both nostrils daily    Post-nasal drip       levothyroxine 75 MCG  tablet    SYNTHROID/LEVOTHROID    90 tablet    Take 1 tablet (75 mcg) by mouth daily    Acquired hypothyroidism

## 2018-08-14 NOTE — PATIENT INSTRUCTIONS
[facility]    If you have any questions regarding to your visit please contact your care team:       Team Red:   Clinic Hours Telephone Number   Dr. Cee Coello, NP   7am-7pm  Monday - Thursday   7am-5pm  Fridays  [phone]  (Appointment scheduling available 24/7)    Questions about your recent visit?   Team Line  [phone]   [facility] [place] [facility] - 11am-9pm Monday-Friday Saturday-Sunday- 9am-5pm   [place] - 5pm-9pm Monday-Friday Saturday-Sunday- 9am-5pm  [phone] - [place]  [phone] - [place]       What options do I have for a visit other than an office visit? We offer electronic visits (e-visits) and telephone visits, when medically appropriate.  Please check with your medical insurance to see if these types of visits are covered, as you will be responsible for any charges that are not paid by your insurance.      You can use [employer] (secure electronic communication) to access to your chart, send your primary care provider a message, or make an appointment. Ask a team member how to get started.     For a price quote for your services, please call our Consumer Price Line at [phone] or our Imaging Cost estimation line at [phone] (for imaging tests).    Sonia FLORES MA      Preventive Health Recommendations  Female Ages 40 to 49    Yearly exam:     See your health care provider every year in order to  1. Review health changes.   2. Discuss preventive care.    3. Review your medicines if your doctor prescribed any.      Get a Pap test every three years (unless you have an abnormal result and your provider advises testing more often).      If you get Pap tests with HPV test, you only need to test every 5 years, unless you have an abnormal result. You do not need a Pap test if your uterus was removed (hysterectomy) and you have not had cancer.      You should be tested each year for STDs  (sexually transmitted diseases), if you're at risk.     Ask your doctor if you should have a mammogram.      Have a colonoscopy (test for colon cancer) if someone in your family has had colon cancer or polyps before age 50.       Have a cholesterol test every 5 years.       Have a diabetes test (fasting glucose) after age 45. If you are at risk for diabetes, you should have this test every 3 years.    Shots: Get a flu shot each year. Get a tetanus shot every 10 years.     Nutrition:     Eat at least 5 servings of fruits and vegetables each day.    Eat whole-grain bread, whole-wheat pasta and brown rice instead of white grains and rice.    Get adequate Calcium and Vitamin D.      Lifestyle    Exercise at least 150 minutes a week (an average of 30 minutes a day, 5 days a week). This will help you control your weight and prevent disease.    Limit alcohol to one drink per day.    No smoking.     Wear sunscreen to prevent skin cancer.    See your dentist every six months for an exam and cleaning.

## 2018-08-14 NOTE — PROGRESS NOTES
Yenny,    Your HIV, kidney, anemia, thyroid and blood glucose tests are normal. Your cholesterol is improving.     Cee Biggs MD    The 10-year ASCVD risk score (Luis Albertorodolfo HAWKINS Jr, et al., 2013) is: 2%    Values used to calculate the score:      Age: 48 years      Sex: Female      Is Non- : No      Diabetic: No      Tobacco smoker: No      Systolic Blood Pressure: 110 mmHg      Is BP treated: No      HDL Cholesterol: 31 mg/dL      Total Cholesterol: 225 mg/dL

## 2018-08-14 NOTE — LETTER
My Asthma Action Plan  Name: Yenny Johnson   YOB: 1970  Date: 8/14/2018   My doctor: Cee Biggs MD   My clinic: [facility]        My Control Medicine: None  My Rescue Medicine: Albuterol (Proair/Ventolin/Proventil) inhaler     My Asthma Severity: intermittent  Avoid your asthma triggers: upper respiratory infections               GREEN ZONE   Good Control    I feel good    No cough or wheeze    Can work, sleep and play without asthma symptoms       Take your asthma control medicine every day.     1. If exercise triggers your asthma, take your rescue medication    15 minutes before exercise or sports, and    During exercise if you have asthma symptoms  2. Spacer to use with inhaler: If you have a spacer, make sure to use it with your inhaler             YELLOW ZONE Getting Worse  I have ANY of these:    I do not feel good    Cough or wheeze    Chest feels tight    Wake up at night   1. Keep taking your Green Zone medications  2. Start taking your rescue medicine:    every 20 minutes for up to 1 hour. Then every 4 hours for 24-48 hours.  3. If you stay in the Yellow Zone for more than 12-24 hours, contact your doctor.  4. If you do not return to the Green Zone in 12-24 hours or you get worse, start taking your oral steroid medicine if prescribed by your provider.           RED ZONE Medical Alert - Get Help  I have ANY of these:    I feel awful    Medicine is not helping    Breathing getting harder    Trouble walking or talking    Nose opens wide to breathe       1. Take your rescue medicine NOW  2. If your provider has prescribed an oral steroid medicine, start taking it NOW  3. Call your doctor NOW  4. If you are still in the Red Zone after 20 minutes and you have not reached your doctor:    Take your rescue medicine again and    Call 911 or go to the emergency room right away    See your regular doctor within 2 weeks of an Emergency Room or Urgent Care visit for follow-up  treatment.          Annual Reminders:  Meet with Asthma Educator,  Flu Shot in the Fall, consider Pneumonia Vaccination for patients with asthma (aged 19 and older).    Pharmacy: [facility] [address] [place] [facility] - [address]                      Asthma Triggers  How To Control Things That Make Your Asthma Worse    Triggers are things that make your asthma worse.  Look at the list below to help you find your triggers and what you can do about them.  You can help prevent asthma flare-ups by staying away from your triggers.      Trigger                                                          What you can do   Cigarette Smoke  Tobacco smoke can make asthma worse. Do not allow smoking in your home, car or around you.  Be sure no one smokes at a child s day care or school.  If you smoke, ask your health care provider for ways to help you quit.  Ask family members to quit too.  Ask your health care provider for a referral to Quit Plan to help you quit smoking, or call 5-375-184-PLAN.     Colds, Flu, Bronchitis  These are common triggers of asthma. Wash your hands often.  Don t touch your eyes, nose or mouth.  Get a flu shot every year.     Dust Mites  These are tiny bugs that live in cloth or carpet. They are too small to see. Wash sheets and blankets in hot water every week.   Encase pillows and mattress in dust mite proof covers.  Avoid having carpet if you can. If you have carpet, vacuum weekly.   Use a dust mask and HEPA vacuum.   Pollen and Outdoor Mold  Some people are allergic to trees, grass, or weed pollen, or molds. Try to keep your windows closed.  Limit time out doors when pollen count is high.   Ask you health care provider about taking medicine during allergy season.     Animal Dander  Some people are allergic to skin flakes, urine or saliva from pets with fur or feathers. Keep pets with fur or feathers out of your home.    If you can t keep the pet outdoors, then keep the pet out of your  bedroom.  Keep the bedroom door closed.  Keep pets off cloth furniture and away from stuffed toys.     Mice, Rats, and Cockroaches  Some people are allergic to the waste from these pests.   Cover food and garbage.  Clean up spills and food crumbs.  Store grease in the refrigerator.   Keep food out of the bedroom.   Indoor Mold  This can be a trigger if your home has high moisture. Fix leaking faucets, pipes, or other sources of water.   Clean moldy surfaces.  Dehumidify basement if it is damp and smelly.   Smoke, Strong Odors, and Sprays  These can reduce air quality. Stay away from strong odors and sprays, such as perfume, powder, hair spray, paints, smoke incense, paint, cleaning products, candles and new carpet.   Exercise or Sports  Some people with asthma have this trigger. Be active!  Ask your doctor about taking medicine before sports or exercise to prevent symptoms.    Warm up for 5-10 minutes before and after sports or exercise.     Other Triggers of Asthma  Cold air:  Cover your nose and mouth with a scarf.  Sometimes laughing or crying can be a trigger.  Some medicines and food can trigger asthma.

## 2018-08-15 ASSESSMENT — ASTHMA QUESTIONNAIRES: ACT_TOTALSCORE: 22

## 2018-09-07 DIAGNOSIS — F32.81 PMDD (PREMENSTRUAL DYSPHORIC DISORDER): ICD-10-CM

## 2018-09-07 NOTE — TELEPHONE ENCOUNTER
Spoke to pharmacy and confirmed duplicate request.      Lyndsay LOCKE CMA ([facility])

## 2018-09-07 NOTE — TELEPHONE ENCOUNTER
FLUoxetine (PROZAC) 20 MG capsule 270 capsule 3 8/14/2018  No     Sig: TAKE THREE CAPSULES BY MOUTH DAILY     Class: E-Prescribe     Order: [id number]     E-Prescribing Status: Receipt confirmed by pharmacy (8/14/2018  9:51 AM CDT)         Call pharmacy after 9 to confirm if duplicate.   Thank you  Abby

## 2018-09-24 ENCOUNTER — OFFICE VISIT (OUTPATIENT)
Dept: AUDIOLOGY | Facility: CLINIC | Age: 48
End: 2018-09-24
Payer: COMMERCIAL

## 2018-09-24 ENCOUNTER — OFFICE VISIT (OUTPATIENT)
Dept: OTOLARYNGOLOGY | Facility: CLINIC | Age: 48
End: 2018-09-24
Payer: COMMERCIAL

## 2018-09-24 VITALS — HEIGHT: 66 IN | BODY MASS INDEX: 28.45 KG/M2 | WEIGHT: 177 LBS

## 2018-09-24 DIAGNOSIS — R68.84 JAW PAIN: ICD-10-CM

## 2018-09-24 DIAGNOSIS — H69.93 DISORDER OF BOTH EUSTACHIAN TUBES: Primary | ICD-10-CM

## 2018-09-24 DIAGNOSIS — G50.1 ATYPICAL FACIAL PAIN: Primary | ICD-10-CM

## 2018-09-24 DIAGNOSIS — H93.13 TINNITUS, BILATERAL: ICD-10-CM

## 2018-09-24 PROCEDURE — 92557 COMPREHENSIVE HEARING TEST: CPT | Performed by: AUDIOLOGIST

## 2018-09-24 PROCEDURE — 99204 OFFICE O/P NEW MOD 45 MIN: CPT | Performed by: OTOLARYNGOLOGY

## 2018-09-24 PROCEDURE — 99207 ZZC NO CHARGE LOS: CPT | Performed by: AUDIOLOGIST

## 2018-09-24 PROCEDURE — 92567 TYMPANOMETRY: CPT | Performed by: AUDIOLOGIST

## 2018-09-24 NOTE — PROGRESS NOTES
History of Present Illness - Yenny Johnson is a 48 year old female here to see me for the first time for LEFT ear symptoms.  She was starting to have pain over the scalp near her ear.  She also will get occasional fullness and even deep aches in the ear.  She also perceives muffled hearing in the ears as well.    She has no history of any ear disease at all. No previous ENT surgery.  No drainage from the ear at all.  She has had a few moments of light headedness, and feeling off and perhaps slightly nauseated.  But no spinning vertigo.    Past Medical History -   Patient Active Problem List   Diagnosis     PMDD (premenstrual dysphoric disorder)     Stress incontinence, female     GERD (gastroesophageal reflux disease)     Hypothyroidism     Lumbar disc herniation     Intermittent asthma     Hyperlipidemia LDL goal <130       Current Medications -   Current Outpatient Prescriptions:      albuterol (PROAIR HFA/PROVENTIL HFA/VENTOLIN HFA) 108 (90 BASE) MCG/ACT Inhaler, Inhale 1-2 puffs into the lungs every 4 hours as needed for shortness of breath / dyspnea, Disp: 1 Inhaler, Rfl: 5     FLUoxetine (PROZAC) 20 MG capsule, TAKE THREE CAPSULES BY MOUTH DAILY, Disp: 270 capsule, Rfl: 3     fluticasone (FLONASE) 50 MCG/ACT spray, Spray 1-2 sprays into both nostrils daily, Disp: 1 Bottle, Rfl: 11     levothyroxine (SYNTHROID/LEVOTHROID) 75 MCG tablet, Take 1 tablet (75 mcg) by mouth daily, Disp: 90 tablet, Rfl: 3    Allergies -   Allergies   Allergen Reactions     Codeine Sulfate      Disorientation, muscle weakness     Gabapentin      sleepiness       Social History -   Social History     Social History     Marital status:      Spouse name: Pasquale     Number of children: 4     Years of education: 15     Occupational History     sales, floor, overnight stocking [employer]      [employer]  Other     MA Student     Social History Main Topics     Smoking status: Former Smoker     Packs/day: 1.00     Years: 14.00      Types: Cigarettes     Quit date: 1/1/1998     Smokeless tobacco: Never Used     Alcohol use No     Drug use: No     Sexual activity: Yes     Partners: Male     Birth control/ protection: Female Surgical      Comment: tubal ligation     Other Topics Concern     Parent/Sibling W/ Cabg, Mi Or Angioplasty Before 65f 55m? Yes      Service No     Blood Transfusions No     Caffeine Concern No     Occupational Exposure No     Hobby Hazards No     Sleep Concern No     Stress Concern No     Weight Concern No     Special Diet No     Back Care No     Exercise Yes     Bike Helmet No     Seat Belt Yes     Self-Exams Yes     Social History Narrative    .    4 children.    Does .       Family History -   Family History   Problem Relation Age of Onset     C.A.D. Father 67     MI     Hypertension Father      Alcohol/Drug Father      Cardiovascular Mother      CHF      Coronary Artery Disease Mother      Hyperlipidemia Mother      Allergies Maternal Grandmother      Diabetes Maternal Grandmother      Diabetes Paternal Grandfather      Allergies Brother      Asthma Brother      Diabetes Brother      Coronary Artery Disease Brother      Asthma Brother      Obesity Sister      Seasonal/Environmental Allergies Daughter      Cancer No family hx of        Review of Systems - As per HPI and PMHx, otherwise 10+ system review of the head and neck, and general constitution is negative.    Physical Exam  LMP 08/07/2015    General - The patient is well nourished and well developed, and appears to have good nutritional status.  Alert and oriented to person and place, answers questions and cooperates with examination appropriately.   Head and Face - Normocephalic and atraumatic, with no gross asymmetry noted of the contour of the facial features.  The facial nerve is intact, with strong symmetric movements.  Voice and Breathing - The patient was breathing comfortably without the use of accessory muscles. There was no  wheezing, stridor, or stertor.  The patients voice was clear and strong, and had appropriate pitch and quality.  Ears - The tympanic membranes are normal in appearance, bony landmarks are intact.  No retraction, perforation, or masses.  No fluid or purulence was seen in the external canal or the middle ear. No evidence of infection of the middle ear or external canal, cerumen was normal in appearance.  Eyes - Extraocular movements intact, and the pupils were reactive to light.  Sclera were not icteric or injected, conjunctiva were pink and moist.  Mouth - Examination of the oral cavity showed pink, healthy oral mucosa. No lesions or ulcerations noted.  The tongue was mobile and midline, and the dentition were in good condition.    Throat - The walls of the oropharynx were smooth, pink, moist, symmetric, and had no lesions or ulcerations.  The tonsillar pillars and soft palate were symmetric.  The uvula was midline on elevation.    Neck - Normal midline excursion of the laryngotracheal complex during swallowing.  Full range of motion on passive movement.  Palpation of the occipital, submental, submandibular, internal jugular chain, and supraclavicular nodes did not demonstrate any abnormal lymph nodes or masses.  The carotid pulse was palpable bilaterally.  Palpation of the thyroid was soft and smooth, with no nodules or goiter appreciated.  The trachea was mobile and midline.  Nose - External contour is symmetric, no gross deflection or scars.  Nasal mucosa is pink and moist with no abnormal mucus.  The septum was midline and non-obstructive, turbinates of normal size and position.  No polyps, masses, or purulence noted on examination.      A/P - Yenny Johnson is a 48 year old female  (G50.1) Atypical facial pain  (primary encounter diagnosis)  (R68.84) Jaw pain    Based on today's history and physical exam, I can find no evidence of middle ear pathology or eustachian tube dysfunction.  At this point my primary  diagnosis is of temporomandibular syndrome.  I have discussed the etiology of TMJ, and the reasons why referred pain can mimic symptoms of ear disease, headaches, and even sinusitis.  i have given the patient an instructional sheet of things to be tried at home, as well a referral to a TMJ specialist should it be needed.  Finally, I counseled the patient that should the therapy not help, or should the symptoms change, that they should return to me.    The only thing that catches my attention is the nausea.  Could this be atypical migraine?

## 2018-09-24 NOTE — PATIENT INSTRUCTIONS
Scheduling Information  To schedule your CT/MRI scan, please contact [facility] at [phone] OR [facility] at [phone]    To schedule your Surgery, please contact our Specialty Schedulers at [phone]      ENT Clinic Locations Clinic Hours Telephone Number     [facility]  [address] [place] [address]   Monday:           1:00pm -- 5:00pm    Friday:              8:00am - 12:00pm   To schedule/reschedule an appointment with   Dr. Curtis,   please contact our   Specialty Scheduling Department at:     [phone]       [facility]  [address] [address] Tuesday:          8:00am -- 2:00pm         Urgent Care Locations Clinic Hours Telephone Numbers     [facility]  [address] [place] [address]     Monday-Friday:     11:00am - 9:00pm    Saturday-Sunday:  9:00am - 5:00pm   [phone]     [facility]  [address] [place] [address]     Monday-Friday:      5:00pm - 9:00pm     Saturday-Sunday:  9:00am - 5:00pm   [phone]

## 2018-09-24 NOTE — LETTER
9/24/2018         RE: Yenny Johnson  [address] [place] [address]        Dear Colleague,    Thank you for referring your patient, Yenny Johnson, to the [facility]. Please see a copy of my visit note below.    History of Present Illness - Yenny Johnson is a 48 year old female here to see me for the first time for LEFT ear symptoms.  She was starting to have pain over the scalp near her ear.  She also will get occasional fullness and even deep aches in the ear.  She also perceives muffled hearing in the ears as well.    She has no history of any ear disease at all. No previous ENT surgery.  No drainage from the ear at all.  She has had a few moments of light headedness, and feeling off and perhaps slightly nauseated.  But no spinning vertigo.    Past Medical History -   Patient Active Problem List   Diagnosis     PMDD (premenstrual dysphoric disorder)     Stress incontinence, female     GERD (gastroesophageal reflux disease)     Hypothyroidism     Lumbar disc herniation     Intermittent asthma     Hyperlipidemia LDL goal <130       Current Medications -   Current Outpatient Prescriptions:      albuterol (PROAIR HFA/PROVENTIL HFA/VENTOLIN HFA) 108 (90 BASE) MCG/ACT Inhaler, Inhale 1-2 puffs into the lungs every 4 hours as needed for shortness of breath / dyspnea, Disp: 1 Inhaler, Rfl: 5     FLUoxetine (PROZAC) 20 MG capsule, TAKE THREE CAPSULES BY MOUTH DAILY, Disp: 270 capsule, Rfl: 3     fluticasone (FLONASE) 50 MCG/ACT spray, Spray 1-2 sprays into both nostrils daily, Disp: 1 Bottle, Rfl: 11     levothyroxine (SYNTHROID/LEVOTHROID) 75 MCG tablet, Take 1 tablet (75 mcg) by mouth daily, Disp: 90 tablet, Rfl: 3    Allergies -   Allergies   Allergen Reactions     Codeine Sulfate      Disorientation, muscle weakness     Gabapentin      sleepiness       Social History -   Social History     Social History     Marital status:      Spouse name: Pasquale     Varun of  children: 4     Years of education: 15     Occupational History     sales, floor, 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 Yes      Service No     Blood Transfusions No     Caffeine Concern No     Occupational Exposure No     Hobby Hazards No     Sleep Concern No     Stress Concern No     Weight Concern No     Special Diet No     Back Care No     Exercise Yes     Bike Helmet No     Seat Belt Yes     Self-Exams Yes     Social History Narrative    .    4 children.    Does .       Family History -   Family History   Problem Relation Age of Onset     C.A.D. Father 67     MI     Hypertension Father      Alcohol/Drug Father      Cardiovascular Mother      CHF      Coronary Artery Disease Mother      Hyperlipidemia Mother      Allergies Maternal Grandmother      Diabetes Maternal Grandmother      Diabetes Paternal Grandfather      Allergies Brother      Asthma Brother      Diabetes Brother      Coronary Artery Disease Brother      Asthma Brother      Obesity Sister      Seasonal/Environmental Allergies Daughter      Cancer No family hx of        Review of Systems - As per HPI and PMHx, otherwise 10+ system review of the head and neck, and general constitution is negative.    Physical Exam  LMP 08/07/2015    General - The patient is well nourished and well developed, and appears to have good nutritional status.  Alert and oriented to person and place, answers questions and cooperates with examination appropriately.   Head and Face - Normocephalic and  atraumatic, with no gross asymmetry noted of the contour of the facial features.  The facial nerve is intact, with strong symmetric movements.  Voice and Breathing - The patient was breathing comfortably without the use of accessory muscles. There was no wheezing, stridor, or stertor.  The patients voice was clear and strong, and had appropriate pitch and quality.  Ears - The tympanic membranes are normal in appearance, bony landmarks are intact.  No retraction, perforation, or masses.  No fluid or purulence was seen in the external canal or the middle ear. No evidence of infection of the middle ear or external canal, cerumen was normal in appearance.  Eyes - Extraocular movements intact, and the pupils were reactive to light.  Sclera were not icteric or injected, conjunctiva were pink and moist.  Mouth - Examination of the oral cavity showed pink, healthy oral mucosa. No lesions or ulcerations noted.  The tongue was mobile and midline, and the dentition were in good condition.    Throat - The walls of the oropharynx were smooth, pink, moist, symmetric, and had no lesions or ulcerations.  The tonsillar pillars and soft palate were symmetric.  The uvula was midline on elevation.    Neck - Normal midline excursion of the laryngotracheal complex during swallowing.  Full range of motion on passive movement.  Palpation of the occipital, submental, submandibular, internal jugular chain, and supraclavicular nodes did not demonstrate any abnormal lymph nodes or masses.  The carotid pulse was palpable bilaterally.  Palpation of the thyroid was soft and smooth, with no nodules or goiter appreciated.  The trachea was mobile and midline.  Nose - External contour is symmetric, no gross deflection or scars.  Nasal mucosa is pink and moist with no abnormal mucus.  The septum was midline and non-obstructive, turbinates of normal size and position.  No polyps, masses, or purulence noted on examination.      A/P - Yenny Johnson is  a 48 year old female  (G50.1) Atypical facial pain  (primary encounter diagnosis)  (R68.84) Jaw pain    Based on today's history and physical exam, I can find no evidence of middle ear pathology or eustachian tube dysfunction.  At this point my primary diagnosis is of temporomandibular syndrome.  I have discussed the etiology of TMJ, and the reasons why referred pain can mimic symptoms of ear disease, headaches, and even sinusitis.  i have given the patient an instructional sheet of things to be tried at home, as well a referral to a TMJ specialist should it be needed.  Finally, I counseled the patient that should the therapy not help, or should the symptoms change, that they should return to me.    The only thing that catches my attention is the nausea.  Could this be atypical migraine?    Again, thank you for allowing me to participate in the care of your patient.        Sincerely,        Khoi Curtis MD

## 2018-09-24 NOTE — PROGRESS NOTES
"AUDIOLOGY REPORT:    Patient was referred to Audiology from ENT by Dr. Curtis for a hearing examination. Patient reports left ear otalgia, fullness, and decrease in hearing for the past few months. Patient reports some tinnitus which switches between a \"howel and high pitched\". They were accompanied today by their self.    Testing:    Otoscopy:   Otoscopic exam indicates ears are clear of cerumen bilaterally     Tympanograms:    RIGHT: restricted eardrum mobility (Type B)     LEFT:   restricted eardrum mobility (Type B)    Thresholds:   Pure Tone Thresholds assessed using conventional audiometry with good  reliability from 250-8000 Hz bilaterally using insert earphones     RIGHT:  normal hearing sensitivity for all frequencies tested.     LEFT:    normal hearing sensitivity for all frequencies tested.     Speech Reception Threshold:    RIGHT: 15 dB HL    LEFT:   25 dB HL    Word Recognition Score:     RIGHT: 100% at 50 dB HL using NU-6 recorded word list.    LEFT:   100% at 50 dB HL using NU-6 recorded word list.    Discussed results with the patient.     Patient was returned to ENT for follow up.     Adonay Ziegler CCC-A  Licensed Audiologist  9/24/2018                                            "

## 2018-09-24 NOTE — MR AVS SNAPSHOT
After Visit Summary   9/24/2018    Yenny Johnson    MRN: [id number]           Patient Information     Date Of Birth          1970        Visit Information        Provider Department      9/24/2018 3:15 PM Adonay Quintanilla [facility]        Today's Diagnoses     Disorder of both eustachian tubes    -  1    Tinnitus, bilateral           Follow-ups after your visit        Future tests that were ordered for you today     Open Future Orders        Priority Expected Expires Ordered    ROSE PT, HAND, AND CHIROPRACTIC REFERRAL Routine  9/24/2019 9/24/2018            Who to contact     If you have questions or need follow up information about today's clinic visit or your schedule please contact [facility] directly at [phone].  Normal or non-critical lab and imaging results will be communicated to you by [employer], letter or phone within 4 business days after the clinic has received the results. If you do not hear from us within 7 days, please contact the clinic through [employer] or phone. If you have a critical or abnormal lab result, we will notify you by phone as soon as possible.  Submit refill requests through [employer] or call your pharmacy and they will forward the refill request to us. Please allow 3 business days for your refill to be completed.          Additional Information About Your Visit        MyChart Information     [employer] gives you secure access to your electronic health record. If you see a primary care provider, you can also send messages to your care team and make appointments. If you have questions, please call your primary care clinic.  If you do not have a primary care provider, please call [phone] and they will assist you.        Care EveryWhere ID     This is your Care EveryWhere ID. This could be used by other organizations to access your [facility] medical records  [id number]        Your Vitals Were     Last Period                    08/07/2015            Blood Pressure from Last 3 Encounters:   08/14/18 110/74   05/02/18 139/90   03/09/18 143/83    Weight from Last 3 Encounters:   08/14/18 177 lb (80.3 kg)   05/02/18 188 lb (85.3 kg)   03/09/18 198 lb (89.8 kg)              We Performed the Following     AUDIOGRAM/TYMPANOGRAM - INTERFACE     COMPREHENSIVE HEARING TEST     TYMPANOMETRY        Primary Care Provider Office Phone # Fax #    Cee Biggs [phone]       [address] [address]        Equal Access to Services     [facility]: Hadii aad ku hadasho Soomaali, waaxda luqadaha, qaybta kaalmada adeegyada, waxevelin sullivanin hayaan adecarlie paige . So [facility] [phone].    ATENCIÓN: Si habla español, tiene a victoria disposición servicios gratuitos de asistencia lingüística. LlThe [facility] [phone].    We comply with applicable federal civil rights laws and [place] laws. We do not discriminate on the basis of race, color, national origin, age, disability, sex, sexual orientation, or gender identity.            Thank you!     Thank you for choosing [facility]  for your care. Our goal is always to provide you with excellent care. Hearing back from our patients is one way we can continue to improve our services. Please take a few minutes to complete the written survey that you may receive in the mail after your visit with us. Thank you!             Your Updated Medication List - Protect others around you: Learn how to safely use, store and throw away your medicines at www.disposemymeds.org.          This list is accurate as of 9/24/18  4:31 PM.  Always use your most recent med list.                   Brand Name Dispense Instructions for use Diagnosis    albuterol 108 (90 Base) MCG/ACT inhaler    PROAIR HFA/PROVENTIL HFA/VENTOLIN HFA    1 Inhaler    Inhale 1-2 puffs into the lungs every 4 hours as needed for shortness of breath / dyspnea    Intermittent asthma, uncomplicated       FLUoxetine 20  MG capsule    PROzac    270 capsule    TAKE THREE CAPSULES BY MOUTH DAILY    PMDD (premenstrual dysphoric disorder)       fluticasone 50 MCG/ACT spray    FLONASE    1 Bottle    Spray 1-2 sprays into both nostrils daily    Post-nasal drip       levothyroxine 75 MCG tablet    SYNTHROID/LEVOTHROID    90 tablet    Take 1 tablet (75 mcg) by mouth daily    Acquired hypothyroidism

## 2018-09-24 NOTE — MR AVS SNAPSHOT
After Visit Summary   9/24/2018    Yenny Johnson    MRN: [id number]           Patient Information     Date Of Birth          1970        Visit Information        Provider Department      9/24/2018 4:15 PM Khoi Curtis MD [facility]        Today's Diagnoses     Atypical facial pain    -  1    Jaw pain          Care Instructions    Scheduling Information  To schedule your CT/MRI scan, please contact [facility] at [phone] OR [facility] at [phone]    To schedule your Surgery, please contact our Specialty Schedulers at [phone]      ENT Clinic Locations Clinic Hours Telephone Number     [facility]  [address] [place] [address]   Monday:           1:00pm -- 5:00pm    Friday:              8:00am - 12:00pm   To schedule/reschedule an appointment with   Dr. Curtis,   please contact our   Specialty Scheduling Department at:     [phone]       [facility]  [address] [place] [address] Tuesday:          8:00am -- 2:00pm         Urgent Care Locations Clinic Hours Telephone Numbers     [facility]  [address] [place] [address]     Monday-Friday:     11:00am - 9:00pm    Saturday-Sunday:  9:00am - 5:00pm   [phone]     [facility]  [address] [place] [address]     Monday-Friday:      5:00pm - 9:00pm     Saturday-Sunday:  9:00am - 5:00pm   [phone]                 Follow-ups after your visit        Additional Services     ROSE PT, HAND, AND CHIROPRACTIC REFERRAL       Physical Therapy, Hand Therapy and Chiropractic Care are available through:  *[facility]  *Hand Therapy (Occupational Therapy or Physical Therapy)  *[facility]    Call one number to schedule at any of the above locations: [phone].    Physical therapy, Hand therapy and/or Chiropractic care has been recommended by your physician as an excellent treatment  option to reduce pain and help people return to normal activities, including sports.  Therapy and/or chiropractic care services are a great complement or alternative to expensive and invasive surgery, injections, or long-term use of prescription medications. The primary goal is to identify the underlying problem and provide you the tools to manage your condition on your own.     Please be aware that coverage of these services is subject to the terms and limitations of your health insurance plan.  Call member services at your health plan with any benefit or coverage questions.      Please bring the following to your appointment:  *Your personal calendar for scheduling future appointments  *Comfortable clothing                  Future tests that were ordered for you today     Open Future Orders        Priority Expected Expires Ordered    ROSE PT, HAND, AND CHIROPRACTIC REFERRAL Routine  9/24/2019 9/24/2018            Who to contact     If you have questions or need follow up information about today's clinic visit or your schedule please contact [facility] directly at [phone].  Normal or non-critical lab and imaging results will be communicated to you by [employer], letter or phone within 4 business days after the clinic has received the results. If you do not hear from us within 7 days, please contact the clinic through [employer] or phone. If you have a critical or abnormal lab result, we will notify you by phone as soon as possible.  Submit refill requests through [employer] or call your pharmacy and they will forward the refill request to us. Please allow 3 business days for your refill to be completed.          Additional Information About Your Visit        [employer] Information     [employer] gives you secure access to your electronic health record. If you see a primary care provider, you can also send messages to your care team and make appointments. If you have questions, please call your primary care  clinic.  If you do not have a primary care provider, please call [phone] and they will assist you.        Care EveryWhere ID     This is your Care EveryWhere ID. This could be used by other organizations to access your [facility] medical records  [id number]        Your Vitals Were     Last Period                   08/07/2015            Blood Pressure from Last 3 Encounters:   08/14/18 110/74   05/02/18 139/90   03/09/18 143/83    Weight from Last 3 Encounters:   08/14/18 80.3 kg (177 lb)   05/02/18 85.3 kg (188 lb)   03/09/18 89.8 kg (198 lb)               Primary Care Provider Office Phone # Fax #    Cee Biggs [phone] [address] [facility] [address]        Equal Access to Services     [facility] : Hadii aad ku hadasho Soomaali, waaxda luqadaha, qaybta kaalmada adeegyada, mercedes varma haymanjula paige . So [facility] [phone].    ATENCIÓN: Si habla español, tiene a victoria disposición servicios gratuitos de asistencia lingüística. Dahiana al [phone].    We comply with applicable federal civil rights laws and [place] laws. We do not discriminate on the basis of race, color, national origin, age, disability, sex, sexual orientation, or gender identity.            Thank you!     Thank you for choosing [facility]  for your care. Our goal is always to provide you with excellent care. Hearing back from our patients is one way we can continue to improve our services. Please take a few minutes to complete the written survey that you may receive in the mail after your visit with us. Thank you!             Your Updated Medication List - Protect others around you: Learn how to safely use, store and throw away your medicines at www.disposemymeds.org.          This list is accurate as of 9/24/18  4:31 PM.  Always use your most recent med list.                   Brand Name Dispense Instructions for use Diagnosis    albuterol 108 (90 Base) MCG/ACT inhaler     PROAIR HFA/PROVENTIL HFA/VENTOLIN HFA    1 Inhaler    Inhale 1-2 puffs into the lungs every 4 hours as needed for shortness of breath / dyspnea    Intermittent asthma, uncomplicated       FLUoxetine 20 MG capsule    PROzac    270 capsule    TAKE THREE CAPSULES BY MOUTH DAILY    PMDD (premenstrual dysphoric disorder)       fluticasone 50 MCG/ACT spray    FLONASE    1 Bottle    Spray 1-2 sprays into both nostrils daily    Post-nasal drip       levothyroxine 75 MCG tablet    SYNTHROID/LEVOTHROID    90 tablet    Take 1 tablet (75 mcg) by mouth daily    Acquired hypothyroidism

## 2018-09-26 ENCOUNTER — MYC MEDICAL ADVICE (OUTPATIENT)
Dept: OTOLARYNGOLOGY | Facility: CLINIC | Age: 48
End: 2018-09-26

## 2018-10-08 ENCOUNTER — MYC MEDICAL ADVICE (OUTPATIENT)
Dept: OTOLARYNGOLOGY | Facility: CLINIC | Age: 48
End: 2018-10-08

## 2018-10-08 DIAGNOSIS — H92.02 OTALGIA, LEFT: Primary | ICD-10-CM

## 2018-10-08 DIAGNOSIS — R20.2 PARESTHESIA: ICD-10-CM

## 2018-10-11 ENCOUNTER — RADIANT APPOINTMENT (OUTPATIENT)
Dept: CT IMAGING | Facility: CLINIC | Age: 48
End: 2018-10-11
Attending: OTOLARYNGOLOGY
Payer: COMMERCIAL

## 2018-10-11 DIAGNOSIS — R20.2 PARESTHESIA: ICD-10-CM

## 2018-10-11 DIAGNOSIS — H92.02 OTALGIA, LEFT: ICD-10-CM

## 2018-11-19 ENCOUNTER — E-VISIT (OUTPATIENT)
Dept: FAMILY MEDICINE | Facility: CLINIC | Age: 48
End: 2018-11-19
Payer: COMMERCIAL

## 2018-11-19 DIAGNOSIS — J01.90 ACUTE SINUSITIS, RECURRENCE NOT SPECIFIED, UNSPECIFIED LOCATION: Primary | ICD-10-CM

## 2018-11-19 PROCEDURE — 99444 ZZC PHYSICIAN ONLINE EVALUATION & MANAGEMENT SERVICE: CPT | Performed by: FAMILY MEDICINE

## 2018-12-05 ENCOUNTER — MYC MEDICAL ADVICE (OUTPATIENT)
Dept: ANESTHESIOLOGY | Facility: CLINIC | Age: 48
End: 2018-12-05

## 2019-06-15 ENCOUNTER — E-VISIT (OUTPATIENT)
Dept: FAMILY MEDICINE | Facility: CLINIC | Age: 49
End: 2019-06-15
Payer: COMMERCIAL

## 2019-06-15 DIAGNOSIS — J01.90 ACUTE SINUSITIS WITH SYMPTOMS > 10 DAYS: Primary | ICD-10-CM

## 2019-06-15 PROCEDURE — 99444 ZZC PHYSICIAN ONLINE EVALUATION & MANAGEMENT SERVICE: CPT | Performed by: FAMILY MEDICINE

## 2019-06-17 NOTE — PATIENT INSTRUCTIONS
Thank you for choosing us for your care. I have placed an order for a prescription so that you can start treatment. View your full visit summary for details by clicking on the link below. Your pharmacist will able to address any questions you may have about the medication.     If you're not feeling better within 5-7 days, please schedule an appointment.  You can schedule an appointment right here in [facility], or call [phone]  If the visit is for the same symptoms as your e-visit, we'll refund the cost of your e-visit if seen within seven days.      Sinusitis (Antibiotic Treatment)    The sinuses are air-filled spaces within the bones of the face. They connect to the inside of the nose. Sinusitis is an inflammation of the tissue that lines the sinuses. Sinusitis can occur during a cold. It can also happen due to allergies to pollens and other particles in the air. Sinusitis can cause symptoms of sinus congestion and a feeling of fullness. A sinus infection causes fever, headache, and facial pain. There is often green or yellow fluid draining from the nose or into the back of the throat (post-nasal drip). You have been given antibiotics to treat this condition.  Home care    Take the full course of antibiotics as instructed. Do not stop taking them, even when you feel better.    Drink plenty of water, hot tea, and other liquids. This may help thin nasal mucus. It also may help your sinuses drain fluids.    Heat may help soothe painful areas of your face. Use a towel soaked in hot water. Or,  the shower and direct the warm spray onto your face. Using a vaporizer along with a menthol rub at night may also help soothe symptoms.     An expectorant with guaifenesin may help thin nasal mucus and help your sinuses drain fluids.    You can use an over-the-counter decongestant, unless a similar medicine was prescribed to you. Nasal sprays work the fastest. Use one that contains phenylephrine or oxymetazoline.  First blow your nose gently. Then use the spray. Do not use these medicines more often than directed on the label. If you do, your symptoms may get worse. You may also take pills that contain pseudoephedrine. Don t use products that combine multiple medicines. This is because side effects may be increased. Read labels. You can also ask the pharmacist for help. (People with high blood pressure should not use decongestants. They can raise blood pressure.)    Over-the-counter antihistamines may help if allergies contributed to your sinusitis.      Do not use nasal rinses or irrigation during an acute sinus infection, unless your healthcare provider tells you to. Rinsing may spread the infection to other areas in your sinuses.    Use acetaminophen or ibuprofen to control pain, unless another pain medicine was prescribed to you. If you have chronic liver or kidney disease or ever had a stomach ulcer, talk with your healthcare provider before using these medicines. (Aspirin should never be taken by anyone under age 18 who is ill with a fever. It may cause severe liver damage.)    Don't smoke. This can make symptoms worse.  Follow-up care  Follow up with your healthcare provider or our staff if you are not better in 1 week.  When to seek medical advice  Call your healthcare provider if any of these occur:    Facial pain or headache that gets worse    Stiff neck    Unusual drowsiness or confusion    Swelling of your forehead or eyelids    Vision problems, such as blurred or double vision    Fever of 100.4 F (38 C) or higher, or as directed by your healthcare provider    Seizure    Breathing problems    Symptoms don't go away in 10 days  Prevention  Here are steps you can take to help prevent an infection:    Keep good hand washing habits.    Don t have close contact with people who have sore throats, colds, or other upper respiratory infections.    Don t smoke, and stay away from secondhand smoke.    Stay up to date with of  your vaccines.  Date Last Reviewed: 11/1/2017 2000-2018 [employer]. [address] [place] [address], [place] [address]. All rights reserved. This information is not intended as a substitute for professional medical care. Always follow your healthcare professional's instructions.

## 2019-08-30 ENCOUNTER — OFFICE VISIT (OUTPATIENT)
Dept: FAMILY MEDICINE | Facility: CLINIC | Age: 49
End: 2019-08-30
Payer: COMMERCIAL

## 2019-08-30 VITALS
HEIGHT: 65 IN | RESPIRATION RATE: 16 BRPM | TEMPERATURE: 97.6 F | HEART RATE: 76 BPM | BODY MASS INDEX: 28.89 KG/M2 | OXYGEN SATURATION: 100 % | WEIGHT: 173.4 LBS | SYSTOLIC BLOOD PRESSURE: 124 MMHG | DIASTOLIC BLOOD PRESSURE: 70 MMHG

## 2019-08-30 DIAGNOSIS — R00.2 PALPITATIONS: ICD-10-CM

## 2019-08-30 DIAGNOSIS — Z00.00 ROUTINE GENERAL MEDICAL EXAMINATION AT A HEALTH CARE FACILITY: Primary | ICD-10-CM

## 2019-08-30 DIAGNOSIS — E78.5 HYPERLIPIDEMIA LDL GOAL <130: ICD-10-CM

## 2019-08-30 DIAGNOSIS — J45.20 MILD INTERMITTENT ASTHMA WITHOUT COMPLICATION: ICD-10-CM

## 2019-08-30 DIAGNOSIS — F32.81 PMDD (PREMENSTRUAL DYSPHORIC DISORDER): ICD-10-CM

## 2019-08-30 DIAGNOSIS — Z12.31 VISIT FOR SCREENING MAMMOGRAM: ICD-10-CM

## 2019-08-30 DIAGNOSIS — E03.9 ACQUIRED HYPOTHYROIDISM: ICD-10-CM

## 2019-08-30 LAB
BASOPHILS # BLD AUTO: 0 10E9/L (ref 0–0.2)
BASOPHILS NFR BLD AUTO: 0.3 %
CHOLEST SERPL-MCNC: 242 MG/DL
DIFFERENTIAL METHOD BLD: NORMAL
EOSINOPHIL # BLD AUTO: 0.1 10E9/L (ref 0–0.7)
EOSINOPHIL NFR BLD AUTO: 2.3 %
ERYTHROCYTE [DISTWIDTH] IN BLOOD BY AUTOMATED COUNT: 12.4 % (ref 10–15)
HCT VFR BLD AUTO: 41.2 % (ref 35–47)
HDLC SERPL-MCNC: 44 MG/DL
HGB BLD-MCNC: 13.5 G/DL (ref 11.7–15.7)
LDLC SERPL CALC-MCNC: 176 MG/DL
LYMPHOCYTES # BLD AUTO: 1.2 10E9/L (ref 0.8–5.3)
LYMPHOCYTES NFR BLD AUTO: 30.8 %
MCH RBC QN AUTO: 30 PG (ref 26.5–33)
MCHC RBC AUTO-ENTMCNC: 32.8 G/DL (ref 31.5–36.5)
MCV RBC AUTO: 92 FL (ref 78–100)
MONOCYTES # BLD AUTO: 0.4 10E9/L (ref 0–1.3)
MONOCYTES NFR BLD AUTO: 10.8 %
NEUTROPHILS # BLD AUTO: 2.2 10E9/L (ref 1.6–8.3)
NEUTROPHILS NFR BLD AUTO: 55.8 %
NONHDLC SERPL-MCNC: 198 MG/DL
PLATELET # BLD AUTO: 229 10E9/L (ref 150–450)
RBC # BLD AUTO: 4.5 10E12/L (ref 3.8–5.2)
TRIGL SERPL-MCNC: 110 MG/DL
TSH SERPL DL<=0.005 MIU/L-ACNC: 1.56 MU/L (ref 0.4–4)
WBC # BLD AUTO: 4 10E9/L (ref 4–11)

## 2019-08-30 PROCEDURE — 84443 ASSAY THYROID STIM HORMONE: CPT | Performed by: FAMILY MEDICINE

## 2019-08-30 PROCEDURE — 99396 PREV VISIT EST AGE 40-64: CPT | Performed by: FAMILY MEDICINE

## 2019-08-30 PROCEDURE — 93000 ELECTROCARDIOGRAM COMPLETE: CPT | Performed by: FAMILY MEDICINE

## 2019-08-30 PROCEDURE — 99213 OFFICE O/P EST LOW 20 MIN: CPT | Mod: 25 | Performed by: FAMILY MEDICINE

## 2019-08-30 PROCEDURE — 36415 COLL VENOUS BLD VENIPUNCTURE: CPT | Performed by: FAMILY MEDICINE

## 2019-08-30 PROCEDURE — 85025 COMPLETE CBC W/AUTO DIFF WBC: CPT | Performed by: FAMILY MEDICINE

## 2019-08-30 PROCEDURE — 80061 LIPID PANEL: CPT | Performed by: FAMILY MEDICINE

## 2019-08-30 RX ORDER — ALBUTEROL SULFATE 90 UG/1
1-2 AEROSOL, METERED RESPIRATORY (INHALATION) EVERY 4 HOURS PRN
Qty: 1 INHALER | Refills: 5 | Status: SHIPPED | OUTPATIENT
Start: 2019-08-30 | End: 2020-09-16

## 2019-08-30 RX ORDER — LEVOTHYROXINE SODIUM 75 UG/1
75 TABLET ORAL DAILY
Qty: 90 TABLET | Refills: 3 | Status: SHIPPED | OUTPATIENT
Start: 2019-08-30 | End: 2020-09-06

## 2019-08-30 ASSESSMENT — ENCOUNTER SYMPTOMS
HEMATOCHEZIA: 0
PALPITATIONS: 1
HEMATURIA: 0
JOINT SWELLING: 0
FEVER: 0
MYALGIAS: 0
NERVOUS/ANXIOUS: 0
ABDOMINAL PAIN: 0
SHORTNESS OF BREATH: 0
CHILLS: 0
HEARTBURN: 0
WEAKNESS: 0
HEADACHES: 0
BREAST MASS: 0
CONSTIPATION: 0
DIARRHEA: 0
COUGH: 0
ARTHRALGIAS: 1
FREQUENCY: 0
DYSURIA: 0
EYE PAIN: 0
DIZZINESS: 0
NAUSEA: 0
SORE THROAT: 0
PARESTHESIAS: 0

## 2019-08-30 ASSESSMENT — PATIENT HEALTH QUESTIONNAIRE - PHQ9: SUM OF ALL RESPONSES TO PHQ QUESTIONS 1-9: 0

## 2019-08-30 ASSESSMENT — MIFFLIN-ST. JEOR: SCORE: 1411.8

## 2019-08-30 NOTE — RESULT ENCOUNTER NOTE
Mail letter:    Yenny,    Your EKG shows a normal heart rhythm. Call or return to clinic as needed if these symptoms worsen or fail to improve as anticipated.     Cee Biggs MD

## 2019-08-30 NOTE — LETTER
[address] [place] [address]    September 3, 2019    Yenny Johnson  [address] [address]          Dear Yenny,    Your EKG shows a normal heart rhythm. Call or return to clinic as needed if these symptoms worsen or fail to improve as anticipated.     Enclosed is a copy of your results.     Results for orders placed or performed in visit on 08/30/19   Lipid panel reflex to direct LDL Fasting   Result Value Ref Range    Cholesterol 242 (H) <200 mg/dL    Triglycerides 110 <150 mg/dL    HDL Cholesterol 44 (L) >49 mg/dL    LDL Cholesterol Calculated 176 (H) <100 mg/dL    Non HDL Cholesterol 198 (H) <130 mg/dL   TSH WITH FREE T4 REFLEX   Result Value Ref Range    TSH 1.56 0.40 - 4.00 mU/L   CBC with platelets differential   Result Value Ref Range    WBC 4.0 4.0 - 11.0 10e9/L    RBC Count 4.50 3.8 - 5.2 10e12/L    Hemoglobin 13.5 11.7 - 15.7 g/dL    Hematocrit 41.2 35.0 - 47.0 %    MCV 92 78 - 100 fl    MCH 30.0 26.5 - 33.0 pg    MCHC 32.8 31.5 - 36.5 g/dL    RDW 12.4 10.0 - 15.0 %    Platelet Count 229 150 - 450 10e9/L    % Neutrophils 55.8 %    % Lymphocytes 30.8 %    % Monocytes 10.8 %    % Eosinophils 2.3 %    % Basophils 0.3 %    Absolute Neutrophil 2.2 1.6 - 8.3 10e9/L    Absolute Lymphocytes 1.2 0.8 - 5.3 10e9/L    Absolute Monocytes 0.4 0.0 - 1.3 10e9/L    Absolute Eosinophils 0.1 0.0 - 0.7 10e9/L    Absolute Basophils 0.0 0.0 - 0.2 10e9/L    Diff Method Automated Method        If you have any questions or concerns, please call myself or my nurse at [phone].      Sincerely,        Cee Biggs MD/ha

## 2019-08-30 NOTE — LETTER
My Asthma Action Plan    Name: Yenny Johnson   YOB: 1970  Date: 8/30/2019   My doctor: Cee Biggs MD   My clinic: [facility]        My Rescue Medicine:   Albuterol inhaler (Proair/Ventolin/Proventil HFA)  2-4 puffs EVERY 4 HOURS as needed. Use a spacer if recommended by your provider.   My Asthma Severity:   Intermittent / Exercise Induced  Know your asthma triggers: upper respiratory infections             GREEN ZONE   Good Control    I feel good    No cough or wheeze    Can work, sleep and play without asthma symptoms       Take your asthma control medicine every day.     1. If exercise triggers your asthma, take your rescue medication    15 minutes before exercise or sports, and    During exercise if you have asthma symptoms  2. Spacer to use with inhaler: If you have a spacer, make sure to use it with your inhaler             YELLOW ZONE Getting Worse  I have ANY of these:    I do not feel good    Cough or wheeze    Chest feels tight    Wake up at night   1. Keep taking your Green Zone medications  2. Start taking your rescue medicine:    every 20 minutes for up to 1 hour. Then every 4 hours for 24-48 hours.  3. If you stay in the Yellow Zone for more than 12-24 hours, contact your doctor.  4. If you do not return to the Green Zone in 12-24 hours or you get worse, start taking your oral steroid medicine if prescribed by your provider.           RED ZONE Medical Alert - Get Help  I have ANY of these:    I feel awful    Medicine is not helping    Breathing getting harder    Trouble walking or talking    Nose opens wide to breathe       1. Take your rescue medicine NOW  2. If your provider has prescribed an oral steroid medicine, start taking it NOW  3. Call your doctor NOW  4. If you are still in the Red Zone after 20 minutes and you have not reached your doctor:    Take your rescue medicine again and    Call 911 or go to the emergency room right away    See your regular doctor  within 2 weeks of an Emergency Room or Urgent Care visit for follow-up treatment.          Annual Reminders:  Meet with Asthma Educator,  Flu Shot in the Fall, consider Pneumonia Vaccination for patients with asthma (aged 19 and older).    Pharmacy:    [facility] [id number] [facility] - [place], [place] - [address] [address] [facility] - [place], [place] - [address]                        Asthma Triggers  How To Control Things That Make Your Asthma Worse    Triggers are things that make your asthma worse.  Look at the list below to help you find your triggers and   what you can do about them. You can help prevent asthma flare-ups by staying away from your triggers.      Trigger                                                          What you can do   Cigarette Smoke  Tobacco smoke can make asthma worse. Do not allow smoking in your home, car or around you.  Be sure no one smokes at a child s day care or school.  If you smoke, ask your health care provider for ways to help you quit.  Ask family members to quit too.  Ask your health care provider for a referral to Quit Plan to help you quit smoking, or call 1-624-724-PLAN.     Colds, Flu, Bronchitis  These are common triggers of asthma. Wash your hands often.  Don t touch your eyes, nose or mouth.  Get a flu shot every year.     Dust Mites  These are tiny bugs that live in cloth or carpet. They are too small to see. Wash sheets and blankets in hot water every week.   Encase pillows and mattress in dust mite proof covers.  Avoid having carpet if you can. If you have carpet, vacuum weekly.   Use a dust mask and HEPA vacuum.   Pollen and Outdoor Mold  Some people are allergic to trees, grass, or weed pollen, or molds. Try to keep your windows closed.  Limit time out doors when pollen count is high.   Ask you health care provider about taking medicine during allergy season.     Animal Dander  Some people are allergic to skin flakes, urine or saliva from pets with fur or  feathers. Keep pets with fur or feathers out of your home.    If you can t keep the pet outdoors, then keep the pet out of your bedroom.  Keep the bedroom door closed.  Keep pets off cloth furniture and away from stuffed toys.     Mice, Rats, and Cockroaches  Some people are allergic to the waste from these pests.   Cover food and garbage.  Clean up spills and food crumbs.  Store grease in the refrigerator.   Keep food out of the bedroom.   Indoor Mold  This can be a trigger if your home has high moisture. Fix leaking faucets, pipes, or other sources of water.   Clean moldy surfaces.  Dehumidify basement if it is damp and smelly.   Smoke, Strong Odors, and Sprays  These can reduce air quality. Stay away from strong odors and sprays, such as perfume, powder, hair spray, paints, smoke incense, paint, cleaning products, candles and new carpet.   Exercise or Sports  Some people with asthma have this trigger. Be active!  Ask your doctor about taking medicine before sports or exercise to prevent symptoms.    Warm up for 5-10 minutes before and after sports or exercise.     Other Triggers of Asthma  Cold air:  Cover your nose and mouth with a scarf.  Sometimes laughing or crying can be a trigger.  Some medicines and food can trigger asthma.

## 2019-08-30 NOTE — PROGRESS NOTES
SUBJECTIVE:   CC: Yenny Johnson is an 49 year old woman  who presents for preventive health visit.     Patient has PMDD, without anhedonia or low mood, without suicidal ideation.      Yenny also presents with palpitations. Symptom onset has been intermittent for a time period of months. Severity is described as mild, transient and off and on. Course of her symptoms over time is unchanged.     Patient also presents for follow-up of hypothyroidism, controlled on current medication.  Denies symptoms of hypo- or hyperthyroidism. Patient also has asthma. Patient has had asthma for years.  Occasional wheezing and shortness of breath are triggered by colds and relieved by albuterol.     Healthy Habits:     Getting at least 3 servings of Calcium per day:  NO    Bi-annual eye exam:  Yes    Dental care twice a year:  Yes    Sleep apnea or symptoms of sleep apnea:  None    Diet:  Regular (no restrictions)    Frequency of exercise:  4-5 days/week    Duration of exercise:  30-45 minutes    Taking medications regularly:  Yes    Medication side effects:  Muscle aches    PHQ-2 Total Score: 0    Additional concerns today:  No            Today's PHQ-2 Score:   PHQ-2 (  [employer]) 2019   Q1: Little interest or pleasure in doing things 0   Q2: Feeling down, depressed or hopeless 0   PHQ-2 Score 0   Q1: Little interest or pleasure in doing things Not at all   Q2: Feeling down, depressed or hopeless Not at all   PHQ-2 Score 0       Abuse: Current or Past(Physical, Sexual or Emotional)- No  Do you feel safe in your environment? Yes    Social History     Tobacco Use     Smoking status: Former Smoker     Packs/day: 1.00     Years: 14.00     Pack years: 14.00     Types: Cigarettes     Last attempt to quit: 1998     Years since quittin.6     Smokeless tobacco: Never Used   Substance Use Topics     Alcohol use: No         Alcohol Use 2019   Prescreen: >3 drinks/day or >7 drinks/week? No   Prescreen: >3  drinks/day or >7 drinks/week? -       Reviewed orders with patient.  Reviewed health maintenance and updated orders accordingly - Yes  Patient Active Problem List   Diagnosis     PMDD (premenstrual dysphoric disorder)     Stress incontinence, female     GERD (gastroesophageal reflux disease)     Hypothyroidism     Lumbar disc herniation     Intermittent asthma     Hyperlipidemia LDL goal <130     Past Surgical History:   Procedure Laterality Date     BIOPSY       C/SECTION, CLASSICAL       CHOLECYSTECTOMY, LAPOROSCOPIC  1995     DESTRUCTION OF PARAVERTEBRAL FACET LUMBAR / SACRAL SINGLE Bilateral 9/12/2017    Procedure: DESTRUCTION OF PARAVERTEBRAL FACET LUMBAR / SACRAL SINGLE;  Radiofrequency Ablation of the Lumbar Facets /bilateral  heating of nerves around spine bilatteraly for purpose of pain rekief;  Surgeon: Benigno Willams MD;  Location: [facility]     HYSTERECTOMY, PAP NO LONGER INDICATED  09/23/2015     INJECT EPIDURAL LUMBAR / SACRAL SINGLE Bilateral 8/9/2017    Procedure: INJECT EPIDURAL LUMBAR / SACRAL SINGLE;  bilateral lumbar medial branch block:injection of local anesthetic into area around spine for purpose of pain relief;  Surgeon: Benigno Willams MD;  Location: [facility]     INJECT PARAVERTEBRAL FACET JOINT LUMBAR / SACRAL FIRST Bilateral 6/28/2017    Procedure: INJECT PARAVERTEBRAL FACET JOINT LUMBAR / SACRAL FIRST;  injection of local anesthesthetic into area around spine for purpose of pain relief;  Surgeon: Benigno Willams MD;  Location: [facility]     INJECT PARAVERTEBRAL FACET JOINT LUMBAR / SACRAL THIRD Bilateral 3/9/2018    Procedure: INJECT PARAVERTEBRAL FACET JOINT LUMBAR / SACRAL THIRD;  Bilateral S1, S2, S3 Lateral Branch Nerve Block and L5 Dorsal Ramus Nerve Block;  Surgeon: Kristi Gomes MD;  Location: [facility]     INJECT SACROILIAC JOINT Bilateral 2/7/2018    Procedure: INJECT SACROILIAC JOINT;  Bilateral sacroiliac Lateral Branch Block;  Surgeon: Edison Sams MD;   Location: [facility]     RADIO FREQUENCY ABLATION / DESTRUCTION OF SACROILOAC JOINT LATERAL BRANCHES (S1/S2/S3) Bilateral 2018    Procedure: RADIO FREQUENCY ABLATION / DESTRUCTION OF SACROILOAC JOINT LATERAL BRANCHES (S1/S2/S3);  Bilateral Radiofrequency Ablation of the Lateral Branches;  Surgeon: Benigno Willams MD;  Location: [facility]     REPAIR MOHS Left 2017    Procedure: REPAIR MOHS;  Surgeon: Jorge Eric MD;  Location: [facility]     TUBAL LIGATION         Social History     Tobacco Use     Smoking status: Former Smoker     Packs/day: 1.00     Years: 14.00     Pack years: 14.00     Types: Cigarettes     Last attempt to quit: 1998     Years since quittin.6     Smokeless tobacco: Never Used   Substance Use Topics     Alcohol use: No     Family History   Problem Relation Age of Onset     C.A.D. Father 67        MI     Hypertension Father      Alcohol/Drug Father      Cardiovascular Mother         CHF      Coronary Artery Disease Mother      Hyperlipidemia Mother      Allergies Maternal Grandmother      Diabetes Maternal Grandmother      Diabetes Paternal Grandfather      Allergies Brother      Asthma Brother      Diabetes Brother      Coronary Artery Disease Brother      Asthma Brother      Obesity Sister      Seasonal/Environmental Allergies Daughter      Cancer No family hx of          Current Outpatient Medications   Medication Sig Dispense Refill     albuterol (PROAIR HFA/PROVENTIL HFA/VENTOLIN HFA) 108 (90 Base) MCG/ACT inhaler Inhale 1-2 puffs into the lungs every 4 hours as needed for shortness of breath / dyspnea 1 Inhaler 5     FLUoxetine (PROZAC) 20 MG capsule TAKE THREE CAPSULES BY MOUTH DAILY 270 capsule 3     levothyroxine (SYNTHROID/LEVOTHROID) 75 MCG tablet Take 1 tablet (75 mcg) by mouth daily 90 tablet 3     Allergies   Allergen Reactions     Codeine Sulfate      Disorientation, muscle weakness     Gabapentin      sleepiness     Mammogram Screening: Patient under age 50, mutual  decision reflected in health maintenance.    Pertinent mammograms are reviewed under the imaging tab.  History of abnormal Pap smear: Status post benign hysterectomy. Health Maintenance and Surgical History updated.  PAP / HPV 3/29/2012   PAP NIL     Reviewed and updated as needed this visit by clinical staff  Tobacco  Allergies  Meds  Problems  Med Hx  Surg Hx  Fam Hx         Reviewed and updated as needed this visit by Provider  Tobacco  Allergies  Meds  Problems  Med Hx  Surg Hx  Fam Hx        Past Medical History:   Diagnosis Date     Anemia      BCC (basal cell carcinoma of skin)      Degeneration of lumbar or lumbosacral intervertebral disc      Depressive disorder      Elevated fasting glucose      Elevated rheumatoid factor 12/12/2012     GERD (gastroesophageal reflux disease) 11/2005    EGD     Hyperlipidemia LDL goal <130      Hypertriglyceridemia      Hypothyroidism      Intermittent asthma      Lumbar disc herniation      Obesity 3/29/2012     PMDD (premenstrual dysphoric disorder)      Stress incontinence, female 3/29/2012        Review of Systems   Constitutional: Negative for chills and fever.   HENT: Positive for ear pain and hearing loss. Negative for congestion and sore throat.    Eyes: Negative for pain and visual disturbance.   Respiratory: Negative for cough and shortness of breath.    Cardiovascular: Positive for chest pain and palpitations. Negative for peripheral edema.   Gastrointestinal: Negative for abdominal pain, constipation, diarrhea, heartburn, hematochezia and nausea.   Breasts:  Negative for tenderness, breast mass and discharge.   Genitourinary: Positive for urgency. Negative for dysuria, frequency, genital sores, hematuria, pelvic pain, vaginal bleeding and vaginal discharge.   Musculoskeletal: Positive for arthralgias. Negative for joint swelling and myalgias.   Neurological: Negative for dizziness, weakness, headaches and paresthesias.   Psychiatric/Behavioral:  "Negative for mood changes. The patient is not nervous/anxious.           OBJECTIVE:   /70   Pulse 76   Temp 97.6  F (36.4  C) (Oral)   Resp 16   Ht 1.65 m (5' 4.96\")   Wt 78.7 kg (173 lb 6.4 oz)   LMP 08/07/2015   SpO2 100%   BMI 28.89 kg/m    Physical Exam  GENERAL: alert and no distress  EYES: Eyes grossly normal to inspection, PERRL and conjunctivae and sclerae normal  HENT: ear canals and TM's normal, nose and mouth without ulcers or lesions  NECK: no adenopathy, no asymmetry, masses, or scars and thyroid normal to palpation  RESP: lungs clear to auscultation - no rales, rhonchi or wheezes  BREAST: normal without masses, tenderness or nipple discharge and no palpable axillary masses or adenopathy  CV: regular rate and rhythm, normal S1 S2, no S3 or S4, no murmur, click or rub, no peripheral edema    ABDOMEN: soft, nontender, no hepatosplenomegaly, no masses and bowel sounds normal  MS: no gross musculoskeletal defects noted, no edema  SKIN: no suspicious lesions or rashes  NEURO: Normal strength and tone, mentation intact and speech normal  PSYCH: mentation appears normal, affect normal/bright    Diagnostic Test Results:  Labs reviewed in Epic  Results for orders placed or performed in visit on 08/30/19   CBC with platelets differential   Result Value Ref Range    WBC 4.0 4.0 - 11.0 10e9/L    RBC Count 4.50 3.8 - 5.2 10e12/L    Hemoglobin 13.5 11.7 - 15.7 g/dL    Hematocrit 41.2 35.0 - 47.0 %    MCV 92 78 - 100 fl    MCH 30.0 26.5 - 33.0 pg    MCHC 32.8 31.5 - 36.5 g/dL    RDW 12.4 10.0 - 15.0 %    Platelet Count 229 150 - 450 10e9/L    % Neutrophils 55.8 %    % Lymphocytes 30.8 %    % Monocytes 10.8 %    % Eosinophils 2.3 %    % Basophils 0.3 %    Absolute Neutrophil 2.2 1.6 - 8.3 10e9/L    Absolute Lymphocytes 1.2 0.8 - 5.3 10e9/L    Absolute Monocytes 0.4 0.0 - 1.3 10e9/L    Absolute Eosinophils 0.1 0.0 - 0.7 10e9/L    Absolute Basophils 0.0 0.0 - 0.2 10e9/L    Diff Method Automated Method  "       ASSESSMENT/PLAN:   (Z00.00) Routine general medical examination at a health care facility  (primary encounter diagnosis)    (F32.81) PMDD (premenstrual dysphoric disorder)  Comment: Well controlled with medications without side effects.   Plan: FLUoxetine (PROZAC) 20 MG capsule          (R00.2) Palpitations  Comment: Differential diagnoses would include: PVC's, anxiety   Plan: CBC with platelets differential, EKG 12-lead         complete w/read - Clinics, OFFICE/OUTPT         VISIT,EST,LEVL IV        Reduce caffeine and increase hydration. Call or return to clinic as needed if these symptoms worsen or fail to improve as anticipated to consider Zio patch.     (E03.9) Acquired hypothyroidism  Comment: euthyroid on replacement   Plan: TSH WITH FREE T4 REFLEX, levothyroxine         (SYNTHROID/LEVOTHROID) 75 MCG tablet          (J45.20) Mild intermittent asthma without complication  Comment: Well controlled with medications without side effects.   Plan: albuterol (PROAIR HFA/PROVENTIL HFA/VENTOLIN         HFA) 108 (90 Base) MCG/ACT inhaler          (E78.5) Hyperlipidemia LDL goal <130  Plan: Lipid panel reflex to direct LDL Fasting        Continue lifestyle strategies     (Z12.31) Visit for screening mammogram  Plan: MA SCREENING DIGITAL BILAT - Future  (s+30)            COUNSELING:  Reviewed preventive health counseling, as reflected in patient instructions  Special attention given to:        Regular exercise       Healthy diet/nutrition       Osteoporosis Prevention/Bone Health       Colon cancer screening       HIV screeninx in teen years, 1x in adult years, and at intervals if high risk       The 10-year ASCVD risk score (Luis Alberto HAWKINS Jr., et al., 2013) is: 2.7%    Values used to calculate the score:      Age: 49 years      Sex: Female      Is Non- : No      Diabetic: No      Tobacco smoker: No      Systolic Blood Pressure: 124 mmHg      Is BP treated: No      HDL Cholesterol: 31 mg/dL     "  Total Cholesterol: 225 mg/dL    Estimated body mass index is 28.89 kg/m  as calculated from the following:    Height as of this encounter: 1.65 m (5' 4.96\").    Weight as of this encounter: 78.7 kg (173 lb 6.4 oz).    Weight management plan: Discussed healthy diet and exercise guidelines     reports that she quit smoking about 21 years ago. Her smoking use included cigarettes. She has a 14.00 pack-year smoking history. She has never used smokeless tobacco.      Counseling Resources:  ATP IV Guidelines  Pooled Cohorts Equation Calculator  Breast Cancer Risk Calculator  FRAX Risk Assessment  ICSI Preventive Guidelines  Dietary Guidelines for Americans, 2010  USDA's MyPlate  ASA Prophylaxis  Lung CA Screening    Cee Biggs MD  [facility]  "

## 2019-08-31 ASSESSMENT — ASTHMA QUESTIONNAIRES: ACT_TOTALSCORE: 23

## 2019-09-03 NOTE — RESULT ENCOUNTER NOTE
Yenny,    Your thyroid level is normal. Your blood counts are normal.     Your cholesterol is higher. Regular exercise can improve the health of your heart and blood vessels. Exercising 30 minutes 5 times a week is encouraged. The American Heart Association recommendation is 150 minutes of exercise every week. Start with 10 - 15 minutes of walking per day and increase the time of your walk until you reach a 30 minute walk. Monitor the time while you are walking. A fitbit or smart phone can be helpful for monitoring your activity. Be sure to choose activities that you enjoy and that will add benefits to your health now and as you age. Always exercise within your comfort zone (no chest pain, able to talk comfortably). Encourage your family to exercise with you.    Increase your intake of vegetables and fruits to 4 - 5 servings per day. Ideally 30% (1/3) of your calories or less should come from fat. Nutrition found in vegetables, fruits and whole grains have been shown to be beneficial for long-term health of your heart and blood vessels.      Cee Biggs MD    The 10-year ASCVD risk score (Luis Albertorodolfo HAWKINS Jr., et al., 2013) is: 1.9%    Values used to calculate the score:      Age: 49 years      Sex: Female      Is Non- : No      Diabetic: No      Tobacco smoker: No      Systolic Blood Pressure: 124 mmHg      Is BP treated: No      HDL Cholesterol: 44 mg/dL      Total Cholesterol: 242 mg/dL

## 2019-09-26 ENCOUNTER — E-VISIT (OUTPATIENT)
Dept: FAMILY MEDICINE | Facility: CLINIC | Age: 49
End: 2019-09-26
Payer: COMMERCIAL

## 2019-09-26 DIAGNOSIS — J01.91 ACUTE RECURRENT SINUSITIS, UNSPECIFIED LOCATION: Primary | ICD-10-CM

## 2019-09-26 PROCEDURE — 99444 ZZC PHYSICIAN ONLINE EVALUATION & MANAGEMENT SERVICE: CPT | Performed by: FAMILY MEDICINE

## 2019-09-27 NOTE — PATIENT INSTRUCTIONS
Thank you for choosing us for your care. I have placed an order for a prescription so that you can start treatment. View your full visit summary for details by clicking on the link below. Your pharmacist will able to address any questions you may have about the medication.     If you're not feeling better within 5-7 days, please schedule an appointment.  You can schedule an appointment right here in [email or web address], or call [phone]  If the visit is for the same symptoms as your e-visit, we'll refund the cost of your e-visit if seen within seven days.      Sinusitis (Antibiotic Treatment)    The sinuses are air-filled spaces within the bones of the face. They connect to the inside of the nose. Sinusitis is an inflammation of the tissue that lines the sinuses. Sinusitis can occur during a cold. It can also happen due to allergies to pollens and other particles in the air. Sinusitis can cause symptoms of sinus congestion and a feeling of fullness. A sinus infection causes fever, headache, and facial pain. There is often green or yellow fluid draining from the nose or into the back of the throat (post-nasal drip). You have been given antibiotics to treat this condition.  Home care    Take the full course of antibiotics as instructed. Do not stop taking them, even when you feel better.    Drink plenty of water, hot tea, and other liquids. This may help thin nasal mucus. It also may help your sinuses drain fluids.    Heat may help soothe painful areas of your face. Use a towel soaked in hot water. Or,  the shower and direct the warm spray onto your face. Using a vaporizer along with a menthol rub at night may also help soothe symptoms.     An expectorant with guaifenesin may help thin nasal mucus and help your sinuses drain fluids.    You can use an over-the-counter decongestant, unless a similar medicine was prescribed to you. Nasal sprays work the fastest. Use one that contains phenylephrine or oxymetazoline.  First blow your nose gently. Then use the spray. Do not use these medicines more often than directed on the label. If you do, your symptoms may get worse. You may also take pills that contain pseudoephedrine. Don t use products that combine multiple medicines. This is because side effects may be increased. Read labels. You can also ask the pharmacist for help. (People with high blood pressure should not use decongestants. They can raise blood pressure.)    Over-the-counter antihistamines may help if allergies contributed to your sinusitis.      Do not use nasal rinses or irrigation during an acute sinus infection, unless your healthcare provider tells you to. Rinsing may spread the infection to other areas in your sinuses.    Use acetaminophen or ibuprofen to control pain, unless another pain medicine was prescribed to you. If you have chronic liver or kidney disease or ever had a stomach ulcer, talk with your healthcare provider before using these medicines. (Aspirin should never be taken by anyone under age 18 who is ill with a fever. It may cause severe liver damage.)    Don't smoke. This can make symptoms worse.  Follow-up care  Follow up with your healthcare provider or our staff if you are not better in 1 week.  When to seek medical advice  Call your healthcare provider if any of these occur:    Facial pain or headache that gets worse    Stiff neck    Unusual drowsiness or confusion    Swelling of your forehead or eyelids    Vision problems, such as blurred or double vision    Fever of 100.4 F (38 C) or higher, or as directed by your healthcare provider    Seizure    Breathing problems    Symptoms don't go away in 10 days  Prevention  Here are steps you can take to help prevent an infection:    Keep good hand washing habits.    Don t have close contact with people who have sore throats, colds, or other upper respiratory infections.    Don t smoke, and stay away from secondhand smoke.    Stay up to date with of  your vaccines.  Date Last Reviewed: 11/1/2017 2000-2018 [employer]. [address] [place] [address], [place] [address]. All rights reserved. This information is not intended as a substitute for professional medical care. Always follow your healthcare professional's instructions.

## 2020-01-20 ENCOUNTER — MYC REFILL (OUTPATIENT)
Dept: FAMILY MEDICINE | Facility: CLINIC | Age: 50
End: 2020-01-20

## 2020-01-20 DIAGNOSIS — F32.81 PMDD (PREMENSTRUAL DYSPHORIC DISORDER): ICD-10-CM

## 2020-01-20 NOTE — TELEPHONE ENCOUNTER
Sent pt Alexus that she still have refill at her pharmacy and to contact them for refill  Christine Gay CMA on 1/20/2020 at 7:15 AM

## 2020-02-23 ENCOUNTER — HEALTH MAINTENANCE LETTER (OUTPATIENT)
Age: 50
End: 2020-02-23

## 2020-03-16 NOTE — NURSING NOTE
"Yenny Johnson's goals for this visit include:   Chief Complaint   Patient presents with     RECHECK       She requests these members of her care team be copied on today's visit information: Cee Biggs      PCP: Cee Biggs    Referring Provider:  No referring provider defined for this encounter.    Chief Complaint   Patient presents with     RECHECK       Initial LMP 08/07/2015 Estimated body mass index is 32.27 kg/(m^2) as calculated from the following:    Height as of 1/10/17: 1.664 m (5' 5.5\").    Weight as of 1/10/17: 89.359 kg (197 lb).  BP completed using cuff size: NA (Not Taken)    Do you need any medication refills at today's visit? No    Sosa Cowan LPN      "
none

## 2020-03-19 ENCOUNTER — E-VISIT (OUTPATIENT)
Dept: FAMILY MEDICINE | Facility: CLINIC | Age: 50
End: 2020-03-19
Payer: COMMERCIAL

## 2020-03-19 DIAGNOSIS — J01.90 ACUTE SINUSITIS WITH SYMPTOMS > 10 DAYS: Primary | ICD-10-CM

## 2020-03-19 PROCEDURE — 99207 ZZC NON-BILLABLE SERV PER CHARTING: CPT | Performed by: FAMILY MEDICINE

## 2020-03-19 NOTE — PATIENT INSTRUCTIONS
Thank you for choosing us for your care. I have placed an order for a prescription so that you can start treatment. View your full visit summary for details by clicking on the link below. Your pharmacist will able to address any questions you may have about the medication.     If you're not feeling better within 5-7 days, please schedule an appointment.  You can schedule an appointment right here in [place] [facility], or call [phone]  If the visit is for the same symptoms as your e-visit, we'll refund the cost of your e-visit if seen within seven days.      Sinusitis (Antibiotic Treatment)    The sinuses are air-filled spaces within the bones of the face. They connect to the inside of the nose. Sinusitis is an inflammation of the tissue that lines the sinuses. Sinusitis can occur during a cold. It can also happen due to allergies to pollens and other particles in the air. Sinusitis can cause symptoms of sinus congestion and a feeling of fullness. A sinus infection causes fever, headache, and facial pain. There is often green or yellow fluid draining from the nose or into the back of the throat (post-nasal drip). You have been given antibiotics to treat this condition.  Home care    Take the full course of antibiotics as instructed. Do not stop taking them, even when you feel better.    Drink plenty of water, hot tea, and other liquids. This may help thin nasal mucus. It also may help your sinuses drain fluids.    Heat may help soothe painful areas of your face. Use a towel soaked in hot water. Or,  the shower and direct the warm spray onto your face. Using a vaporizer along with a menthol rub at night may also help soothe symptoms.     An expectorant with guaifenesin may help thin nasal mucus and help your sinuses drain fluids.    You can use an over-the-counter decongestant, unless a similar medicine was prescribed to you. Nasal sprays work the fastest. Use one that contains phenylephrine or oxymetazoline.  First blow your nose gently. Then use the spray. Do not use these medicines more often than directed on the label. If you do, your symptoms may get worse. You may also take pills that contain pseudoephedrine. Don t use products that combine multiple medicines. This is because side effects may be increased. Read labels. You can also ask the pharmacist for help. (People with high blood pressure should not use decongestants. They can raise blood pressure.)    Over-the-counter antihistamines may help if allergies contributed to your sinusitis.      Do not use nasal rinses or irrigation during an acute sinus infection, unless your healthcare provider tells you to. Rinsing may spread the infection to other areas in your sinuses.    Use acetaminophen or ibuprofen to control pain, unless another pain medicine was prescribed to you. If you have chronic liver or kidney disease or ever had a stomach ulcer, talk with your healthcare provider before using these medicines. (Aspirin should never be taken by anyone under age 18 who is ill with a fever. It may cause severe liver damage.)    Don't smoke. This can make symptoms worse.  Follow-up care  Follow up with your healthcare provider or our staff if you are not better in 1 week.  When to seek medical advice  Call your healthcare provider if any of these occur:    Facial pain or headache that gets worse    Stiff neck    Unusual drowsiness or confusion    Swelling of your forehead or eyelids    Vision problems, such as blurred or double vision    Fever of 100.4 F (38 C) or higher, or as directed by your healthcare provider    Seizure    Breathing problems    Symptoms don't go away in 10 days  Prevention  Here are steps you can take to help prevent an infection:    Keep good hand washing habits.    Don t have close contact with people who have sore throats, colds, or other upper respiratory infections.    Don t smoke, and stay away from secondhand smoke.    Stay up to date with of  your vaccines.  Date Last Reviewed: 11/1/2017 2000-2019 [employer]. [address] [place] [address], [place] [address]. All rights reserved. This information is not intended as a substitute for professional medical care. Always follow your healthcare professional's instructions.

## 2020-06-30 ENCOUNTER — TRANSFERRED RECORDS (OUTPATIENT)
Dept: HEALTH INFORMATION MANAGEMENT | Facility: CLINIC | Age: 50
End: 2020-06-30

## 2020-08-28 NOTE — ANESTHESIA PREPROCEDURE EVALUATION
Anesthesia Evaluation     . Pt has had prior anesthetic. Type: General, MAC and Regional    No history of anesthetic complications     ROS/MED HX    ENT/Pulmonary:     (+)other ENT- MOHs Defect Left Nose, tobacco use, Past use , . .    Neurologic:  - neg neurologic ROS     Cardiovascular:     (+) ----. : . . DAHL, . :. . Previous cardiac testing Echodate:10/2016results:Normaldate: results:ECG reviewed date:10/2016 results:NSR= 89. Inter-ventricular conduction delay. date: results:          METS/Exercise Tolerance:     Hematologic:     (+) Anemia, -      Musculoskeletal: Comment: Lumbar DDD. ? RA  (+) arthritis, , , -       GI/Hepatic:     (+) GERD Asymptomatic on medication,       Renal/Genitourinary:  - ROS Renal section negative       Endo: Comment: BMI= 32+    (+) thyroid problem hypothyroidism, Obesity, .      Psychiatric:     (+) psychiatric history depression      Infectious Disease:  - neg infectious disease ROS       Malignancy:   (+) Malignancy History of Skin  Skin CA Remission status post, MOHs defect, left nose        Other:    (+) No chance of pregnancy C-spine cleared: N/A, no H/O Chronic Pain,             Physical Exam  Normal systems: dental    Airway   Mallampati: II  TM distance: >3 FB  Neck ROM: full    Dental     Cardiovascular   Rhythm and rate: regular and normal      Pulmonary    breath sounds clear to auscultation                    Anesthesia Plan      History & Physical Review  History and physical reviewed and following examination; no interval change.    ASA Status:  2 .    NPO Status:  > 6 hours    Plan for MAC Maintenance will be TIVA.  Reason for MAC:  Procedure to face, neck, head or breast  PONV prophylaxis:  Ondansetron (or other 5HT-3) and Dexamethasone or Solumedrol       Postoperative Care  Postoperative pain management:  IV analgesics.      Consents  Anesthetic plan, risks, benefits and alternatives discussed with: .  Use of blood products discussed: No .   .                          .   Fair

## 2020-09-06 ENCOUNTER — MYC REFILL (OUTPATIENT)
Dept: FAMILY MEDICINE | Facility: CLINIC | Age: 50
End: 2020-09-06

## 2020-09-06 DIAGNOSIS — E03.9 ACQUIRED HYPOTHYROIDISM: ICD-10-CM

## 2020-09-06 DIAGNOSIS — F32.81 PMDD (PREMENSTRUAL DYSPHORIC DISORDER): ICD-10-CM

## 2020-09-10 ENCOUNTER — MYC REFILL (OUTPATIENT)
Dept: FAMILY MEDICINE | Facility: CLINIC | Age: 50
End: 2020-09-10

## 2020-09-10 DIAGNOSIS — E03.9 ACQUIRED HYPOTHYROIDISM: ICD-10-CM

## 2020-09-10 DIAGNOSIS — F32.81 PMDD (PREMENSTRUAL DYSPHORIC DISORDER): ICD-10-CM

## 2020-09-10 RX ORDER — LEVOTHYROXINE SODIUM 75 UG/1
75 TABLET ORAL DAILY
Qty: 90 TABLET | Refills: 3 | Status: CANCELLED | OUTPATIENT
Start: 2020-09-10

## 2020-09-10 RX ORDER — LEVOTHYROXINE SODIUM 75 UG/1
75 TABLET ORAL DAILY
Qty: 90 TABLET | Refills: 0 | Status: SHIPPED | OUTPATIENT
Start: 2020-09-10 | End: 2020-09-16

## 2020-09-10 NOTE — TELEPHONE ENCOUNTER
Prescription approved per [facility].    Lyndsay Lassiter RN  [facility]

## 2020-09-16 ENCOUNTER — ANCILLARY PROCEDURE (OUTPATIENT)
Dept: CARDIOLOGY | Facility: CLINIC | Age: 50
End: 2020-09-16
Attending: NURSE PRACTITIONER
Payer: COMMERCIAL

## 2020-09-16 ENCOUNTER — OFFICE VISIT (OUTPATIENT)
Dept: FAMILY MEDICINE | Facility: CLINIC | Age: 50
End: 2020-09-16
Payer: COMMERCIAL

## 2020-09-16 ENCOUNTER — HOSPITAL ENCOUNTER (OUTPATIENT)
Facility: CLINIC | Age: 50
Setting detail: SPECIMEN
Discharge: HOME | End: 2020-09-16
Admitting: NURSE PRACTITIONER
Payer: COMMERCIAL

## 2020-09-16 VITALS
DIASTOLIC BLOOD PRESSURE: 74 MMHG | WEIGHT: 181 LBS | HEART RATE: 89 BPM | RESPIRATION RATE: 18 BRPM | SYSTOLIC BLOOD PRESSURE: 138 MMHG | OXYGEN SATURATION: 98 % | TEMPERATURE: 97.8 F | HEIGHT: 66 IN | BODY MASS INDEX: 29.09 KG/M2

## 2020-09-16 DIAGNOSIS — M25.50 MULTIPLE JOINT PAIN: ICD-10-CM

## 2020-09-16 DIAGNOSIS — E78.5 HYPERLIPIDEMIA LDL GOAL <130: ICD-10-CM

## 2020-09-16 DIAGNOSIS — Z00.00 ROUTINE GENERAL MEDICAL EXAMINATION AT A HEALTH CARE FACILITY: ICD-10-CM

## 2020-09-16 DIAGNOSIS — Z12.31 VISIT FOR SCREENING MAMMOGRAM: ICD-10-CM

## 2020-09-16 DIAGNOSIS — J45.20 MILD INTERMITTENT ASTHMA WITHOUT COMPLICATION: ICD-10-CM

## 2020-09-16 DIAGNOSIS — F32.81 PMDD (PREMENSTRUAL DYSPHORIC DISORDER): ICD-10-CM

## 2020-09-16 DIAGNOSIS — Z23 NEED FOR INFLUENZA VACCINATION: Primary | ICD-10-CM

## 2020-09-16 DIAGNOSIS — E03.9 ACQUIRED HYPOTHYROIDISM: ICD-10-CM

## 2020-09-16 DIAGNOSIS — Z12.11 SCREENING FOR COLON CANCER: ICD-10-CM

## 2020-09-16 DIAGNOSIS — R00.2 PALPITATIONS: ICD-10-CM

## 2020-09-16 LAB
ANION GAP SERPL CALCULATED.3IONS-SCNC: 3 MMOL/L (ref 3–14)
BUN SERPL-MCNC: 16 MG/DL (ref 7–30)
CALCIUM SERPL-MCNC: 8.9 MG/DL (ref 8.5–10.1)
CHLORIDE SERPL-SCNC: 104 MMOL/L (ref 94–109)
CHOLEST SERPL-MCNC: 222 MG/DL
CO2 SERPL-SCNC: 30 MMOL/L (ref 20–32)
CREAT SERPL-MCNC: 0.88 MG/DL (ref 0.52–1.04)
GFR SERPL CREATININE-BSD FRML MDRD: 76 ML/MIN/{1.73_M2}
GLUCOSE SERPL-MCNC: 99 MG/DL (ref 70–99)
HDLC SERPL-MCNC: 42 MG/DL
LDLC SERPL CALC-MCNC: 144 MG/DL
NONHDLC SERPL-MCNC: 180 MG/DL
POTASSIUM SERPL-SCNC: 4.1 MMOL/L (ref 3.4–5.3)
SODIUM SERPL-SCNC: 137 MMOL/L (ref 133–144)
TRIGL SERPL-MCNC: 182 MG/DL
TSH SERPL DL<=0.005 MIU/L-ACNC: 1.46 MU/L (ref 0.4–4)

## 2020-09-16 PROCEDURE — 0298T ZZC EXT ECG > 48HR TO 21 DAY REVIEW AND INTERPRETATN: CPT | Performed by: INTERNAL MEDICINE

## 2020-09-16 PROCEDURE — 99396 PREV VISIT EST AGE 40-64: CPT | Mod: 25 | Performed by: NURSE PRACTITIONER

## 2020-09-16 PROCEDURE — 0296T ZZHC  EXT ECG > 48HR TO 21 DAY RCRD W/CONECT INTL RCRD: CPT | Performed by: INTERNAL MEDICINE

## 2020-09-16 PROCEDURE — 99213 OFFICE O/P EST LOW 20 MIN: CPT | Mod: 25 | Performed by: NURSE PRACTITIONER

## 2020-09-16 PROCEDURE — 90471 IMMUNIZATION ADMIN: CPT | Performed by: NURSE PRACTITIONER

## 2020-09-16 PROCEDURE — 80048 BASIC METABOLIC PNL TOTAL CA: CPT | Performed by: NURSE PRACTITIONER

## 2020-09-16 PROCEDURE — 84443 ASSAY THYROID STIM HORMONE: CPT | Performed by: NURSE PRACTITIONER

## 2020-09-16 PROCEDURE — 36415 COLL VENOUS BLD VENIPUNCTURE: CPT | Performed by: NURSE PRACTITIONER

## 2020-09-16 PROCEDURE — 0296T LEADLESS EKG MONITOR 3 TO 14 DAYS: CPT | Performed by: NURSE PRACTITIONER

## 2020-09-16 PROCEDURE — 80061 LIPID PANEL: CPT | Performed by: NURSE PRACTITIONER

## 2020-09-16 PROCEDURE — 82274 ASSAY TEST FOR BLOOD FECAL: CPT | Performed by: NURSE PRACTITIONER

## 2020-09-16 PROCEDURE — 90682 RIV4 VACC RECOMBINANT DNA IM: CPT | Performed by: NURSE PRACTITIONER

## 2020-09-16 PROCEDURE — 0298T ZZC EXT ECG > 48HR TO 21 DAY REVIEW AND INTERPRETATN: CPT | Performed by: NURSE PRACTITIONER

## 2020-09-16 RX ORDER — ALBUTEROL SULFATE 90 UG/1
1-2 AEROSOL, METERED RESPIRATORY (INHALATION) EVERY 4 HOURS PRN
Qty: 1 INHALER | Refills: 5 | Status: SHIPPED | OUTPATIENT
Start: 2020-09-16 | End: 2022-02-02

## 2020-09-16 RX ORDER — LEVOTHYROXINE SODIUM 75 UG/1
75 TABLET ORAL DAILY
Qty: 90 TABLET | Refills: 3 | Status: SHIPPED | OUTPATIENT
Start: 2020-09-16 | End: 2021-02-22 | Stop reason: DRUGHIGH

## 2020-09-16 ASSESSMENT — ENCOUNTER SYMPTOMS
FEVER: 0
COUGH: 0
CHILLS: 0
NERVOUS/ANXIOUS: 0
CONSTIPATION: 0
HEMATURIA: 0
FREQUENCY: 0
DIZZINESS: 0
EYE PAIN: 0
HEMATOCHEZIA: 0
DIARRHEA: 0
HEADACHES: 1
ABDOMINAL PAIN: 0
BREAST MASS: 0

## 2020-09-16 ASSESSMENT — MIFFLIN-ST. JEOR: SCORE: 1457.76

## 2020-09-16 NOTE — PROGRESS NOTES
"   SUBJECTIVE:   CC: Yenny Johnson is an 50 year old woman who presents for preventive health visit.     Patient has been advised of split billing requirements and indicates understanding: Yes  Healthy Habits:     Getting at least 3 servings of Calcium per day:  Yes    Bi-annual eye exam:  Yes    Dental care twice a year:  Yes    Sleep apnea or symptoms of sleep apnea:  None    Diet:  Regular (no restrictions)    Frequency of exercise:  6-7 days/week    Duration of exercise:  30-45 minutes    Taking medications regularly:  Yes    Medication side effects:  None    PHQ-2 Total Score: 0    Additional concerns today:  Yes       Palpitations    Onset: years    Description (location/character/radiation/duration): pressure and palpitations that feel like \"flutter\" or \"skipped beat\", episodes occur a couple times/week and last less than 5 minutes, occurs with both rest and activity    Intensity:  mild    Accompanying signs and symptoms:        Shortness of breath: no        Sweating: no        Nausea/vomitting: no        Palpitations: YES       Other (fevers/chills/cough/heartburn/lightheadedness): no     History (similar episodes/previous evaluation): Yes, has had normal echo and EKG    Precipitating or alleviating factors:       Worse with exertion: YES, occasionally when walking but not always       Worse with breathing: no        Related to eating: no        Better with burping: no     Therapies tried and outcome: None    Had a positive COVID test in June and she was asymptomatic at the time     Musculoskeletal problem/pain    Duration: years    Description  Location: feet, hands  Intermittent episodes a couple times a week first thing in the morning and with some certain movements    Intensity:  mild    Accompanying signs and symptoms: none    History  Previous similar problem: YES  Previous evaluation:  2012 RF was slightly elevated, went to rheumatology and no chronic therapy started at that time     Precipitating " or alleviating factors:  Trauma or overuse: no   Aggravating factors include: getting out of bed and certain movement,     Therapies tried and outcome: moving and stretching hands and Ibuprofen are helpful    Hypothyroidism Follow-up      Since last visit, patient describes the following symptoms: weight gain of 10 lbs, fatigue and hair loss    Today's PHQ-2 Score:   PHQ-2 (  [employer]) 2020   Q1: Little interest or pleasure in doing things 0   Q2: Feeling down, depressed or hopeless 0   PHQ-2 Score 0   Q1: Little interest or pleasure in doing things Not at all   Q2: Feeling down, depressed or hopeless Not at all   PHQ-2 Score 0     Abuse: Current or Past (Physical, Sexual or Emotional) - No  Do you feel safe in your environment? Yes    Social History     Tobacco Use     Smoking status: Former Smoker     Packs/day: 1.00     Years: 14.00     Pack years: 14.00     Types: Cigarettes     Last attempt to quit: 1998     Years since quittin.7     Smokeless tobacco: Never Used   Substance Use Topics     Alcohol use: No       Alcohol Use 2020   Prescreen: >3 drinks/day or >7 drinks/week? No   Prescreen: >3 drinks/day or >7 drinks/week? -       Reviewed orders with patient.  Reviewed health maintenance and updated orders accordingly - Yes  Lab work is in process    Mammogram Screening: Patient over age 50, mutual decision to screen reflected in health maintenance.    Pertinent mammograms are reviewed under the imaging tab.  History of abnormal Pap smear: has had hysterectomy   PAP / HPV 3/29/2012   PAP NIL     Reviewed and updated as needed this visit by clinical staff  Tobacco  Allergies  Meds  Problems  Med Hx  Surg Hx  Fam Hx         Reviewed and updated as needed this visit by Provider  Tobacco  Allergies  Meds  Problems  Med Hx  Surg Hx  Fam Hx        Past Medical History:   Diagnosis Date     Anemia      BCC (basal cell carcinoma of skin)      Degeneration of lumbar or lumbosacral  intervertebral disc      Depressive disorder      Elevated fasting glucose      Elevated rheumatoid factor 12/12/2012     GERD (gastroesophageal reflux disease) 11/2005    EGD     Hyperlipidemia LDL goal <130      Hypertriglyceridemia      Hypothyroidism      Intermittent asthma      Lumbar disc herniation      Obesity 3/29/2012     PMDD (premenstrual dysphoric disorder)      Stress incontinence, female 3/29/2012      Past Surgical History:   Procedure Laterality Date     BIOPSY       C/SECTION, CLASSICAL       CHOLECYSTECTOMY, LAPOROSCOPIC  1995     DESTRUCTION OF PARAVERTEBRAL FACET LUMBAR / SACRAL SINGLE Bilateral 9/12/2017    Procedure: DESTRUCTION OF PARAVERTEBRAL FACET LUMBAR / SACRAL SINGLE;  Radiofrequency Ablation of the Lumbar Facets /bilateral  heating of nerves around spine bilatteraly for purpose of pain rekief;  Surgeon: Benigno Willams MD;  Location: [facility]     HYSTERECTOMY, PAP NO LONGER INDICATED  09/23/2015     INJECT EPIDURAL LUMBAR / SACRAL SINGLE Bilateral 8/9/2017    Procedure: INJECT EPIDURAL LUMBAR / SACRAL SINGLE;  bilateral lumbar medial branch block:injection of local anesthetic into area around spine for purpose of pain relief;  Surgeon: Benigno Willams MD;  Location: [facility]     INJECT PARAVERTEBRAL FACET JOINT LUMBAR / SACRAL FIRST Bilateral 6/28/2017    Procedure: INJECT PARAVERTEBRAL FACET JOINT LUMBAR / SACRAL FIRST;  injection of local anesthesthetic into area around spine for purpose of pain relief;  Surgeon: Benigno Willams MD;  Location: [facility]     INJECT PARAVERTEBRAL FACET JOINT LUMBAR / SACRAL THIRD Bilateral 3/9/2018    Procedure: INJECT PARAVERTEBRAL FACET JOINT LUMBAR / SACRAL THIRD;  Bilateral S1, S2, S3 Lateral Branch Nerve Block and L5 Dorsal Ramus Nerve Block;  Surgeon: Kristi Gomes MD;  Location: [facility]     INJECT SACROILIAC JOINT Bilateral 2/7/2018    Procedure: INJECT SACROILIAC JOINT;  Bilateral sacroiliac Lateral Branch Block;  Surgeon: Edison Sams  "Alfa Gray MD;  Location: [facility]     RADIO FREQUENCY ABLATION / DESTRUCTION OF SACROILOAC JOINT LATERAL BRANCHES (S1/S2/S3) Bilateral 5/2/2018    Procedure: RADIO FREQUENCY ABLATION / DESTRUCTION OF SACROILOAC JOINT LATERAL BRANCHES (S1/S2/S3);  Bilateral Radiofrequency Ablation of the Lateral Branches;  Surgeon: Benigno Willams MD;  Location: [facility]     REPAIR MOHS Left 1/19/2017    Procedure: REPAIR MOHS;  Surgeon: Jorge Eric MD;  Location: [facility]     TUBAL LIGATION         Review of Systems   Constitutional: Negative for chills and fever.   HENT: Negative for congestion and ear pain.    Eyes: Negative for pain.   Respiratory: Negative for cough.    Cardiovascular: Positive for chest pain.   Gastrointestinal: Negative for abdominal pain, constipation, diarrhea and hematochezia.   Breasts:  Negative for breast mass.   Genitourinary: Negative for frequency, genital sores, hematuria, pelvic pain and vaginal discharge.   Neurological: Positive for headaches. Negative for dizziness.   Psychiatric/Behavioral: The patient is not nervous/anxious.         OBJECTIVE:   /74   Pulse 89   Temp 97.8  F (36.6  C) (Oral)   Resp 18   Ht 1.676 m (5' 6\")   Wt 82.1 kg (181 lb)   LMP 08/07/2015   SpO2 98%   BMI 29.21 kg/m    Physical Exam  GENERAL: healthy, alert and no distress  EYES: Eyes grossly normal to inspection, PERRL and conjunctivae and sclerae normal  HENT: ear canals and TM's normal, nose and mouth without ulcers or lesions  NECK: no adenopathy, no asymmetry, masses, or scars and thyroid normal to palpation  RESP: lungs clear to auscultation - no rales, rhonchi or wheezes  CV: regular rate and rhythm, normal S1 S2, no S3 or S4, no murmur, click or rub, no peripheral edema and peripheral pulses strong  ABDOMEN: soft, nontender, no hepatosplenomegaly, no masses and bowel sounds normal  MS: no gross musculoskeletal defects noted, no edema  SKIN: no suspicious lesions or rashes  NEURO: Normal " strength and tone, mentation intact and speech normal  PSYCH: mentation appears normal, affect normal/bright    Diagnostic Test Results:  Labs reviewed in Epic    ASSESSMENT/PLAN:   1. Routine general medical examination at a health care facility  - Basic metabolic panel    2. Need for influenza vaccination  - C RIV4 (FLUBLOK) VACCINE RECOMBINANT DNA PRSRV ANTIBIO FREE, IM [[id number]]    3. Visit for screening mammogram  - MA SCREENING DIGITAL BILAT - Future  (s+30); Future    4. Screening for colon cancer  - Fecal colorectal cancer screen FIT; Future    5. Acquired hypothyroidism  Well controlled with medications without side effects.  - TSH WITH FREE T4 REFLEX  - levothyroxine (SYNTHROID/LEVOTHROID) 75 MCG tablet; Take 1 tablet (75 mcg) by mouth daily  Dispense: 90 tablet; Refill: 3    6. Hyperlipidemia LDL goal <130  Has been using diet and exercise only.   - Lipid panel reflex to direct LDL Fasting    7. Palpitations  - Leadless EKG Monitor 3 to 14 Days; Future    8. Multiple joint pain  Discussed retesting RF and rheumatology referral but patient does not feel that symptoms are significant enough to want daily medication. Continue morning hand exercises and ibuprofen PRN.     9. PMDD (premenstrual dysphoric disorder)  Well controlled with medications without side effects.  - FLUoxetine (PROZAC) 20 MG capsule; TAKE THREE CAPSULES BY MOUTH DAILY  Dispense: 270 capsule; Refill: 3    10. Mild intermittent asthma without complication  Well controlled with medications without side effects.  - albuterol (PROAIR HFA/PROVENTIL HFA/VENTOLIN HFA) 108 (90 Base) MCG/ACT inhaler; Inhale 1-2 puffs into the lungs every 4 hours as needed for shortness of breath / dyspnea  Dispense: 1 Inhaler; Refill: 5    Patient has been advised of split billing requirements and indicates understanding: Yes  COUNSELING:  Reviewed preventive health counseling, as reflected in patient instructions       Regular exercise       Healthy  "diet/nutrition       Vision screening       Hearing screening       Immunizations    Vaccinated for: Influenza         Alcohol Use       Osteoporosis Prevention/Bone Health       Colon cancer screening    Estimated body mass index is 28.89 kg/m  as calculated from the following:    Height as of 8/30/19: 1.65 m (5' 4.96\").    Weight as of 8/30/19: 78.7 kg (173 lb 6.4 oz).    Weight management plan: Discussed healthy diet and exercise guidelines     She reports that she quit smoking about 22 years ago. Her smoking use included cigarettes. She has a 14.00 pack-year smoking history. She has never used smokeless tobacco.    Counseling Resources:  ATP IV Guidelines  Pooled Cohorts Equation Calculator  Breast Cancer Risk Calculator  BRCA-Related Cancer Risk Assessment: FHS-7 Tool  FRAX Risk Assessment  ICSI Preventive Guidelines  Dietary Guidelines for Americans, 2010  USDA's MyPlate  ASA Prophylaxis  Lung CA Screening    KAY Powell [facility]  "

## 2020-09-16 NOTE — PATIENT INSTRUCTIONS
It is recommended that you get a vaccination for shingles called Shingrix (given as 2 shots, 2 to 6 months apart), even if you have already had the Zostavax vaccine. Discuss getting the Shingix vaccine from your pharmacist, or schedule an ancillary shot visit here. Some insurances do not cover the cost of these vaccines.        Preventive Health Recommendations  Female Ages 50 - 64    Yearly exam: See your health care provider every year in order to  o Review health changes.   o Discuss preventive care.    o Review your medicines if your doctor has prescribed any.      Get a Pap test every three years (unless you have an abnormal result and your provider advises testing more often).    If you get Pap tests with HPV test, you only need to test every 5 years, unless you have an abnormal result.     You do not need a Pap test if your uterus was removed (hysterectomy) and you have not had cancer.    You should be tested each year for STDs (sexually transmitted diseases) if you're at risk.     Have a mammogram every 1 to 2 years.    Have a colonoscopy at age 50, or have a yearly FIT test (stool test). These exams screen for colon cancer.      Have a cholesterol test every 5 years, or more often if advised.    Have a diabetes test (fasting glucose) every three years. If you are at risk for diabetes, you should have this test more often.     If you are at risk for osteoporosis (brittle bone disease), think about having a bone density scan (DEXA).    Shots: Get a flu shot each year. Get a tetanus shot every 10 years.    Nutrition:     Eat at least 5 servings of fruits and vegetables each day.    Eat whole-grain bread, whole-wheat pasta and brown rice instead of white grains and rice.    Get adequate Calcium and Vitamin D.     Lifestyle    Exercise at least 150 minutes a week (30 minutes a day, 5 days a week). This will help you control your weight and prevent disease.    Limit alcohol to one drink per day.    No smoking.      Wear sunscreen to prevent skin cancer.     See your dentist every six months for an exam and cleaning.    See your eye doctor every 1 to 2 years.

## 2020-09-16 NOTE — LETTER
My Asthma Action Plan    Name: Yenny Johnson   YOB: 1970  Date: 9/16/2020   My doctor: KAY Powell CNP   My clinic: [facility]        My Rescue Medicine:   Albuterol inhaler (Proair/Ventolin/Proventil HFA)  2-4 puffs EVERY 4 HOURS as needed. Use a spacer if recommended by your provider.   My Asthma Severity:   Intermittent / Exercise Induced  Know your asthma triggers: Patient is unaware of triggers             GREEN ZONE   Good Control    I feel good    No cough or wheeze    Can work, sleep and play without asthma symptoms       Take your asthma control medicine every day.     1. If exercise triggers your asthma, take your rescue medication    15 minutes before exercise or sports, and    During exercise if you have asthma symptoms  2. Spacer to use with inhaler: If you have a spacer, make sure to use it with your inhaler             YELLOW ZONE Getting Worse  I have ANY of these:    I do not feel good    Cough or wheeze    Chest feels tight    Wake up at night   1. Keep taking your Green Zone medications  2. Start taking your rescue medicine:    every 20 minutes for up to 1 hour. Then every 4 hours for 24-48 hours.  3. If you stay in the Yellow Zone for more than 12-24 hours, contact your doctor.  4. If you do not return to the Green Zone in 12-24 hours or you get worse, start taking your oral steroid medicine if prescribed by your provider.           RED ZONE Medical Alert - Get Help  I have ANY of these:    I feel awful    Medicine is not helping    Breathing getting harder    Trouble walking or talking    Nose opens wide to breathe       1. Take your rescue medicine NOW  2. If your provider has prescribed an oral steroid medicine, start taking it NOW  3. Call your doctor NOW  4. If you are still in the Red Zone after 20 minutes and you have not reached your doctor:    Take your rescue medicine again and    Call 911 or go to the emergency room right away    See your  regular doctor within 2 weeks of an Emergency Room or Urgent Care visit for follow-up treatment.          Annual Reminders:  Meet with Asthma Educator,  Flu Shot in the Fall, consider Pneumonia Vaccination for patients with asthma (aged 19 and older).    Pharmacy:    [facility] [id number] [facility] - [place], [place] - [address] [facility] [id number] [facility] [employer] - [place], [place] - [address]    Electronically signed by KAY Powell CNP   Date: 09/16/20                    Asthma Triggers  How To Control Things That Make Your Asthma Worse    Triggers are things that make your asthma worse.  Look at the list below to help you find your triggers and   what you can do about them. You can help prevent asthma flare-ups by staying away from your triggers.      Trigger                                                          What you can do   Cigarette Smoke  Tobacco smoke can make asthma worse. Do not allow smoking in your home, car or around you.  Be sure no one smokes at a child s day care or school.  If you smoke, ask your health care provider for ways to help you quit.  Ask family members to quit too.  Ask your health care provider for a referral to Quit Plan to help you quit smoking, or call 6-072-274-PLAN.     Colds, Flu, Bronchitis  These are common triggers of asthma. Wash your hands often.  Don t touch your eyes, nose or mouth.  Get a flu shot every year.     Dust Mites  These are tiny bugs that live in cloth or carpet. They are too small to see. Wash sheets and blankets in hot water every week.   Encase pillows and mattress in dust mite proof covers.  Avoid having carpet if you can. If you have carpet, vacuum weekly.   Use a dust mask and HEPA vacuum.   Pollen and Outdoor Mold  Some people are allergic to trees, grass, or weed pollen, or molds. Try to keep your windows closed.  Limit time out doors when pollen count is high.   Ask you health care provider about taking medicine during allergy season.     Animal  Dander  Some people are allergic to skin flakes, urine or saliva from pets with fur or feathers. Keep pets with fur or feathers out of your home.    If you can t keep the pet outdoors, then keep the pet out of your bedroom.  Keep the bedroom door closed.  Keep pets off cloth furniture and away from stuffed toys.     Mice, Rats, and Cockroaches  Some people are allergic to the waste from these pests.   Cover food and garbage.  Clean up spills and food crumbs.  Store grease in the refrigerator.   Keep food out of the bedroom.   Indoor Mold  This can be a trigger if your home has high moisture. Fix leaking faucets, pipes, or other sources of water.   Clean moldy surfaces.  Dehumidify basement if it is damp and smelly.   Smoke, Strong Odors, and Sprays  These can reduce air quality. Stay away from strong odors and sprays, such as perfume, powder, hair spray, paints, smoke incense, paint, cleaning products, candles and new carpet.   Exercise or Sports  Some people with asthma have this trigger. Be active!  Ask your doctor about taking medicine before sports or exercise to prevent symptoms.    Warm up for 5-10 minutes before and after sports or exercise.     Other Triggers of Asthma  Cold air:  Cover your nose and mouth with a scarf.  Sometimes laughing or crying can be a trigger.  Some medicines and food can trigger asthma.

## 2020-09-17 DIAGNOSIS — Z12.11 SCREENING FOR COLON CANCER: ICD-10-CM

## 2020-09-17 LAB — HEMOCCULT STL QL IA: NEGATIVE

## 2020-09-17 ASSESSMENT — ASTHMA QUESTIONNAIRES: ACT_TOTALSCORE: 24

## 2020-09-17 NOTE — RESULT ENCOUNTER NOTE
Your FIT card test results are normal. This means you are unlikely to have colon cancer. This test for low risk individuals should be repeated yearly until age 75. Request your FIT card from the clinic or at the time of an appointment before it is due next year.     Cee Biggs MD

## 2020-10-09 ENCOUNTER — E-VISIT (OUTPATIENT)
Dept: FAMILY MEDICINE | Facility: CLINIC | Age: 50
End: 2020-10-09
Payer: COMMERCIAL

## 2020-10-09 DIAGNOSIS — J01.90 ACUTE SINUSITIS WITH SYMPTOMS > 10 DAYS: Primary | ICD-10-CM

## 2020-10-09 PROCEDURE — 99421 OL DIG E/M SVC 5-10 MIN: CPT | Performed by: FAMILY MEDICINE

## 2020-10-09 NOTE — PATIENT INSTRUCTIONS
Thank you for choosing us for your care. I have placed an order for a prescription so that you can start treatment. View your full visit summary for details by clicking on the link below. Your pharmacist will able to address any questions you may have about the medication.     If you're not feeling better within 5-7 days, please schedule an appointment.  You can schedule an appointment right here in [facility], or call [phone]  If the visit is for the same symptoms as your e-visit, we'll refund the cost of your e-visit if seen within seven days.      Sinusitis (Antibiotic Treatment)    The sinuses are air-filled spaces within the bones of the face. They connect to the inside of the nose. Sinusitis is an inflammation of the tissue that lines the sinuses. Sinusitis can occur during a cold. It can also happen due to allergies to pollens and other particles in the air. Sinusitis can cause symptoms of sinus congestion and a feeling of fullness. A sinus infection causes fever, headache, and facial pain. There is often green or yellow fluid draining from the nose or into the back of the throat (post-nasal drip). You have been given antibiotics to treat this condition.  Home care    Take the full course of antibiotics as instructed. Do not stop taking them, even when you feel better.    Drink plenty of water, hot tea, and other liquids. This may help thin nasal mucus. It also may help your sinuses drain fluids.    Heat may help soothe painful areas of your face. Use a towel soaked in hot water. Or,  the shower and direct the warm spray onto your face. Using a vaporizer along with a menthol rub at night may also help soothe symptoms.     An expectorant with guaifenesin may help thin nasal mucus and help your sinuses drain fluids.    You can use an over-the-counter decongestant, unless a similar medicine was prescribed to you. Nasal sprays work the fastest. Use one that contains phenylephrine or oxymetazoline.  First blow your nose gently. Then use the spray. Do not use these medicines more often than directed on the label. If you do, your symptoms may get worse. You may also take pills that contain pseudoephedrine. Don t use products that combine multiple medicines. This is because side effects may be increased. Read labels. You can also ask the pharmacist for help. (People with high blood pressure should not use decongestants. They can raise blood pressure.)    Over-the-counter antihistamines may help if allergies contributed to your sinusitis.      Do not use nasal rinses or irrigation during an acute sinus infection, unless your healthcare provider tells you to. Rinsing may spread the infection to other areas in your sinuses.    Use acetaminophen or ibuprofen to control pain, unless another pain medicine was prescribed to you. If you have chronic liver or kidney disease or ever had a stomach ulcer, talk with your healthcare provider before using these medicines. (Aspirin should never be taken by anyone under age 18 who is ill with a fever. It may cause severe liver damage.)    Don't smoke. This can make symptoms worse.  Follow-up care  Follow up with your healthcare provider or our staff if you are not better in 1 week.  When to seek medical advice  Call your healthcare provider if any of these occur:    Facial pain or headache that gets worse    Stiff neck    Unusual drowsiness or confusion    Swelling of your forehead or eyelids    Vision problems, such as blurred or double vision    Fever of 100.4 F (38 C) or higher, or as directed by your healthcare provider    Seizure    Breathing problems    Symptoms don't go away in 10 days  Prevention  Here are steps you can take to help prevent an infection:    Keep good hand washing habits.    Don t have close contact with people who have sore throats, colds, or other upper respiratory infections.    Don t smoke, and stay away from secondhand smoke.    Stay up to date with of  your vaccines.  Date Last Reviewed: 11/1/2017 2000-2019 [employer]. [address] [place] [address], [place] [address]. All rights reserved. This information is not intended as a substitute for professional medical care. Always follow your healthcare professional's instructions.

## 2020-10-16 ENCOUNTER — TELEPHONE (OUTPATIENT)
Dept: FAMILY MEDICINE | Facility: CLINIC | Age: 50
End: 2020-10-16

## 2020-10-16 DIAGNOSIS — I47.10 PAROXYSMAL SUPRAVENTRICULAR TACHYCARDIA (H): Primary | ICD-10-CM

## 2020-10-16 NOTE — TELEPHONE ENCOUNTER
Zio patch results available for review on zio suites web sites. Results printed and placed in basket at providers desk.  Lyndsay LOCKE CMA ([facility])

## 2020-10-16 NOTE — TELEPHONE ENCOUNTER
Unable to reach patient by phone so MyChart sent. Zio shows 3 episodes of SVT, longest is 10 beats at max 162 bpm. Symptoms correlate with SVT and normal rhythm. Will refer to cardiology for further evaluation.   Eugenie King, APRN, CNP

## 2020-10-23 ENCOUNTER — TELEPHONE (OUTPATIENT)
Dept: CARDIOLOGY | Facility: CLINIC | Age: 50
End: 2020-10-23

## 2020-10-23 NOTE — TELEPHONE ENCOUNTER
I47.1 (ICD-10-CM) - Paroxysmal supraventricular tachycardia (H) work que referral to fk card     Left message to return clinic call to .  Karie Brown L.P.N.

## 2020-11-02 ENCOUNTER — VIRTUAL VISIT (OUTPATIENT)
Dept: CARDIOLOGY | Facility: CLINIC | Age: 50
End: 2020-11-02
Payer: COMMERCIAL

## 2020-11-02 DIAGNOSIS — I47.10 PAROXYSMAL SUPRAVENTRICULAR TACHYCARDIA (H): ICD-10-CM

## 2020-11-02 DIAGNOSIS — R06.09 DYSPNEA ON EXERTION: Primary | ICD-10-CM

## 2020-11-02 DIAGNOSIS — R07.89 ATYPICAL CHEST PAIN: ICD-10-CM

## 2020-11-02 PROCEDURE — 99202 OFFICE O/P NEW SF 15 MIN: CPT | Mod: 95 | Performed by: INTERNAL MEDICINE

## 2020-11-02 NOTE — PROGRESS NOTES
"Yenny Johnson is a 50 year old female who is being evaluated via a billable video visit.      The patient has been notified of following:     \"This video visit will be conducted via a call between you and your physician/provider. We have found that certain health care needs can be provided without the need for an in-person physical exam.  This service lets us provide the care you need with a video conversation.  If a prescription is necessary we can send it directly to your pharmacy.  If lab work is needed we can place an order for that and you can then stop by our lab to have the test done at a later time.    Video visits are billed at different rates depending on your insurance coverage.  Please reach out to your insurance provider with any questions.    If during the course of the call the physician/provider feels a video visit is not appropriate, you will not be charged for this service.\"    Patient has given verbal consent for Video visit? Yes  How would you like to obtain your AVS? MyChart  If you are dropped from the video visit, the video invite should be resent to: Text to cell phone: [phone]  Will anyone else be joining your video visit? No        Video-Visit Details    Type of service:  Video Visit    Video Start Time: 1:14 PM  Video End Time: 1:26 PM  Originating Location (pt. Location): Other Work    Distant Location (provider location):  [facility] HEART [facility]     Platform used for Video Visit: Doximity    HPI: I was requested by Eugenie King to consult on chest discomfort and palpitations    Patient is a 50-year-old woman without any known cardiac history of cardiovascular risk factors such as hypertension, diabetes, hyperlipidemia on treatment, or current smoking.    Patient was recently seen by Eugenie King in the clinic and based on reported symptoms of skipped heartbeats,  patient underwent a Zio patch recording for 1 week.  There were no significant arrhythmias " "detected by the Zio patch.    Patient's main concern, however, is a sharp \"digging\" left-sided chest pain that can sometimes be underneath the left armpit.  This happens intermittently averaging about 1 time a week and is not associated with exertion.  It is transient, lasting for a few seconds to few minutes.  She is also concerned about exertional dyspnea that has developed after she recovered from COVID-19 infection at the end of June 2020.  She was not hospitalized for COVID-19, but since then she has noticed this dyspnea on exertion.    She denies any symptoms of prolonged palpitations, exertional angina, frequent lightheadedness, presyncope or syncope.    PAST MEDICAL HISTORY:  Past Medical History:   Diagnosis Date     Anemia      BCC (basal cell carcinoma of skin)      Degeneration of lumbar or lumbosacral intervertebral disc      Depressive disorder      Elevated fasting glucose      Elevated rheumatoid factor 12/12/2012     GERD (gastroesophageal reflux disease) 11/2005    EGD     Hyperlipidemia LDL goal <130      Hypertriglyceridemia      Hypothyroidism      Intermittent asthma      Lumbar disc herniation      Obesity 3/29/2012     PMDD (premenstrual dysphoric disorder)      Stress incontinence, female 3/29/2012       CURRENT MEDICATIONS:  Current Outpatient Medications   Medication Sig Dispense Refill     albuterol (PROAIR HFA/PROVENTIL HFA/VENTOLIN HFA) 108 (90 Base) MCG/ACT inhaler Inhale 1-2 puffs into the lungs every 4 hours as needed for shortness of breath / dyspnea 1 Inhaler 5     FLUoxetine (PROZAC) 20 MG capsule TAKE THREE CAPSULES BY MOUTH DAILY 270 capsule 3     levothyroxine (SYNTHROID/LEVOTHROID) 75 MCG tablet Take 1 tablet (75 mcg) by mouth daily 90 tablet 3       PAST SURGICAL HISTORY:  Past Surgical History:   Procedure Laterality Date     BIOPSY       C/SECTION, CLASSICAL       CHOLECYSTECTOMY, LAPOROSCOPIC  1995     DESTRUCTION OF PARAVERTEBRAL FACET LUMBAR / SACRAL SINGLE Bilateral " 9/12/2017    Procedure: DESTRUCTION OF PARAVERTEBRAL FACET LUMBAR / SACRAL SINGLE;  Radiofrequency Ablation of the Lumbar Facets /bilateral  heating of nerves around spine bilatteraly for purpose of pain rekief;  Surgeon: Benigno Willams MD;  [facility]     HYSTERECTOMY, PAP NO LONGER INDICATED  09/23/2015     INJECT EPIDURAL LUMBAR / SACRAL SINGLE Bilateral 8/9/2017    Procedure: INJECT EPIDURAL LUMBAR / SACRAL SINGLE;  bilateral lumbar medial branch block:injection of local anesthetic into area around spine for purpose of pain relief;  Surgeon: Benigno Willams MD;  [facility]     INJECT PARAVERTEBRAL FACET JOINT LUMBAR / SACRAL FIRST Bilateral 6/28/2017    Procedure: INJECT PARAVERTEBRAL FACET JOINT LUMBAR / SACRAL FIRST;  injection of local anesthesthetic into area around spine for purpose of pain relief;  Surgeon: Benigno Willams MD;  [facility]     INJECT PARAVERTEBRAL FACET JOINT LUMBAR / SACRAL THIRD Bilateral 3/9/2018    Procedure: INJECT PARAVERTEBRAL FACET JOINT LUMBAR / SACRAL THIRD;  Bilateral S1, S2, S3 Lateral Branch Nerve Block and L5 Dorsal Ramus Nerve Block;  Surgeon: Kristi Gomes MD;  [facility]     INJECT SACROILIAC JOINT Bilateral 2/7/2018    Procedure: INJECT SACROILIAC JOINT;  Bilateral sacroiliac Lateral Branch Block;  Surgeon: Edison Sams MD;  [facility]     RADIO FREQUENCY ABLATION / DESTRUCTION OF SACROILOAC JOINT LATERAL BRANCHES (S1/S2/S3) Bilateral 5/2/2018    Procedure: RADIO FREQUENCY ABLATION / DESTRUCTION OF SACROILOAC JOINT LATERAL BRANCHES (S1/S2/S3);  Bilateral Radiofrequency Ablation of the Lateral Branches;  Surgeon: Benigno Willams MD;  [facility]     REPAIR MOHS Left 1/19/2017    Procedure: REPAIR MOHS;  Surgeon: Jorge Eric MD;  Location: [facility]     TUBAL LIGATION         ALLERGIES:     Allergies   Allergen Reactions     Codeine Sulfate      Disorientation, muscle weakness     Gabapentin      sleepiness        FAMILY HISTORY:  - Premature coronary artery disease  - Atrial fibrillation  - Sudden cardiac death     SOCIAL HISTORY:  Social History     Tobacco Use     Smoking status: Former Smoker     Packs/day: 1.00     Years: 14.00     Pack years: 14.00     Types: Cigarettes     Quit date: 1998     Years since quittin.8     Smokeless tobacco: Never Used   Substance Use Topics     Alcohol use: No     Drug use: No       ROS:   Constitutional: No fever, chills, or sweats. Weight stable.   ENT: No visual disturbance, ear ache, epistaxis, sore throat.   Cardiovascular: As per HPI.   Respiratory: No cough, hemoptysis.    GI: No nausea, vomiting, hematemesis, melena, or hematochezia.   : No hematuria.   Integument: Negative.   Psychiatric: Negative.   Hematologic:  Easy bruising, no easy bleeding.  Neuro: Negative.   Endocrinology: No significant heat or cold intolerance   Musculoskeletal: No myalgia.    Exam:  [facility] 2015   GENERAL APPEARANCE: healthy, alert and no distress    Labs:  CBC RESULTS:   Lab Results   Component Value Date    WBC 4.0 2019    RBC 4.50 2019    HGB 13.5 2019    HCT 41.2 2019    MCV 92 2019    MCH 30.0 2019    MCHC 32.8 2019    RDW 12.4 2019     2019       BMP RESULTS:  Lab Results   Component Value Date     2020    POTASSIUM 4.1 2020    CHLORIDE 104 2020    CO2 30 2020    ANIONGAP 3 2020    GLC 99 2020    BUN 16 2020    CR 0.88 2020    GFRESTIMATED 76 2020    GFRESTBLACK 88 2020    CHAU 8.9 2020        INR RESULTS:  No results found for: INR    Procedures:      Assessment and Plan:     Dyspnea on exertion for investigation    Given patient's symptoms of new dyspnea on exertion with mild activity, especially when the onset was after she recovered from COVID-19, we will obtain a an exercise stress echocardiogram.    There is no need for scheduled follow-up at  the heart rhythm clinic; we will communicate the results to the patient.  However, if there is a need to discuss the findings we will schedule a video visit.    All questions and concerns were addressed and patient is happy with plan.      CC  Patient Care Team:  Cee Biggs MD as PCP - General (Family Practice)  Cee Biggs MD as Assigned PCP  ARMANDO PARDO

## 2020-11-02 NOTE — LETTER
"11/2/2020      RE: Yenny Johnson  [address] [address]       Dear Colleague,    Thank you for the opportunity to participate in the care of your patient, Yenny Johnson, at the [facility] HEART [facility] at [facility]. Please see a copy of my visit note below.    Yenny Johnson is a 50 year old female who is being evaluated via a billable video visit.      The patient has been notified of following:     \"This video visit will be conducted via a call between you and your physician/provider. We have found that certain health care needs can be provided without the need for an in-person physical exam.  This service lets us provide the care you need with a video conversation.  If a prescription is necessary we can send it directly to your pharmacy.  If lab work is needed we can place an order for that and you can then stop by our lab to have the test done at a later time.    Video visits are billed at different rates depending on your insurance coverage.  Please reach out to your insurance provider with any questions.    If during the course of the call the physician/provider feels a video visit is not appropriate, you will not be charged for this service.\"    Patient has given verbal consent for Video visit? Yes  How would you like to obtain your AVS? MyChart  If you are dropped from the video visit, the video invite should be resent to: Text to cell phone: [phone]  Will anyone else be joining your video visit? No        Video-Visit Details    Type of service:  Video Visit    Video Start Time: 1:14 PM  Video End Time: 1:26 PM  Originating Location (pt. Location): Other Work    Distant Location (provider location):  [facility]     Platform used for Video Visit: Doximity    HPI: I was requested by Eugenie King to consult on chest discomfort and palpitations    Patient is a 50-year-old woman without any " "known cardiac history of cardiovascular risk factors such as hypertension, diabetes, hyperlipidemia on treatment, or current smoking.    Patient was recently seen by Eugenie King in the clinic and based on reported symptoms of skipped heartbeats,  patient underwent a Zio patch recording for 1 week.  There were no significant arrhythmias detected by the Zio patch.    Patient's main concern, however, is a sharp \"digging\" left-sided chest pain that can sometimes be underneath the left armpit.  This happens intermittently averaging about 1 time a week and is not associated with exertion.  It is transient, lasting for a few seconds to few minutes.  She is also concerned about exertional dyspnea that has developed after she recovered from COVID-19 infection at the end of June 2020.  She was not hospitalized for COVID-19, but since then she has noticed this dyspnea on exertion.    She denies any symptoms of prolonged palpitations, exertional angina, frequent lightheadedness, presyncope or syncope.    PAST MEDICAL HISTORY:  Past Medical History:   Diagnosis Date     Anemia      BCC (basal cell carcinoma of skin)      Degeneration of lumbar or lumbosacral intervertebral disc      Depressive disorder      Elevated fasting glucose      Elevated rheumatoid factor 12/12/2012     GERD (gastroesophageal reflux disease) 11/2005    EGD     Hyperlipidemia LDL goal <130      Hypertriglyceridemia      Hypothyroidism      Intermittent asthma      Lumbar disc herniation      Obesity 3/29/2012     PMDD (premenstrual dysphoric disorder)      Stress incontinence, female 3/29/2012       CURRENT MEDICATIONS:  Current Outpatient Medications   Medication Sig Dispense Refill     albuterol (PROAIR HFA/PROVENTIL HFA/VENTOLIN HFA) 108 (90 Base) MCG/ACT inhaler Inhale 1-2 puffs into the lungs every 4 hours as needed for shortness of breath / dyspnea 1 Inhaler 5     FLUoxetine (PROZAC) 20 MG capsule TAKE THREE CAPSULES BY MOUTH DAILY 270 capsule " 3     levothyroxine (SYNTHROID/LEVOTHROID) 75 MCG tablet Take 1 tablet (75 mcg) by mouth daily 90 tablet 3       PAST SURGICAL HISTORY:  Past Surgical History:   Procedure Laterality Date     BIOPSY       C/SECTION, CLASSICAL       CHOLECYSTECTOMY, LAPOROSCOPIC  1995     DESTRUCTION OF PARAVERTEBRAL FACET LUMBAR / SACRAL SINGLE Bilateral 9/12/2017    Procedure: DESTRUCTION OF PARAVERTEBRAL FACET LUMBAR / SACRAL SINGLE;  Radiofrequency Ablation of the Lumbar Facets /bilateral  heating of nerves around spine bilatteraly for purpose of pain rekief;  Surgeon: Benigno Willams MD;  [facility]     HYSTERECTOMY, PAP NO LONGER INDICATED  09/23/2015     INJECT EPIDURAL LUMBAR / SACRAL SINGLE Bilateral 8/9/2017    Procedure: INJECT EPIDURAL LUMBAR / SACRAL SINGLE;  bilateral lumbar medial branch block:injection of local anesthetic into area around spine for purpose of pain relief;  Surgeon: Benigno Willams MD;  [facility]     INJECT PARAVERTEBRAL FACET JOINT LUMBAR / SACRAL FIRST Bilateral 6/28/2017    Procedure: INJECT PARAVERTEBRAL FACET JOINT LUMBAR / SACRAL FIRST;  injection of local anesthesthetic into area around spine for purpose of pain relief;  Surgeon: Benigno Willams MD;  [facility]     INJECT PARAVERTEBRAL FACET JOINT LUMBAR / SACRAL THIRD Bilateral 3/9/2018    Procedure: INJECT PARAVERTEBRAL FACET JOINT LUMBAR / SACRAL THIRD;  Bilateral S1, S2, S3 Lateral Branch Nerve Block and L5 Dorsal Ramus Nerve Block;  Surgeon: Kristi Gomes MD;  [facility]     INJECT SACROILIAC JOINT Bilateral 2/7/2018    Procedure: INJECT SACROILIAC JOINT;  Bilateral sacroiliac Lateral Branch Block;  Surgeon: Edison Sams MD;  [facility]     RADIO FREQUENCY ABLATION / DESTRUCTION OF SACROILOAC JOINT LATERAL BRANCHES (S1/S2/S3) Bilateral 5/2/2018    Procedure: RADIO FREQUENCY ABLATION / DESTRUCTION OF SACROILOAC JOINT LATERAL BRANCHES (S1/S2/S3);  Bilateral Radiofrequency Ablation of  the Lateral Branches;  Surgeon: Benigno Willams MD;  [facility]     REPAIR MOHS Left 2017    Procedure: REPAIR MOHS;  Surgeon: Jorge Eric MD;  Location: [facility]     TUBAL LIGATION         ALLERGIES:     Allergies   Allergen Reactions     Codeine Sulfate      Disorientation, muscle weakness     Gabapentin      sleepiness       FAMILY HISTORY:  - Premature coronary artery disease  - Atrial fibrillation  - Sudden cardiac death     SOCIAL HISTORY:  Social History     Tobacco Use     Smoking status: Former Smoker     Packs/day: 1.00     Years: 14.00     Pack years: 14.00     Types: Cigarettes     Quit date: 1998     Years since quittin.8     Smokeless tobacco: Never Used   Substance Use Topics     Alcohol use: No     Drug use: No       ROS:   Constitutional: No fever, chills, or sweats. Weight stable.   ENT: No visual disturbance, ear ache, epistaxis, sore throat.   Cardiovascular: As per HPI.   Respiratory: No cough, hemoptysis.    GI: No nausea, vomiting, hematemesis, melena, or hematochezia.   : No hematuria.   Integument: Negative.   Psychiatric: Negative.   Hematologic:  Easy bruising, no easy bleeding.  Neuro: Negative.   Endocrinology: No significant heat or cold intolerance   Musculoskeletal: No myalgia.    Exam:  LMP 2015   GENERAL APPEARANCE: healthy, alert and no distress    Labs:  CBC RESULTS:   Lab Results   Component Value Date    WBC 4.0 2019    RBC 4.50 2019    HGB 13.5 2019    HCT 41.2 2019    MCV 92 2019    MCH 30.0 2019    MCHC 32.8 2019    RDW 12.4 2019     2019       BMP RESULTS:  Lab Results   Component Value Date     2020    POTASSIUM 4.1 2020    CHLORIDE 104 2020    CO2 30 2020    ANIONGAP 3 2020    GLC 99 2020    BUN 16 2020    CR 0.88 2020    GFRESTIMATED 76 2020    GFRESTBLACK 88 2020    CHAU 8.9 2020        INR RESULTS:  No  results found for: INR    Procedures:      Assessment and Plan:     Dyspnea on exertion for investigation    Given patient's symptoms of new dyspnea on exertion with mild activity, especially when the onset was after she recovered from COVID-19, we will obtain a an exercise stress echocardiogram.    There is no need for scheduled follow-up at the heart rhythm clinic; we will communicate the results to the patient.  However, if there is a need to discuss the findings we will schedule a video visit.    All questions and concerns were addressed and patient is happy with plan.      CC  Patient Care Team:  Cee Biggs MD as PCP - General (Family Practice)  Cee Biggs MD as Assigned PCP  ARMANDO PARDO      Please do not hesitate to contact me if you have any questions/concerns.     Sincerely,     Yulisa Larsen MD

## 2020-11-02 NOTE — PATIENT INSTRUCTIONS
Thank you for coming to the [facility]; please note the following instructions:    1. Dr. Larsen has ordered a stress echo to rule out any coronary artery disease.  Please call [phone] option 1 to schedule this test.          If you have any questions regarding your visit please contact your care team:     Cardiology  Telephone Number   Silva BOTELLO, RN  Yenny CARMONA, RN   Tierra BLAIR, RMA  Meaghan YAO, RMA  Karie GONZALEZ, LPN   [phone] (option 1)   For scheduling appts:     [phone] (select option 1)       For the Device Clinic (Pacemakers and ICD's)  RN's :  Mayra Lemus   During business hours: [phone]    *After business hours:  [phone] (select option 4)      Normal test result notifications will be released via [employer] or mailed within 7 business days.  All other test results, will be communicated via telephone once reviewed by your cardiologist.    If you need a medication refill please contact your pharmacy.  Please allow 3 business days for your refill to be completed.    As always, thank you for trusting us with your health care needs!

## 2020-11-05 DIAGNOSIS — Z12.31 VISIT FOR SCREENING MAMMOGRAM: ICD-10-CM

## 2020-11-05 PROCEDURE — 77067 SCR MAMMO BI INCL CAD: CPT | Performed by: RADIOLOGY

## 2020-11-11 ENCOUNTER — ANCILLARY PROCEDURE (OUTPATIENT)
Dept: CARDIOLOGY | Facility: CLINIC | Age: 50
End: 2020-11-11
Attending: INTERNAL MEDICINE
Payer: COMMERCIAL

## 2020-11-11 DIAGNOSIS — I47.10 PAROXYSMAL SUPRAVENTRICULAR TACHYCARDIA (H): ICD-10-CM

## 2020-11-11 DIAGNOSIS — R06.09 DYSPNEA ON EXERTION: ICD-10-CM

## 2020-11-11 DIAGNOSIS — R07.89 ATYPICAL CHEST PAIN: ICD-10-CM

## 2020-11-11 PROCEDURE — 93352 ADMIN ECG CONTRAST AGENT: CPT | Performed by: INTERNAL MEDICINE

## 2020-11-11 PROCEDURE — 93321 DOPPLER ECHO F-UP/LMTD STD: CPT | Mod: 26 | Performed by: INTERNAL MEDICINE

## 2020-11-11 PROCEDURE — 93325 DOPPLER ECHO COLOR FLOW MAPG: CPT | Mod: 26 | Performed by: INTERNAL MEDICINE

## 2020-11-11 PROCEDURE — 93321 DOPPLER ECHO F-UP/LMTD STD: CPT | Mod: TC | Performed by: INTERNAL MEDICINE

## 2020-11-11 PROCEDURE — 99207 PR STATISTIC IV PUSH SINGLE INITIAL SUBSTANCE: CPT | Performed by: INTERNAL MEDICINE

## 2020-11-11 PROCEDURE — 93350 STRESS TTE ONLY: CPT | Mod: 26 | Performed by: INTERNAL MEDICINE

## 2020-11-11 PROCEDURE — 93350 STRESS TTE ONLY: CPT | Mod: TC | Performed by: INTERNAL MEDICINE

## 2020-11-11 PROCEDURE — 93325 DOPPLER ECHO COLOR FLOW MAPG: CPT | Mod: TC | Performed by: INTERNAL MEDICINE

## 2020-11-11 PROCEDURE — 93017 CV STRESS TEST TRACING ONLY: CPT | Performed by: INTERNAL MEDICINE

## 2020-11-11 PROCEDURE — 93018 CV STRESS TEST I&R ONLY: CPT | Performed by: INTERNAL MEDICINE

## 2020-11-11 PROCEDURE — 93016 CV STRESS TEST SUPVJ ONLY: CPT | Performed by: INTERNAL MEDICINE

## 2020-11-11 RX ADMIN — Medication 3 ML: at 15:30

## 2020-11-17 ENCOUNTER — E-VISIT (OUTPATIENT)
Dept: FAMILY MEDICINE | Facility: CLINIC | Age: 50
End: 2020-11-17
Payer: COMMERCIAL

## 2020-11-17 ENCOUNTER — MYC MEDICAL ADVICE (OUTPATIENT)
Dept: CARDIOLOGY | Facility: CLINIC | Age: 50
End: 2020-11-17

## 2020-11-17 DIAGNOSIS — J01.90 ACUTE SINUSITIS WITH SYMPTOMS > 10 DAYS: Primary | ICD-10-CM

## 2020-11-17 PROCEDURE — 99421 OL DIG E/M SVC 5-10 MIN: CPT | Performed by: FAMILY MEDICINE

## 2020-11-17 NOTE — PATIENT INSTRUCTIONS
Thank you for choosing us for your care. I have placed an order for a prescription so that you can start treatment. View your full visit summary for details by clicking on the link below. Your pharmacist will able to address any questions you may have about the medication.     If you're not feeling better within 5-7 days, please schedule an appointment.  You can schedule an appointment right here in [facility], or call [phone]  If the visit is for the same symptoms as your e-visit, we'll refund the cost of your e-visit if seen within seven days.      Sinusitis (Antibiotic Treatment)    The sinuses are air-filled spaces within the bones of the face. They connect to the inside of the nose. Sinusitis is an inflammation of the tissue that lines the sinuses. Sinusitis can occur during a cold. It can also happen due to allergies to pollens and other particles in the air. Sinusitis can cause symptoms of sinus congestion and a feeling of fullness. A sinus infection causes fever, headache, and facial pain. There is often green or yellow fluid draining from the nose or into the back of the throat (post-nasal drip). You have been given antibiotics to treat this condition.   Home care    Take the full course of antibiotics as instructed. Don't stop taking them, even when you feel better.    Drink plenty of water, hot tea, and other liquids as directed by the healthcare provider. This may help thin nasal mucus. It also may help your sinuses drain fluids.    Heat may help soothe painful areas of your face. Use a towel soaked in hot water. Or,  the shower and direct the warm spray onto your face. Using a vaporizer along with a menthol rub at night may also help soothe symptoms.     An expectorant with guaifenesin may help thin nasal mucus and help your sinuses drain fluids. Talk with your provider or pharmacists before taking an over-the-counter (OTC) medicine if you have any questions about it or its side  effects..    You can use an OTC decongestant, unless a similar medicine was prescribed to you. Nasal sprays work the fastest. Use one that contains phenylephrine or oxymetazoline. First blow your nose gently. Then use the spray. Don't use these medicines more often than directed on the label. If you do, your symptoms may get worse. You may also take pills that contain pseudoephedrine. Don t use products that combine multiple medicines. This is because side effects may be increased. Read labels. You can also ask the pharmacist for help. (People with high blood pressure should not use decongestants. They can raise blood pressure.) Talk with your provider or pharmacist if you have any questions about the medicine..    OTC antihistamines may help if allergies contributed to your sinusitis. Talk with your provider or pharmacist if you have any questions about the medicine..    Don't use nasal rinses or irrigation during an acute sinus infection, unless your healthcare provider tells you to. Rinsing may spread the infection to other areas in your sinuses.    Use acetaminophen or ibuprofen to control pain, unless another pain medicine was prescribed to you. If you have chronic liver or kidney disease or ever had a stomach ulcer, talk with your healthcare provider before using these medicines. Never give aspirin to anyone under age 18 who is ill with a fever. It may cause severe liver damage.    Don't smoke. This can make symptoms worse.    Follow-up care  Follow up with your healthcare provider, or as advised.   When to seek medical advice  Call your healthcare provider if any of these occur:     Facial pain or headache that gets worse    Stiff neck    Unusual drowsiness or confusion    Swelling of your forehead or eyelids    Symptoms don't go away in 10 days    Vision problems, such as blurred or double vision    Fever of 100.4 F (38 C) or higher, or as directed by your healthcare provider  Call 911  Call 911 if any of  these occur:     Seizure    Trouble breathing    Feeling dizzy or faint    Fingernails, skin or lips look blue, purple , or gray  Prevention  Here are steps you can take to help prevent an infection:     Keep good hand washing habits.    Don t have close contact with people who have sore throats, colds, or other upper respiratory infections.    Don t smoke, and stay away from secondhand smoke.    Stay up to date with of your vaccines.  [employer] last reviewed this educational content on 12/1/2019 2000-2020 [employer] [place]. [address] [place] [address], [place] [address]. All rights reserved. This information is not intended as a substitute for professional medical care. Always follow your healthcare professional's instructions.

## 2020-12-21 ENCOUNTER — OFFICE VISIT (OUTPATIENT)
Dept: FAMILY MEDICINE | Facility: CLINIC | Age: 50
End: 2020-12-21
Payer: COMMERCIAL

## 2020-12-21 VITALS
TEMPERATURE: 97.6 F | SYSTOLIC BLOOD PRESSURE: 130 MMHG | DIASTOLIC BLOOD PRESSURE: 86 MMHG | HEART RATE: 85 BPM | OXYGEN SATURATION: 99 % | WEIGHT: 188 LBS | BODY MASS INDEX: 30.34 KG/M2

## 2020-12-21 DIAGNOSIS — Z23 NEED FOR TETANUS, DIPHTHERIA, AND ACELLULAR PERTUSSIS (TDAP) VACCINE: ICD-10-CM

## 2020-12-21 DIAGNOSIS — M71.21 POPLITEAL CYST, RIGHT: Primary | ICD-10-CM

## 2020-12-21 PROCEDURE — 99213 OFFICE O/P EST LOW 20 MIN: CPT | Mod: 25 | Performed by: PHYSICIAN ASSISTANT

## 2020-12-21 PROCEDURE — 90471 IMMUNIZATION ADMIN: CPT | Performed by: PHYSICIAN ASSISTANT

## 2020-12-21 PROCEDURE — 90715 TDAP VACCINE 7 YRS/> IM: CPT | Performed by: PHYSICIAN ASSISTANT

## 2020-12-21 ASSESSMENT — PAIN SCALES - GENERAL: PAINLEVEL: MODERATE PAIN (4)

## 2020-12-21 NOTE — PROGRESS NOTES
Subjective     Yenny Johnson is a 50 year old female who presents to clinic today for the following health issues:    HPI         Musculoskeletal problem/pain  Onset/Duration: 10 days ago  Description  Location: knee - right  Joint Swelling: YES  Redness: no  Pain: YES- throbbing, pressure back of knee  Warmth: no  Intensity:  mild, moderate, 4/10  Progression of Symptoms:  improving  Accompanying signs and symptoms:   Fevers: no  Numbness/tingling/weakness: no  History  Trauma to the area: no  Recent illness:  no  Previous similar problem: no  Previous evaluation:  no  Precipitating or alleviating factors:  Aggravating factors include: Bending  Therapies tried and outcome: nothing and Ibuprofen     Osgood Schlatters.    Reports the back of her knee feels very swollen and tight. Pain with crouching. Pain in extremes of flexion.     Review of Systems   Constitutional, HEENT, cardiovascular, pulmonary, gi and gu systems are negative, except as otherwise noted.      Objective    /86   Pulse 85   Temp 97.6  F (36.4  C) (Oral)   Wt 85.3 kg (188 lb)   LMP 08/07/2015   SpO2 99%   BMI 30.34 kg/m    Body mass index is 30.34 kg/m .  Physical Exam   GENERAL: healthy, alert and no distress  NECK: no adenopathy, no asymmetry, masses, or scars and thyroid normal to palpation  RESP: lungs clear to auscultation - no rales, rhonchi or wheezes  CV: regular rate and rhythm, normal S1 S2, no S3 or S4, no murmur, click or rub, no peripheral edema and peripheral pulses strong  ABDOMEN: soft, nontender, no hepatosplenomegaly, no masses and bowel sounds normal  MS: Right knee: posterior knee tender to palpation, swelling when compared to contralateral side. No erythema, no warmth, no joint effusion. ROM WNL. Varus, valgus negative. Lachman negative. Strength normal.         Assessment & Plan     Popliteal cyst, right  Mild joint degenerative changes suspected, but with pain limited to popliteal region, suspect popliteal cyst  causing her symptoms. Recommend she see non surgical sports med to have US evaluation and aspiration (+/- joint injection). Discussed with patient and she'd like to move forward with that evaluation. She has no further questions today.   - Orthopedic & Spine  Referral; Future    Need for tetanus, diphtheria, and acellular pertussis (Tdap) vaccine  Vaccinated today.  - TDAP VACCINE (Adacel, Boostrix)  [[id number]]        Return if symptoms worsen or fail to improve.    Sofiya Riddle PA-C  [facility]

## 2020-12-21 NOTE — NURSING NOTE
Prior to immunization administration, verified patients identity using patient s name and date of birth. Please see Immunization Activity for additional information.     Screening Questionnaire for Adult Immunization    Are you sick today?   No   Do you have allergies to medications, food, a vaccine component or latex?   No   Have you ever had a serious reaction after receiving a vaccination?   No   Do you have a long-term health problem with heart, lung, kidney, or metabolic disease (e.g., diabetes), asthma, a blood disorder, no spleen, complement component deficiency, a cochlear implant, or a spinal fluid leak?  Are you on long-term aspirin therapy?   No   Do you have cancer, leukemia, HIV/AIDS, or any other immune system problem?   No   Do you have a parent, brother, or sister with an immune system problem?   No   In the past 3 months, have you taken medications that affect  your immune system, such as prednisone, other steroids, or anticancer drugs; drugs for the treatment of rheumatoid arthritis, Crohn s disease, or psoriasis; or have you had radiation treatments?   No   Have you had a seizure, or a brain or other nervous system problem?   No   During the past year, have you received a transfusion of blood or blood    products, or been given immune (gamma) globulin or antiviral drug?   No   For women: Are you pregnant or is there a chance you could become       pregnant during the next month?   No   Have you received any vaccinations in the past 4 weeks?   No     Immunization questionnaire answers were all negative.      Vaccine information supplied.    Per orders of Sofiya Riddle, injection of Tdap given by Teri Calero. Patient instructed to remain in clinic for 15 minutes afterwards, and to report any adverse reaction to me immediately.       Screening performed by Teri Calero on 12/21/2020 at 11:04 AM.

## 2021-01-21 ENCOUNTER — ALLIED HEALTH/NURSE VISIT (OUTPATIENT)
Dept: NURSING | Facility: CLINIC | Age: 51
End: 2021-01-21
Attending: FAMILY MEDICINE
Payer: COMMERCIAL

## 2021-01-21 ENCOUNTER — E-VISIT (OUTPATIENT)
Dept: FAMILY MEDICINE | Facility: CLINIC | Age: 51
End: 2021-01-21
Payer: COMMERCIAL

## 2021-01-21 DIAGNOSIS — Z20.822 SUSPECTED COVID-19 VIRUS INFECTION: Primary | ICD-10-CM

## 2021-01-21 DIAGNOSIS — Z20.822 SUSPECTED COVID-19 VIRUS INFECTION: ICD-10-CM

## 2021-01-21 LAB
LABORATORY COMMENT REPORT: NORMAL
SARS-COV-2 RNA RESP QL NAA+PROBE: NEGATIVE
SARS-COV-2 RNA RESP QL NAA+PROBE: NORMAL
SPECIMEN SOURCE: NORMAL
SPECIMEN SOURCE: NORMAL

## 2021-01-21 PROCEDURE — U0003 INFECTIOUS AGENT DETECTION BY NUCLEIC ACID (DNA OR RNA); SEVERE ACUTE RESPIRATORY SYNDROME CORONAVIRUS 2 (SARS-COV-2) (CORONAVIRUS DISEASE [COVID-19]), AMPLIFIED PROBE TECHNIQUE, MAKING USE OF HIGH THROUGHPUT TECHNOLOGIES AS DESCRIBED BY CMS-2020-01-R: HCPCS | Performed by: FAMILY MEDICINE

## 2021-01-21 PROCEDURE — U0005 INFEC AGEN DETEC AMPLI PROBE: HCPCS | Performed by: FAMILY MEDICINE

## 2021-01-21 PROCEDURE — 99421 OL DIG E/M SVC 5-10 MIN: CPT | Performed by: FAMILY MEDICINE

## 2021-01-21 NOTE — RESULT ENCOUNTER NOTE
Your final test results are pending.  Please check your chart again. You will receive further instruction when your full test result panel is complete.

## 2021-01-21 NOTE — PATIENT INSTRUCTIONS
Dear Yenny Johnson,    Your symptoms show that you may have coronavirus (COVID-19). This illness can cause fever, cough and trouble breathing. Many people get a mild case and get better on their own. Some people can get very sick.    Will I be tested for COVID-19?  We would like to test you for Covid-19 virus. I have placed orders for this test.     For all employees or close contacts (except [employer] [place] [employer] and [employer] - see below), go to your [employer] page and scroll down to the section that says  You have an appointment that needs to be scheduled  and click the large green button that says  Schedule Now  and follow the steps to find the next available opening.     If you are unable to complete these steps or if you cannot find any available times, please call [phone] to schedule employee testing.     [employer] [facility] [employer] employees or close contacts, please call [phone].   [place] ([employer]) employees or close contacts call [phone].    Return to work/school/ guidance:  Please let your workplace manager and staffing office know when your quarantine ends     We can t give you an exact date as it depends on the above. You can calculate this on your own or work with your manager/staffing office to calculate this. (For example if you were exposed on 10/4, you would have to quarantine for 14 full days. That would be through 10/18. You could return on 10/19.)      If you receive a positive COVID-19 test result, follow the guidance of the those who are giving you the results. Usually the return to work is 10 (or in some cases 20 days from symptom onset.) If you work at [employer], you must also be cleared by Employee Occupational Health and Safety to return to work.        If you receive a negative COVID-19 test result and did not have a high risk exposure to someone with a known positive COVID-19 test, you can return to work once you're free of fever for 24 hours without fever-reducing medication and  your symptoms are improving or resolved.      If you receive a negative COVID-19 test and If you had a high risk exposure to someone who has tested positive for COVID-19 then you can return to work 14 days after your last contact with the positive individual    Note: If you have ongoing exposure to the covid positive person, this quarantine period may be more than 14 days. (For example, if you are continued to be exposed to your child who tested positive and cannot isolate from them, then the quarantine of 7-14 days can't start until your child is no longer contagious. This is typically 10 days from onset of the child's symptoms. So the total duration may be 17-24 days in this case.)    Sign up for [employer]   We know it's scary to hear that you might have COVID-19. We want to track your symptoms to make sure you're okay over the next 2 weeks. Please look for an email from [employer]this is a free, online program that we'll use to keep in touch. To sign up, follow the link in the email you will receive. Learn more at [email or web address]    How can I take care of myself?    Get lots of rest. Drink extra fluids (unless a doctor has told you not to)    Take Tylenol (acetaminophen) or ibuprofen for fever or pain. If you have liver or kidney problems, ask your family doctor if it's okay to take Tylenol o ibuprofen    If you have other health problems (like cancer, heart failure, an organ transplant or severe kidney disease): Call your specialty clinic if you don't feel better in the next 2 days.    Know when to call 911. Emergency warning signs include:  o Trouble breathing or shortness of breath  o Pain or pressure in the chest that doesn't go away  o Feeling confused like you haven't felt before, or not being able to wake up  o Bluish-colored lips or face    Where can I get more information?   [employer] [facility] - About COVID-19:   [email or web address]    CDC - What to Do If You're Sick:    www.cdc.gov/coronavirus/2019-ncov/about/steps-when-sick.html

## 2021-02-16 ENCOUNTER — VIRTUAL VISIT (OUTPATIENT)
Dept: FAMILY MEDICINE | Facility: CLINIC | Age: 51
End: 2021-02-16
Payer: COMMERCIAL

## 2021-02-16 DIAGNOSIS — E03.9 ACQUIRED HYPOTHYROIDISM: ICD-10-CM

## 2021-02-16 DIAGNOSIS — F32.81 PMDD (PREMENSTRUAL DYSPHORIC DISORDER): ICD-10-CM

## 2021-02-16 DIAGNOSIS — R53.83 FATIGUE, UNSPECIFIED TYPE: Primary | ICD-10-CM

## 2021-02-16 DIAGNOSIS — I47.10 PAROXYSMAL SUPRAVENTRICULAR TACHYCARDIA (H): ICD-10-CM

## 2021-02-16 DIAGNOSIS — L65.9 HAIR LOSS: ICD-10-CM

## 2021-02-16 DIAGNOSIS — N95.1 PERIMENOPAUSAL VASOMOTOR SYMPTOMS: ICD-10-CM

## 2021-02-16 PROCEDURE — 99213 OFFICE O/P EST LOW 20 MIN: CPT | Mod: 95 | Performed by: FAMILY MEDICINE

## 2021-02-16 NOTE — PROGRESS NOTES
Yenny is a 50 year old who is being evaluated via a billable video visit.      How would you like to obtain your AVS? MyChart  If the video visit is dropped, the invitation should be resent by: Send to e-mail at: [email or web address]  Will anyone else be joining your video visit? No      Video Start Time: 4:20 pm    Assessment & Plan     Fatigue, unspecified type  -encouraged regular and adequate sleep and healthy lifestyle strategies   -offered post COVID clinic referral  -Call or return to clinic as needed if these symptoms worsen or fail to improve as anticipated to consider sleep study     PMDD (premenstrual dysphoric disorder)  Perimenopausal vasomotor symptoms  -Well controlled with medications without side effects.     Acquired hypothyroidism  -euthyroid on replacement   - TSH with free T4 reflex; Future    Hair loss  -Differential diagnoses would include: age related hair loss, so we discussed use of Rogaine over-the-counter   -The patient is reassured that these symptoms do not appear to represent a serious or threatening condition.    Paroxysmal supraventricular tachycardia (H)  -resolved     See Patient Instructions    Return in about 7 months (around 9/16/2021) for physical.    Cee Biggs MD  [facility]    Subjective   Yenny is a 50 year old who presents for the following health issues     HPI       Chief Complaint   Patient presents with     Menopausal Sx     Fatigue, hair loss     Yenny Johnson is a 50 year old female who presents with fatigue, mild hot flashes, and hair loss since a diagnosis of COVID-19 10 months ago. Symptom onset has been gradual, worsening for a time period of 10 months. Severity is described as mild. Course of her symptoms over time is worsening.         Review of Systems   CONSTITUTIONAL:POSITIVE  for weight gain  INTEGUMENTARY/SKIN: hair loss  ENT/MOUTH: NEGATIVE for ear, mouth and throat problems  RESP: NEGATIVE for significant cough or  SOB  MUSCULOSKELETAL:back pain  ENDOCRINE: Hx thyroid disease  PSYCHIATRIC: NEGATIVE for changes in mood or affect      Objective           Vitals:  No vitals were obtained today due to virtual visit.    Physical Exam   GENERAL: alert and no distress  EYES: Eyes grossly normal to inspection.  No discharge or erythema, or obvious scleral/conjunctival abnormalities.  RESP: No audible wheeze, cough, or visible cyanosis.  No visible retractions or increased work of breathing.    SKIN: Visible skin clear. No significant rash, abnormal pigmentation or lesions.  NEURO: Cranial nerves grossly intact.  Mentation and speech appropriate for age.  PSYCH: Mentation appears normal, affect normal/bright, judgement and insight intact, normal speech and appearance well-groomed.    Allied Health/Nurse Visit on 01/21/2021   Component Date Value Ref [place] Status     COVID-19 Virus PCR to [facility] - So* 01/21/2021 Nasopharyngeal   Final     COVID-19 Virus PCR to [facility] - Re* 01/21/2021 Test received-See reflex to [facility] test SARS CoV2 (COVID-19) Virus RT-PCR   Final     SARS-CoV-2 Virus Specimen Source 01/21/2021 Nasopharyngeal   Final     SARS-CoV-2 PCR Result 01/21/2021 NEGATIVE   Final    SARS-CoV2 (COVID-19) RNA not detected, presumed negative.     SARS-CoV-2 PCR Comment 01/21/2021    Final                    Value:Testing was performed using the Aptima SARS-CoV-2 Assay on the AVST Instrument System.   Additional information about this Emergency Use Authorization (EUA) assay can be found via   the Lab Guide.      Comment: This test should be ordered for the detection of SARS-CoV-2 in individuals who   meet SARS-CoV-2 clinical and/or epidemiological criteria. Test performance is   unknown in asymptomatic patients.  This test is for in vitro diagnostic use under the FDA EUA for laboratories   certified under CLIA to perform high complexity testing. This test has not   been FDA cleared or approved.  A negative result does not rule out  the presence of PCR inhibitors in the   specimen or target RNA in concentration below the limit of detection for the   assay. The possibility of a false negative should be considered if the   patient's recent exposure or clinical presentation suggests COVID-19.  This test was validated by the [facility]   Diagnostic Laboratory. This laboratory is certified under the Clinical   Laboratory Improvement Amendments of 1988 (CLIA-88) as qualified to perform   high complexity laboratory testing.                   Video-Visit Details    Type of service:  Video Visit    Video End Time:4:32 pm    Originating Location (pt. Location): Home    Distant Location (provider location):  [facility]     Platform used for Video Visit: [employer]

## 2021-02-22 ENCOUNTER — MYC MEDICAL ADVICE (OUTPATIENT)
Dept: FAMILY MEDICINE | Facility: CLINIC | Age: 51
End: 2021-02-22

## 2021-02-22 DIAGNOSIS — E03.9 ACQUIRED HYPOTHYROIDISM: ICD-10-CM

## 2021-02-22 DIAGNOSIS — E03.9 ACQUIRED HYPOTHYROIDISM: Primary | ICD-10-CM

## 2021-02-22 LAB — TSH SERPL DL<=0.005 MIU/L-ACNC: 3.53 MU/L (ref 0.4–4)

## 2021-02-22 PROCEDURE — 36415 COLL VENOUS BLD VENIPUNCTURE: CPT | Performed by: FAMILY MEDICINE

## 2021-02-22 PROCEDURE — 84443 ASSAY THYROID STIM HORMONE: CPT | Performed by: FAMILY MEDICINE

## 2021-02-22 RX ORDER — LEVOTHYROXINE SODIUM 88 UG/1
88 TABLET ORAL DAILY
Qty: 90 TABLET | Refills: 3 | Status: SHIPPED | OUTPATIENT
Start: 2021-02-22 | End: 2022-01-16

## 2021-02-22 NOTE — TELEPHONE ENCOUNTER
Dr. Biggs,   Please see eoSemi message below and advise. Pt is responding to result note from today. Thanks.    Lyndsay Lassiter RN  [facility]

## 2021-04-09 DIAGNOSIS — E03.9 ACQUIRED HYPOTHYROIDISM: ICD-10-CM

## 2021-04-09 LAB — TSH SERPL DL<=0.005 MIU/L-ACNC: 1.75 MU/L (ref 0.4–4)

## 2021-04-09 PROCEDURE — 36415 COLL VENOUS BLD VENIPUNCTURE: CPT | Performed by: FAMILY MEDICINE

## 2021-04-09 PROCEDURE — 84443 ASSAY THYROID STIM HORMONE: CPT | Performed by: FAMILY MEDICINE

## 2021-07-03 ENCOUNTER — OFFICE VISIT (OUTPATIENT)
Dept: URGENT CARE | Facility: URGENT CARE | Age: 51
End: 2021-07-03
Payer: COMMERCIAL

## 2021-07-03 VITALS
TEMPERATURE: 99.7 F | DIASTOLIC BLOOD PRESSURE: 81 MMHG | SYSTOLIC BLOOD PRESSURE: 131 MMHG | OXYGEN SATURATION: 98 % | HEART RATE: 101 BPM

## 2021-07-03 DIAGNOSIS — R10.31 RLQ ABDOMINAL PAIN: Primary | ICD-10-CM

## 2021-07-03 PROCEDURE — 99215 OFFICE O/P EST HI 40 MIN: CPT | Performed by: INTERNAL MEDICINE

## 2021-07-03 ASSESSMENT — ENCOUNTER SYMPTOMS
FEVER: 1
COUGH: 0
CHILLS: 1
HEMATURIA: 0
DIAPHORESIS: 0
MYALGIAS: 0
ABDOMINAL PAIN: 1
BACK PAIN: 0
CONSTIPATION: 0
NAUSEA: 1
DIARRHEA: 0
HEADACHES: 0
DIFFICULTY URINATING: 0
VOMITING: 0
SORE THROAT: 0

## 2021-07-03 NOTE — PROGRESS NOTES
ASSESSMENT AND PLAN:      ICD-10-CM    1. RLQ abdominal pain  R10.31        Differential Diagnosis:  Abdominal Pain: Appendix, Diverticular Disease, Ovarian torsion/cyst , Bowel Adhesions with history of past surgeries    History of hysterectomy and cholecystectomy    PLAN:  Patient was referred to the emergency room for evaluation of right lower quadrant pain.    Discussed differential and due to the amount of pain she was referred out to ER  she is to go by private transport    There are no Patient Instructions on file for this visit.  Return in about 1 day (around 7/4/2021) for To ER.        Louisa Reis MD  [facility]    Subjective     Yenny Johnson is a 50 year old who presents for Patient presents with:  Abdominal Pain: pain so severe starting to feel nausea and chills/sweats. radiates to RLQ    an established patient of [facility].    Abdominal Pain  Patient developed abdominal pain yesterday evening noticed while on a walk.  It kept her up all night.  She is having difficulty describing it  Location: RLQ and periumbilical   Radiation: None.     Associated Symptoms: nausea.    Surgical History: cholecystectomy and Hysterectomy  Has appendix & ovaries        Review of Systems   Constitutional: Positive for chills and fever (99.5). Negative for diaphoresis.   HENT: Negative for sore throat.    Respiratory: Negative for cough.    Gastrointestinal: Positive for abdominal pain and nausea. Negative for constipation, diarrhea and vomiting. Heartburn: has reflux.        Had a normal BM.  This did not help her pain   Genitourinary: Negative for difficulty urinating, hematuria and vaginal discharge.   Musculoskeletal: Negative for back pain and myalgias.   Skin: Negative for rash.   Neurological: Negative for headaches.           Objective    /81   Pulse 101   Temp 99.7  F (37.6  C) (Tympanic)   LMP 08/07/2015   SpO2 98%   Physical Exam  Vitals signs reviewed.   Constitutional:        General: She is in acute distress.      Appearance: Normal appearance. She is ill-appearing. She is not diaphoretic.   Cardiovascular:      Rate and Rhythm: Normal rate and regular rhythm.      Pulses: Normal pulses.      Heart sounds: Normal heart sounds.   Pulmonary:      Effort: Pulmonary effort is normal.   Abdominal:      Palpations: Abdomen is soft.      Tenderness: There is abdominal tenderness. There is guarding. There is no right CVA tenderness or left CVA tenderness.      Hernia: No hernia is present.      Comments: Tenderness noticed greatest right lower quadrant    Also noted suprapubic periumbilical left lower quadrant and left mid quadrant   Neurological:      Mental Status: She is alert.

## 2021-07-11 ENCOUNTER — MYC MEDICAL ADVICE (OUTPATIENT)
Dept: FAMILY MEDICINE | Facility: CLINIC | Age: 51
End: 2021-07-11

## 2021-07-12 NOTE — TELEPHONE ENCOUNTER
Okay to schedule end of day Wednesday, same day, or double book end of day any day  Cee Biggs MD

## 2021-07-20 ENCOUNTER — VIRTUAL VISIT (OUTPATIENT)
Dept: FAMILY MEDICINE | Facility: CLINIC | Age: 51
End: 2021-07-20
Payer: COMMERCIAL

## 2021-07-20 DIAGNOSIS — K57.32 DIVERTICULITIS OF COLON: Primary | ICD-10-CM

## 2021-07-20 DIAGNOSIS — Z12.11 SPECIAL SCREENING FOR MALIGNANT NEOPLASMS, COLON: ICD-10-CM

## 2021-07-20 PROCEDURE — 99213 OFFICE O/P EST LOW 20 MIN: CPT | Mod: 95 | Performed by: FAMILY MEDICINE

## 2021-07-20 NOTE — PROGRESS NOTES
"Jaci is a 51 year old who is being evaluated via a billable telephone visit.      What phone number would you like to be contacted at? [phone]  How would you like to obtain your AVS? MyChart    Assessment & Plan     Diverticulitis of colon  -resolved  -follow-up colonoscopy in 4 - 6 weeks     Special screening for malignant neoplasms, colon  - Adult Gastro Ref - Procedure Only; Future    Ordering of each unique test         BMI:   Estimated body mass index is 30.34 kg/m  as calculated from the following:    Height as of 9/16/20: 1.676 m (5' 6\").    Weight as of 12/21/20: 85.3 kg (188 lb).   Weight management plan: Patient was referred to their PCP to discuss a diet and exercise plan.    See Patient Instructions    Return in about 8 weeks (around 9/16/2021) for physical.    Cee Biggs MD  [facility] KEIKO Beatty is a 51 year old who presents for the following health issues     HPI       Hospital Follow-up Visit:    Hospital/Nursing Home/ Rehab Facility: [facility]  Date of Admission: 7-3-21  Date of Discharge: 7-6-21  Reason(s) for Admission: Diverticulitis       Was your hospitalization related to COVID-19? No   Problems taking medications regularly:  None  Medication changes since discharge: None  Problems adhering to non-medication therapy:  None    Summary of hospitalization:  CareEverywhere information obtained and reviewed  Diagnostic Tests/Treatments reviewed.  Follow up needed: colonoscopy   Other Healthcare Providers Involved in Patient s Care:         None  Update since discharge: improved.       Post Discharge Medication Reconciliation: discharge medications reconciled, continue medications without change.  Plan of care communicated with patient                  Review of Systems   CONSTITUTIONAL: NEGATIVE for fever, chills, change in weight  RESP: NEGATIVE for significant cough or SOB  GI: NEGATIVE for nausea, abdominal pain, heartburn, or change in bowel " habits      Objective           Vitals:  No vitals were obtained today due to virtual visit.    Physical Exam   healthy, alert and no distress  PSYCH: Alert and oriented times 3; coherent speech, normal   rate and volume, able to articulate logical thoughts, able   to abstract reason, no tangential thoughts, no hallucinations   or delusions  Her affect is normal  RESP: No cough, no audible wheezing, able to talk in full sentences  Remainder of exam unable to be completed due to telephone visits    Orders Only on 04/09/2021   Component Date Value Ref Range Status     TSH 04/09/2021 1.75  0.40 - 4.00 mU/L Final               Phone call duration: 6 minutes

## 2021-07-29 ENCOUNTER — TELEPHONE (OUTPATIENT)
Dept: GASTROENTEROLOGY | Facility: OUTPATIENT CENTER | Age: 51
End: 2021-07-29

## 2021-07-29 DIAGNOSIS — Z11.59 ENCOUNTER FOR SCREENING FOR OTHER VIRAL DISEASES: ICD-10-CM

## 2021-07-29 NOTE — TELEPHONE ENCOUNTER
Screening Questions  1. Are you active on mychart? yes     2. What insurance is in the chart? Preferred one    2.  Ordering/Referring Provider: joss    3. BMI 30.3    4. Are you on daily home oxygen? no    5. Do you have a history of difficult airway? no    6. Have you had a heart, lung, or liver transplant? no    7. Are you currently on dialysis? no    8. Have you had a stroke or Transient ischemic atttack (TIA) within 6 months? no    9. In the past 6 months, have you had any heart related issues including cardiomyopathy or heart attack?         If yes, did it require cardiac stenting or other implantable device?no    10. Do you have any implantable devices in your body (pacemaker, defib, LVAD)? no    11. Do you take nitroglycerin? If yes, how often? no    12. Are you currently taking any blood thinners?no    13. Are you a diabetic? no    14. (Females) Are you currently pregnant? no  If yes, how many weeks?    15. Have you had a procedure in the past that was difficult to tolerate with conscious sedation? Any allergies to Fentanyl or Versed no    16. Are you taking any scheduled prescription narcotics more than once daily? no    17. Do you have any chemical dependencies such as alcohol, street drugs, or methadone? no    18. Do you have any history of post-traumatic stress syndrome or mental health issues? no    19. Do you transfer independently? yes    20.  Do you have any issues with constipation? no    21. Preferred Pharmacy for Pre Prescription in chart    Scheduling Details    Colonoscopy Prep Sent?: miralax  Procedure Scheduled: colonoscopy  Provider/Surgeon: eric  Date of Procedure: 08/30/21  Location: [place]  Caller (Please ask for phone number if not scheduled by patient): erica krishnamurthy      Sedation Type: cs  Conscious Sedation- Needs  for 6 hours after the procedure  MAC/General-Needs  for 24 hours after procedure    Pre-op Required at [facility] [place], [place] and OR for  MAC sedation:   (if yes advise patient they will need a pre-op prior to procedure)      Is patient on blood thinners? -no (If yes- inform patient to follow up with PCP or provider for follow up instructions)     Informed patient they will need an adult  yes  Cannot take any type of public or medical transportation alone    Informed Patient of COVID Test Requirement yes, will have one at her employment which will flow into her epic chart    Confirmed Nurse will call to complete assessment yes    Additional comments:

## 2021-08-04 ENCOUNTER — TELEPHONE (OUTPATIENT)
Dept: GASTROENTEROLOGY | Facility: CLINIC | Age: 51
End: 2021-08-04

## 2021-08-04 NOTE — TELEPHONE ENCOUNTER
Caller: Yenny    Procedure: Colonoscopy    Date of Procedure Cancelled: 8/30/21 with Dr. Willett    Ordering Provider: Cee Biggs MD in [facility]    Reason for cancel: Work conflict    Rescheduled: yes, patient rescheduled on 9/27/21 with Dr. Urena     If rescheduled:    Date: 9/27/21   Location: [place]   Note any change or update to original order/sedation: n/a

## 2021-08-08 DIAGNOSIS — Z11.59 ENCOUNTER FOR SCREENING FOR OTHER VIRAL DISEASES: ICD-10-CM

## 2021-09-24 ENCOUNTER — ALLIED HEALTH/NURSE VISIT (OUTPATIENT)
Dept: NURSING | Facility: CLINIC | Age: 51
End: 2021-09-24
Payer: COMMERCIAL

## 2021-09-24 DIAGNOSIS — Z11.59 ENCOUNTER FOR SCREENING FOR OTHER VIRAL DISEASES: ICD-10-CM

## 2021-09-24 LAB — SARS-COV-2 RNA RESP QL NAA+PROBE: NEGATIVE

## 2021-09-24 PROCEDURE — U0005 INFEC AGEN DETEC AMPLI PROBE: HCPCS

## 2021-09-24 PROCEDURE — 999N000103 HC STATISTIC NO CHARGE FACILITY FEE

## 2021-09-26 ENCOUNTER — HEALTH MAINTENANCE LETTER (OUTPATIENT)
Age: 51
End: 2021-09-26

## 2021-09-27 ENCOUNTER — HOSPITAL ENCOUNTER (OUTPATIENT)
Facility: AMBULATORY SURGERY CENTER | Age: 51
Discharge: HOME | End: 2021-09-27
Attending: INTERNAL MEDICINE | Admitting: INTERNAL MEDICINE
Payer: COMMERCIAL

## 2021-09-27 VITALS
OXYGEN SATURATION: 100 % | TEMPERATURE: 97.4 F | HEART RATE: 83 BPM | DIASTOLIC BLOOD PRESSURE: 81 MMHG | SYSTOLIC BLOOD PRESSURE: 131 MMHG | RESPIRATION RATE: 16 BRPM

## 2021-09-27 LAB — COLONOSCOPY: NORMAL

## 2021-09-27 PROCEDURE — G8918 PT W/O PREOP ORDER IV AB PRO: HCPCS

## 2021-09-27 PROCEDURE — 45378 DIAGNOSTIC COLONOSCOPY: CPT

## 2021-09-27 PROCEDURE — G8907 PT DOC NO EVENTS ON DISCHARG: HCPCS

## 2021-09-27 RX ORDER — NALOXONE HYDROCHLORIDE 0.4 MG/ML
0.4 INJECTION, SOLUTION INTRAMUSCULAR; INTRAVENOUS; SUBCUTANEOUS
Status: DISCONTINUED | OUTPATIENT
Start: 2021-09-27 | End: 2021-09-28 | Stop reason: HOSPADM

## 2021-09-27 RX ORDER — ONDANSETRON 4 MG/1
4 TABLET, ORALLY DISINTEGRATING ORAL EVERY 6 HOURS PRN
Status: DISCONTINUED | OUTPATIENT
Start: 2021-09-27 | End: 2021-09-28 | Stop reason: HOSPADM

## 2021-09-27 RX ORDER — ONDANSETRON 2 MG/ML
4 INJECTION INTRAMUSCULAR; INTRAVENOUS EVERY 6 HOURS PRN
Status: DISCONTINUED | OUTPATIENT
Start: 2021-09-27 | End: 2021-09-28 | Stop reason: HOSPADM

## 2021-09-27 RX ORDER — FLUMAZENIL 0.1 MG/ML
0.2 INJECTION, SOLUTION INTRAVENOUS
Status: ACTIVE | OUTPATIENT
Start: 2021-09-27 | End: 2021-09-27

## 2021-09-27 RX ORDER — ONDANSETRON 2 MG/ML
4 INJECTION INTRAMUSCULAR; INTRAVENOUS
Status: DISCONTINUED | OUTPATIENT
Start: 2021-09-27 | End: 2021-09-28 | Stop reason: HOSPADM

## 2021-09-27 RX ORDER — NALOXONE HYDROCHLORIDE 0.4 MG/ML
0.2 INJECTION, SOLUTION INTRAMUSCULAR; INTRAVENOUS; SUBCUTANEOUS
Status: DISCONTINUED | OUTPATIENT
Start: 2021-09-27 | End: 2021-09-28 | Stop reason: HOSPADM

## 2021-09-27 RX ORDER — LIDOCAINE 40 MG/G
CREAM TOPICAL
Status: DISCONTINUED | OUTPATIENT
Start: 2021-09-27 | End: 2021-09-28 | Stop reason: HOSPADM

## 2021-09-27 RX ORDER — PROCHLORPERAZINE MALEATE 10 MG
10 TABLET ORAL EVERY 6 HOURS PRN
Status: DISCONTINUED | OUTPATIENT
Start: 2021-09-27 | End: 2021-09-28 | Stop reason: HOSPADM

## 2021-09-27 RX ORDER — FENTANYL CITRATE 50 UG/ML
INJECTION, SOLUTION INTRAMUSCULAR; INTRAVENOUS PRN
Status: DISCONTINUED | OUTPATIENT
Start: 2021-09-27 | End: 2021-09-27 | Stop reason: HOSPADM

## 2021-10-05 ENCOUNTER — MYC MEDICAL ADVICE (OUTPATIENT)
Dept: FAMILY MEDICINE | Facility: CLINIC | Age: 51
End: 2021-10-05

## 2021-10-05 DIAGNOSIS — K21.9 GASTROESOPHAGEAL REFLUX DISEASE WITHOUT ESOPHAGITIS: Primary | ICD-10-CM

## 2021-10-14 ENCOUNTER — ALLIED HEALTH/NURSE VISIT (OUTPATIENT)
Dept: NURSING | Facility: CLINIC | Age: 51
End: 2021-10-14
Attending: PEDIATRICS
Payer: COMMERCIAL

## 2021-10-14 DIAGNOSIS — Z20.822 EXPOSURE TO 2019 NOVEL CORONAVIRUS: ICD-10-CM

## 2021-10-14 DIAGNOSIS — Z20.822 EXPOSURE TO 2019 NOVEL CORONAVIRUS: Primary | ICD-10-CM

## 2021-10-14 LAB — SARS-COV-2 RNA RESP QL NAA+PROBE: NEGATIVE

## 2021-10-14 PROCEDURE — U0005 INFEC AGEN DETEC AMPLI PROBE: HCPCS

## 2021-10-14 NOTE — NURSING NOTE
Chief Complaint   Patient presents with     Allied Health Visit     covid swab     Sussy Adler, DALTONN

## 2021-11-02 ENCOUNTER — TELEPHONE (OUTPATIENT)
Dept: FAMILY MEDICINE | Facility: CLINIC | Age: 51
End: 2021-11-02

## 2021-11-08 ENCOUNTER — ALLIED HEALTH/NURSE VISIT (OUTPATIENT)
Dept: NURSING | Facility: CLINIC | Age: 51
End: 2021-11-08
Attending: PEDIATRICS
Payer: COMMERCIAL

## 2021-11-08 DIAGNOSIS — Z20.822 SUSPECTED COVID-19 VIRUS INFECTION: Primary | ICD-10-CM

## 2021-11-08 DIAGNOSIS — Z20.822 SUSPECTED COVID-19 VIRUS INFECTION: ICD-10-CM

## 2021-11-08 LAB — SARS-COV-2 RNA RESP QL NAA+PROBE: POSITIVE

## 2021-11-08 PROCEDURE — U0003 INFECTIOUS AGENT DETECTION BY NUCLEIC ACID (DNA OR RNA); SEVERE ACUTE RESPIRATORY SYNDROME CORONAVIRUS 2 (SARS-COV-2) (CORONAVIRUS DISEASE [COVID-19]), AMPLIFIED PROBE TECHNIQUE, MAKING USE OF HIGH THROUGHPUT TECHNOLOGIES AS DESCRIBED BY CMS-2020-01-R: HCPCS

## 2021-11-08 NOTE — LETTER
November 9, 2021      Yenny Johnson  [address], [place] [address]          COVID-19 Virus PCR to [facility] - Result (no units)   Date Value   01/21/2021     Test received-See reflex to [facility] test SARS CoV2 (COVID-19) Virus RT-PCR     SARS-CoV-2 PCR Result (no units)   Date Value   01/21/2021 NEGATIVE     SARS CoV2 PCR (no units)   Date Value   11/08/2021 Positive (A)       This letter provides a written record that you were tested for COVID-19. Your result was positive. This means you have COVID-19 (coronavirus).    How can I protect others?If you have symptoms, stay home and away from others (self-isolate) until:You ve had no fever--and no medicine that reduces fever--for 1 full day (24 hours). And      Your other symptoms have gotten better. For example, your cough or breathing has improved. And     At least 10 days have passed since your symptoms started. (If you've been told by a doctor that you have a weak immune system, wait 20 days).    If you don t have symptoms: Stay home and away from others (self-isolate) until at least 10 days have passed since your first positive COVID-19 test. If you have a weak immune system, please self-isolate for 20 days.    During this time:    Stay in your own room, including for meals. Use your own bathroom if you can.    Stay away from others in your home. No hugging, kissing or shaking hands. No visitors.     Don t go to work, school or anywhere else.     Clean  high touch  surfaces often (doorknobs, counters, handles, etc.). Use a household cleaning spray or wipes. You ll find a full list on the EPA website at www.epa.gov/pesticide-registration/list-n-disinfectants-use-against-sars-cov-2.     Cover your mouth and nose with a mask or other face covering to avoid spreading germs.    Wash your hands and face often with soap and water.    Make a list of people you have been in close contact with recently, even if either of you wore a face covering.  o Start  your list from 2 days before you became ill or had a positive test.  o Include anyone that was within 6 feet of you for a cumulative total of 15 minutes or more in 24 hours. (Example: if you sat next to Homer for 5 minutes in the morning and 10 minutes in the afternoon, then you were in close contact for 15 minutes total that day. Homer would be added to your list.)        A public health worker will call or text you. It is important that you answer. They will ask you questions about possible exposures to COVID-19, such as people you have been in direct contact with and places you have visited.    Tell the people on your list that you have COVID-19; they should stay away from others for 14 days starting form the last time they were in contact with you (unless you are told something different from a public health worker).    Caregivers in these groups are at risk for severe illness due to COVID-19:  o People 65 years and older  o People who live in a nursing home or long-term care facility  o People with chronic disease (lung, heart, cancer, diabetes, kidney, liver, immunologic)  o People who have a weakened immune system, including those who:  - Are in cancer treatment  - Take medicine that weakens the immune system, such as corticosteroids  - Had a bone marrow or organ transplant  - Have an immune deficiency  - Have poorly controlled HIV or AIDS  - Are obese (body mass index of 40 or higher)  - Smoke regularly    Caregivers should wear gloves while washing dishes, handling laundry and cleaning bedrooms and bathrooms.    Wash and dry laundry with special caution. Don t shake dirty laundry, and use the warmest water setting you can.    If you have a weakened immune system, ask your doctor about other actions you should take.    For more tips, go to www.cdc.gov/coronavirus/2019-ncov/downloads/10Things.pdf.    You should not go back to work until you meet the guidelines above for ending your home isolation. You don't  need to be retested for COVID-19 before going back to work- studies show that you won't spread the virus if it's been at least 10 days since your symptoms started (or 20 days, if you have a weak immune system).    Employers: This document serves as formal notice of your employee s medical guidelines for going back to work. They must meet the above guidelines before going back to work in person.    How can I take care of myself?    1. Get lots of rest. Drink extra fluids (unless a doctor has told you not to).    2. Take Tylenol (acetaminophen) for fever or pain. If you have liver or kidney problems, ask your family doctor if it s okay to take Tylenol.     Take either:     650 mg (two 325 mg pills) every 4 to 6 hours, or     1,000 mg (two 500 mg pills) every 8 hours as needed.     Note: Don t take more than 3,000 mg in one day. Acetaminophen is found in many medicines (both prescribed and over-the-counter medicines). Read all labels to be sure you don t take too much.    For children, check the Tylenol bottle for the right dose (based on their age or weight).    3. If you have other health problems (like cancer, heart failure, an organ transplant or severe kidney disease): Call your specialty clinic if you don t feel better in the next 2 days.    4. Know when to call 911: Emergency warning signs include:    Trouble breathing or shortness of breath    Pain or pressure in the chest that doesn t go away    Feeling confused like you haven t felt before, or not being able to wake up    Bluish-colored lips or face    5. Sign up for [employer]. We know it s scary to hear that you have COVID-19. We want to track your symptoms to make sure you re okay over the next 2 weeks. Please look for an email from [employer]--this is a free, online program that we ll use to keep in touch. To sign up, follow the link in the email. Learn more at [email or web address].      Where can I get more information?    [facility]:  [email or web address]    Coronavirus Basics: [email or web address]/[email or web address]/basics.html    What to Do If You re Sick: www.cdc.gov/coronavirus/2019-ncov/about/steps-when-sick.html    Ending Home Isolation: www.cdc.gov/coronavirus/2019-ncov/hcp/disposition-in-home-patients.html     Caring for Someone with COVID-19: www.cdc.gov/coronavirus/2019-ncov/if-you-are-sick/care-for-someone.html     [facility] clinical trials (COVID-19 research studies): clinicalaffairs.[facility]-clinical-trials

## 2021-11-17 ENCOUNTER — E-VISIT (OUTPATIENT)
Dept: FAMILY MEDICINE | Facility: CLINIC | Age: 51
End: 2021-11-17
Payer: COMMERCIAL

## 2021-11-17 DIAGNOSIS — J01.90 ACUTE SINUSITIS WITH SYMPTOMS > 10 DAYS: Primary | ICD-10-CM

## 2021-11-17 PROCEDURE — 99421 OL DIG E/M SVC 5-10 MIN: CPT | Performed by: FAMILY MEDICINE

## 2021-11-17 NOTE — PATIENT INSTRUCTIONS
Sinusitis (Antibiotic Treatment)    The sinuses are air-filled spaces within the bones of the face. They connect to the inside of the nose. Sinusitis is an inflammation of the tissue that lines the sinuses. Sinusitis can occur during a cold. It can also happen due to allergies to pollens and other particles in the air. Sinusitis can cause symptoms of sinus congestion and a feeling of fullness. A sinus infection causes fever, headache, and facial pain. There is often green or yellow fluid draining from the nose or into the back of the throat (post-nasal drip). You have been given antibiotics to treat this condition.   Home care    Take the full course of antibiotics as instructed. Don't stop taking them, even when you feel better.    Drink plenty of water, hot tea, and other liquids as directed by the healthcare provider. This may help thin nasal mucus. It also may help your sinuses drain fluids.    Heat may help soothe painful areas of your face. Use a towel soaked in hot water. Or,  the shower and direct the warm spray onto your face. Using a vaporizer along with a menthol rub at night may also help soothe symptoms.     An expectorant with guaifenesin may help thin nasal mucus and help your sinuses drain fluids. Talk with your provider or pharmacists before taking an over-the-counter (OTC) medicine if you have any questions about it or its side effects..    You can use an OTC decongestant, unless a similar medicine was prescribed to you. Nasal sprays work the fastest. Use one that contains phenylephrine or oxymetazoline. First blow your nose gently. Then use the spray. Don't use these medicines more often than directed on the label. If you do, your symptoms may get worse. You may also take pills that contain pseudoephedrine. Don t use products that combine multiple medicines. This is because side effects may be increased. Read labels. You can also ask the pharmacist for help. (People with high blood  pressure should not use decongestants. They can raise blood pressure.) Talk with your provider or pharmacist if you have any questions about the medicine..    OTC antihistamines may help if allergies contributed to your sinusitis. Talk with your provider or pharmacist if you have any questions about the medicine..    Don't use nasal rinses or irrigation during an acute sinus infection, unless your healthcare provider tells you to. Rinsing may spread the infection to other areas in your sinuses.    Use acetaminophen or ibuprofen to control pain, unless another pain medicine was prescribed to you. If you have chronic liver or kidney disease or ever had a stomach ulcer, talk with your healthcare provider before using these medicines. Never give aspirin to anyone under age 18 who is ill with a fever. It may cause severe liver damage.    Don't smoke. This can make symptoms worse.    Follow-up care  Follow up with your healthcare provider, or as advised.   When to seek medical advice  Call your healthcare provider if any of these occur:     Facial pain or headache that gets worse    Stiff neck    Unusual drowsiness or confusion    Swelling of your forehead or eyelids    Symptoms don't go away in 10 days    Vision problems, such as blurred or double vision    Fever of 100.4 F (38 C) or higher, or as directed by your healthcare provider  Call 911  Call 911 if any of these occur:     Seizure    Trouble breathing    Feeling dizzy or faint    Fingernails, skin or lips look blue, purple , or gray  Prevention  Here are steps you can take to help prevent an infection:     Keep good hand washing habits.    Don t have close contact with people who have sore throats, colds, or other upper respiratory infections.    Don t smoke, and stay away from secondhand smoke.    Stay up to date with of your vaccines.  [employer] last reviewed this educational content on 12/1/2019 2000-2021 [employer]. All rights reserved. This  information is not intended as a substitute for professional medical care. Always follow your healthcare professional's instructions.        Dear Yenny,    After reviewing your responses, I've been able to diagnose you with?a sinus infection caused by bacteria.?Because you have also been diagnosed with COVID, you should continue to isolate until your symptoms have improved for 24 hours and at least 10 days since onset of symptoms.     Based on your responses and diagnosis, I have prescribed Augmentin to treat your symptoms. I have sent this to your pharmacy.?     It is also important to stay well hydrated, get lots of rest and take over-the-counter decongestants,?tylenol?or ibuprofen if you?are able to?take those medications per your primary care provider to help relieve discomfort.?     It is important that you take?all of?your prescribed medication even if your symptoms are improving after a few doses.? Taking?all of?your medicine helps prevent the symptoms from returning.?     If your symptoms worsen, you develop severe headache, vomiting, high fever (>102), or are not improving in 7 days, please contact your primary care provider for an appointment or visit any of our convenient Walk-in Care or Urgent Care Centers to be seen which can be found on our website?here.?     Thanks again for choosing?us?as your health care partner,?   ?  Cee Biggs MD?

## 2021-11-21 ENCOUNTER — HEALTH MAINTENANCE LETTER (OUTPATIENT)
Age: 51
End: 2021-11-21

## 2021-12-14 ENCOUNTER — LAB (OUTPATIENT)
Dept: LAB | Facility: CLINIC | Age: 51
End: 2021-12-14
Payer: COMMERCIAL

## 2021-12-14 DIAGNOSIS — K21.9 GASTROESOPHAGEAL REFLUX DISEASE WITHOUT ESOPHAGITIS: ICD-10-CM

## 2021-12-14 LAB
ANION GAP SERPL CALCULATED.3IONS-SCNC: 3 MMOL/L (ref 3–14)
BUN SERPL-MCNC: 18 MG/DL (ref 7–30)
CALCIUM SERPL-MCNC: 9.5 MG/DL (ref 8.5–10.1)
CHLORIDE BLD-SCNC: 102 MMOL/L (ref 94–109)
CO2 SERPL-SCNC: 30 MMOL/L (ref 20–32)
CREAT SERPL-MCNC: 0.88 MG/DL (ref 0.52–1.04)
GFR SERPL CREATININE-BSD FRML MDRD: 76 ML/MIN/1.73M2
GLUCOSE BLD-MCNC: 107 MG/DL (ref 70–99)
POTASSIUM BLD-SCNC: 4.4 MMOL/L (ref 3.4–5.3)
SODIUM SERPL-SCNC: 135 MMOL/L (ref 133–144)

## 2021-12-14 PROCEDURE — 36415 COLL VENOUS BLD VENIPUNCTURE: CPT

## 2021-12-14 PROCEDURE — 80048 BASIC METABOLIC PNL TOTAL CA: CPT

## 2022-01-12 ENCOUNTER — ANCILLARY PROCEDURE (OUTPATIENT)
Dept: MAMMOGRAPHY | Facility: CLINIC | Age: 52
End: 2022-01-12
Payer: COMMERCIAL

## 2022-01-12 DIAGNOSIS — Z12.31 VISIT FOR SCREENING MAMMOGRAM: ICD-10-CM

## 2022-01-12 PROCEDURE — 77067 SCR MAMMO BI INCL CAD: CPT | Mod: GC | Performed by: RADIOLOGY

## 2022-01-12 PROCEDURE — 77063 BREAST TOMOSYNTHESIS BI: CPT | Mod: GC | Performed by: RADIOLOGY

## 2022-01-13 DIAGNOSIS — E03.9 ACQUIRED HYPOTHYROIDISM: ICD-10-CM

## 2022-01-15 DIAGNOSIS — F32.81 PMDD (PREMENSTRUAL DYSPHORIC DISORDER): ICD-10-CM

## 2022-01-16 RX ORDER — LEVOTHYROXINE SODIUM 88 UG/1
TABLET ORAL
Qty: 90 TABLET | Refills: 0 | Status: SHIPPED | OUTPATIENT
Start: 2022-01-16 | End: 2022-02-02

## 2022-01-17 NOTE — TELEPHONE ENCOUNTER
Prescription approved per [facility] Refill Protocol.      Lyndsay Lassiter RN  [facility]

## 2022-02-01 ASSESSMENT — ENCOUNTER SYMPTOMS
BREAST MASS: 0
HEADACHES: 0
FREQUENCY: 0
SORE THROAT: 0
ARTHRALGIAS: 1
HEMATOCHEZIA: 0
WEAKNESS: 1
HEARTBURN: 0
CONSTIPATION: 0
DIZZINESS: 0
NERVOUS/ANXIOUS: 0
NAUSEA: 0
PALPITATIONS: 1
COUGH: 1
EYE PAIN: 0
PARESTHESIAS: 0
DYSURIA: 0
DIARRHEA: 0
MYALGIAS: 0
SHORTNESS OF BREATH: 0
HEMATURIA: 0
ABDOMINAL PAIN: 0
CHILLS: 0
FEVER: 0
JOINT SWELLING: 0

## 2022-02-02 ENCOUNTER — OFFICE VISIT (OUTPATIENT)
Dept: FAMILY MEDICINE | Facility: CLINIC | Age: 52
End: 2022-02-02
Payer: COMMERCIAL

## 2022-02-02 ENCOUNTER — ANCILLARY PROCEDURE (OUTPATIENT)
Dept: GENERAL RADIOLOGY | Facility: CLINIC | Age: 52
End: 2022-02-02
Attending: FAMILY MEDICINE
Payer: COMMERCIAL

## 2022-02-02 ENCOUNTER — MYC MEDICAL ADVICE (OUTPATIENT)
Dept: FAMILY MEDICINE | Facility: CLINIC | Age: 52
End: 2022-02-02

## 2022-02-02 VITALS
OXYGEN SATURATION: 98 % | BODY MASS INDEX: 32.32 KG/M2 | HEART RATE: 79 BPM | TEMPERATURE: 97.8 F | SYSTOLIC BLOOD PRESSURE: 137 MMHG | DIASTOLIC BLOOD PRESSURE: 86 MMHG | HEIGHT: 65 IN | WEIGHT: 194 LBS

## 2022-02-02 DIAGNOSIS — E78.5 HYPERLIPIDEMIA LDL GOAL <130: Primary | ICD-10-CM

## 2022-02-02 DIAGNOSIS — N95.1 PERIMENOPAUSAL SYMPTOM: ICD-10-CM

## 2022-02-02 DIAGNOSIS — M25.561 CHRONIC PAIN OF RIGHT KNEE: ICD-10-CM

## 2022-02-02 DIAGNOSIS — G89.29 CHRONIC PAIN OF RIGHT KNEE: ICD-10-CM

## 2022-02-02 DIAGNOSIS — E03.9 ACQUIRED HYPOTHYROIDISM: ICD-10-CM

## 2022-02-02 DIAGNOSIS — Z00.00 ROUTINE GENERAL MEDICAL EXAMINATION AT A HEALTH CARE FACILITY: Primary | ICD-10-CM

## 2022-02-02 LAB
CHOLEST SERPL-MCNC: 239 MG/DL
FASTING STATUS PATIENT QL REPORTED: ABNORMAL
HCV AB SERPL QL IA: NONREACTIVE
HDLC SERPL-MCNC: 40 MG/DL
LDLC SERPL CALC-MCNC: 131 MG/DL
LDLC SERPL CALC-MCNC: ABNORMAL MG/DL
NONHDLC SERPL-MCNC: 199 MG/DL
TRIGL SERPL-MCNC: 411 MG/DL
TSH SERPL DL<=0.005 MIU/L-ACNC: 1.74 MU/L (ref 0.4–4)

## 2022-02-02 PROCEDURE — 90732 PPSV23 VACC 2 YRS+ SUBQ/IM: CPT | Performed by: FAMILY MEDICINE

## 2022-02-02 PROCEDURE — 84443 ASSAY THYROID STIM HORMONE: CPT | Performed by: FAMILY MEDICINE

## 2022-02-02 PROCEDURE — 90750 HZV VACC RECOMBINANT IM: CPT | Performed by: FAMILY MEDICINE

## 2022-02-02 PROCEDURE — 36415 COLL VENOUS BLD VENIPUNCTURE: CPT | Performed by: FAMILY MEDICINE

## 2022-02-02 PROCEDURE — 86803 HEPATITIS C AB TEST: CPT | Performed by: FAMILY MEDICINE

## 2022-02-02 PROCEDURE — 90471 IMMUNIZATION ADMIN: CPT | Performed by: FAMILY MEDICINE

## 2022-02-02 PROCEDURE — 80061 LIPID PANEL: CPT | Performed by: FAMILY MEDICINE

## 2022-02-02 PROCEDURE — 99213 OFFICE O/P EST LOW 20 MIN: CPT | Mod: 25 | Performed by: FAMILY MEDICINE

## 2022-02-02 PROCEDURE — 99396 PREV VISIT EST AGE 40-64: CPT | Mod: 25 | Performed by: FAMILY MEDICINE

## 2022-02-02 PROCEDURE — 73562 X-RAY EXAM OF KNEE 3: CPT | Mod: RT | Performed by: RADIOLOGY

## 2022-02-02 PROCEDURE — 83721 ASSAY OF BLOOD LIPOPROTEIN: CPT | Mod: 59 | Performed by: FAMILY MEDICINE

## 2022-02-02 PROCEDURE — 90472 IMMUNIZATION ADMIN EACH ADD: CPT | Performed by: FAMILY MEDICINE

## 2022-02-02 RX ORDER — ATORVASTATIN CALCIUM 20 MG/1
20 TABLET, FILM COATED ORAL DAILY
Qty: 90 TABLET | Refills: 3 | Status: SHIPPED | OUTPATIENT
Start: 2022-02-02 | End: 2022-12-19

## 2022-02-02 RX ORDER — LEVOTHYROXINE SODIUM 88 UG/1
88 TABLET ORAL DAILY
Qty: 90 TABLET | Refills: 3 | Status: SHIPPED | OUTPATIENT
Start: 2022-02-02 | End: 2022-12-19

## 2022-02-02 ASSESSMENT — ENCOUNTER SYMPTOMS
ARTHRALGIAS: 1
FREQUENCY: 0
DIZZINESS: 0
HEMATURIA: 0
DYSURIA: 0
FEVER: 0
ABDOMINAL PAIN: 0
DIARRHEA: 0
SORE THROAT: 0
MYALGIAS: 0
CHILLS: 0
EYE PAIN: 0
HEADACHES: 0
HEMATOCHEZIA: 0
HEARTBURN: 0
NAUSEA: 0
COUGH: 1
SHORTNESS OF BREATH: 0
JOINT SWELLING: 0
PARESTHESIAS: 0
WEAKNESS: 1
NERVOUS/ANXIOUS: 0
PALPITATIONS: 1
CONSTIPATION: 0
BREAST MASS: 0

## 2022-02-02 ASSESSMENT — MIFFLIN-ST. JEOR: SCORE: 1492.11

## 2022-02-02 ASSESSMENT — ASTHMA QUESTIONNAIRES
ACT_TOTALSCORE: 25
QUESTION_3 LAST FOUR WEEKS HOW OFTEN DID YOUR ASTHMA SYMPTOMS (WHEEZING, COUGHING, SHORTNESS OF BREATH, CHEST TIGHTNESS OR PAIN) WAKE YOU UP AT NIGHT OR EARLIER THAN USUAL IN THE MORNING: NOT AT ALL
QUESTION_2 LAST FOUR WEEKS HOW OFTEN HAVE YOU HAD SHORTNESS OF BREATH: NOT AT ALL
QUESTION_5 LAST FOUR WEEKS HOW WOULD YOU RATE YOUR ASTHMA CONTROL: COMPLETELY CONTROLLED
QUESTION_1 LAST FOUR WEEKS HOW MUCH OF THE TIME DID YOUR ASTHMA KEEP YOU FROM GETTING AS MUCH DONE AT WORK, SCHOOL OR AT HOME: NONE OF THE TIME
QUESTION_4 LAST FOUR WEEKS HOW OFTEN HAVE YOU USED YOUR RESCUE INHALER OR NEBULIZER MEDICATION (SUCH AS ALBUTEROL): NOT AT ALL
ACT_TOTALSCORE: 25

## 2022-02-02 NOTE — PROGRESS NOTES
SUBJECTIVE:   CC: Yenny Johnson is an 51 year old woman  who presents for preventive health visit.     Patient has been advised of split billing requirements and indicates understanding: Yes     Patient also presents for follow-up of hypothyroidism, controlled on current medication.  Denies symptoms of hypo- or hyperthyroidism. She take fluoxetine for perimenopausal mood changes. Patient also presents with right knee pain for months with instability but no injury, locking, or swelling.    Healthy Habits:     Getting at least 3 servings of Calcium per day:  Yes    Bi-annual eye exam:  Yes    Dental care twice a year:  Yes    Sleep apnea or symptoms of sleep apnea:  None    Diet:  Regular (no restrictions)    Frequency of exercise:  1 day/week    Duration of exercise:  30-45 minutes    Taking medications regularly:  Yes    Medication side effects:  Not applicable    PHQ-2 Total Score: 0    Additional concerns today:  Yes      Pain in right knee. Pain score is 2-3/10 right now but can be 7/10.    Today's PHQ-2 Score:   PHQ-2 (  [employer]) 2022   Q1: Little interest or pleasure in doing things 0   Q2: Feeling down, depressed or hopeless 0   PHQ-2 Score 0   PHQ-2 Total Score (12-17 Years)- Positive if 3 or more points; Administer PHQ-A if positive -   Q1: Little interest or pleasure in doing things Not at all   Q2: Feeling down, depressed or hopeless Not at all   PHQ-2 Score 0       Abuse: Current or Past (Physical, Sexual or Emotional) - No  Do you feel safe in your environment? Yes    Have you ever done Advance Care Planning? (For example, a Health Directive, POLST, or a discussion with a medical provider or your loved ones about your wishes): No, advance care planning information given to patient to review.  Advanced care planning was discussed at today's visit.    Social History     Tobacco Use     Smoking status: Former Smoker     Packs/day: 1.00     Years: 14.00     Pack years: 14.00     Types:  Cigarettes     Quit date: 3/30/1998     Years since quittin.8     Smokeless tobacco: Never Used   Substance Use Topics     Alcohol use: No         Alcohol Use 2022   Prescreen: >3 drinks/day or >7 drinks/week? No   Prescreen: >3 drinks/day or >7 drinks/week? -       Reviewed orders with patient.  Reviewed health maintenance and updated orders accordingly - Yes  Patient Active Problem List   Diagnosis     PMDD (premenstrual dysphoric disorder)     Obesity     Stress incontinence, female     GERD (gastroesophageal reflux disease)     Hypothyroidism     Lumbar disc herniation     Intermittent asthma     Hyperlipidemia LDL goal <130     Past Surgical History:   Procedure Laterality Date     BACK SURGERY  2017    ablation     BIOPSY       C/SECTION, CLASSICAL       CHOLECYSTECTOMY, LAPOROSCOPIC       DESTRUCTION OF PARAVERTEBRAL FACET LUMBAR / SACRAL SINGLE Bilateral 2017    Procedure: DESTRUCTION OF PARAVERTEBRAL FACET LUMBAR / SACRAL SINGLE;  Radiofrequency Ablation of the Lumbar Facets /bilateral  heating of nerves around spine bilatteraly for purpose of pain rekief;  Surgeon: Benigno Willams MD;  Location: [facility]     HYSTERECTOMY, PAP NO LONGER INDICATED  2015     INJECT EPIDURAL LUMBAR / SACRAL SINGLE Bilateral 2017    Procedure: INJECT EPIDURAL LUMBAR / SACRAL SINGLE;  bilateral lumbar medial branch block:injection of local anesthetic into area around spine for purpose of pain relief;  Surgeon: Benigno Willams MD;  Location: [facility]     INJECT PARAVERTEBRAL FACET JOINT LUMBAR / SACRAL FIRST Bilateral 2017    Procedure: INJECT PARAVERTEBRAL FACET JOINT LUMBAR / SACRAL FIRST;  injection of local anesthesthetic into area around spine for purpose of pain relief;  Surgeon: Benigno Willams MD;  Location: [facility]     INJECT PARAVERTEBRAL FACET JOINT LUMBAR / SACRAL THIRD Bilateral 3/9/2018    Procedure: INJECT PARAVERTEBRAL FACET JOINT LUMBAR / SACRAL THIRD;  Bilateral S1, S2, S3  Lateral Branch Nerve Block and L5 Dorsal Ramus Nerve Block;  Surgeon: Kristi Gomes MD;  Location: [facility]     INJECT SACROILIAC JOINT Bilateral 2018    Procedure: INJECT SACROILIAC JOINT;  Bilateral sacroiliac Lateral Branch Block;  Surgeon: Edison Sams MD;  Location: [facility]     RADIO FREQUENCY ABLATION / DESTRUCTION OF SACROILOAC JOINT LATERAL BRANCHES (S1/S2/S3) Bilateral 2018    Procedure: RADIO FREQUENCY ABLATION / DESTRUCTION OF SACROILOAC JOINT LATERAL BRANCHES (S1/S2/S3);  Bilateral Radiofrequency Ablation of the Lateral Branches;  Surgeon: Benigno Willams MD;  Location: [facility]     REPAIR MOHS Left 2017    Procedure: REPAIR MOHS;  Surgeon: Jorge Eric MD;  Location: [facility]     TUBAL LIGATION         Social History     Tobacco Use     Smoking status: Former Smoker     Packs/day: 1.00     Years: 14.00     Pack years: 14.00     Types: Cigarettes     Quit date: 3/30/1998     Years since quittin.8     Smokeless tobacco: Never Used   Substance Use Topics     Alcohol use: No     Family History   Problem Relation Age of Onset     Cardiovascular Mother         CHF      Coronary Artery Disease Mother         Cholesterol     Hyperlipidemia Mother      C.A.D. Father 67        MI     Hypertension Father      Alcohol/Drug Father      Obesity Sister      Obesity Sister      Diabetes Brother      Asthma Brother      Obesity Brother      Coronary Artery Disease Brother      Allergies Maternal Grandmother      Diabetes Maternal Grandmother      Diabetes Paternal Grandfather      Seasonal/Environmental Allergies Daughter      Cancer No family hx of          Current Outpatient Medications   Medication Sig Dispense Refill     FLUoxetine (PROZAC) 20 MG capsule TAKE 3 CAPSULES BY MOUTH EVERY  capsule 3     Iron-Vitamin C (VITRON-C PO) Take by mouth daily       levothyroxine (SYNTHROID/LEVOTHROID) 88 MCG tablet Take 1 tablet (88 mcg) by mouth daily 90 tablet 3     Allergies    Allergen Reactions     Codeine Sulfate      Disorientation, muscle weakness     Gabapentin      sleepiness       Breast Cancer Screening:  Any new diagnosis of family breast, ovarian, or bowel cancer? No    FHS-7:   Breast CA Risk Assessment (FHS-7) 1/12/2022   Did any of your first-degree relatives have breast or ovarian cancer? No   Did any of your relatives have bilateral breast cancer? No   Did any man in your family have breast cancer? No   Did any woman in your family have breast and ovarian cancer? No   Did any woman in your family have breast cancer before age 50 y? No   Do you have 2 or more relatives with breast and/or ovarian cancer? No   Do you have 2 or more relatives with breast and/or bowel cancer? No        Mammogram Screening: Recommended annual mammography  Pertinent mammograms are reviewed under the imaging tab.    History of abnormal Pap smear: Status post benign hysterectomy. Health Maintenance and Surgical History updated.  PAP / HPV 3/29/2012   PAP (Historical) NIL     Reviewed and updated as needed this visit by clinical staff  Tobacco  Allergies  Meds  Problems  Med Hx  Surg Hx  Fam Hx  Soc Hx         Reviewed and updated as needed this visit by Provider  Tobacco  Allergies  Meds  Problems  Med Hx  Surg Hx  Fam Hx        Past Medical History:   Diagnosis Date     Anemia      BCC (basal cell carcinoma of skin)      Degeneration of lumbar or lumbosacral intervertebral disc      Depressive disorder      Diverticulitis of colon      Elevated fasting glucose      Elevated rheumatoid factor 12/12/2012     GERD (gastroesophageal reflux disease) 11/2005    EGD     Hyperlipidemia LDL goal <130      Hypertriglyceridemia      Hypothyroidism      Intermittent asthma      Lumbar disc herniation      Obesity 03/29/2012     Paroxysmal supraventricular tachycardia (H) 02/16/2021     PMDD (premenstrual dysphoric disorder)      Stress incontinence, female 03/29/2012        Review of Systems  "  Constitutional: Negative for chills and fever.   HENT: Positive for ear pain. Negative for congestion, hearing loss and sore throat.    Eyes: Negative for pain and visual disturbance.   Respiratory: Positive for cough. Negative for shortness of breath.    Cardiovascular: Positive for chest pain and palpitations. Negative for peripheral edema.   Gastrointestinal: Negative for abdominal pain, constipation, diarrhea, heartburn, hematochezia and nausea.   Breasts:  Negative for tenderness, breast mass and discharge.   Genitourinary: Negative for dysuria, frequency, genital sores, hematuria, pelvic pain, urgency, vaginal bleeding and vaginal discharge.   Musculoskeletal: Positive for arthralgias. Negative for joint swelling and myalgias.   Skin: Negative for rash.   Neurological: Positive for weakness. Negative for dizziness, headaches and paresthesias.   Psychiatric/Behavioral: Negative for mood changes. The patient is not nervous/anxious.           OBJECTIVE:   /86 (BP Location: Left arm, Patient Position: Sitting, Cuff Size: Adult Large)   Pulse 79   Temp 97.8  F (36.6  C) (Oral)   Ht 1.645 m (5' 4.76\")   Wt 88 kg (194 lb)   LMP 08/07/2015   SpO2 98%   Breastfeeding No   BMI 32.52 kg/m    Physical Exam  GENERAL: alert, no distress and obese  EYES: Eyes grossly normal to inspection, PERRL and conjunctivae and sclerae normal  HENT: ear canals and TM's normal, nose and mouth without ulcers or lesions  NECK: no adenopathy, no asymmetry, masses, or scars and thyroid normal to palpation  RESP: lungs clear to auscultation - no rales, rhonchi or wheezes  CV: regular rate and rhythm, normal S1 S2, no S3 or S4, no murmur, click or rub, no peripheral edema   ABDOMEN: soft, nontender, no hepatosplenomegaly, no masses and bowel sounds normal  MS: no gross musculoskeletal defects noted, no edema  SKIN: no suspicious lesions or rashes  NEURO: Normal strength and tone, mentation intact and speech normal  PSYCH: " mentation appears normal, affect normal/bright    Diagnostic Test Results:  Labs reviewed in Epic    ASSESSMENT/PLAN:   (Z00.00) Routine general medical examination at a health care facility  (primary encounter diagnosis)  Plan: Hepatitis C Screen Reflex to HCV RNA Quant and         Genotype, Lipid panel reflex to direct LDL         Fasting          (E03.9) Acquired hypothyroidism  Comment: euthyroid on replacement   Plan: levothyroxine (SYNTHROID/LEVOTHROID) 88 MCG         tablet, TSH with free T4 reflex, OFFICE/OUTPT         VISIT,EST,LEVL III          (M25.561,  G89.29) Chronic pain of right knee  Comment: Differential diagnoses would include: degenerative joint disease or internal derangement   Plan: XR Knee Right 3 Views, OFFICE/OUTPT         VISIT,EST,LEVL III        Consider referral pending results; follow-up as needed     (N95.1) Perimenopausal symptom  Comment: Well controlled with medications without side effects.   Plan: FLUoxetine (PROZAC) 20 MG capsule, OFFICE/OUTPT        VISIT,EST,LEVL III            Patient has been advised of split billing requirements and indicates understanding: Yes    COUNSELING:  Reviewed preventive health counseling, as reflected in patient instructions  Special attention given to:        Regular exercise       Healthy diet/nutrition       Osteoporosis prevention/bone health       Colorectal Cancer Screening       Consider Hep C screening for all patients one time for ages 18-79 years       HIV screeninx in teen years, 1x in adult years, and at intervals if high risk       (Rody)menopause management       The 10-year ASCVD risk score (Luis Alberto HAWKINS Jr., et al., 2013) is: 2.4%    Values used to calculate the score:      Age: 51 years      Sex: Female      Is Non- : No      Diabetic: No      Tobacco smoker: No      Systolic Blood Pressure: 137 mmHg      Is BP treated: No      HDL Cholesterol: 42 mg/dL      Total Cholesterol: 222 mg/dL    Estimated body mass  "index is 32.52 kg/m  as calculated from the following:    Height as of this encounter: 1.645 m (5' 4.76\").    Weight as of this encounter: 88 kg (194 lb).    Weight management plan: Discussed healthy diet and exercise guidelines    She reports that she quit smoking about 23 years ago. Her smoking use included cigarettes. She has a 14.00 pack-year smoking history. She has never used smokeless tobacco.      Counseling Resources:  ATP IV Guidelines  Pooled Cohorts Equation Calculator  Breast Cancer Risk Calculator  BRCA-Related Cancer Risk Assessment: FHS-7 Tool  FRAX Risk Assessment  ICSI Preventive Guidelines  Dietary Guidelines for Americans, 2010  USDA's MyPlate  ASA Prophylaxis  Lung CA Screening    Cee Biggs MD  [facility]  "

## 2022-02-02 NOTE — RESULT ENCOUNTER NOTE
Yenny,    This is a good result. No arthritis. It is still possible to have an internal derangement like meniscal tear, and we can consider physical therapy. Let me know if you'd like a referral. Call or return to clinic as needed if these symptoms worsen or fail to improve as anticipated.     Cee Biggs MD

## 2022-02-02 NOTE — LETTER
My Asthma Action Plan    Name: Yenny Johnson   YOB: 1970  Date: 2/2/2022   My doctor: Cee Biggs MD   My clinic: [facility]        My Rescue Medicine:   Albuterol inhaler (Proair/Ventolin/Proventil HFA)  2-4 puffs EVERY 4 HOURS as needed. Use a spacer if recommended by your provider.   My Asthma Severity:   Intermittent / Exercise Induced  Know your asthma triggers: upper respiratory infections             GREEN ZONE   Good Control    I feel good    No cough or wheeze    Can work, sleep and play without asthma symptoms       Take your asthma control medicine every day.     1. If exercise triggers your asthma, take your rescue medication    15 minutes before exercise or sports, and    During exercise if you have asthma symptoms  2. Spacer to use with inhaler: If you have a spacer, make sure to use it with your inhaler             YELLOW ZONE Getting Worse  I have ANY of these:    I do not feel good    Cough or wheeze    Chest feels tight    Wake up at night   1. Keep taking your Green Zone medications  2. Start taking your rescue medicine:    every 20 minutes for up to 1 hour. Then every 4 hours for 24-48 hours.  3. If you stay in the Yellow Zone for more than 12-24 hours, contact your doctor.  4. If you do not return to the Green Zone in 12-24 hours or you get worse, start taking your oral steroid medicine if prescribed by your provider.           RED ZONE Medical Alert - Get Help  I have ANY of these:    I feel awful    Medicine is not helping    Breathing getting harder    Trouble walking or talking    Nose opens wide to breathe       1. Take your rescue medicine NOW  2. If your provider has prescribed an oral steroid medicine, start taking it NOW  3. Call your doctor NOW  4. If you are still in the Red Zone after 20 minutes and you have not reached your doctor:    Take your rescue medicine again and    Call 911 or go to the emergency room right away    See your regular  doctor within 2 weeks of an Emergency Room or Urgent Care visit for follow-up treatment.          Annual Reminders:  Meet with Asthma Educator,  Flu Shot in the Fall, consider Pneumonia Vaccination for patients with asthma (aged 19 and older).    Pharmacy:    [id number] - [place], [place] - [address] [facility] [id number] [facility] - [place], [place] - [address]    Electronically signed by Cee Biggs MD   Date: 02/02/22                    Asthma Triggers  How To Control Things That Make Your Asthma Worse    Triggers are things that make your asthma worse.  Look at the list below to help you find your triggers and   what you can do about them. You can help prevent asthma flare-ups by staying away from your triggers.      Trigger                                                          What you can do   Cigarette Smoke  Tobacco smoke can make asthma worse. Do not allow smoking in your home, car or around you.  Be sure no one smokes at a child s day care or school.  If you smoke, ask your health care provider for ways to help you quit.  Ask family members to quit too.  Ask your health care provider for a referral to Quit Plan to help you quit smoking, or call 6-880-075-PLAN.     Colds, Flu, Bronchitis  These are common triggers of asthma. Wash your hands often.  Don t touch your eyes, nose or mouth.  Get a flu shot every year.     Dust Mites  These are tiny bugs that live in cloth or carpet. They are too small to see. Wash sheets and blankets in hot water every week.   Encase pillows and mattress in dust mite proof covers.  Avoid having carpet if you can. If you have carpet, vacuum weekly.   Use a dust mask and HEPA vacuum.   Pollen and Outdoor Mold  Some people are allergic to trees, grass, or weed pollen, or molds. Try to keep your windows closed.  Limit time out doors when pollen count is high.   Ask you health care provider about taking medicine during allergy season.     Animal Dander  Some people  are allergic to skin flakes, urine or saliva from pets with fur or feathers. Keep pets with fur or feathers out of your home.    If you can t keep the pet outdoors, then keep the pet out of your bedroom.  Keep the bedroom door closed.  Keep pets off cloth furniture and away from stuffed toys.     Mice, Rats, and Cockroaches  Some people are allergic to the waste from these pests.   Cover food and garbage.  Clean up spills and food crumbs.  Store grease in the refrigerator.   Keep food out of the bedroom.   Indoor Mold  This can be a trigger if your home has high moisture. Fix leaking faucets, pipes, or other sources of water.   Clean moldy surfaces.  Dehumidify basement if it is damp and smelly.   Smoke, Strong Odors, and Sprays  These can reduce air quality. Stay away from strong odors and sprays, such as perfume, powder, hair spray, paints, smoke incense, paint, cleaning products, candles and new carpet.   Exercise or Sports  Some people with asthma have this trigger. Be active!  Ask your doctor about taking medicine before sports or exercise to prevent symptoms.    Warm up for 5-10 minutes before and after sports or exercise.     Other Triggers of Asthma  Cold air:  Cover your nose and mouth with a scarf.  Sometimes laughing or crying can be a trigger.  Some medicines and food can trigger asthma.

## 2022-02-02 NOTE — RESULT ENCOUNTER NOTE
Yenny,    Your hepatitis C and thyroid tests are normal. Your cholesterol is high. I recommend increasing exercise and eating at least 4 to 5 servings of fruits and vegetables daily. Follow-up yearly.     Cee Biggs MD

## 2022-02-10 NOTE — NURSING NOTE
Vitals - Patient Reported  Weight (Patient Reported): 82.1 kg (181 lb)    Meaghan Carreno MA   Fractionation Number: 9

## 2022-03-22 ENCOUNTER — E-VISIT (OUTPATIENT)
Dept: FAMILY MEDICINE | Facility: CLINIC | Age: 52
End: 2022-03-22
Payer: COMMERCIAL

## 2022-03-22 DIAGNOSIS — M25.569 KNEE PAIN, UNSPECIFIED CHRONICITY, UNSPECIFIED LATERALITY: ICD-10-CM

## 2022-03-22 DIAGNOSIS — M25.559 HIP PAIN: Primary | ICD-10-CM

## 2022-03-22 PROCEDURE — 99207 PR NON-BILLABLE SERV PER CHARTING: CPT | Performed by: FAMILY MEDICINE

## 2022-03-23 NOTE — PATIENT INSTRUCTIONS
Thank you for choosing us for your care. I think an in-clinic visit would be best next steps based on your symptoms. Please schedule a clinic appointment; you won t be charged for this eVisit.      You can schedule an appointment right here in [facility], or call [phone]

## 2022-03-29 ENCOUNTER — MYC MEDICAL ADVICE (OUTPATIENT)
Dept: FAMILY MEDICINE | Facility: CLINIC | Age: 52
End: 2022-03-29
Payer: COMMERCIAL

## 2022-03-29 DIAGNOSIS — G89.29 CHRONIC PAIN OF RIGHT KNEE: Primary | ICD-10-CM

## 2022-03-29 DIAGNOSIS — M25.561 CHRONIC PAIN OF RIGHT KNEE: Primary | ICD-10-CM

## 2022-04-06 ENCOUNTER — OFFICE VISIT (OUTPATIENT)
Dept: ORTHOPEDICS | Facility: CLINIC | Age: 52
End: 2022-04-06
Attending: FAMILY MEDICINE
Payer: COMMERCIAL

## 2022-04-06 VITALS
HEIGHT: 65 IN | DIASTOLIC BLOOD PRESSURE: 78 MMHG | SYSTOLIC BLOOD PRESSURE: 124 MMHG | WEIGHT: 195 LBS | BODY MASS INDEX: 32.49 KG/M2

## 2022-04-06 DIAGNOSIS — M25.561 CHRONIC PAIN OF RIGHT KNEE: ICD-10-CM

## 2022-04-06 DIAGNOSIS — G89.29 CHRONIC PAIN OF RIGHT KNEE: ICD-10-CM

## 2022-04-06 PROCEDURE — 99204 OFFICE O/P NEW MOD 45 MIN: CPT | Performed by: PEDIATRICS

## 2022-04-06 NOTE — PATIENT INSTRUCTIONS
We discussed potential causes for your posterior knee pain, which may include a meniscus injury given the knee joint swelling and with exam findings today.  With ongoing symptoms, considerations include additional imaging with MRI, trial of physical therapy, and possibly a steroid injection.  Plan MRI next.  Order has been placed.  Plan to contact you with MRI results.    Advanced imaging is done by appointment. Some insurance companies may require a prior authorization to be completed which can delay the time until you are able to schedule your appointment.   Please call [place] [place] [facility] [place]: [phone] to schedule your MRI.  Depending on your availability you can usually schedule within the next 1-2 days.  If you are active on [employer], you may have access to your test results before your provider is able to review the study and advise on next steps.        The clinic will call you with results. If you have not heard from the clinic within 2-3 days following your MRI, please contact us at the number listed below.      If you have any further questions for your physician or physician s care team you can call [phone] and use option [phone] to leave a voice message. Calls received during business hours will be returned same day.

## 2022-04-06 NOTE — PROGRESS NOTES
ASSESSMENT & PLAN    Yenny was seen today for pain.    Diagnoses and all orders for this visit:    Chronic pain of right knee  -     Orthopedic  Referral  -     MR Knee Right w/o Contrast; Future      Favor joint source, given effusion and exam findings. Otherwise, types of activities that cause pain can also be seen with patellofemoral source.  We discussed the following: symptom treatment, activity modification/rest, imaging, rehab and injection therapy. Following discussion, plan:        See Patient Instructions  Patient Instructions   We discussed potential causes for your posterior knee pain, which may include a meniscus injury given the knee joint swelling and with exam findings today.  With ongoing symptoms, considerations include additional imaging with MRI, trial of physical therapy, and possibly a steroid injection.  Plan MRI next.  Order has been placed.  Plan to contact you with MRI results.    Advanced imaging is done by appointment. Some insurance companies may require a prior authorization to be completed which can delay the time until you are able to schedule your appointment.   Please call [place] [place] [facility] [place]: [phone] to schedule your MRI.  Depending on your availability you can usually schedule within the next 1-2 days.  If you are active on [employer], you may have access to your test results before your provider is able to review the study and advise on next steps.        The clinic will call you with results. If you have not heard from the clinic within 2-3 days following your MRI, please contact us at the number listed below.      If you have any further questions for your physician or physician s care team you can call [phone] and use option 3 to leave a voice message. Calls received during business hours will be returned same day.        Selvin Bahena DO  [facility] SPORTS MEDICINE [facility]      CC: Cee Biggs      -----  Chief  "Complaint   Patient presents with     Right Knee - Pain       SUBJECTIVE  Yenny Johnson is a/an 51 year old female who is seen in consultation at the request of  Cee Biggs M.D. for evaluation of right knee pain.  Did have a fall while in [place] about 5-6 months ago.  A few months later, she felt like there was a cyst in the back of her knee.   She was seen by primary and x rays were taken recently.   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  .     The patient is seen by themselves.      Onset: 4-6 month(s) ago. 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     **  Initially noted pain left hip and right knee after the fall. Was still able to walk after falling.  Since that time, still feels like difficult to bend knee, stoop.  Sometimes gets sensation of swelling, pressure posterior knee. Has not really noted visible swelling anteriorly.  No noted knee joint noise.  Limping with getting up and getting moving, then starts to walk more normally.  Sometimes some medial/lateral pain right knee also, but more with lying in bed.  Descending stairs notes pain. Ascending not an issue.        REVIEW OF SYSTEMS:  Review of Systems   All other systems reviewed and are negative.        OBJECTIVE:  /78   Ht 1.645 m (5' 4.75\")   Wt 88.5 kg (195 lb)   LMP 08/07/2015   BMI 32.70 kg/m     General: healthy, alert and in no distress  HEENT: no scleral icterus or conjunctival erythema  Skin: no suspicious lesions or rash. No jaundice.  CV: distal perfusion intact   Resp: normal respiratory effort " without conversational dyspnea   Psych: normal mood and affect  Gait: normal steady gait with appropriate coordination and balance   Neuro: Normal light sensory exam of  extremity       Right Knee exam    Inspection:   mild effusion   no ecchymosis    ROM:      Full active and passive ROM with flexion and extension    Patellar Motion:      Normal patellar tracking noted through range of motion    Tender:      Mild posterior knee    Non Tender:       remainder of knee area    Special Tests:      Posterior, deep pain with Adrianne       neg (-) Lachman       neg (-) anterior drawer       neg (-) posterior drawer       neg (-) varus       neg (-) valgus       Mild posterior pain with forced extension  Apley compression with some posterior pain            RADIOLOGY:  I independently visualized and reviewed these images with the patient  No acute bony abnormality. Ossicles anterior knee consistent with previous Osgood.      Results for orders placed or performed in visit on 02/02/22   XR Knee Right 3 Views    Narrative    RIGHT KNEE THREE VIEWS   2/2/2022 8:33 AM     HISTORY:  Chronic knee pain.    COMPARISON: None.      Impression    IMPRESSION: There are ossifications in the distal patellar tendon  consistent with prior Osgood-Schlatter's disease. Otherwise negative..  Normal joint spacing and alignment. No fracture, effusion or calcified  intra-articular body.    LENA CATALAN MD         SYSTEM ID:  [id number]

## 2022-04-06 NOTE — LETTER
4/6/2022         RE: Yenny Johnson  [address] [place] [address]        Dear Colleague,    Thank you for referring your patient, Yenny Johnson, to the [facility]. Please see a copy of my visit note below.    ASSESSMENT & PLAN    Yenny was seen today for pain.    Diagnoses and all orders for this visit:    Chronic pain of right knee  -     Orthopedic  Referral  -     MR Knee Right w/o Contrast; Future      Favor joint source, given effusion and exam findings. Otherwise, types of activities that cause pain can also be seen with patellofemoral source.  We discussed the following: symptom treatment, activity modification/rest, imaging, rehab and injection therapy. Following discussion, plan:        See Patient Instructions  Patient Instructions   We discussed potential causes for your posterior knee pain, which may include a meniscus injury given the knee joint swelling and with exam findings today.  With ongoing symptoms, considerations include additional imaging with MRI, trial of physical therapy, and possibly a steroid injection.  Plan MRI next.  Order has been placed.  Plan to contact you with MRI results.    Advanced imaging is done by appointment. Some insurance companies may require a prior authorization to be completed which can delay the time until you are able to schedule your appointment.   Please call [place] [facility] [place]: [phone] to schedule your MRI.  Depending on your availability you can usually schedule within the next 1-2 days.  If you are active on [employer], you may have access to your test results before your provider is able to review the study and advise on next steps.        The clinic will call you with results. If you have not heard from the clinic within 2-3 days following your MRI, please contact us at the number listed below.      If you have any further questions for your physician or physician s  care team you can call [phone] and use option 3 to leave a voice message. Calls received during business hours will be returned same day.        Selvin Arellano [facility] HospitalDO nikki  [facility]ICINE [facility] [place]      CC: Cee Biggs      -----  Chief Complaint   Patient presents with     Right Knee - Pain       SUBJECTIVE  Yenny Johnson is a/an 51 year old female who is seen in consultation at the request of  Cee Biggs M.D. for evaluation of right knee pain.  Did have a fall while in [place] about 5-6 months ago.  A few months later, she felt like there was a cyst in the back of her knee.   She was seen by primary and x rays were taken recently.   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  .     The patient is seen by themselves.      Onset: 4-6 month(s) ago. 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     **  Initially noted pain left hip and right knee after the fall. Was still able to walk after falling.  Since that time, still feels like difficult to bend knee, stoop.  Sometimes gets sensation of swelling, pressure posterior knee. Has not really noted visible swelling anteriorly.  No noted knee joint noise.  Limping with getting up and getting moving, then starts to walk more normally.  Sometimes some medial/lateral pain right knee also, but more with lying in bed.  Descending stairs notes pain. Ascending not an issue.        REVIEW OF SYSTEMS:  Review of Systems   All other systems reviewed and are negative.        OBJECTIVE:  /78   Ht 1.645 m (5'  "4.75\")   Wt 88.5 kg (195 lb)   LMP 08/07/2015   BMI 32.70 kg/m     General: healthy, alert and in no distress  HEENT: no scleral icterus or conjunctival erythema  Skin: no suspicious lesions or rash. No jaundice.  CV: distal perfusion intact   Resp: normal respiratory effort without conversational dyspnea   Psych: normal mood and affect  Gait: normal steady gait with appropriate coordination and balance   Neuro: Normal light sensory exam of  extremity       Right Knee exam    Inspection:   mild effusion   no ecchymosis    ROM:      Full active and passive ROM with flexion and extension    Patellar Motion:      Normal patellar tracking noted through range of motion    Tender:      Mild posterior knee    Non Tender:       remainder of knee area    Special Tests:      Posterior, deep pain with Adrianne       neg (-) Lachman       neg (-) anterior drawer       neg (-) posterior drawer       neg (-) varus       neg (-) valgus       Mild posterior pain with forced extension  Apley compression with some posterior pain            RADIOLOGY:  I independently visualized and reviewed these images with the patient  No acute bony abnormality. Ossicles anterior knee consistent with previous Osgood.      Results for orders placed or performed in visit on 02/02/22   XR Knee Right 3 Views    Narrative    RIGHT KNEE THREE VIEWS   2/2/2022 8:33 AM     HISTORY:  Chronic knee pain.    COMPARISON: None.      Impression    IMPRESSION: There are ossifications in the distal patellar tendon  consistent with prior Osgood-Schlatter's disease. Otherwise negative..  Normal joint spacing and alignment. No fracture, effusion or calcified  intra-articular body.    LENA CATALAN MD         SYSTEM ID:  [id number]                      Again, thank you for allowing me to participate in the care of your patient.        Sincerely,        Selvin Bahena DO    "

## 2022-04-12 ENCOUNTER — ANCILLARY PROCEDURE (OUTPATIENT)
Dept: MRI IMAGING | Facility: CLINIC | Age: 52
End: 2022-04-12
Attending: PEDIATRICS
Payer: COMMERCIAL

## 2022-04-12 DIAGNOSIS — M25.561 CHRONIC PAIN OF RIGHT KNEE: ICD-10-CM

## 2022-04-12 DIAGNOSIS — G89.29 CHRONIC PAIN OF RIGHT KNEE: ICD-10-CM

## 2022-04-12 PROCEDURE — 73721 MRI JNT OF LWR EXTRE W/O DYE: CPT | Mod: RT | Performed by: RADIOLOGY

## 2022-04-13 ENCOUNTER — TELEPHONE (OUTPATIENT)
Dept: ORTHOPEDICS | Facility: CLINIC | Age: 52
End: 2022-04-13
Payer: COMMERCIAL

## 2022-04-13 NOTE — TELEPHONE ENCOUNTER
Results for orders placed or performed in visit on 04/12/22   MR Knee Right w/o Contrast    Narrative    EXAMINATION: MRI of the right knee without contrast    DATE:  4/12/2022    HISTORY: Chronic knee pain    TECHNIQUE: Multiplanar, multisequence MR imaging of the right knee was  obtained using standard sequences in 3 planes without the use of  intravenous or intra-articular gadolinium contrast.    Comparison:  Comparison radiographs dated 2/2/2022 were reviewed,  which demonstrated ossification in the patellar tendon.    FINDINGS:    In the medial compartment, there is mild free edge fraying at the  junction of the body and the posterior horn without discrete tear.  There is mild diffuse cartilage thinning without subchondral edema.    In the lateral compartment, the lateral meniscus is intact. There is  no high-grade or full-thickness cartilage loss or subchondral edema.    In the patellofemoral compartment, there is low-grade fraying of the  articular cartilage along the medial patellar facet without  subchondral edema.    The anterior and posterior cruciate ligaments are intact.    The tibial collateral ligament is intact. The anterior iliotibial  band, fibular collateral ligament, biceps femoris tendon, and  popliteus tendons are intact.    There is a small joint effusion. No popliteal cyst. No joint bodies.    Marked bony fragmentation in the region of the distal patellar tendon,  consistent with prior Osgood Schlatter's disease. The patellar tendon  is intact however minimally thinned in the area of the ossification.       Impression    IMPRESSION:  1. Small right knee joint effusion.  2. Bony fragmentation in the region of the tibial tubercle, presumed  from prior Osgood Schlattter's disease. Thinning of the distal  patellar tendon in the region of the ossification, without  full-thickness tear or tendon retraction.  3. Mild free edge fraying at the junction of the body with the  posterior horn of the  medial meniscus without discrete tear or  displaced fragment.  4. Low-grade cartilage fissuring along the right knee medial patellar  facet with mild diffuse thinning of the articular cartilage in the  medial femorotibial joint compartment.  5. The anterior and posterior cruciate ligaments, medial and lateral  supporting structures, and lateral meniscus are intact.     JOAO CHESTER MD         SYSTEM ID:  [id number]

## 2022-04-15 NOTE — TELEPHONE ENCOUNTER
MRI of the right knee shows some fraying of the medial meniscus, without obvious tear.  There is also some cartilage thinning/wearing in the patellofemoral and medial compartments (under the kneecap, and at the inner aspect of the knee).  Also with some swelling of the knee, related to the underlying cartilage issues.  Finally, there is some bony fragmentation at the anterior knee, chronic, as seen previously on x-rays (related to Osgood-Schlatter); there is some thinning of the patellar tendon here, but nothing is torn.  Basically, findings on the MRI are primarily some underlying mild degenerative change.  There is no cyst at the posterior knee.  Options include: 1) trial of physical therapy; 2) trial of steroid injection (for inflammation and pain relief).  Certainly would offer a trial of physical therapy to start.  If doing PT, monitor course over 4 to 6 weeks.  If interested in injection, can return to clinic and we can discuss and perform.  I would be happy to have a visit with the patient (in person, by video, or by phone) to discuss further if that would be helpful.  Thanks.  Selvin Bahena DO, ROSA

## 2022-04-16 ENCOUNTER — MYC MEDICAL ADVICE (OUTPATIENT)
Dept: ORTHOPEDICS | Facility: CLINIC | Age: 52
End: 2022-04-16
Payer: COMMERCIAL

## 2022-04-16 DIAGNOSIS — M17.11 PRIMARY OSTEOARTHRITIS OF RIGHT KNEE: Primary | ICD-10-CM

## 2022-04-18 NOTE — TELEPHONE ENCOUNTER
Options discussed were steroid injection and/or PT based on her right knee MRI results. Patient writes today wondering if a course of oral steroids would be a reasonable option. Thank you. Sofiya Peñaloza, ATC

## 2022-04-19 RX ORDER — METHYLPREDNISOLONE 4 MG
TABLET, DOSE PACK ORAL
Qty: 21 TABLET | Refills: 0 | Status: SHIPPED | OUTPATIENT
Start: 2022-04-19 | End: 2022-05-16

## 2022-04-19 RX ORDER — METHYLPREDNISOLONE 4 MG
TABLET, DOSE PACK ORAL
Qty: 21 TABLET | Refills: 0 | Status: CANCELLED | OUTPATIENT
Start: 2022-04-19

## 2022-04-19 NOTE — TELEPHONE ENCOUNTER
Sure, could try it. Medrol dosepak pended, she has had in the past, most recently 2017.  Pharmacy?  Thanks.  Selvin Bahena, , CAQ

## 2022-05-16 ENCOUNTER — OFFICE VISIT (OUTPATIENT)
Dept: ORTHOPEDICS | Facility: CLINIC | Age: 52
End: 2022-05-16

## 2022-05-16 ENCOUNTER — ANCILLARY PROCEDURE (OUTPATIENT)
Dept: GENERAL RADIOLOGY | Facility: CLINIC | Age: 52
End: 2022-05-16
Attending: PEDIATRICS
Payer: COMMERCIAL

## 2022-05-16 ENCOUNTER — MYC MEDICAL ADVICE (OUTPATIENT)
Dept: FAMILY MEDICINE | Facility: CLINIC | Age: 52
End: 2022-05-16

## 2022-05-16 VITALS
WEIGHT: 199 LBS | SYSTOLIC BLOOD PRESSURE: 112 MMHG | DIASTOLIC BLOOD PRESSURE: 76 MMHG | BODY MASS INDEX: 33.15 KG/M2 | HEIGHT: 65 IN

## 2022-05-16 DIAGNOSIS — M79.661 PAIN IN RIGHT LOWER LEG: ICD-10-CM

## 2022-05-16 DIAGNOSIS — M17.11 PRIMARY OSTEOARTHRITIS OF RIGHT KNEE: Primary | ICD-10-CM

## 2022-05-16 DIAGNOSIS — M25.561 CHRONIC PAIN OF RIGHT KNEE: ICD-10-CM

## 2022-05-16 DIAGNOSIS — E03.9 ACQUIRED HYPOTHYROIDISM: Primary | ICD-10-CM

## 2022-05-16 DIAGNOSIS — G89.29 CHRONIC PAIN OF RIGHT KNEE: ICD-10-CM

## 2022-05-16 PROCEDURE — 20610 DRAIN/INJ JOINT/BURSA W/O US: CPT | Mod: RT | Performed by: PEDIATRICS

## 2022-05-16 PROCEDURE — 73590 X-RAY EXAM OF LOWER LEG: CPT | Mod: TC | Performed by: RADIOLOGY

## 2022-05-16 PROCEDURE — 99213 OFFICE O/P EST LOW 20 MIN: CPT | Mod: 25 | Performed by: PEDIATRICS

## 2022-05-16 RX ADMIN — TRIAMCINOLONE ACETONIDE 40 MG: 40 INJECTION, SUSPENSION INTRA-ARTICULAR; INTRAMUSCULAR at 09:17

## 2022-05-16 RX ADMIN — LIDOCAINE HYDROCHLORIDE 2 ML: 10 INJECTION, SOLUTION INFILTRATION; PERINEURAL at 09:17

## 2022-05-16 NOTE — PATIENT INSTRUCTIONS
Discussed options for the right knee underlying degenerative change, including with therapy, medications, use of compression or support, injections.  Steroid injection done today.  Monitor course over the next 2 weeks with injection.    Regarding right lower leg pain, tenderness is more in the anterior compartment; there is no focal bony tenderness on exam today.  X-rays are not conclusive for any bony abnormality either.  For now, some home exercises with stretching reviewed.  Will monitor what happens with the leg as we expect the need to calm down from the injection.  If there are ongoing issues in the right lower leg, we could consider additional imaging with MRI.    If you have any further questions for your physician or physician s care team you can call [phone] and use option 3 to leave a voice message. Calls received during business hours will be returned same day.        Injection Discharge Instructions    You may shower, however avoid swimming, tub baths or hot tubs for 24 hours following your procedure  You may have a mild to moderate increase in pain for several days following the injection.  It may take up to 14 days for the steroid medication to start working although you may feel the effect as early as a few days after the procedure.  You may use ice packs for 10-15 minutes, 3 to 4 times a day at the injection site for comfort  You may use anti-inflammatory medications (such as Ibuprofen or Aleve or Advil) if you are able to take safely, or acetaminophen (Tylenol) for pain control if necessary  If you were fasting, you may resume your normal diet and medications after the procedure  If you have diabetes, check your blood sugar more frequently than usual as your blood sugar may be higher than normal for 10-14 days following a steroid injection. Contact your doctor who manages your diabetes if your blood sugar is higher than usual    If you experience any of the following, call the clinic @  [phone]  - Fever over 100 degree F  - Swelling, bleeding, redness, drainage, warmth at the injection site  - New or worsening pain

## 2022-05-16 NOTE — LETTER
5/16/2022         RE: Yenny Johnson  [address] [facility] [address]        Dear Colleague,    Thank you for referring your patient, Yenny Johnson, to the [facility]. Please see a copy of my visit note below.    ASSESSMENT & PLAN    Yenny was seen today for pain.    Diagnoses and all orders for this visit:    Primary osteoarthritis of right knee  -     Large Joint Injection/Arthocentesis: R knee joint    Chronic pain of right knee  -     Large Joint Injection/Arthocentesis: R knee joint    Pain in right lower leg  -     XR Tibia & Fibula Right 2 Views; Future            See Patient Instructions  Patient Instructions   Discussed options for the right knee underlying degenerative change, including with therapy, medications, use of compression or support, injections.  Steroid injection done today.  Monitor course over the next 2 weeks with injection.    Regarding right lower leg pain, tenderness is more in the anterior compartment; there is no focal bony tenderness on exam today.  X-rays are not conclusive for any bony abnormality either.  For now, some home exercises with stretching reviewed.  Will monitor what happens with the leg as we expect the need to calm down from the injection.  If there are ongoing issues in the right lower leg, we could consider additional imaging with MRI.    If you have any further questions for your physician or physician s care team you can call [phone] and use option 3 to leave a voice message. Calls received during business hours will be returned same day.        Injection Discharge Instructions      You may shower, however avoid swimming, tub baths or hot tubs for 24 hours following your procedure    You may have a mild to moderate increase in pain for several days following the injection.    It may take up to 14 days for the steroid medication to start working although you may feel the effect as early as a few days  "after the procedure.    You may use ice packs for 10-15 minutes, 3 to 4 times a day at the injection site for comfort    You may use anti-inflammatory medications (such as Ibuprofen or Aleve or Advil) if you are able to take safely, or acetaminophen (Tylenol) for pain control if necessary    If you were fasting, you may resume your normal diet and medications after the procedure    If you have diabetes, check your blood sugar more frequently than usual as your blood sugar may be higher than normal for 10-14 days following a steroid injection. Contact your doctor who manages your diabetes if your blood sugar is higher than usual      If you experience any of the following, call the clinic @ [phone]  - Fever over 100 degree F  - Swelling, bleeding, redness, drainage, warmth at the injection site  - New or worsening pain        [facility]    SUBJECTIVE- Interim History May 16, 2022    Chief Complaint   Patient presents with     Right Knee - Pain       Yenny Johnson is a 51 year old female who is seen in f/u up for    Primary osteoarthritis of right knee  Chronic pain of right knee  Pain in right lower leg. Since last visit on 4/6/22 patient has undergone an MRI.  She did try an oral steroid and did not have any relief from the medication.   Pain in her right lower leg as well.   Pain in the right lower leg will wake her up at night.  Does feel like her knee will \"snap\" as she is rolling over in bed.    - Now ~5-7 months  from initial injury described as a fall.    **  With knee rotation, notes some posterior knee area pain.    **  Also with throbbing pain in mid-distal lower leg, band like around the lower leg. Feels like may have some swelling in right LE in general.  Right lower leg pain generally notes more with sleep position, or getting up and getting moving; however, it can be rather random, not always present. Is mild currently.      REVIEW " "OF SYSTEMS:  Review of Systems      OBJECTIVE:  /76   Ht 1.645 m (5' 4.75\")   Wt 90.3 kg (199 lb)   LMP 08/07/2015   BMI 33.37 kg/m         Right Knee exam     Inspection:              mild effusion              no ecchymosis     ROM:      Full active and passive ROM with flexion and extension         Right Lower leg:  No swelling or ecchymosis  Grossly intact ankle ROM  Mild tenderness anteriorly, anterior compartment mid lower leg  No bony tenderness tibia, fibula       RADIOLOGY:  Final results and radiologist's interpretation, available in the [facility] health record.  Images were reviewed with the patient in the office today.  My personal interpretation of the performed imaging: primarily arthrosis, some medial meniscus degenerative change as well.      Results for orders placed or performed in visit on 04/12/22   MR Knee Right w/o Contrast    Narrative    EXAMINATION: MRI of the right knee without contrast    DATE:  4/12/2022    HISTORY: Chronic knee pain    TECHNIQUE: Multiplanar, multisequence MR imaging of the right knee was  obtained using standard sequences in 3 planes without the use of  intravenous or intra-articular gadolinium contrast.    Comparison:  Comparison radiographs dated 2/2/2022 were reviewed,  which demonstrated ossification in the patellar tendon.    FINDINGS:    In the medial compartment, there is mild free edge fraying at the  junction of the body and the posterior horn without discrete tear.  There is mild diffuse cartilage thinning without subchondral edema.    In the lateral compartment, the lateral meniscus is intact. There is  no high-grade or full-thickness cartilage loss or subchondral edema.    In the patellofemoral compartment, there is low-grade fraying of the  articular cartilage along the medial patellar facet without  subchondral edema.    The anterior and posterior cruciate ligaments are intact.    The tibial collateral ligament is intact. The anterior " iliotibial  band, fibular collateral ligament, biceps femoris tendon, and  popliteus tendons are intact.    There is a small joint effusion. No popliteal cyst. No joint bodies.    Marked bony fragmentation in the region of the distal patellar tendon,  consistent with prior Osgood Schlatter's disease. The patellar tendon  is intact however minimally thinned in the area of the ossification.       Impression    IMPRESSION:  1. Small right knee joint effusion.  2. Bony fragmentation in the region of the tibial tubercle, presumed  from prior Osgood Schlattter's disease. Thinning of the distal  patellar tendon in the region of the ossification, without  full-thickness tear or tendon retraction.  3. Mild free edge fraying at the junction of the body with the  posterior horn of the medial meniscus without discrete tear or  displaced fragment.  4. Low-grade cartilage fissuring along the right knee medial patellar  facet with mild diffuse thinning of the articular cartilage in the  medial femorotibial joint compartment.  5. The anterior and posterior cruciate ligaments, medial and lateral  supporting structures, and lateral meniscus are intact.     JOAO CHESTER MD         SYSTEM ID:  [id number]         Large Joint Injection/Arthocentesis: R knee joint    Date/Time: 5/16/2022 9:17 AM  Performed by: Selvin Bahena DO  Authorized by: Selvin Bahena DO     Indications:  Pain and osteoarthritis  Needle Size:  25 G  Guidance: landmark guided    Approach:  Anteromedial  Location:  Knee      Medications:  2 mL lidocaine 1 %; 40 mg triamcinolone 40 MG/ML  Outcome:  Tolerated well, no immediate complications  Procedure discussed: discussed risks, benefits, and alternatives    Consent Given by:  Patient  Timeout: timeout called immediately prior to procedure    Prep: patient was prepped and draped in usual sterile fashion              20 minutes spent on the date of the encounter doing chart review, history and exam,  documentation and further activities per the note, not including injection           Again, thank you for allowing me to participate in the care of your patient.        Sincerely,        Selvin Bahena, DO

## 2022-05-16 NOTE — PROGRESS NOTES
ASSESSMENT & PLAN    Yenny was seen today for pain.    Diagnoses and all orders for this visit:    Primary osteoarthritis of right knee  -     Large Joint Injection/Arthocentesis: R knee joint    Chronic pain of right knee  -     Large Joint Injection/Arthocentesis: R knee joint    Pain in right lower leg  -     XR Tibia & Fibula Right 2 Views; Future            See Patient Instructions  Patient Instructions   Discussed options for the right knee underlying degenerative change, including with therapy, medications, use of compression or support, injections.  Steroid injection done today.  Monitor course over the next 2 weeks with injection.    Regarding right lower leg pain, tenderness is more in the anterior compartment; there is no focal bony tenderness on exam today.  X-rays are not conclusive for any bony abnormality either.  For now, some home exercises with stretching reviewed.  Will monitor what happens with the leg as we expect the need to calm down from the injection.  If there are ongoing issues in the right lower leg, we could consider additional imaging with MRI.    If you have any further questions for your physician or physician s care team you can call [phone] and use option 3 to leave a voice message. Calls received during business hours will be returned same day.        Injection Discharge Instructions      You may shower, however avoid swimming, tub baths or hot tubs for 24 hours following your procedure    You may have a mild to moderate increase in pain for several days following the injection.    It may take up to 14 days for the steroid medication to start working although you may feel the effect as early as a few days after the procedure.    You may use ice packs for 10-15 minutes, 3 to 4 times a day at the injection site for comfort    You may use anti-inflammatory medications (such as Ibuprofen or Aleve or Advil) if you are able to take safely, or acetaminophen (Tylenol) for pain control  "if necessary    If you were fasting, you may resume your normal diet and medications after the procedure    If you have diabetes, check your blood sugar more frequently than usual as your blood sugar may be higher than normal for 10-14 days following a steroid injection. Contact your doctor who manages your diabetes if your blood sugar is higher than usual      If you experience any of the following, call the clinic @ [phone]  - Fever over 100 degree F  - Swelling, bleeding, redness, drainage, warmth at the injection site  - New or worsening pain        [facility]    SUBJECTIVE- Interim History May 16, 2022    Chief Complaint   Patient presents with     Right Knee - Pain       Yenny Johnson is a 51 year old female who is seen in f/u up for    Primary osteoarthritis of right knee  Chronic pain of right knee  Pain in right lower leg. Since last visit on 4/6/22 patient has undergone an MRI.  She did try an oral steroid and did not have any relief from the medication.   Pain in her right lower leg as well.   Pain in the right lower leg will wake her up at night.  Does feel like her knee will \"snap\" as she is rolling over in bed.    - Now ~5-7 months  from initial injury described as a fall.    **  With knee rotation, notes some posterior knee area pain.    **  Also with throbbing pain in mid-distal lower leg, band like around the lower leg. Feels like may have some swelling in right LE in general.  Right lower leg pain generally notes more with sleep position, or getting up and getting moving; however, it can be rather random, not always present. Is mild currently.      REVIEW OF SYSTEMS:  Review of Systems      OBJECTIVE:  /76   Ht 1.645 m (5' 4.75\")   Wt 90.3 kg (199 lb)   LMP 08/07/2015   BMI 33.37 kg/m         Right Knee exam     Inspection:              mild effusion              no ecchymosis     ROM:      Full active and passive ROM " with flexion and extension         Right Lower leg:  No swelling or ecchymosis  Grossly intact ankle ROM  Mild tenderness anteriorly, anterior compartment mid lower leg  No bony tenderness tibia, fibula       RADIOLOGY:  Final results and radiologist's interpretation, available in the [facility] health record.  Images were reviewed with the patient in the office today.  My personal interpretation of the performed imaging: knee MRI: primarily arthrosis, some medial meniscus degenerative change as well.    tib fib: neg    XR Tibia & Fibula Right 2 Views    Narrative    XR RIGHT TIBIA AND FIBULA TWO VIEWS  5/16/2022 8:40 AM    INDICATION: Pain in right lower leg  COMPARISON: None available.       Impression    IMPRESSION: Anatomic alignment right tibia and fibula. No acute  displaced tibia or fibula fracture. No focal periosteal reaction. Bony  fragmentation chronic in appearance at the anterior tibial tubercle  near the distal patellar tendon. No significant right leg soft tissue  swelling.    JOSE NAIR MD         SYSTEM ID:  [id number]         Results for orders placed or performed in visit on 04/12/22   MR Knee Right w/o Contrast    Narrative    EXAMINATION: MRI of the right knee without contrast    DATE:  4/12/2022    HISTORY: Chronic knee pain    TECHNIQUE: Multiplanar, multisequence MR imaging of the right knee was  obtained using standard sequences in 3 planes without the use of  intravenous or intra-articular gadolinium contrast.    Comparison:  Comparison radiographs dated 2/2/2022 were reviewed,  which demonstrated ossification in the patellar tendon.    FINDINGS:    In the medial compartment, there is mild free edge fraying at the  junction of the body and the posterior horn without discrete tear.  There is mild diffuse cartilage thinning without subchondral edema.    In the lateral compartment, the lateral meniscus is intact. There is  no high-grade or full-thickness cartilage loss or subchondral  edema.    In the patellofemoral compartment, there is low-grade fraying of the  articular cartilage along the medial patellar facet without  subchondral edema.    The anterior and posterior cruciate ligaments are intact.    The tibial collateral ligament is intact. The anterior iliotibial  band, fibular collateral ligament, biceps femoris tendon, and  popliteus tendons are intact.    There is a small joint effusion. No popliteal cyst. No joint bodies.    Marked bony fragmentation in the region of the distal patellar tendon,  consistent with prior Osgood Schlatter's disease. The patellar tendon  is intact however minimally thinned in the area of the ossification.       Impression    IMPRESSION:  1. Small right knee joint effusion.  2. Bony fragmentation in the region of the tibial tubercle, presumed  from prior Osgood Schlattter's disease. Thinning of the distal  patellar tendon in the region of the ossification, without  full-thickness tear or tendon retraction.  3. Mild free edge fraying at the junction of the body with the  posterior horn of the medial meniscus without discrete tear or  displaced fragment.  4. Low-grade cartilage fissuring along the right knee medial patellar  facet with mild diffuse thinning of the articular cartilage in the  medial femorotibial joint compartment.  5. The anterior and posterior cruciate ligaments, medial and lateral  supporting structures, and lateral meniscus are intact.     JOAO CHESTER MD         SYSTEM ID:  [id number]         Large Joint Injection/Arthocentesis: R knee joint    Date/Time: 5/16/2022 9:17 AM  Performed by: Selvin Bahena DO  Authorized by: Selvin Bahena DO     Indications:  Pain and osteoarthritis  Needle Size:  25 G  Guidance: landmark guided    Approach:  Anteromedial  Location:  Knee      Medications:  2 mL lidocaine 1 %; 40 mg triamcinolone 40 MG/ML  Outcome:  Tolerated well, no immediate complications  Procedure discussed: discussed  risks, benefits, and alternatives    Consent Given by:  Patient  Timeout: timeout called immediately prior to procedure    Prep: patient was prepped and draped in usual sterile fashion              20 minutes spent on the date of the encounter doing chart review, history and exam, documentation and further activities per the note, not including injection

## 2022-05-19 RX ORDER — LIDOCAINE HYDROCHLORIDE 10 MG/ML
2 INJECTION, SOLUTION INFILTRATION; PERINEURAL
Status: DISCONTINUED | OUTPATIENT
Start: 2022-05-16 | End: 2023-01-25 | Stop reason: ALTCHOICE

## 2022-05-19 RX ORDER — TRIAMCINOLONE ACETONIDE 40 MG/ML
40 INJECTION, SUSPENSION INTRA-ARTICULAR; INTRAMUSCULAR
Status: DISCONTINUED | OUTPATIENT
Start: 2022-05-16 | End: 2023-01-25 | Stop reason: ALTCHOICE

## 2022-05-25 ENCOUNTER — LAB (OUTPATIENT)
Dept: LAB | Facility: CLINIC | Age: 52
End: 2022-05-25
Payer: COMMERCIAL

## 2022-05-25 DIAGNOSIS — E78.5 HYPERLIPIDEMIA LDL GOAL <130: ICD-10-CM

## 2022-05-25 DIAGNOSIS — E03.9 ACQUIRED HYPOTHYROIDISM: ICD-10-CM

## 2022-05-25 LAB
CHOLEST SERPL-MCNC: 179 MG/DL
FASTING STATUS PATIENT QL REPORTED: ABNORMAL
HDLC SERPL-MCNC: 41 MG/DL
LDLC SERPL CALC-MCNC: 91 MG/DL
NONHDLC SERPL-MCNC: 138 MG/DL
TRIGL SERPL-MCNC: 234 MG/DL
TSH SERPL DL<=0.005 MIU/L-ACNC: 2.04 MU/L (ref 0.4–4)

## 2022-05-25 PROCEDURE — 84443 ASSAY THYROID STIM HORMONE: CPT

## 2022-05-25 PROCEDURE — 80061 LIPID PANEL: CPT

## 2022-05-25 PROCEDURE — 36415 COLL VENOUS BLD VENIPUNCTURE: CPT

## 2022-05-25 NOTE — RESULT ENCOUNTER NOTE
Bryant Ramon,    Your thyroid level is normal. Your cholesterol is improving. Continue your medications. Follow-up yearly.     Cee Biggs MD

## 2022-06-01 ENCOUNTER — MYC MEDICAL ADVICE (OUTPATIENT)
Dept: FAMILY MEDICINE | Facility: CLINIC | Age: 52
End: 2022-06-01
Payer: COMMERCIAL

## 2022-06-01 DIAGNOSIS — E66.09 OBESITY DUE TO EXCESS CALORIES WITHOUT SERIOUS COMORBIDITY, UNSPECIFIED CLASSIFICATION: Primary | ICD-10-CM

## 2022-06-01 NOTE — TELEPHONE ENCOUNTER
"Please see [employer] message.     Per 2/2/22 OV note: \"Weight management plan: Discussed healthy diet and exercise guidelines\"    Routed to PCP to review and advise.     Ashley Rogers RN, BSN, PHN  [facility] [place]        "

## 2022-07-25 ENCOUNTER — OFFICE VISIT (OUTPATIENT)
Dept: ORTHOPEDICS | Facility: CLINIC | Age: 52
End: 2022-07-25
Payer: COMMERCIAL

## 2022-07-25 VITALS
DIASTOLIC BLOOD PRESSURE: 84 MMHG | SYSTOLIC BLOOD PRESSURE: 136 MMHG | WEIGHT: 199 LBS | BODY MASS INDEX: 33.15 KG/M2 | HEIGHT: 65 IN

## 2022-07-25 DIAGNOSIS — M17.11 PRIMARY OSTEOARTHRITIS OF RIGHT KNEE: Primary | ICD-10-CM

## 2022-07-25 PROCEDURE — 99214 OFFICE O/P EST MOD 30 MIN: CPT | Performed by: PEDIATRICS

## 2022-07-25 NOTE — PROGRESS NOTES
ASSESSMENT & PLAN    Yenny was seen today for recheck.    Diagnoses and all orders for this visit:    Primary osteoarthritis of right knee      Prior auth already completed for visco. Plan that next.  Questions answered. Discussed signs and symptoms that may indicate more serious issues; the patient was instructed to seek appropriate care if noted. Yenny indicates understanding of these issues and agrees with the plan.      See Patient Instructions  Patient Instructions   Ongoing symptoms in the right knee remain most consistent with underlying degenerative change, or some osteoarthritis.  We reviewed again the MRI findings, demonstrating such.  Repeat clinical exam today once again shows a joint effusion, and some tenderness medially, consistent with underlying degenerative change.  Otherwise, the knee feels stable.  Reviewed options with use of support for the knee (sleeve, compression, brace), trial of physical therapy, and injection options, and potential for referral on to see orthopedic surgeon.  Following discussion, plan to pursue Visco supplement injection, imaging guided to follow my colleagues next.  From there, monitor for additional 3 to 4 weeks.  Contact clinic with any questions or concerns, or particularly if not improving with the injection.  Follow-up sooner if needed.    If you have any further questions for your physician or physician s care team you can call [phone] and use option 3 to leave a voice message. Calls received during business hours will be returned same day.        [facility]    SUBJECTIVE- Interim History July 25, 2022    Chief Complaint   Patient presents with     Right Knee - RECHECK       Yenny Johnson is a 52 year old female who is seen in f/u up for Primary osteoarthritis of right knee. Since last visit on 5/16/22, where she underwent a right knee steroid injection,  patient has continued to have pain.  She  "has a concern that she may have done more damage when she was watching her brother's twins (4 y/o).  She was not wearing a brace and may have twisted her knee.  No fall, but did have pain.    Does feel that the steroid injection was helpful, did feel it took awhile to be helpful.  Feels it helped with the swelling behind the knee.   Does wake her up at night.  Has been using ice and ibuprofen.    Does occasionally have a feeling off instability.    Wanted to make sure that the options of injection vs PT were still viable.      **  Took ~2 weeks for benefit from left knee steroid injection. Did not get 100% relief, but did note some improvement in overall comfort; able to walk without limping.  With the increase in pain with activities noted above, was in [place] for ~5 days. Not really a specific injury noted, but increased symptoms after that.  Has noted some anterior and medial knee pain, peripatellar since then.   Has noted persistent right knee swelling, has effusion noted today.          REVIEW OF SYSTEMS:  Review of Systems      OBJECTIVE:  /84   Ht 1.645 m (5' 4.75\")   Wt 90.3 kg (199 lb)   LMP 08/07/2015   BMI 33.37 kg/m         Right Knee exam    Inspection:   Mild-mod effusion   no ecchymosis    ROM:      Grossly intact active and passive ROM with flexion and extension    Tender:      Mild posterior knee  Medial joint line, medial patella    Special Tests:      + pain with Adrianne       neg (-) Lachman       neg (-) anterior drawer       neg (-) posterior drawer       neg (-) varus       neg (-) valgus       Mild pain with forced extension        RADIOLOGY:  Previous MRI visualized/reviewed again:    EXAMINATION: MRI of the right knee without contrast     DATE:  4/12/2022     HISTORY: Chronic knee pain     TECHNIQUE: Multiplanar, multisequence MR imaging of the right knee was  obtained using standard sequences in 3 planes without the use of  intravenous or intra-articular gadolinium " contrast.     Comparison:  Comparison radiographs dated 2/2/2022 were reviewed,  which demonstrated ossification in the patellar tendon.     FINDINGS:     In the medial compartment, there is mild free edge fraying at the  junction of the body and the posterior horn without discrete tear.  There is mild diffuse cartilage thinning without subchondral edema.     In the lateral compartment, the lateral meniscus is intact. There is  no high-grade or full-thickness cartilage loss or subchondral edema.     In the patellofemoral compartment, there is low-grade fraying of the  articular cartilage along the medial patellar facet without  subchondral edema.     The anterior and posterior cruciate ligaments are intact.     The tibial collateral ligament is intact. The anterior iliotibial  band, fibular collateral ligament, biceps femoris tendon, and  popliteus tendons are intact.     There is a small joint effusion. No popliteal cyst. No joint bodies.     Marked bony fragmentation in the region of the distal patellar tendon,  consistent with prior Osgood Schlatter's disease. The patellar tendon  is intact however minimally thinned in the area of the ossification.                                                                       IMPRESSION:  1. Small right knee joint effusion.  2. Bony fragmentation in the region of the tibial tubercle, presumed  from prior Osgood Schlattter's disease. Thinning of the distal  patellar tendon in the region of the ossification, without  full-thickness tear or tendon retraction.  3. Mild free edge fraying at the junction of the body with the  posterior horn of the medial meniscus without discrete tear or  displaced fragment.  4. Low-grade cartilage fissuring along the right knee medial patellar  facet with mild diffuse thinning of the articular cartilage in the  medial femorotibial joint compartment.  5. The anterior and posterior cruciate ligaments, medial and lateral  supporting structures,  and lateral meniscus are intact.      JOAO CHESTER MD                30 minutes spent on the date of the encounter doing chart review, history and exam, documentation and further activities per the note

## 2022-07-25 NOTE — LETTER
7/25/2022         RE: Yenny Johnson  [address] [facility] [address]        Dear Colleague,    Thank you for referring your patient, Yenny Johnson, to the [facility]. Please see a copy of my visit note below.    ASSESSMENT & PLAN    Yenny was seen today for recheck.    Diagnoses and all orders for this visit:    Primary osteoarthritis of right knee      Prior auth already completed for visco. Plan that next.  Questions answered. Discussed signs and symptoms that may indicate more serious issues; the patient was instructed to seek appropriate care if noted. Yenny indicates understanding of these issues and agrees with the plan.      See Patient Instructions  Patient Instructions   Ongoing symptoms in the right knee remain most consistent with underlying degenerative change, or some osteoarthritis.  We reviewed again the MRI findings, demonstrating such.  Repeat clinical exam today once again shows a joint effusion, and some tenderness medially, consistent with underlying degenerative change.  Otherwise, the knee feels stable.  Reviewed options with use of support for the knee (sleeve, compression, brace), trial of physical therapy, and injection options, and potential for referral on to see orthopedic surgeon.  Following discussion, plan to pursue Visco supplement injection, imaging guided to follow my colleagues next.  From there, monitor for additional 3 to 4 weeks.  Contact clinic with any questions or concerns, or particularly if not improving with the injection.  Follow-up sooner if needed.    If you have any further questions for your physician or physician s care team you can call [phone] and use option 3 to leave a voice message. Calls received during business hours will be returned same day.        Selvin Bahena DO  [facility]    SUBJECTIVE- Interim History July 25, 2022    Chief Complaint   Patient  "presents with     Right Knee - RECHECK       Yenny Johnson is a 52 year old female who is seen in f/u up for Primary osteoarthritis of right knee. Since last visit on 5/16/22, where she underwent a right knee steroid injection,  patient has continued to have pain.  She has a concern that she may have done more damage when she was watching her brother's twins (6 y/o).  She was not wearing a brace and may have twisted her knee.  No fall, but did have pain.    Does feel that the steroid injection was helpful, did feel it took awhile to be helpful.  Feels it helped with the swelling behind the knee.   Does wake her up at night.  Has been using ice and ibuprofen.    Does occasionally have a feeling off instability.    Wanted to make sure that the options of injection vs PT were still viable.      **  Took ~2 weeks for benefit from left knee steroid injection. Did not get 100% relief, but did note some improvement in overall comfort; able to walk without limping.  With the increase in pain with activities noted above, was in [place] for ~5 days. Not really a specific injury noted, but increased symptoms after that.  Has noted some anterior and medial knee pain, peripatellar since then.   Has noted persistent right knee swelling, has effusion noted today.          REVIEW OF SYSTEMS:  Review of Systems      OBJECTIVE:  /84   Ht 1.645 m (5' 4.75\")   Wt 90.3 kg (199 lb)   LMP 08/07/2015   BMI 33.37 kg/m         Right Knee exam    Inspection:   Mild-mod effusion   no ecchymosis    ROM:      Grossly intact active and passive ROM with flexion and extension    Tender:      Mild posterior knee  Medial joint line, medial patella    Special Tests:      + pain with Adrianne       neg (-) Lachman       neg (-) anterior drawer       neg (-) posterior drawer       neg (-) varus       neg (-) valgus       Mild pain with forced extension        RADIOLOGY:  Previous MRI visualized/reviewed again:    EXAMINATION: MRI of the right " knee without contrast     DATE:  4/12/2022     HISTORY: Chronic knee pain     TECHNIQUE: Multiplanar, multisequence MR imaging of the right knee was  obtained using standard sequences in 3 planes without the use of  intravenous or intra-articular gadolinium contrast.     Comparison:  Comparison radiographs dated 2/2/2022 were reviewed,  which demonstrated ossification in the patellar tendon.     FINDINGS:     In the medial compartment, there is mild free edge fraying at the  junction of the body and the posterior horn without discrete tear.  There is mild diffuse cartilage thinning without subchondral edema.     In the lateral compartment, the lateral meniscus is intact. There is  no high-grade or full-thickness cartilage loss or subchondral edema.     In the patellofemoral compartment, there is low-grade fraying of the  articular cartilage along the medial patellar facet without  subchondral edema.     The anterior and posterior cruciate ligaments are intact.     The tibial collateral ligament is intact. The anterior iliotibial  band, fibular collateral ligament, biceps femoris tendon, and  popliteus tendons are intact.     There is a small joint effusion. No popliteal cyst. No joint bodies.     Marked bony fragmentation in the region of the distal patellar tendon,  consistent with prior Osgood Schlatter's disease. The patellar tendon  is intact however minimally thinned in the area of the ossification.                                                                       IMPRESSION:  1. Small right knee joint effusion.  2. Bony fragmentation in the region of the tibial tubercle, presumed  from prior Osgood Schlattter's disease. Thinning of the distal  patellar tendon in the region of the ossification, without  full-thickness tear or tendon retraction.  3. Mild free edge fraying at the junction of the body with the  posterior horn of the medial meniscus without discrete tear or  displaced fragment.  4. Low-grade  cartilage fissuring along the right knee medial patellar  facet with mild diffuse thinning of the articular cartilage in the  medial femorotibial joint compartment.  5. The anterior and posterior cruciate ligaments, medial and lateral  supporting structures, and lateral meniscus are intact.      JOAO CHESTER MD                30 minutes spent on the date of the encounter doing chart review, history and exam, documentation and further activities per the note           Again, thank you for allowing me to participate in the care of your patient.        Sincerely,        Selvin Bahena DO

## 2022-07-25 NOTE — PATIENT INSTRUCTIONS
Ongoing symptoms in the right knee remain most consistent with underlying degenerative change, or some osteoarthritis.  We reviewed again the MRI findings, demonstrating such.  Repeat clinical exam today once again shows a joint effusion, and some tenderness medially, consistent with underlying degenerative change.  Otherwise, the knee feels stable.  Reviewed options with use of support for the knee (sleeve, compression, brace), trial of physical therapy, and injection options, and potential for referral on to see orthopedic surgeon.  Following discussion, plan to pursue Visco supplement injection, imaging guided to follow my colleagues next.  From there, monitor for additional 3 to 4 weeks.  Contact clinic with any questions or concerns, or particularly if not improving with the injection.  Follow-up sooner if needed.    If you have any further questions for your physician or physician s care team you can call [phone] and use option 3 to leave a voice message. Calls received during business hours will be returned same day.

## 2022-08-11 ENCOUNTER — OFFICE VISIT (OUTPATIENT)
Dept: ORTHOPEDICS | Facility: CLINIC | Age: 52
End: 2022-08-11
Payer: COMMERCIAL

## 2022-08-11 DIAGNOSIS — M17.11 PRIMARY OSTEOARTHRITIS OF RIGHT KNEE: Primary | ICD-10-CM

## 2022-08-11 PROCEDURE — 20611 DRAIN/INJ JOINT/BURSA W/US: CPT | Mod: RT | Performed by: FAMILY MEDICINE

## 2022-08-11 NOTE — LETTER
8/11/2022         RE: Yenny Johnson  [address] [facility] [address]        Dear Colleague,    Thank you for referring your patient, Yenny Johnson, to the [facility]. Please see a copy of my visit note below.    Large Joint Injection/Arthocentesis: R knee joint    Date/Time: 8/11/2022 5:00 PM  Performed by: Олег Jackson MD  Authorized by: Олег Jackson MD     Indications:  Pain and osteoarthritis  Needle Size:  21 G  Guidance: ultrasound    Approach:  Superolateral  Location:  Knee      Medications:  48 mg hylan 48 MG/6ML  Aspirate amount (mL):  27  Aspirate:  Serous and yellow  Outcome:  Tolerated well, no immediate complications  Procedure discussed: discussed risks, benefits, and alternatives    Consent Given by:  Patient  Timeout: timeout called immediately prior to procedure    Prep: patient was prepped and draped in usual sterile fashion     Ultrasound images of procedure were permanently stored.   Referred by Dr. Bahena     Patient tolerated right knee aspiration/hyaluronic acid injection today.  Aftercare instructions given to patient.  Plan to follow-up as previously discussed with referring provider.  Ultrasound guided images were permanently stored.     Олег Jackson MD [facility]              Again, thank you for allowing me to participate in the care of your patient.        Sincerely,        Олег Jackson MD

## 2022-08-11 NOTE — PATIENT INSTRUCTIONS
[facility] Injection Discharge Instructions    Procedure: Right knee aspiration/Synvisc injection    You may shower, however avoid swimming, tub baths or hot tubs for 24 hours following your procedure  You may have a mild to moderate increase in pain for several days following the injection.  It may take up to 30 days for the steroid medication to start working although you may feel the effect as early as a few days after the procedure.  You may use ice packs for 10-15 minutes, 3 to 4 times a day at the injection site for comfort  You may use anti-inflammatory medications (such as Ibuprofen or Aleve or Advil) or Tylenol for pain control if necessary    If you experience any of the following, call [facility] @ [phone] or [phone]  -Fever over 100 degree F  -Swelling, bleeding, redness, drainage, warmth at the injection site  - New or worsening pain

## 2022-08-11 NOTE — PROGRESS NOTES
Large Joint Injection/Arthocentesis: R knee joint    Date/Time: 8/11/2022 5:00 PM  Performed by: Олег Jackson MD  Authorized by: Олег Jackson MD     Indications:  Pain and osteoarthritis  Needle Size:  21 G  Guidance: ultrasound    Approach:  Superolateral  Location:  Knee      Medications:  48 mg hylan 48 MG/6ML  Aspirate amount (mL):  27  Aspirate:  Serous and yellow  Outcome:  Tolerated well, no immediate complications  Procedure discussed: discussed risks, benefits, and alternatives    Consent Given by:  Patient  Timeout: timeout called immediately prior to procedure    Prep: patient was prepped and draped in usual sterile fashion     Ultrasound images of procedure were permanently stored.   Referred by Dr. Bahena     Patient tolerated right knee aspiration/hyaluronic acid injection today.  Aftercare instructions given to patient.  Plan to follow-up as previously discussed with referring provider.  Ultrasound guided images were permanently stored.     Олег Jackson MD [facility]

## 2022-10-11 ENCOUNTER — TELEPHONE (OUTPATIENT)
Dept: SURGERY | Facility: CLINIC | Age: 52
End: 2022-10-11

## 2022-10-11 NOTE — TELEPHONE ENCOUNTER
Called patient to remind them to fill out theirr new patient quesitonnaire. Someone seemed to  but then hung up right away    Janki Randall MA

## 2022-10-12 ENCOUNTER — VIRTUAL VISIT (OUTPATIENT)
Dept: SURGERY | Facility: CLINIC | Age: 52
End: 2022-10-12
Payer: COMMERCIAL

## 2022-10-12 DIAGNOSIS — E66.09 OBESITY DUE TO EXCESS CALORIES WITHOUT SERIOUS COMORBIDITY, UNSPECIFIED CLASSIFICATION: ICD-10-CM

## 2022-10-12 DIAGNOSIS — E78.5 HYPERLIPIDEMIA, UNSPECIFIED HYPERLIPIDEMIA TYPE: ICD-10-CM

## 2022-10-12 DIAGNOSIS — E66.811 OBESITY (BMI 30.0-34.9): Primary | ICD-10-CM

## 2022-10-12 DIAGNOSIS — K21.9 GASTROESOPHAGEAL REFLUX DISEASE WITHOUT ESOPHAGITIS: ICD-10-CM

## 2022-10-12 DIAGNOSIS — M17.9 OSTEOARTHRITIS OF KNEE, UNSPECIFIED LATERALITY, UNSPECIFIED OSTEOARTHRITIS TYPE: ICD-10-CM

## 2022-10-12 PROCEDURE — 99205 OFFICE O/P NEW HI 60 MIN: CPT | Mod: 95 | Performed by: FAMILY MEDICINE
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
## 2022-10-12 NOTE — PATIENT INSTRUCTIONS
Bryant Ramon,  It was nice meeting with you today. Please call [phone] to set up a follow up appointment with me in 2-3 months.  Kenisha Hong         Eat Better ? Move More ? Live Well    Eat 3 nutrient-rich meals each day    Don t skip meals--it will cause you to overeat later in the day!    Eating fiber (vegetables/fruits/whole grains) and protein with meals helps you stay full longer    Choose foods with less than 10 grams of sugar and 5 grams of fat per serving to prevent excess calories and weight re-gain  Eat around the same times each day to develop a routine eating schedule   Avoid snacking unless physically hungry.   Planned snacks: 1-2 times per day and no more than 150 calories    Eat protein first   Protein helps with healing, maintaining adequate muscle mass, reducing hunger and optimizing nutritional status   Aim for 60-80 grams of protein per day   Fill up on Fiber   Fiber comes from plants--fruits, veggies, whole grains, nuts/seeds and beans   Fiber is low in calories, high in phytonutrients and helps you stay full longer   Aim for 25-35 grams per day by eating fiber with meals and snacks  Eat [id number]   Take 20-30 minutes to eat each meal by taking small bites, chewing foods to applesauce consistency or 20-30 times before you swallow   Eating foods too fast can delay satiety/fullness signals and increase overeating   Slow down your eating by using toddler utensils, putting your fork/spoon down between bites and not watching TV or emailing during meals!   Keep a Journal         Writing down what you eat, how you feel and when you are active helps you identify new changes to work on from week to week         Look for ways to cut 100 calories from your current diet 2-3 times per day  Drink 64 ounces of 0-Calorie drinks between meals   Water   Zero calorie Propel  or Vitamin Water     SoBe [place]  Zero Calories   Crystal Light , Sugar-Free Jacob-Aid , and other sugar-free lemonade or flavored  ocampo   Keep Caffeine to less than 300mg per day ie: 3-6oz cups coffee     Work up to 45-60 minutes of physical activity most days of the week   Helps with losing weight and prevent regaining those extra pounds!    Do a combo of cardio (walking/water exercises) and strength training (lifting weights/Vinyasa yoga)    Avoid Mindless Eating   Be present when you eat--take note of the smell, taste and quality of your food   Make a list of alternative activities you could do to prevent eating out of boredom/stress  Go for a walk, call a friend, chew gum, paint your nails, re-organize the garage, etc       LEAN PROTEIN SOURCES                                                          Aim for 20 grams of protein per meal    Protein Source Portion Calories Grams of Protein                           Nonfat, plain Greek yogurt    (10 grams sugar or less) 3/4 cup (6 oz)  12-17   Light Yogurt (10 grams sugar or less) 3/4 cup (6 oz)  6-8   Protein Shake 1 shake 110-180 15-30   Skim/1% Milk or lactose-free milk 1 cup ( 8 oz)  8   Plain or light, flavored soymilk 1 cup  7-8   Plain or light, hemp milk 1 cup 110 6   Fat Free or 1% Cottage Cheese 1/2 cup 90 15   Part skim ricotta cheese 1/2 cup 100 14   Part skim or reduced fat cheese slices 1 ounce 65-80 8     Mozzarella String Cheese 1 80 8   Canned tuna, chicken, crab or salmon  (canned in water)  1/2 cup 100 15-20   White fish (broiled, grilled, baked) 3 ounces 100 21   Waite/Tuna (broiled, grilled, baked) 3 ounces 150-180 21   Shrimp, Scallops, Lobster, Crab 3 ounces 100 21   Pork loin, Pork Tenderloin 3 ounces 150 21   Boneless, skinless chicken /turkey breast                          (broiled, grilled, baked) 3 ounces 120 21   [place], [place], [place], and Venison 3 ounces 120 21   Lean cuts of red meat and pork (sirloin,   round, tenderloin, flank, ground 93%-96%) 3 ounces 170 21   Lean or Extra Lean Ground Turkey 1/2 cup 150 20   90-95% Lean Turkey  Burger 1 zia 140-180 21   Low-fat casserole with lean meat 3/4 cup 200 17   Luncheon Meats                                                        (turkey, lean ham, roast beef, chicken) 3 ounces 100 21   Egg (boiled, poached, scrambled) 1 Egg 60 7   Egg Substitute 1/2 cup 70 10   Nuts (limit to 1 serving per day)  3 Tbsp. 150 7   Nut [place] (peanut, almond)  Limit to 1 serving or less daily 1 Tbsp. 90 4   Soy Burger (varies) 1  15   Garbanzo, Black, [employer] 1/2 cup 110 7   Refried Beans 1/2 cup 100 7   Kidney and Lima beans 1/2 cup 110 7   Tempeh 3 oz 175 18   Vegan crumbles 1/2 cup 100 14   Tofu 1/2 cup 110 14   Chili (beans and extra lean beef or turkey) 1 cup 200 23   Lentil Stew/Soup 1 cup 150 12   Black Bean Soup 1 cup 175 12          MEDICATION STARTED AT THIS APPOINTMENT    We are starting a GLP-1 (Glucagon-like Peptide-1) medication called Saxenda. One of the ways it works is by slowing down the rate that food leaves your stomach. You feel gomez and will eat less. It also helps regulate hormones that can help improve your blood sugars.    If you are a patient that checks blood sugars, continue to check as instructed by your doctor. Low blood sugars are rare but can happen if patients are on insulin or other oral agents. If you notice consistent low sugars or high sugars, your medication may need to be adjusted after your appointment. If this is the case, please call RN and provide her your blood sugar record from the last 3-4 days. The RN will get in touch with the doctor and call you back/Mychart message with recommendations. We tolerate high sugars for a bit, so if sugars are running 180-200, this is ok. As weight starts dropping the blood sugars should too. If readings are consistently over 200 for 1-2 weeks, then you should call the doctor/nurse.    Dosing for this medication:   Week 1- Inject 0.6 mg daily  Week 2- Inject 1.2 mg daily  Week 3- Inject 1.8 mg daily  Week 4- Inject 2.4 mg  daily  Week 5 and thereafter- Inject 3.0 mg daily    Side effects of GLP- Medications include: The most common side effects are all GI related and consist of: nausea, constipation, diarrhea, burping, or gassiness. Patients are advised to eat slowly and less, and nausea typically passes if people can stick it out.     The risk of pancreatitis (inflammation of the pancreas) has been associated with this type of medication, but is very rare.  If you have had pancreatitis in the past, this medication may not be for you. Please let us know about any past history of pancreas problems.    Symptoms of pancreatitis include: Pain in your upper stomach area which may travel to your back and be worse after eating. Your stomach area may be tender to the touch.  You may have vomiting or nausea and/or have a fever. If you should develop any of these symptoms, stop the medication and contact your primary care doctor. They will do a blood test to check for pancreatitis.       This medication is usually not covered by insurance and can be quite expensive. Sometimes a prior authorization is required, which may take up to 1-2 weeks for an insurance company to make a decision if they will cover the medication. Please be patient, you will be notified after a decision has been made.    Contact the nurse via [facility] or call [phone] if you have any questions or concerns. (Do not stop taking it if you don't think it's working. For some people it works without them knowing it.)     In order to get refills of this or any medication we prescribe you must be seen in the medical weight mgmt clinic every 2-4 months.     Here is the web address to sign up for the [employer] savings card.  [email or web address]

## 2022-10-15 ENCOUNTER — E-VISIT (OUTPATIENT)
Dept: URGENT CARE | Facility: CLINIC | Age: 52
End: 2022-10-15
Payer: COMMERCIAL

## 2022-10-15 DIAGNOSIS — J32.9 VIRAL SINUSITIS: Primary | ICD-10-CM

## 2022-10-15 DIAGNOSIS — B97.89 VIRAL SINUSITIS: Primary | ICD-10-CM

## 2022-10-15 PROCEDURE — 99421 OL DIG E/M SVC 5-10 MIN: CPT | Performed by: NURSE PRACTITIONER

## 2022-10-15 RX ORDER — OXYMETAZOLINE HYDROCHLORIDE 0.05 G/100ML
2 SPRAY NASAL 2 TIMES DAILY
Qty: 15 ML | Refills: 0 | Status: SHIPPED | OUTPATIENT
Start: 2022-10-15 | End: 2022-12-19

## 2022-10-15 RX ORDER — FLUTICASONE PROPIONATE 50 MCG
1 SPRAY, SUSPENSION (ML) NASAL DAILY
Qty: 16 G | Refills: 0 | Status: SHIPPED | OUTPATIENT
Start: 2022-10-15 | End: 2022-12-13

## 2022-10-15 NOTE — PATIENT INSTRUCTIONS
You may want to try a nasal lavage (also known as nasal irrigation). You can find over-the-counter products, such as Neti-Pot, at retail locations or make your own at home. Instructions for homemade nasal lavage and more information on the process are available online at http://www.aafp.org/[email or web address]/1115/p1121.html.

## 2022-12-06 DIAGNOSIS — E66.811 OBESITY (BMI 30.0-34.9): ICD-10-CM

## 2022-12-06 RX ORDER — LIRAGLUTIDE 6 MG/ML
3 INJECTION, SOLUTION SUBCUTANEOUS DAILY
Qty: 15 ML | Refills: 1 | Status: SHIPPED | OUTPATIENT
Start: 2022-12-06 | End: 2023-01-18

## 2022-12-10 DIAGNOSIS — B97.89 VIRAL SINUSITIS: ICD-10-CM

## 2022-12-10 DIAGNOSIS — J32.9 VIRAL SINUSITIS: ICD-10-CM

## 2022-12-13 ENCOUNTER — MYC REFILL (OUTPATIENT)
Dept: FAMILY MEDICINE | Facility: CLINIC | Age: 52
End: 2022-12-13

## 2022-12-13 RX ORDER — FLUTICASONE PROPIONATE 50 MCG
SPRAY, SUSPENSION (ML) NASAL
Qty: 16 ML | Refills: 0 | Status: SHIPPED | OUTPATIENT
Start: 2022-12-13 | End: 2022-12-19

## 2022-12-13 NOTE — TELEPHONE ENCOUNTER
Routing refill request to provider for review/approval because:  Requested refill from  provider at the [facility].  Please review and refill if appropriate.  Thanks!

## 2022-12-19 ENCOUNTER — OFFICE VISIT (OUTPATIENT)
Dept: FAMILY MEDICINE | Facility: CLINIC | Age: 52
End: 2022-12-19
Payer: COMMERCIAL

## 2022-12-19 VITALS
WEIGHT: 189 LBS | TEMPERATURE: 98 F | SYSTOLIC BLOOD PRESSURE: 133 MMHG | OXYGEN SATURATION: 96 % | HEART RATE: 67 BPM | HEIGHT: 65 IN | DIASTOLIC BLOOD PRESSURE: 81 MMHG | BODY MASS INDEX: 31.49 KG/M2

## 2022-12-19 DIAGNOSIS — D17.30 LIPOMA OF SKIN AND SUBCUTANEOUS TISSUE: ICD-10-CM

## 2022-12-19 DIAGNOSIS — E78.5 HYPERLIPIDEMIA LDL GOAL <130: ICD-10-CM

## 2022-12-19 DIAGNOSIS — F32.81 PMDD (PREMENSTRUAL DYSPHORIC DISORDER): ICD-10-CM

## 2022-12-19 DIAGNOSIS — Z00.00 ROUTINE GENERAL MEDICAL EXAMINATION AT A HEALTH CARE FACILITY: Primary | ICD-10-CM

## 2022-12-19 DIAGNOSIS — M67.40 GANGLION CYST: ICD-10-CM

## 2022-12-19 DIAGNOSIS — Z12.31 VISIT FOR SCREENING MAMMOGRAM: ICD-10-CM

## 2022-12-19 DIAGNOSIS — E03.9 ACQUIRED HYPOTHYROIDISM: ICD-10-CM

## 2022-12-19 PROCEDURE — 90471 IMMUNIZATION ADMIN: CPT | Performed by: FAMILY MEDICINE

## 2022-12-19 PROCEDURE — 84443 ASSAY THYROID STIM HORMONE: CPT | Performed by: FAMILY MEDICINE

## 2022-12-19 PROCEDURE — 36415 COLL VENOUS BLD VENIPUNCTURE: CPT | Performed by: FAMILY MEDICINE

## 2022-12-19 PROCEDURE — 90750 HZV VACC RECOMBINANT IM: CPT | Performed by: FAMILY MEDICINE

## 2022-12-19 PROCEDURE — 99396 PREV VISIT EST AGE 40-64: CPT | Mod: 25 | Performed by: FAMILY MEDICINE

## 2022-12-19 RX ORDER — LEVOTHYROXINE SODIUM 88 UG/1
88 TABLET ORAL DAILY
Qty: 90 TABLET | Refills: 3 | Status: SHIPPED | OUTPATIENT
Start: 2022-12-19 | End: 2023-12-26

## 2022-12-19 ASSESSMENT — ENCOUNTER SYMPTOMS
CONSTIPATION: 0
JOINT SWELLING: 0
HEMATURIA: 0
MYALGIAS: 0
EYE PAIN: 0
ARTHRALGIAS: 1
WEAKNESS: 0
SORE THROAT: 1
HEARTBURN: 1
ABDOMINAL PAIN: 0
DYSURIA: 0
NAUSEA: 0
HEMATOCHEZIA: 0
PALPITATIONS: 0
NERVOUS/ANXIOUS: 0
DIARRHEA: 0
CHILLS: 0
FEVER: 0
BREAST MASS: 0
PARESTHESIAS: 0
HEADACHES: 0
FREQUENCY: 0
SHORTNESS OF BREATH: 0
COUGH: 0
DIZZINESS: 0

## 2022-12-19 ASSESSMENT — ASTHMA QUESTIONNAIRES
QUESTION_1 LAST FOUR WEEKS HOW MUCH OF THE TIME DID YOUR ASTHMA KEEP YOU FROM GETTING AS MUCH DONE AT WORK, SCHOOL OR AT HOME: NONE OF THE TIME
QUESTION_2 LAST FOUR WEEKS HOW OFTEN HAVE YOU HAD SHORTNESS OF BREATH: NOT AT ALL
ACT_TOTALSCORE: 25
QUESTION_3 LAST FOUR WEEKS HOW OFTEN DID YOUR ASTHMA SYMPTOMS (WHEEZING, COUGHING, SHORTNESS OF BREATH, CHEST TIGHTNESS OR PAIN) WAKE YOU UP AT NIGHT OR EARLIER THAN USUAL IN THE MORNING: NOT AT ALL
QUESTION_5 LAST FOUR WEEKS HOW WOULD YOU RATE YOUR ASTHMA CONTROL: COMPLETELY CONTROLLED
ACT_TOTALSCORE: 25
QUESTION_4 LAST FOUR WEEKS HOW OFTEN HAVE YOU USED YOUR RESCUE INHALER OR NEBULIZER MEDICATION (SUCH AS ALBUTEROL): NOT AT ALL

## 2022-12-19 NOTE — PROGRESS NOTES
SUBJECTIVE:   CC: Jaci is an 52 year old who presents for preventive health visit.     Patient has been advised of split billing requirements and indicates understanding: Yes     She stopped her statin medication in consideration of myalgia side effects. Patient also presents for follow-up of hypothyroidism, controlled on current medication.  Denies symptoms of hypo- or hyperthyroidism albeit difficulty losing weight. She also requests refill of medication for PMDD.     Healthy Habits:     Getting at least 3 servings of Calcium per day:  Yes    Bi-annual eye exam:  Yes    Dental care twice a year:  Yes    Sleep apnea or symptoms of sleep apnea:  None    Diet:  Regular (no restrictions)    Frequency of exercise:  1 day/week    Duration of exercise:  15-30 minutes    Taking medications regularly:  Yes    Medication side effects:  Muscle aches    PHQ-2 Total Score: 0    Additional concerns today:  Yes      Lump on her back that has gotten bigger and a lump on her ring finger left hand that is painful.    Today's PHQ-2 Score:   PHQ-2 (  [employer]) 2022   Q1: Little interest or pleasure in doing things 0   Q2: Feeling down, depressed or hopeless 0   PHQ-2 Score 0   PHQ-2 Total Score (12-17 Years)- Positive if 3 or more points; Administer PHQ-A if positive -   Q1: Little interest or pleasure in doing things Not at all   Q2: Feeling down, depressed or hopeless Not at all   PHQ-2 Score 0           Social History     Tobacco Use     Smoking status: Former     Packs/day: 1.00     Years: 14.00     Pack years: 14.00     Types: Cigarettes     Quit date: 3/30/1998     Years since quittin.7     Smokeless tobacco: Never   Substance Use Topics     Alcohol use: No         Alcohol Use 2022   Prescreen: >3 drinks/day or >7 drinks/week? No   Prescreen: >3 drinks/day or >7 drinks/week? -       Reviewed orders with patient.  Reviewed health maintenance and updated orders accordingly - Yes  Patient Active Problem List    Diagnosis     PMDD (premenstrual dysphoric disorder)     Obesity     Stress incontinence, female     GERD (gastroesophageal reflux disease)     Hypothyroidism     Lumbar disc herniation     Intermittent asthma     Hyperlipidemia LDL goal <130     Past Surgical History:   Procedure Laterality Date     BIOPSY       C/SECTION, CLASSICAL       CHOLECYSTECTOMY, LAPOROSCOPIC  1995     COLONOSCOPY WITH CO2 INSUFFLATION N/A 09/27/2021    Procedure: COLONOSCOPY, WITH CO2 INSUFFLATION;  Surgeon: Dimitrios Cevallos MD;  Location: [facility]     DESTRUCTION OF PARAVERTEBRAL FACET LUMBAR / SACRAL SINGLE Bilateral 09/12/2017    Procedure: DESTRUCTION OF PARAVERTEBRAL FACET LUMBAR / SACRAL SINGLE;  Radiofrequency Ablation of the Lumbar Facets /bilateral  heating of nerves around spine bilatteraly for purpose of pain rekief;  Surgeon: Benigno Willams MD;  Location: [facility]     HYSTERECTOMY, PAP NO LONGER INDICATED  09/23/2015     INJECT EPIDURAL LUMBAR / SACRAL SINGLE Bilateral 08/09/2017    Procedure: INJECT EPIDURAL LUMBAR / SACRAL SINGLE;  bilateral lumbar medial branch block:injection of local anesthetic into area around spine for purpose of pain relief;  Surgeon: Benigno Willams MD;  Location: [facility]     INJECT PARAVERTEBRAL FACET JOINT LUMBAR / SACRAL FIRST Bilateral 06/28/2017    Procedure: INJECT PARAVERTEBRAL FACET JOINT LUMBAR / SACRAL FIRST;  injection of local anesthesthetic into area around spine for purpose of pain relief;  Surgeon: Benigno Willams MD;  Location: [facility]     INJECT PARAVERTEBRAL FACET JOINT LUMBAR / SACRAL THIRD Bilateral 03/09/2018    Procedure: INJECT PARAVERTEBRAL FACET JOINT LUMBAR / SACRAL THIRD;  Bilateral S1, S2, S3 Lateral Branch Nerve Block and L5 Dorsal Ramus Nerve Block;  Surgeon: Kristi Gomes MD;  Location: [facility]     INJECT SACROILIAC JOINT Bilateral 02/07/2018    Procedure: INJECT SACROILIAC JOINT;  Bilateral sacroiliac Lateral Branch Block;  Surgeon: Edison Sams  MD Isaac;  [facility]: [facility]     RADIO FREQUENCY ABLATION / DESTRUCTION OF SACROILOAC JOINT LATERAL BRANCHES (S1/S2/S3) Bilateral 2018    Procedure: RADIO FREQUENCY ABLATION / DESTRUCTION OF SACROILOAC JOINT LATERAL BRANCHES (S1/S2/S3);  Bilateral Radiofrequency Ablation of the Lateral Branches;  Surgeon: Benigno Willams MD;  Location: [facility]     REPAIR MOHS Left 2017    Procedure: REPAIR MOHS;  Surgeon: Jorge Eric MD;  Location: [facility]     TUBAL LIGATION         Social History     Tobacco Use     Smoking status: Former     Packs/day: 1.00     Years: 14.00     Pack years: 14.00     Types: Cigarettes     Quit date: 3/30/1998     Years since quittin.7     Smokeless tobacco: Never   Substance Use Topics     Alcohol use: No     Family History   Problem Relation Age of Onset     Cardiovascular Mother         CHF      Coronary Artery Disease Mother         Cholesterol     Hyperlipidemia Mother      C.A.D. Father 67        MI     Hypertension Father      Alcohol/Drug Father      Obesity Sister      Obesity Sister      Diabetes Brother      Asthma Brother      Obesity Brother      Coronary Artery Disease Brother      Allergies Maternal Grandmother      Diabetes Maternal Grandmother      Diabetes Paternal Grandfather      Seasonal/Environmental Allergies Daughter      Cancer No family hx of          Current Outpatient Medications   Medication Sig Dispense Refill     FLUoxetine (PROZAC) 20 MG capsule TAKE 3 CAPSULES BY MOUTH EVERY  capsule 3     insulin pen needle (31G X 6 MM) 31G X 6 MM miscellaneous Use 1 pen needle daily or as directed. 100 each 11     levothyroxine (SYNTHROID/LEVOTHROID) 88 MCG tablet Take 1 tablet (88 mcg) by mouth daily 90 tablet 3     liraglutide - Weight Management (SAXENDA) 18 MG/3ML pen Inject 3 mg Subcutaneous daily 15 mL 1     Allergies   Allergen Reactions     Codeine Sulfate      Disorientation, muscle weakness       Breast Cancer Screening:    FHS-7:   Breast CA  Risk Assessment (FHS-7) 1/12/2022   Did any of your first-degree relatives have breast or ovarian cancer? No   Did any of your relatives have bilateral breast cancer? No   Did any man in your family have breast cancer? No   Did any woman in your family have breast and ovarian cancer? No   Did any woman in your family have breast cancer before age 50 y? No   Do you have 2 or more relatives with breast and/or ovarian cancer? No   Do you have 2 or more relatives with breast and/or bowel cancer? No        Mammogram Screening: Recommended annual mammography  Pertinent mammograms are reviewed under the imaging tab.    History of abnormal Pap smear: Status post benign hysterectomy. Health Maintenance and Surgical History updated.  PAP / HPV 3/29/2012   PAP (Historical) NIL     Reviewed and updated as needed this visit by clinical staff   Tobacco  Allergies  Meds  Problems  Med Hx  Surg Hx  Fam Hx          Reviewed and updated as needed this visit by Provider   Tobacco  Allergies  Meds  Problems  Med Hx  Surg Hx  Fam Hx         Past Medical History:   Diagnosis Date     Anemia      BCC (basal cell carcinoma of skin)      Degeneration of lumbar or lumbosacral intervertebral disc      Depressive disorder      Diverticulitis of colon      Elevated fasting glucose      Elevated rheumatoid factor 12/12/2012     GERD (gastroesophageal reflux disease) 11/2005    EGD     Hyperlipidemia LDL goal <130      Hypertriglyceridemia      Hypothyroidism      Intermittent asthma      Lumbar disc herniation      Obesity 03/29/2012     Paroxysmal supraventricular tachycardia (H) 02/16/2021     PMDD (premenstrual dysphoric disorder)      Stress incontinence, female 03/29/2012        Review of Systems   Constitutional: Negative for chills and fever.   HENT: Positive for congestion and sore throat. Negative for ear pain and hearing loss.    Eyes: Negative for pain and visual disturbance.   Respiratory: Negative for cough and  "shortness of breath.    Cardiovascular: Negative for chest pain, palpitations and peripheral edema.   Gastrointestinal: Positive for heartburn. Negative for abdominal pain, constipation, diarrhea, hematochezia and nausea.   Breasts:  Negative for tenderness, breast mass and discharge.   Genitourinary: Negative for dysuria, frequency, genital sores, hematuria, pelvic pain, urgency, vaginal bleeding and vaginal discharge.   Musculoskeletal: Positive for arthralgias. Negative for joint swelling and myalgias.   Skin: Negative for rash.   Neurological: Negative for dizziness, weakness, headaches and paresthesias.   Psychiatric/Behavioral: Negative for mood changes. The patient is not nervous/anxious.           OBJECTIVE:   /81 (BP Location: Right arm, Patient Position: Sitting, Cuff Size: Adult Regular)   Pulse 67   Temp 98  F (36.7  C) (Oral)   Ht 1.64 m (5' 4.57\")   Wt 85.7 kg (189 lb)   LMP 08/07/2015   SpO2 96%   BMI 31.87 kg/m    Physical Exam  GENERAL: alert, no distress and obese  EYES: Eyes grossly normal to inspection, PERRL and conjunctivae and sclerae normal  HENT: ear canals and TM's normal, nose and mouth without ulcers or lesions  NECK: no adenopathy, no asymmetry, masses, or scars and thyroid normal to palpation  RESP: lungs clear to auscultation - no rales, rhonchi or wheezes  CV: regular rate and rhythm, normal S1 S2, no S3 or S4, no murmur, click or rub, no peripheral edema    ABDOMEN: soft, nontender, no hepatosplenomegaly, no masses and bowel sounds normal  MS: normal gait; no edema; small mass on volar aspect of right finger   SKIN: no suspicious lesions or rashes; soft mobile mass under skin of left back  NEURO: Normal strength and tone, mentation intact and speech normal  PSYCH: mentation appears normal, affect normal/bright    Diagnostic Test Results:  Labs reviewed in Epic    ASSESSMENT/PLAN:   (Z00.00) Routine general medical examination at a health care facility  (primary encounter " diagnosis)    (E78.5) Hyperlipidemia LDL goal <130  Comment: possible statin side effects; on weight loss program   Plan: Call or return to clinic as needed if these symptoms worsen or fail to improve as anticipated to consider other causes of myalgia. If symptoms improve, follow-up with me to discuss alternative treatment     (E03.9) Acquired hypothyroidism  Comment: euthyroid on replacement   Plan: levothyroxine (SYNTHROID/LEVOTHROID) 88 MCG         tablet, TSH with free T4 reflex          (F32.81) PMDD (premenstrual dysphoric disorder)  Comment: Well controlled with medications without side effects.   Plan: FLUoxetine (PROZAC) 20 MG capsule          (M67.40) Ganglion cyst  Comment: right volar finger   Plan: The patient is reassured that these symptoms do not appear to represent a serious or threatening condition. Offered orthopedic surgery referral.     (D17.30) Lipoma of skin and subcutaneous tissue  Comment: left back   Plan: The patient is reassured that these symptoms do not appear to represent a serious or threatening condition. Offered general surgery referral.     (Z12.31) Visit for screening mammogram  Plan: MA SCREENING DIGITAL BILAT - Future  (s+30)                  COUNSELING:  Reviewed preventive health counseling, as reflected in patient instructions  Special attention given to:        Regular exercise       Healthy diet/nutrition       Contraception       Osteoporosis prevention/bone health       Colorectal Cancer Screening       Consider Hep C screening for all patients one time for ages 18-79 years       HIV screeninx in teen years, 1x in adult years, and at intervals if high risk       (Rody)menopause management       The 10-year ASCVD risk score (Bryan PANTOJA, et al., 2019) is: 2%    Values used to calculate the score:      Age: 52 years      Sex: Female      Is Non- : No      Diabetic: No      Tobacco smoker: No      Systolic Blood Pressure: 133 mmHg      Is BP  "treated: No      HDL Cholesterol: 41 mg/dL      Total Cholesterol: 179 mg/dL      BMI:   Estimated body mass index is 31.87 kg/m  as calculated from the following:    Height as of this encounter: 1.64 m (5' 4.57\").    Weight as of this encounter: 85.7 kg (189 lb).   Weight management plan: Patient referred to endocrine and/or weight management specialty      She reports that she quit smoking about 24 years ago. Her smoking use included cigarettes. She has a 14.00 pack-year smoking history. She has never used smokeless tobacco.          Cee Biggs MD  [facility]  "

## 2022-12-20 LAB — TSH SERPL DL<=0.005 MIU/L-ACNC: 2.89 MU/L (ref 0.4–4)

## 2023-01-15 NOTE — PROGRESS NOTES
Yenny is a 52 year old who is being evaluated via a billable video visit.      If the video visit is dropped, the invitation should be resent by: Text to cell phone: [phone]  Will anyone else be joining your video visit? No      Video-Visit Details    Type of service:  Video Visit    Video Start Time: 1:00 PM    Video End Time:1:16 PM    Originating Location (pt. Location[place] on [place]    Distant Location (provider location):  [facility] - off site    Platform used for Video Visit: [facility]      Bariat[facility] Care Clinic Non Surgical Follow up Visit   Date of visit: 1/18/2023  Physician: KRISTINE Hong MD, MD  Primary Care is Cee Biggs.  Yenny Johnson   52 year old  female     Initial Weight: 199#  Initial BMI: 33.37  Today's Weight:   Wt Readings from Last 1 Encounters:   01/18/23 184 lb (83.5 kg)     Body mass index is 31.58 kg/m .           Assessment and Plan   Assessment: Yenny is a 52 year old year old female who presents for medical weight management.      Plan:    1. Obesity (BMI 30.0-34.9)  Patient was congratulated on her success thus far. Healthy habits to assist with further weight loss were discussed. She will try to exercise as tolerated. She is having an MRI done of her knee this week. She will continue the saxenda.     2. Hyperlipidemia, unspecified hyperlipidemia type  This may improve with healthy habits and weight loss.    3. Osteoarthritis of knee, unspecified laterality, unspecified osteoarthritis type  This may improve with healthy habits and weight loss.    Follow up in 3 months with myself           INTERIM HISTORY  Patient started Saxenda after her initial visit in October and she feels that it is helping to control her appetite and craving control. She occasionally has gotten some heartburn while ramping the dose up.     DIETARY HISTORY  Meals Per Day: 3  Eating Protein First?: yes  Food Diary: B:eggs and protein shake  L:terriyake chicken and some salad D:protein and vegetable  Snacks Per Day: rare almonds  Fluid Intake: 64 plus  Portion Control:  yes  Calorie Containing Beverages: none  Typical Protein Food Choices: meat, eggs, protein shake  Meals at Restaurant per week:2    Positive Changes Since Last Visit: portion control, water intake  Struggling With: none    Knowledgeable in Reading Food Labels: yes  Getting Adequate Protein: yes  Sleeping 7-8 hours/day sometimes struggles  Stress management not discussed    PHYSICAL ACTIVITY PATTERNS:  None, knee pain, steps at work    REVIEW OF SYSTEMS  GENERAL/CONSTITUTIONAL:  Fatigue: sometimes  HEENT:   glaucoma: no  CARDIOVASCULAR:  History of heart disease: no  PULMONARY:  Dyspnea on exertion: no  PSYCHIATRIC:  Moods: stable  MUSCULOSKELETAL/RHEUMATOLOGIC  Arthralgias: yes  Myalgias: no  ENDOCRINE:  Monitoring Blood Sugars: no  Sugars Well Controlled: na  No personal or family history of medullary thyroid cancer no  :  Birth control: not discussed       Patient Profile   Social History     Social History Narrative     Not on file        Past Medical History   Past Medical History:   Diagnosis Date     Anemia      BCC (basal cell carcinoma of skin)      Degeneration of lumbar or lumbosacral intervertebral disc      Depressive disorder      Diverticulitis of colon      Elevated fasting glucose      Elevated rheumatoid factor 12/12/2012     GERD (gastroesophageal reflux disease) 11/2005    EGD     Hyperlipidemia LDL goal <130      Hypertriglyceridemia      Hypothyroidism      Intermittent asthma      Lumbar disc herniation      Obesity 03/29/2012     Paroxysmal supraventricular tachycardia (H) 02/16/2021     PMDD (premenstrual dysphoric disorder)      Stress incontinence, female 03/29/2012     Patient Active Problem List   Diagnosis     PMDD (premenstrual dysphoric disorder)     Obesity     Stress incontinence, female     GERD (gastroesophageal reflux disease)     Hypothyroidism      "Lumbar disc herniation     Intermittent asthma     Hyperlipidemia LDL goal <130       Past Surgical History  She has a past surgical history that includes cholecystectomy, laporoscopic (1995); tubal ligation; c/section, classical; biopsy; hysterectomy, pap no longer indicated (09/23/2015); Repair MOHS (Left, 01/19/2017); Inject Paravertebral Facet Joint Lumbar / Sacral First (Bilateral, 06/28/2017); Inject Epidural Lumbar / Sacral Single (Bilateral, 08/09/2017); Destruction Of Paravertebral Facet Lumbar / Sacral Single (Bilateral, 09/12/2017); Inject Sacroiliac Joint (Bilateral, 02/07/2018); Inject Paravertebral Facet Joint Lumbar / Sacral Third (Bilateral, 03/09/2018); Radio Frequency Ablation / Destruction of Sacroiloac Joint Lateral Branches (S1/S2/S3) (Bilateral, 05/02/2018); and Colonoscopy with CO2 insufflation (N/A, 09/27/2021).     Examination   Ht 5' 4\" (1.626 m)   Wt 184 lb (83.5 kg)   LMP 08/07/2015   BMI 31.58 kg/m    GENERAL: Healthy, alert and no distress  EYES: Eyes grossly normal to inspection.  No discharge or erythema, or obvious scleral/conjunctival abnormalities.  RESP: No audible wheeze, cough, or visible cyanosis.  No visible retractions or increased work of breathing.    SKIN: Visible skin clear. No significant rash, abnormal pigmentation or lesions.  NEURO: Cranial nerves grossly intact.  Mentation and speech appropriate for age.  PSYCH: Mentation appears normal, affect normal/bright, judgement and insight intact, normal speech and appearance well-groomed.       Counseling:   We reviewed the important post op bariatric recommendations:  -eating 3 meals daily  -eating protein first, getting >60gm protein daily  -eating slowly, chewing food well  -avoiding/limiting calorie containing beverages  -limiting starchy vegetables and carbohydrates, choosing wheat, not white with breads,   crackers, pastas, cornel, bagels, tortillas, rice  -limiting restaurant or cafeteria eating to twice a week or " less    We discussed the importance of restorative sleep and stress management in maintaining a healthy weight.  We discussed the National Weight Control Registry healthy weight maintenance strategies and ways to optimize metabolism.  We discussed the importance of physical activity including cardiovascular and strength training in maintaining a healthier weight.    Total time spent on the date of this encounter doing: chart review, review of test results, patient visit, physical exam, education, counseling, developing plan of care and documenting = 37 minutes.         KRISTINE Hong MD  [facility]

## 2023-01-16 ENCOUNTER — OFFICE VISIT (OUTPATIENT)
Dept: FAMILY MEDICINE | Facility: CLINIC | Age: 53
End: 2023-01-16
Payer: COMMERCIAL

## 2023-01-16 VITALS
BODY MASS INDEX: 31.32 KG/M2 | HEART RATE: 92 BPM | TEMPERATURE: 98.2 F | DIASTOLIC BLOOD PRESSURE: 73 MMHG | OXYGEN SATURATION: 96 % | SYSTOLIC BLOOD PRESSURE: 107 MMHG | HEIGHT: 65 IN | WEIGHT: 188 LBS

## 2023-01-16 DIAGNOSIS — S89.92XA KNEE INJURY, LEFT, INITIAL ENCOUNTER: Primary | ICD-10-CM

## 2023-01-16 DIAGNOSIS — Z13.1 SCREENING FOR DIABETES MELLITUS: ICD-10-CM

## 2023-01-16 DIAGNOSIS — M79.10 MYALGIA: ICD-10-CM

## 2023-01-16 LAB
BASOPHILS # BLD AUTO: 0 10E3/UL (ref 0–0.2)
BASOPHILS NFR BLD AUTO: 0 %
EOSINOPHIL # BLD AUTO: 0.2 10E3/UL (ref 0–0.7)
EOSINOPHIL NFR BLD AUTO: 3 %
ERYTHROCYTE [DISTWIDTH] IN BLOOD BY AUTOMATED COUNT: 12.1 % (ref 10–15)
ERYTHROCYTE [SEDIMENTATION RATE] IN BLOOD BY WESTERGREN METHOD: 7 MM/HR (ref 0–30)
HCT VFR BLD AUTO: 38.3 % (ref 35–47)
HGB BLD-MCNC: 12.8 G/DL (ref 11.7–15.7)
LYMPHOCYTES # BLD AUTO: 1.7 10E3/UL (ref 0.8–5.3)
LYMPHOCYTES NFR BLD AUTO: 30 %
MCH RBC QN AUTO: 30.6 PG (ref 26.5–33)
MCHC RBC AUTO-ENTMCNC: 33.4 G/DL (ref 31.5–36.5)
MCV RBC AUTO: 92 FL (ref 78–100)
MONOCYTES # BLD AUTO: 0.4 10E3/UL (ref 0–1.3)
MONOCYTES NFR BLD AUTO: 7 %
NEUTROPHILS # BLD AUTO: 3.4 10E3/UL (ref 1.6–8.3)
NEUTROPHILS NFR BLD AUTO: 60 %
PLATELET # BLD AUTO: 239 10E3/UL (ref 150–450)
RBC # BLD AUTO: 4.18 10E6/UL (ref 3.8–5.2)
WBC # BLD AUTO: 5.6 10E3/UL (ref 4–11)

## 2023-01-16 PROCEDURE — 99214 OFFICE O/P EST MOD 30 MIN: CPT | Performed by: FAMILY MEDICINE

## 2023-01-16 PROCEDURE — 36415 COLL VENOUS BLD VENIPUNCTURE: CPT | Performed by: FAMILY MEDICINE

## 2023-01-16 PROCEDURE — 85652 RBC SED RATE AUTOMATED: CPT | Performed by: FAMILY MEDICINE

## 2023-01-16 PROCEDURE — 85025 COMPLETE CBC W/AUTO DIFF WBC: CPT | Performed by: FAMILY MEDICINE

## 2023-01-16 PROCEDURE — 82947 ASSAY GLUCOSE BLOOD QUANT: CPT | Performed by: FAMILY MEDICINE

## 2023-01-16 PROCEDURE — 82550 ASSAY OF CK (CPK): CPT | Performed by: FAMILY MEDICINE

## 2023-01-16 RX ORDER — DIAZEPAM 5 MG
5 TABLET ORAL ONCE
Qty: 1 TABLET | Refills: 0 | Status: SHIPPED | OUTPATIENT
Start: 2023-01-16 | End: 2023-01-16

## 2023-01-16 RX ORDER — ATORVASTATIN CALCIUM 20 MG/1
20 TABLET, FILM COATED ORAL DAILY
COMMUNITY
Start: 2023-01-16 | End: 2023-02-09

## 2023-01-16 ASSESSMENT — PAIN SCALES - GENERAL: PAINLEVEL: SEVERE PAIN (7)

## 2023-01-16 NOTE — PROGRESS NOTES
Assessment & Plan     (S89.92XA) Knee injury, left, initial encounter  (primary encounter diagnosis)  Comment: Differential diagnoses would include: lateral ligament sprain, LMT   Plan: XR Knee Left 3 Views, MR Knee Right w/o         Contrast, diazepam (VALIUM) 5 MG tablet        Call or return to clinic as needed if these symptoms worsen or fail to improve      (M79.10) Myalgia  Plan: ESR: Erythrocyte sedimentation rate, CK total,         CBC with platelets and differential           (Z13.1) Screening for diabetes mellitus  Plan: Glucose                     See Patient Instructions    Return in about 11 months (around 12/19/2023) for Wellness visit.    Cee Biggs MD  [facility] KEIKO Beatty is a 52 year old, presenting for the following health issues:  Knee left      Knee left    History of Present Illness       Reason for visit:  Left knee injury/general joint pain  Symptom onset:  More than a month  Symptoms include:  Long bone joint pain  Symptom intensity:  Moderate  Symptom progression:  Staying the same  Had these symptoms before:  No  What makes it worse:  Get up to wall, walking  What makes it better:  Ice ibuprofen    She eats 2-3 servings of fruits and vegetables daily.She consumes 0 sweetened beverage(s) daily.She exercises with enough effort to increase her heart rate 10 to 19 minutes per day.  She exercises with enough effort to increase her heart rate 3 or less days per week.   She is taking medications regularly.     She fell and twisted her left knee several weeks ago. Some instability but no locking or swelling. Her lower extremities in general feel achy.         Review of Systems   CONSTITUTIONAL: NEGATIVE for fever, chills, change in weight  ENT/MOUTH: NEGATIVE for ear, mouth and throat problems  RESP: NEGATIVE for significant cough or SOB  MUSCULOSKELETAL: lower extremity pain, left knee pain   NEURO: NEGATIVE for weakness, dizziness or paresthesias     "  Objective    /73 (BP Location: Right arm, Patient Position: Sitting, Cuff Size: Adult Large)   Pulse 92   Temp 98.2  F (36.8  C) (Oral)   Ht 1.645 m (5' 4.76\")   Wt 85.3 kg (188 lb)   LMP 08/07/2015   SpO2 96%   BMI 31.51 kg/m    Body mass index is 31.51 kg/m .  Physical Exam   GENERAL: alert, no distress and obese  RESP: lungs clear to auscultation - no rales, rhonchi or wheezes  CV: regular rate and rhythm, normal S1 S2, no S3 or S4, no murmur, click or rub, no peripheral edema    MS: no gross musculoskeletal defects noted, no edema  NEURO: Normal strength and tone, mentation intact and speech normal  PSYCH: mentation appears normal, affect normal/bright    Office Visit on 12/19/2022   Component Date Value Ref Range Status     TSH 12/19/2022 2.89  0.40 - 4.00 mU/L Final     No results found for this or any previous visit (from the past 24 hour(s)).                "

## 2023-01-16 NOTE — LETTER
My Asthma Action Plan    Name: Yenny Johnson   YOB: 1970  Date: 1/16/2023   My doctor: Cee Biggs MD   My clinic: [facility]        My Rescue Medicine:   Albuterol inhaler (Proair/Ventolin/Proventil HFA)  2-4 puffs EVERY 4 HOURS as needed. Use a spacer if recommended by your provider.   My Asthma Severity:   Intermittent / Exercise Induced  Know your asthma triggers: upper respiratory infections             GREEN ZONE   Good Control    I feel good    No cough or wheeze    Can work, sleep and play without asthma symptoms       Take your asthma control medicine every day.     1. If exercise triggers your asthma, take your rescue medication    15 minutes before exercise or sports, and    During exercise if you have asthma symptoms  2. Spacer to use with inhaler: If you have a spacer, make sure to use it with your inhaler             YELLOW ZONE Getting Worse  I have ANY of these:    I do not feel good    Cough or wheeze    Chest feels tight    Wake up at night   1. Keep taking your Green Zone medications  2. Start taking your rescue medicine:    every 20 minutes for up to 1 hour. Then every 4 hours for 24-48 hours.  3. If you stay in the Yellow Zone for more than 12-24 hours, contact your doctor.  4. If you do not return to the Green Zone in 12-24 hours or you get worse, start taking your oral steroid medicine if prescribed by your provider.           RED ZONE Medical Alert - Get Help  I have ANY of these:    I feel awful    Medicine is not helping    Breathing getting harder    Trouble walking or talking    Nose opens wide to breathe       1. Take your rescue medicine NOW  2. If your provider has prescribed an oral steroid medicine, start taking it NOW  3. Call your doctor NOW  4. If you are still in the Red Zone after 20 minutes and you have not reached your doctor:    Take your rescue medicine again and    Call 911 or go to the emergency room right away    See your  regular doctor within 2 weeks of an Emergency Room or Urgent Care visit for follow-up treatment.          Annual Reminders:  Meet with Asthma Educator,  Flu Shot in the Fall, consider Pneumonia Vaccination for patients with asthma (aged 19 and older).    Pharmacy:    [facility] [id number] - [place], [place] - [address] [facility] [id number] [facility] - [place], [place] - [address]    Electronically signed by Cee Biggs MD   Date: 01/16/23                    Asthma Triggers  How To Control Things That Make Your Asthma Worse    Triggers are things that make your asthma worse.  Look at the list below to help you find your triggers and   what you can do about them. You can help prevent asthma flare-ups by staying away from your triggers.      Trigger                                                          What you can do   Cigarette Smoke  Tobacco smoke can make asthma worse. Do not allow smoking in your home, car or around you.  Be sure no one smokes at a child s day care or school.  If you smoke, ask your health care provider for ways to help you quit.  Ask family members to quit too.  Ask your health care provider for a referral to Quit Plan to help you quit smoking, or call 8-587-961-PLAN.     Colds, Flu, Bronchitis  These are common triggers of asthma. Wash your hands often.  Don t touch your eyes, nose or mouth.  Get a flu shot every year.     Dust Mites  These are tiny bugs that live in cloth or carpet. They are too small to see. Wash sheets and blankets in hot water every week.   Encase pillows and mattress in dust mite proof covers.  Avoid having carpet if you can. If you have carpet, vacuum weekly.   Use a dust mask and HEPA vacuum.   Pollen and Outdoor Mold  Some people are allergic to trees, grass, or weed pollen, or molds. Try to keep your windows closed.  Limit time out doors when pollen count is high.   Ask you health care provider about taking medicine during allergy season.     Animal  Dander  Some people are allergic to skin flakes, urine or saliva from pets with fur or feathers. Keep pets with fur or feathers out of your home.    If you can t keep the pet outdoors, then keep the pet out of your bedroom.  Keep the bedroom door closed.  Keep pets off cloth furniture and away from stuffed toys.     Mice, Rats, and Cockroaches  Some people are allergic to the waste from these pests.   Cover food and garbage.  Clean up spills and food crumbs.  Store grease in the refrigerator.   Keep food out of the bedroom.   Indoor Mold  This can be a trigger if your home has high moisture. Fix leaking faucets, pipes, or other sources of water.   Clean moldy surfaces.  Dehumidify basement if it is damp and smelly.   Smoke, Strong Odors, and Sprays  These can reduce air quality. Stay away from strong odors and sprays, such as perfume, powder, hair spray, paints, smoke incense, paint, cleaning products, candles and new carpet.   Exercise or Sports  Some people with asthma have this trigger. Be active!  Ask your doctor about taking medicine before sports or exercise to prevent symptoms.    Warm up for 5-10 minutes before and after sports or exercise.     Other Triggers of Asthma  Cold air:  Cover your nose and mouth with a scarf.  Sometimes laughing or crying can be a trigger.  Some medicines and food can trigger asthma.

## 2023-01-18 ENCOUNTER — VIRTUAL VISIT (OUTPATIENT)
Dept: SURGERY | Facility: CLINIC | Age: 53
End: 2023-01-18
Payer: COMMERCIAL

## 2023-01-18 VITALS — BODY MASS INDEX: 31.41 KG/M2 | HEIGHT: 64 IN | WEIGHT: 184 LBS

## 2023-01-18 DIAGNOSIS — E78.5 HYPERLIPIDEMIA, UNSPECIFIED HYPERLIPIDEMIA TYPE: ICD-10-CM

## 2023-01-18 DIAGNOSIS — E66.811 OBESITY (BMI 30.0-34.9): Primary | ICD-10-CM

## 2023-01-18 DIAGNOSIS — M17.9 OSTEOARTHRITIS OF KNEE, UNSPECIFIED LATERALITY, UNSPECIFIED OSTEOARTHRITIS TYPE: ICD-10-CM

## 2023-01-18 LAB
CK SERPL-CCNC: 72 U/L (ref 30–225)
FASTING STATUS PATIENT QL REPORTED: NORMAL
GLUCOSE BLD-MCNC: 91 MG/DL (ref 70–99)

## 2023-01-18 PROCEDURE — 99214 OFFICE O/P EST MOD 30 MIN: CPT | Mod: 95 | Performed by: FAMILY MEDICINE

## 2023-01-18 RX ORDER — LIRAGLUTIDE 6 MG/ML
3 INJECTION, SOLUTION SUBCUTANEOUS DAILY
Qty: 15 ML | Refills: 1 | Status: SHIPPED | OUTPATIENT
Start: 2023-01-18 | End: 2023-04-19

## 2023-01-18 NOTE — PATIENT INSTRUCTIONS
Bryant Ramon,  It was nice meeting with you today. Please call [phone] to set up a follow up appointment with me in 3 months.  Kenisha Hong     Eat Better ? Move More ? Live Well    Eat 3 nutrient-rich meals each day    Don t skip meals--it will cause you to overeat later in the day!    Eating fiber (vegetables/fruits/whole grains) and protein with meals helps you stay full longer    Choose foods with less than 10 grams of sugar and 5 grams of fat per serving to prevent excess calories and weight re-gain  Eat around the same times each day to develop a routine eating schedule   Avoid snacking unless physically hungry.   Planned snacks: 1-2 times per day and no more than 150 calories    Eat protein first   Protein helps with healing, maintaining adequate muscle mass, reducing hunger and optimizing nutritional status   Aim for 60-80 grams of protein per day   Fill up on Fiber   Fiber comes from plants--fruits, veggies, whole grains, nuts/seeds and beans   Fiber is low in calories, high in phytonutrients and helps you stay full longer   Aim for 25-35 grams per day by eating fiber with meals and snacks  Eat [id number]   Take 20-30 minutes to eat each meal by taking small bites, chewing foods to applesauce consistency or 20-30 times before you swallow   Eating foods too fast can delay satiety/fullness signals and increase overeating   Slow down your eating by using toddler utensils, putting your fork/spoon down between bites and not watching TV or emailing during meals!   Keep a Journal         Writing down what you eat, how you feel and when you are active helps you identify new changes to work on from week to week         Look for ways to cut 100 calories from your current diet 2-3 times per day  Drink 64 ounces of 0-Calorie drinks between meals   Water   Zero calorie Propel  or Vitamin Water     So[employer] Lif[employer]  Zero Calories   Crystal Light , Sugar-Free Jacob-Aid , and other sugar-free lemonade or flavored ocampo    Keep Caffeine to less than 300mg per day ie: 3-6oz cups coffee     Work up to 45-60 minutes of physical activity most days of the week   Helps with losing weight and prevent regaining those extra pounds!    Do a combo of cardio (walking/water exercises) and strength training (lifting weights/Vinyasa yoga)    Avoid Mindless Eating   Be present when you eat--take note of the smell, taste and quality of your food   Make a list of alternative activities you could do to prevent eating out of boredom/stress  Go for a walk, call a friend, chew gum, paint your nails, re-organize the garage, etc

## 2023-01-23 DIAGNOSIS — Z86.69 HISTORY OF MIGRAINE HEADACHES: Primary | ICD-10-CM

## 2023-01-23 RX ORDER — TOPIRAMATE 25 MG/1
TABLET, FILM COATED ORAL
Qty: 270 TABLET | Refills: 0 | Status: SHIPPED | OUTPATIENT
Start: 2023-01-23 | End: 2023-04-19

## 2023-01-25 ENCOUNTER — ANCILLARY PROCEDURE (OUTPATIENT)
Dept: MRI IMAGING | Facility: CLINIC | Age: 53
End: 2023-01-25
Attending: FAMILY MEDICINE
Payer: COMMERCIAL

## 2023-01-25 ENCOUNTER — ANCILLARY PROCEDURE (OUTPATIENT)
Dept: GENERAL RADIOLOGY | Facility: CLINIC | Age: 53
End: 2023-01-25
Attending: FAMILY MEDICINE
Payer: COMMERCIAL

## 2023-01-25 ENCOUNTER — OFFICE VISIT (OUTPATIENT)
Dept: ORTHOPEDICS | Facility: CLINIC | Age: 53
End: 2023-01-25
Attending: FAMILY MEDICINE
Payer: COMMERCIAL

## 2023-01-25 ENCOUNTER — ANCILLARY ORDERS (OUTPATIENT)
Dept: FAMILY MEDICINE | Facility: CLINIC | Age: 53
End: 2023-01-25

## 2023-01-25 VITALS
HEIGHT: 65 IN | WEIGHT: 188 LBS | BODY MASS INDEX: 31.32 KG/M2 | DIASTOLIC BLOOD PRESSURE: 78 MMHG | SYSTOLIC BLOOD PRESSURE: 113 MMHG

## 2023-01-25 DIAGNOSIS — S89.92XD KNEE INJURY, LEFT, SUBSEQUENT ENCOUNTER: Primary | ICD-10-CM

## 2023-01-25 DIAGNOSIS — M17.12 PRIMARY OSTEOARTHRITIS OF LEFT KNEE: ICD-10-CM

## 2023-01-25 DIAGNOSIS — S89.92XA KNEE INJURY, LEFT, INITIAL ENCOUNTER: ICD-10-CM

## 2023-01-25 DIAGNOSIS — M25.561 ACUTE PAIN OF RIGHT KNEE: ICD-10-CM

## 2023-01-25 DIAGNOSIS — S83.242D TEAR OF MEDIAL MENISCUS OF LEFT KNEE, UNSPECIFIED TEAR TYPE, UNSPECIFIED WHETHER OLD OR CURRENT TEAR, SUBSEQUENT ENCOUNTER: Primary | ICD-10-CM

## 2023-01-25 DIAGNOSIS — S83.242D TEAR OF MEDIAL MENISCUS OF LEFT KNEE, UNSPECIFIED TEAR TYPE, UNSPECIFIED WHETHER OLD OR CURRENT TEAR, SUBSEQUENT ENCOUNTER: ICD-10-CM

## 2023-01-25 PROBLEM — S83.249A MEDIAL MENISCUS TEAR: Status: ACTIVE | Noted: 2023-01-25

## 2023-01-25 PROCEDURE — 73721 MRI JNT OF LWR EXTRE W/O DYE: CPT | Mod: LT | Performed by: RADIOLOGY

## 2023-01-25 PROCEDURE — 73562 X-RAY EXAM OF KNEE 3: CPT | Mod: TC | Performed by: RADIOLOGY

## 2023-01-25 PROCEDURE — 99213 OFFICE O/P EST LOW 20 MIN: CPT | Performed by: FAMILY MEDICINE

## 2023-01-25 NOTE — PROGRESS NOTES
Yenny Johnson  :  1970  DOS: 2023  MRN: [id number]    Sports Medicine Clinic Visit    PCP: Cee Biggs    Yenny Johnson is a 52 year old female who is seen in consultation at the request of  Cee Biggs M.D. presenting with left knee injury.    Injury: Patient describes injury as walking with daughter, slipped on ice, twisting left knee with valgus type injury that occurred ~ 6+ weeks ago.  Pain located over left posterior lateral knee, nonradiating.  Additional Features:  Positive: swelling, instability and weakness.  Symptoms are better with Ibuprofen and Rest.  Symptoms are worse with: squatting, going from sit to stand, kneeling, stairs, walking.  Other evaluation and/or treatments so far consists of: Ice, Ibuprofen, Rest and compression sleeve.  Recent imaging completed: Xrays and MRI completed earlier today.  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    Past Medical History:   Diagnosis Date     Anemia      BCC (basal cell carcinoma of skin)      Degeneration of lumbar or lumbosacral intervertebral disc      Depressive disorder      Diverticulitis of colon      Elevated fasting glucose      Elevated rheumatoid factor 2012     GERD (gastroesophageal reflux disease) 2005    EGD     Hyperlipidemia LDL goal <130      Hypertriglyceridemia      Hypothyroidism      Intermittent asthma      Lumbar disc herniation      Medial meniscus tear      Obesity 2012     Paroxysmal supraventricular tachycardia (H) 2021     PMDD (premenstrual dysphoric disorder)      Stress incontinence, female 2012     Past Surgical History:   Procedure Laterality Date     BIOPSY       C/SECTION, CLASSICAL        CHOLECYSTECTOMY, LAPOROSCOPIC  1995     COLONOSCOPY WITH CO2 INSUFFLATION N/A 09/27/2021    Procedure: COLONOSCOPY, WITH CO2 INSUFFLATION;  Surgeon: Dimitrios Cevallos MD;  Location: [facility] OR     DESTRUCTION OF PARAVERTEBRAL FACET LUMBAR / SACRAL SINGLE Bilateral 09/12/2017    Procedure: DESTRUCTION OF PARAVERTEBRAL FACET LUMBAR / SACRAL SINGLE;  Radiofrequency Ablation of the Lumbar Facets /bilateral  heating of nerves around spine bilatteraly for purpose of pain rekief;  Surgeon: Benigno Willams MD;  Location: [facility] OR     HYSTERECTOMY, PAP NO LONGER INDICATED  09/23/2015     INJECT EPIDURAL LUMBAR / SACRAL SINGLE Bilateral 08/09/2017    Procedure: INJECT EPIDURAL LUMBAR / SACRAL SINGLE;  bilateral lumbar medial branch block:injection of local anesthetic into area around spine for purpose of pain relief;  Surgeon: Benigno Willams MD;  Location: [facility] OR     INJECT PARAVERTEBRAL FACET JOINT LUMBAR / SACRAL FIRST Bilateral 06/28/2017    Procedure: INJECT PARAVERTEBRAL FACET JOINT LUMBAR / SACRAL FIRST;  injection of local anesthesthetic into area around spine for purpose of pain relief;  Surgeon: Benigno Willams MD;  Location: [facility]     INJECT PARAVERTEBRAL FACET JOINT LUMBAR / SACRAL THIRD Bilateral 03/09/2018    Procedure: INJECT PARAVERTEBRAL FACET JOINT LUMBAR / SACRAL THIRD;  Bilateral S1, S2, S3 Lateral Branch Nerve Block and L5 Dorsal Ramus Nerve Block;  Surgeon: Kristi Gomes MD;  Location: [facility] OR     INJECT SACROILIAC JOINT Bilateral 02/07/2018    Procedure: INJECT SACROILIAC JOINT;  Bilateral sacroiliac Lateral Branch Block;  Surgeon: Edison Sams MD;  Location: [facility]     RADIO FREQUENCY ABLATION / DESTRUCTION OF SACROILOAC JOINT LATERAL BRANCHES (S1/S2/S3) Bilateral 05/02/2018    Procedure: RADIO FREQUENCY ABLATION / DESTRUCTION OF SACROILOAC JOINT LATERAL BRANCHES (S1/S2/S3);  Bilateral Radiofrequency Ablation of the Lateral Branches;  Surgeon: Benigno Willams MD;   "Location: [facility] OR     REPAIR MOHS Left 01/19/2017    Procedure: REPAIR MOHS;  Surgeon: Jorge Eric MD;  Location: [facility] OR     TUBAL LIGATION       Family History   Problem Relation Age of Onset     Cardiovascular Mother         CHF      Coronary Artery Disease Mother         Cholesterol     Hyperlipidemia Mother      C.A.D. Father 67        MI     Hypertension Father      Alcohol/Drug Father      Obesity Sister      Obesity Sister      Diabetes Brother      Asthma Brother      Obesity Brother      Coronary Artery Disease Brother      Allergies Maternal Grandmother      Diabetes Maternal Grandmother      Diabetes Paternal Grandfather      Seasonal/Environmental Allergies Daughter      Cancer No family hx of        Objective  /78   Ht 1.645 m (5' 4.75\")   Wt 85.3 kg (188 lb)   LMP 08/07/2015   BMI 31.53 kg/m        General: healthy, alert and in no distress      HEENT: no scleral icterus or conjunctival erythema     Skin: no suspicious lesions or rash. No jaundice.     CV: regular rhythm by palpation, 2+ distal pulses, no pedal edema      Resp: normal respiratory effort without conversational dyspnea     Psych: normal mood and affect      Gait: mildly antalgic, appropriate coordination and balance     Neuro: normal light touch sensory exam of the extremities. Motor strength as noted below     Left Knee exam    ROM:        Full active and passive ROM with flexion and extension       Painful terminal flexion    Inspection:       no visible ecchymosis       no visible edema or effusion    Skin:       no visible deformities       well perfused       capillary refill brisk    Patellar Motion:        Normal patellar tracking noted through range of motion       Crepitus noted in the patellofemoral joint    Tender:       Posterior knee       lateral patellar border mild       medial joint line mild    Non Tender:         remainder of knee area    Special Tests:        Mildly painful Adrianne       neg (-) " Lachman       neg (-) anterior drawer       neg (-) posterior drawer       neg (-) varus at 0 deg and 30 deg       neg (-) valgus at 0 deg and 30 deg      Painful PF compression    Evaluation of ipsilateral kinetic chain       Modest baseline strength with hip extension and abduction      Radiology  Recent Results (from the past 744 hour(s))   XR Knee Left 3 Views    Narrative    KNEE THREE VIEWS LEFT  1/25/2023 7:53 AM     HISTORY: Knee injury, left, initial encounter; pain  COMPARISON: None.      Impression    IMPRESSION: No acute fracture or malalignment. There is normal joint  spacing. No significant knee joint effusion.    CT BROWER MD         SYSTEM ID:  [id number]   MR Knee Left w/o Contrast    Narrative    MR left knee without contrast 1/25/2023 8:34 AM    Techniques: Multiplanar multisequence imaging of the left knee was  obtained without administration of intra-articular or intravenous  contrast using routing protocol.    History: Knee injury, left, initial encounter    Additional History from EMR: Twisted knee several weeks ago. Some  instability but no locking or swelling.    Comparison: Left knee radiograph 1/25/2023    Findings:    MENISCI:  Medial meniscus: Posterior root ligament partial tear of the medial  meniscus with underlying cystlike change and edema like marrow signal  intensity at its attachment. Regional soft tissue edema including  oblique popliteal ligament course, consistent with sprain and possible  partial tear. Evaluation of these area partially compromised due to  pulsation artifact. Additionally, intrasubstance signal body and  posterior horn of medial meniscus.  Lateral meniscus: Intact.    LIGAMENTS  Cruciate ligaments: Intact.  Medial supporting structures: Intact. Thickened tibial collateral  ligament, consistent with remote trauma.   Lateral supporting structures: Intact.    EXTENSOR MECHANISM  Intact.    FLUID  Small joint effusion. Trace Guevara's cyst.    OSSEOUS and  ARTICULAR STRUCTURES  Bones: Edema like marrow signal intensity at the peripheral aspect of  medial tibial plateau, likely reactive. No fracture, contusion, or  osseous lesion is seen.     Patellofemoral compartment: Superficial chondral loss medial patellar  facet articular cartilage. Full-thickness chondral fissure medial  trochlea caudally with subchondral edema like marrow signal intensity.    Medial compartment: Subchondral cystlike change at the posterior  weightbearing surface of the medial femoral condyle consistent with  overlying full-thickness chondral fissure presence.    Lateral compartment: No high-grade hyaline cartilage disease.    ANCILLARY FINDINGS  None.      Impression    Impression:  1. Partial medial meniscus posterior root ligament tear.   a. Sprain of oblique popliteal ligament with possible partial tear.  2. 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    I have personally reviewed the examination and initial interpretation  and I agree with the findings.    RICHARD SHARMA         SYSTEM ID:  [id number]         Assessment:  1. Knee injury, left, subsequent encounter    2. Acute pain of right knee    3. Tear of medial meniscus of left knee, unspecified tear type, unspecified whether old or current tear, subsequent encounter    4. Primary osteoarthritis of left knee        Plan:  Discussed the assessment with the patient.  Follow up: prn based on clinical progress  Reviewed MRI that was completed today  She does have a meniscal root tear that can be amenable to surgery, unclear based on exam if this is the source of her pain, vs DJD flare vs posterior strain  XR and MRI images independently visualized and reviewed with patient today in clinic  Can offer surgical referral in the future prn  Also reviewed option for CSI +/- aspiration based on progress  PT options reviewed  RICE reviewed  Low impact activity modification strategies reviewed  Oral Tylenol and  topical Voltaren gel reviewed as safe OTC options, reviewed safe dosing strategies  We discussed modified progressive pain-free activity as tolerated  Home handouts provided and supportive care reviewed  All questions were answered today  Contact us with additional questions or concerns  Signs and sx of concern reviewed    Thanks very much for sending this nice lady to us, I will keep you updated with her progress      Selvin Dalal DO, [facility]  Sports Medicine Physician  [facility]            Disclaimer: This note consists of symbols derived from keyboarding, dictation and/or voice recognition software. As a result, there may be errors in the script that have gone undetected. Please consider this when interpreting information found in this chart.

## 2023-01-25 NOTE — RESULT ENCOUNTER NOTE
Please call patient:  Your MRI shows a partial medial meniscal tear, sprain, and cartilage issue called chondromalacia. I recommend meeting with an orthopedic specialist to discuss next steps and have placed a referral for scheduling outreach.     Cee Bgigs MD

## 2023-01-25 NOTE — LETTER
2023         RE: Yenny Johnson  [address] [facility] [address]        Dear Colleague,    Thank you for referring your patient, Yenny Johnson, to the [facility]. Please see a copy of my visit note below.    Yenny Johnson  :  1970  DOS: 2023  MRN: [id number]    Sports Medicine Clinic Visit    PCP: Cee Biggs    Yenny Johnson is a 52 year old female who is seen in consultation at the request of  Cee Biggs M.D. presenting with left knee injury.    Injury: Patient describes injury as walking with daughter, slipped on ice, twisting left knee with valgus type injury that occurred ~ 6+ weeks ago.  Pain located over left posterior lateral knee, nonradiating.  Additional Features:  Positive: swelling, instability and weakness.  Symptoms are better with Ibuprofen and Rest.  Symptoms are worse with: squatting, going from sit to stand, kneeling, stairs, walking.  Other evaluation and/or treatments so far consists of: Ice, Ibuprofen, Rest and compression sleeve.  Recent imaging completed: Xrays and MRI completed earlier today.  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    Past Medical History:   Diagnosis Date     Anemia      BCC (basal cell carcinoma of skin)      Degeneration of lumbar or lumbosacral intervertebral disc      Depressive disorder      Diverticulitis of colon      Elevated fasting glucose      Elevated rheumatoid factor 2012     GERD (gastroesophageal reflux disease) 2005    EGD     Hyperlipidemia LDL goal <130      Hypertriglyceridemia      Hypothyroidism      Intermittent asthma      Lumbar disc herniation      Medial meniscus tear       Obesity 03/29/2012     Paroxysmal supraventricular tachycardia (H) 02/16/2021     PMDD (premenstrual dysphoric disorder)      Stress incontinence, female 03/29/2012     Past Surgical History:   Procedure Laterality Date     BIOPSY       C/SECTION, CLASSICAL       CHOLECYSTECTOMY, LAPOROSCOPIC  1995     COLONOSCOPY WITH CO2 INSUFFLATION N/A 09/27/2021    Procedure: COLONOSCOPY, WITH CO2 INSUFFLATION;  Surgeon: Dimitrios Cevallos MD;  Location: [facility] OR     DESTRUCTION OF PARAVERTEBRAL FACET LUMBAR / SACRAL SINGLE Bilateral 09/12/2017    Procedure: DESTRUCTION OF PARAVERTEBRAL FACET LUMBAR / SACRAL SINGLE;  Radiofrequency Ablation of the Lumbar Facets /bilateral  heating of nerves around spine bilatteraly for purpose of pain rekief;  Surgeon: Benigno Willams MD;  Location: [facility] OR     HYSTERECTOMY, PAP NO LONGER INDICATED  09/23/2015     INJECT EPIDURAL LUMBAR / SACRAL SINGLE Bilateral 08/09/2017    Procedure: INJECT EPIDURAL LUMBAR / SACRAL SINGLE;  bilateral lumbar medial branch block:injection of local anesthetic into area around spine for purpose of pain relief;  Surgeon: Benigno Willams MD;  Location: [facility]     INJECT PARAVERTEBRAL FACET JOINT LUMBAR / SACRAL FIRST Bilateral 06/28/2017    Procedure: INJECT PARAVERTEBRAL FACET JOINT LUMBAR / SACRAL FIRST;  injection of local anesthesthetic into area around spine for purpose of pain relief;  Surgeon: Benigno Willams MD;  [facility]: [facility]     INJECT PARAVERTEBRAL FACET JOINT LUMBAR / SACRAL THIRD Bilateral 03/09/2018    Procedure: INJECT PARAVERTEBRAL FACET JOINT LUMBAR / SACRAL THIRD;  Bilateral S1, S2, S3 Lateral Branch Nerve Block and L5 Dorsal Ramus Nerve Block;  Surgeon: Kristi Gomes MD;  Location: [facility]     INJECT SACROILIAC JOINT Bilateral 02/07/2018    Procedure: INJECT SACROILIAC JOINT;  Bilateral sacroiliac Lateral Branch Block;  Surgeon: Edison Sams MD;  [facility]     RADIO FREQUENCY ABLATION / DESTRUCTION OF  "SACROILOAC JOINT LATERAL BRANCHES (S1/S2/S3) Bilateral 05/02/2018    Procedure: RADIO FREQUENCY ABLATION / DESTRUCTION OF SACROILOAC JOINT LATERAL BRANCHES (S1/S2/S3);  Bilateral Radiofrequency Ablation of the Lateral Branches;  Surgeon: Benigno Willams MD;  [facility]     REPAIR MOHS Left 01/19/2017    Procedure: REPAIR MOHS;  Surgeon: Jorge Eric MD;  Location: [facility] OR     TUBAL LIGATION       Family History   Problem Relation Age of Onset     Cardiovascular Mother         CHF      Coronary Artery Disease Mother         Cholesterol     Hyperlipidemia Mother      C.A.D. Father 67        MI     Hypertension Father      Alcohol/Drug Father      Obesity Sister      Obesity Sister      Diabetes Brother      Asthma Brother      Obesity Brother      Coronary Artery Disease Brother      Allergies Maternal Grandmother      Diabetes Maternal Grandmother      Diabetes Paternal Grandfather      Seasonal/Environmental Allergies Daughter      Cancer No family hx of        Objective  /78   Ht 1.645 m (5' 4.75\")   Wt 85.3 kg (188 lb)   LMP 08/07/2015   BMI 31.53 kg/m        General: healthy, alert and in no distress      HEENT: no scleral icterus or conjunctival erythema     Skin: no suspicious lesions or rash. No jaundice.     CV: regular rhythm by palpation, 2+ distal pulses, no pedal edema      Resp: normal respiratory effort without conversational dyspnea     Psych: normal mood and affect      Gait: mildly antalgic, appropriate coordination and balance     Neuro: normal light touch sensory exam of the extremities. Motor strength as noted below     Left Knee exam    ROM:        Full active and passive ROM with flexion and extension       Painful terminal flexion    Inspection:       no visible ecchymosis       no visible edema or effusion    Skin:       no visible deformities       well perfused       capillary refill brisk    Patellar Motion:        Normal patellar tracking noted through range of " motion       Crepitus noted in the patellofemoral joint    Tender:       Posterior knee       lateral patellar border mild       medial joint line mild    Non Tender:         remainder of knee area    Special Tests:        Mildly painful Adrianne       neg (-) Lachman       neg (-) anterior drawer       neg (-) posterior drawer       neg (-) varus at 0 deg and 30 deg       neg (-) valgus at 0 deg and 30 deg      Painful PF compression    Evaluation of ipsilateral kinetic chain       Modest baseline strength with hip extension and abduction      Radiology  Recent Results (from the past 744 hour(s))   XR Knee Left 3 Views    Narrative    KNEE THREE VIEWS LEFT  1/25/2023 7:53 AM     HISTORY: Knee injury, left, initial encounter; pain  COMPARISON: None.      Impression    IMPRESSION: No acute fracture or malalignment. There is normal joint  spacing. No significant knee joint effusion.    CT BROWER MD         SYSTEM ID:  [id number]   MR Knee Left w/o Contrast    Narrative    MR left knee without contrast 1/25/2023 8:34 AM    Techniques: Multiplanar multisequence imaging of the left knee was  obtained without administration of intra-articular or intravenous  contrast using routing protocol.    History: Knee injury, left, initial encounter    Additional History from EMR: Twisted knee several weeks ago. Some  instability but no locking or swelling.    Comparison: Left knee radiograph 1/25/2023    Findings:    MENISCI:  Medial meniscus: Posterior root ligament partial tear of the medial  meniscus with underlying cystlike change and edema like marrow signal  intensity at its attachment. Regional soft tissue edema including  oblique popliteal ligament course, consistent with sprain and possible  partial tear. Evaluation of these area partially compromised due to  pulsation artifact. Additionally, intrasubstance signal body and  posterior horn of medial meniscus.  Lateral meniscus: Intact.    LIGAMENTS  Cruciate  ligaments: Intact.  Medial supporting structures: Intact. Thickened tibial collateral  ligament, consistent with remote trauma.   Lateral supporting structures: Intact.    EXTENSOR MECHANISM  Intact.    FLUID  Small joint effusion. Trace Guevara's cyst.    OSSEOUS and ARTICULAR STRUCTURES  Bones: Edema like marrow signal intensity at the peripheral aspect of  medial tibial plateau, likely reactive. No fracture, contusion, or  osseous lesion is seen.     Patellofemoral compartment: Superficial chondral loss medial patellar  facet articular cartilage. Full-thickness chondral fissure medial  trochlea caudally with subchondral edema like marrow signal intensity.    Medial compartment: Subchondral cystlike change at the posterior  weightbearing surface of the medial femoral condyle consistent with  overlying full-thickness chondral fissure presence.    Lateral compartment: No high-grade hyaline cartilage disease.    ANCILLARY FINDINGS  None.      Impression    Impression:  1. Partial medial meniscus posterior root ligament tear.   a. Sprain of oblique popliteal ligament with possible partial tear.  2. 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    I have personally reviewed the examination and initial interpretation  and I agree with the findings.    RICHARD ZACHARY         SYSTEM ID:  [id number]         Assessment:  1. Knee injury, left, subsequent encounter    2. Acute pain of right knee    3. Tear of medial meniscus of left knee, unspecified tear type, unspecified whether old or current tear, subsequent encounter    4. Primary osteoarthritis of left knee        Plan:  Discussed the assessment with the patient.  Follow up: prn based on clinical progress  Reviewed MRI that was completed today  She does have a meniscal root tear that can be amenable to surgery, unclear based on exam if this is the source of her pain, vs DJD flare vs posterior strain  XR and MRI images independently  visualized and reviewed with patient today in clinic  Can offer surgical referral in the future prn  Also reviewed option for CSI +/- aspiration based on progress  PT options reviewed  RICE reviewed  Low impact activity modification strategies reviewed  Oral Tylenol and topical Voltaren gel reviewed as safe OTC options, reviewed safe dosing strategies  We discussed modified progressive pain-free activity as tolerated  Home handouts provided and supportive care reviewed  All questions were answered today  Contact us with additional questions or concerns  Signs and sx of concern reviewed    Thanks very much for sending this nice lady to us, I will keep you updated with her progress      Selvin Dalal DO, ROSA  Sports Medicine Physician  [facility]            Disclaimer: This note consists of symbols derived from keyboarding, dictation and/or voice recognition software. As a result, there may be errors in the script that have gone undetected. Please consider this when interpreting information found in this chart.      Again, thank you for allowing me to participate in the care of your patient.        Sincerely,        Selvin Dalal DO

## 2023-01-26 NOTE — PROGRESS NOTES
"CHIEF COMPLAINT:   Chief Complaint   Patient presents with     Left Knee - Pain     DOI mid Dec 2022. Patient notes she slipped and fell on the ice, twisted her knee. She was able to walk that day. Since then it has gotten worse. Pain is posterior. It feels fat and swollen. Extending her knee causes increased pain. Getting up from a chair and getting going is hard. She limps throughout the day. No tx.   .  Yenny Johnson is seen today in the [facility] for evaluation of left knee pain at the request of Dr. Selvin Dalal,         HISTORY OF PRESENT ILLNESS    Yenny Johnson is a 52 year old female seen for evaluation of ongoing left knee pain following a known injury.   Pain has been present for 6 weeks, mid 12/2022, slipped and fell on the ice, twisting her left knee. She was able to walk afterwards, but pain is getting worse. She locates pain behind the knee outer aspect, aggravated with extending the knee, walking, sit to stand getting up from a chair. She limps all day due to pain. The knee feels swollen.    Treatment has been ice, ibuprofen, brace.    Denies prior left knee problems. Reports right knee problems, osgood schlatter disease. Had a \"fall\" a couple years ago for right knee treated with cortisone and \"chicken injection\".    Present symptoms: pain laterally and posteriorly, pain sharp, dull/achy , mild pain, mild swelling, no catching/popping, no locking, no giving way.    Pain severity: 3/10, up to 8-9/10 with certain movements.  Frequency of symptoms: are constant  Exacerbating Factors: weight bearing, stairs, [id number]-ie-[id number], prolonged sitting, prolonged standing  Relieving Factors: rest, medications: ibuprofen.  Night Pain: Yes  Pain while at rest: Yes   Numbness or tingling: No   Patient has tried:     NSAIDS: Yes      Physical Therapy: No      Activity modification: Yes      Bracing: Yes , sleeve and velcro hinged.     Injections: No      Ice: Yes      Assistive " device:  No      Other PMH:  has a past medical history of Anemia, BCC (basal cell carcinoma of skin), Degeneration of lumbar or lumbosacral intervertebral disc, Depressive disorder, Diverticulitis of colon, Elevated fasting glucose, Elevated rheumatoid factor (12/12/2012), GERD (gastroesophageal reflux disease) (11/2005), Hyperlipidemia LDL goal <130, Hypertriglyceridemia, Hypothyroidism, Intermittent asthma, Lumbar disc herniation, Medial meniscus tear, Obesity (03/29/2012), Paroxysmal supraventricular tachycardia (H) (02/16/2021), PMDD (premenstrual dysphoric disorder), and Stress incontinence, female (03/29/2012).    She has no past medical history of Cerebral infarction (H), Congestive heart failure (H), Congestive heart failure, unspecified, COPD (chronic obstructive pulmonary disease) (H), CVA (cerebral infarction), Diabetes (H), History of blood transfusion, or Hypertension.  Patient Active Problem List   Diagnosis     PMDD (premenstrual dysphoric disorder)     Obesity     Stress incontinence, female     GERD (gastroesophageal reflux disease)     Hypothyroidism     Lumbar disc herniation     Intermittent asthma     Hyperlipidemia LDL goal <130     Medial meniscus tear       Surgical Hx:  has a past surgical history that includes cholecystectomy, laporoscopic (1995); tubal ligation; c/section, classical; biopsy; hysterectomy, pap no longer indicated (09/23/2015); Repair MOHS (Left, 01/19/2017); Inject Paravertebral Facet Joint Lumbar / Sacral First (Bilateral, 06/28/2017); Inject Epidural Lumbar / Sacral Single (Bilateral, 08/09/2017); Destruction Of Paravertebral Facet Lumbar / Sacral Single (Bilateral, 09/12/2017); Inject Sacroiliac Joint (Bilateral, 02/07/2018); Inject Paravertebral Facet Joint Lumbar / Sacral Third (Bilateral, 03/09/2018); Radio Frequency Ablation / Destruction of Sacroiloac Joint Lateral Branches (S1/S2/S3) (Bilateral, 05/02/2018); and Colonoscopy with CO2 insufflation (N/A,  09/27/2021).    Medications:   Current Outpatient Medications:      atorvastatin (LIPITOR) 20 MG tablet, Take 1 tablet (20 mg) by mouth daily, Disp: , Rfl:      FLUoxetine (PROZAC) 20 MG capsule, TAKE 3 CAPSULES BY MOUTH EVERY DAY, Disp: 270 capsule, Rfl: 3     insulin pen needle (31G X 6 MM) 31G X 6 MM miscellaneous, Use 1 pen needle daily or as directed., Disp: 100 each, Rfl: 11     levothyroxine (SYNTHROID/LEVOTHROID) 88 MCG tablet, Take 1 tablet (88 mcg) by mouth daily, Disp: 90 tablet, Rfl: 3     liraglutide - Weight Management (SAXENDA) 18 MG/3ML pen, Inject 3 mg Subcutaneous daily, Disp: 15 mL, Rfl: 1     topiramate (TOPAMAX) 25 MG tablet, Take 1 tablet (25 mg) by mouth daily for 7 days, THEN 2 tablets (50 mg) daily for 7 days, THEN 3 tablets (75 mg) daily for 76 days., Disp: 270 tablet, Rfl: 0    Allergies: No Known Allergies    Social Hx: certified medical assistant.   reports that she quit smoking about 24 years ago. Her smoking use included cigarettes. She has a 14.00 pack-year smoking history. She has never used smokeless tobacco. She reports that she does not drink alcohol and does not use drugs.    Family Hx: family history includes Alcohol/Drug in her father; Allergies in her maternal grandmother; Asthma in her brother; C.A.D. (age of onset: 67) in her father; Cardiovascular in her mother; Coronary Artery Disease in her brother and mother; Diabetes in her brother, maternal grandmother, and paternal grandfather; Hyperlipidemia in her mother; Hypertension in her father; Obesity in her brother, sister, and sister; Seasonal/Environmental Allergies in her daughter.    REVIEW OF SYSTEMS: 10 point ROS neg other than the symptoms noted above in the HPI and PMH. Notables include  CONSTITUTIONAL:NEGATIVE for fever, chills, change in weight  INTEGUMENTARY/SKIN: NEGATIVE for worrisome rashes, moles or lesions  MUSCULOSKELETAL:See HPI above  NEURO: NEGATIVE for weakness, dizziness or paresthesias    PHYSICAL  "EXAM:  /87   Pulse 71   Ht 1.645 m (5' 4.75\")   Wt 83.8 kg (184 lb 11.2 oz)   LMP 08/07/2015   BMI 30.97 kg/m     GENERAL APPEARANCE: healthy, alert, no distress  SKIN: no suspicious lesions or rashes  NEURO: Normal strength and tone, mentation intact and speech normal  PSYCH:  mentation appears normal and affect normal, not anxious  RESPIRATORY: No increased work of breathing.  HANDS: no clubbing, nail pitting  LYMPH: no palpable popliteal lymphadenopathy.    BILATERAL LOWER EXTREMITIES:  Gait: antalgic favoring left.  Alignment: slight varus  No gross deformities or masses.  No Quad atrophy, strength weak on left compared to the right, pain limited.  Intact sensation deep peroneal nerve, superficial peroneal nerve, med/lat tibial nerve, sural nerve, saphenous nerve  Intact EHL, EDL, TA, FHL, GS, quadriceps hamstrings and hip flexors  Bilateral calf soft and nttp or squeeze.  DTRs: achilles 2+, patella 2+.  Edema: trace    LEFT KNEE EXAM:    Skin: intact, no ecchymosis or erythema  Squat: 100 %, not limited by pain.     ROM: full extension to 130 flexion, mild posterolateral discomfort.  Tight hamstrings on straight leg raise.  Effusion: small  Tender: posterolateral knee   NTTP med/lat joint line, posteromedial knee, anterior knee  McMurrays: negative    MCL: stable, and non-painful at both 0 and 30 degrees knee flexion  Varus stress: stable, and non-painful at both 0 and 30 degrees knee flexion  Lachmans: neg, firm endpoint  Posterior Drawer stable  Patellofemoral joint:                Apprehension: negative              Crepitations: minimal    RIGHT KNEE EXAM:    Skin: intact, no ecchymosis or erythema  Squat: 100 %, not limited by pain.     ROM: full extension to 135+ flexion  Tight hamstrings on straight leg raise.  Effusion: none  Tender: NTTP med/lat joint line, anterior or posterior knee  McMurrays: negative    MCL: stable, and non-painful at both 0 and 30 degrees knee flexion  Varus stress: " stable, and non-painful at both 0 and 30 degrees knee flexion  Lachmans: neg, firm endpoint  Posterior Drawer stable  Patellofemoral joint:                Apprehension: negative              Crepitations: minimal    X-RAY:  3 views left knee from 1/25/2023 were reviewed in clinic today. On my review, no obvious fractures or dislocations. No acute fracture or malalignment. There is normal joint spacing. No significant knee joint effusion.    MRI:  MRI left knee from 1/25/2023 was reviewed in clinic today.  1. Partial medial meniscus posterior root ligament tear.   a. Sprain of oblique popliteal ligament with possible partial tear.  2. Grade IV chondromalacia areas in the patellofemoral and medial compartments.       ASSESSMENT/PLAN: Yenny Johnson is a 52 year old female with acute left knee pain, popliteal ligament strain, partial medial meniscus posterior root tear, primary osteoarthritis.     * exam, images reviewed  * based on location of pain, suspect more popiteal strain pain rather than medial meniscus root tear pain, but can't be certain for sure  * discussed options, nonsurgical with ice/heat, massage, range of motion, hamstrings stretching, activity modification, over the counter pain medications, bracing, therapy and injections/aspiration  * popliteal strain / pain will resolve on its own with time, nonsurgical  * if there is a medial meniscus root tear, we could potentially fix this or debride with surgery. Unclear based on history, exam that this is really an issue for her at this time,  * she also has some mild osteoarthritis in the knee, more medial and patello-femoral in nature.    * at this time, she'd like to try aspiration/injection, therapy and see what happens. She states her right knee did well a couple years ago with injection so would like to start there first.    * return to clinic as needed.    * All questions were addressed and answered prior to discharge from clinic today. The patient  acknowledges an understanding of and agreement with the plan set forth during today's visit. Patient was advised to call our office or MyChart us if any further questions arise upon leaving our office today.        Bryan Escobar M.D., M.S.  Dept. of Orthopaedic Surgery  [facility]    Large Joint Injection/Arthocentesis: L knee joint    Date/Time: 1/30/2023 3:52 PM  Performed by: Shashank Aguilera PA  Authorized by: Bryan Escobar MD     Indications:  Pain, osteoarthritis and joint swelling  Needle Size:  18 G  Guidance: landmark guided    Approach:  Superolateral  Location:  Knee      Medications:  80 mg methylPREDNISolone 80 MG/ML; 4 mL bupivacaine 0.25 %  Aspirate amount (mL):  20  Aspirate:  Yellow  Outcome:  Tolerated well, no immediate complications  Procedure discussed: discussed risks, benefits, and alternatives    Consent Given by:  Patient  Prep: patient was prepped and draped in usual sterile fashion

## 2023-01-30 ENCOUNTER — OFFICE VISIT (OUTPATIENT)
Dept: ORTHOPEDICS | Facility: CLINIC | Age: 53
End: 2023-01-30
Payer: COMMERCIAL

## 2023-01-30 VITALS
DIASTOLIC BLOOD PRESSURE: 87 MMHG | BODY MASS INDEX: 30.77 KG/M2 | HEIGHT: 65 IN | SYSTOLIC BLOOD PRESSURE: 129 MMHG | HEART RATE: 71 BPM | WEIGHT: 184.7 LBS

## 2023-01-30 DIAGNOSIS — M17.12 PRIMARY OSTEOARTHRITIS OF LEFT KNEE: ICD-10-CM

## 2023-01-30 DIAGNOSIS — M25.462 EFFUSION OF LEFT KNEE: ICD-10-CM

## 2023-01-30 DIAGNOSIS — M25.562 ACUTE PAIN OF LEFT KNEE: Primary | ICD-10-CM

## 2023-01-30 DIAGNOSIS — M76.892 POPLITEUS TENDINITIS OF LEFT LOWER EXTREMITY: ICD-10-CM

## 2023-01-30 PROCEDURE — 99203 OFFICE O/P NEW LOW 30 MIN: CPT | Mod: 25 | Performed by: ORTHOPAEDIC SURGERY

## 2023-01-30 PROCEDURE — 20610 DRAIN/INJ JOINT/BURSA W/O US: CPT | Mod: LT | Performed by: ORTHOPAEDIC SURGERY

## 2023-01-30 RX ORDER — BUPIVACAINE HYDROCHLORIDE 2.5 MG/ML
4 INJECTION, SOLUTION INFILTRATION; PERINEURAL
Status: DISCONTINUED | OUTPATIENT
Start: 2023-01-30 | End: 2023-02-01

## 2023-01-30 RX ORDER — METHYLPREDNISOLONE ACETATE 80 MG/ML
80 INJECTION, SUSPENSION INTRA-ARTICULAR; INTRALESIONAL; INTRAMUSCULAR; SOFT TISSUE
Status: DISCONTINUED | OUTPATIENT
Start: 2023-01-30 | End: 2023-02-01

## 2023-01-30 RX ADMIN — BUPIVACAINE HYDROCHLORIDE 4 ML: 2.5 INJECTION, SOLUTION INFILTRATION; PERINEURAL at 15:52

## 2023-01-30 RX ADMIN — METHYLPREDNISOLONE ACETATE 80 MG: 80 INJECTION, SUSPENSION INTRA-ARTICULAR; INTRALESIONAL; INTRAMUSCULAR; SOFT TISSUE at 15:52

## 2023-01-30 ASSESSMENT — KOOS JR
STRAIGHTENING KNEE FULLY: MODERATE
TWISING OR PIVOTING ON KNEE: SEVERE
RISING FROM SITTING: SEVERE
KOOS JR SCORING: 47.49
STANDING UPRIGHT: MILD
HOW SEVERE IS YOUR KNEE STIFFNESS AFTER FIRST WAKING IN MORNING: SEVERE
BENDING TO THE FLOOR TO PICK UP OBJECT: MODERATE
GOING UP OR DOWN STAIRS: MODERATE

## 2023-01-30 ASSESSMENT — PAIN SCALES - GENERAL: PAINLEVEL: MILD PAIN (3)

## 2023-01-30 NOTE — LETTER
"    1/30/2023         RE: Yenny Johnson  [address] [facility] [address]        Dear Colleague,    Thank you for referring your patient, Yenny Johnson, to the [facility]. Please see a copy of my visit note below.    CHIEF COMPLAINT:   Chief Complaint   Patient presents with     Left Knee - Pain     DOI mid Dec 2022. Patient notes she slipped and fell on the ice, twisted her knee. She was able to walk that day. Since then it has gotten worse. Pain is posterior. It feels fat and swollen. Extending her knee causes increased pain. Getting up from a chair and getting going is hard. She limps throughout the day. No tx.   .  Yenny Johnson is seen today in the [facility] for evaluation of left knee pain at the request of Dr. Selvin Dalal,         HISTORY OF PRESENT ILLNESS    Yenny Johnson is a 52 year old female seen for evaluation of ongoing left knee pain following a known injury.   Pain has been present for 6 weeks, mid 12/2022, slipped and fell on the ice, twisting her left knee. She was able to walk afterwards, but pain is getting worse. She locates pain behind the knee outer aspect, aggravated with extending the knee, walking, sit to stand getting up from a chair. She limps all day due to pain. The knee feels swollen.    Treatment has been ice, ibuprofen, brace.    Denies prior left knee problems. Reports right knee problems, osgood schlatter disease. Had a \"fall\" a couple years ago for right knee treated with cortisone and \"chicken injection\".    Present symptoms: pain laterally and posteriorly, pain sharp, dull/achy , mild pain, mild swelling, no catching/popping, no locking, no giving way.    Pain severity: 3/10, up to 8-9/10 with certain movements.  Frequency of symptoms: are constant  Exacerbating Factors: weight bearing, stairs, [id number], prolonged sitting, prolonged standing  Relieving Factors: rest, medications: " ibuprofen.  Night Pain: Yes  Pain while at rest: Yes   Numbness or tingling: No   Patient has tried:     NSAIDS: Yes      Physical Therapy: No      Activity modification: Yes      Bracing: Yes , sleeve and velcro hinged.     Injections: No      Ice: Yes      Assistive device:  No      Other PMH:  has a past medical history of Anemia, BCC (basal cell carcinoma of skin), Degeneration of lumbar or lumbosacral intervertebral disc, Depressive disorder, Diverticulitis of colon, Elevated fasting glucose, Elevated rheumatoid factor (12/12/2012), GERD (gastroesophageal reflux disease) (11/2005), Hyperlipidemia LDL goal <130, Hypertriglyceridemia, Hypothyroidism, Intermittent asthma, Lumbar disc herniation, Medial meniscus tear, Obesity (03/29/2012), Paroxysmal supraventricular tachycardia (H) (02/16/2021), PMDD (premenstrual dysphoric disorder), and Stress incontinence, female (03/29/2012).    She has no past medical history of Cerebral infarction (H), Congestive heart failure (H), Congestive heart failure, unspecified, COPD (chronic obstructive pulmonary disease) (H), CVA (cerebral infarction), Diabetes (H), History of blood transfusion, or Hypertension.  Patient Active Problem List   Diagnosis     PMDD (premenstrual dysphoric disorder)     Obesity     Stress incontinence, female     GERD (gastroesophageal reflux disease)     Hypothyroidism     Lumbar disc herniation     Intermittent asthma     Hyperlipidemia LDL goal <130     Medial meniscus tear       Surgical Hx:  has a past surgical history that includes cholecystectomy, laporoscopic (1995); tubal ligation; c/section, classical; biopsy; hysterectomy, pap no longer indicated (09/23/2015); Repair MOHS (Left, 01/19/2017); Inject Paravertebral Facet Joint Lumbar / Sacral First (Bilateral, 06/28/2017); Inject Epidural Lumbar / Sacral Single (Bilateral, 08/09/2017); Destruction Of Paravertebral Facet Lumbar / Sacral Single (Bilateral, 09/12/2017); Inject Sacroiliac Joint  (Bilateral, 02/07/2018); Inject Paravertebral Facet Joint Lumbar / Sacral Third (Bilateral, 03/09/2018); Radio Frequency Ablation / Destruction of Sacroiloac Joint Lateral Branches (S1/S2/S3) (Bilateral, 05/02/2018); and Colonoscopy with CO2 insufflation (N/A, 09/27/2021).    Medications:   Current Outpatient Medications:      atorvastatin (LIPITOR) 20 MG tablet, Take 1 tablet (20 mg) by mouth daily, Disp: , Rfl:      FLUoxetine (PROZAC) 20 MG capsule, TAKE 3 CAPSULES BY MOUTH EVERY DAY, Disp: 270 capsule, Rfl: 3     insulin pen needle (31G X 6 MM) 31G X 6 MM miscellaneous, Use 1 pen needle daily or as directed., Disp: 100 each, Rfl: 11     levothyroxine (SYNTHROID/LEVOTHROID) 88 MCG tablet, Take 1 tablet (88 mcg) by mouth daily, Disp: 90 tablet, Rfl: 3     liraglutide - Weight Management (SAXENDA) 18 MG/3ML pen, Inject 3 mg Subcutaneous daily, Disp: 15 mL, Rfl: 1     topiramate (TOPAMAX) 25 MG tablet, Take 1 tablet (25 mg) by mouth daily for 7 days, THEN 2 tablets (50 mg) daily for 7 days, THEN 3 tablets (75 mg) daily for 76 days., Disp: 270 tablet, Rfl: 0    Allergies: No Known Allergies    Social Hx: certified medical assistant.   reports that she quit smoking about 24 years ago. Her smoking use included cigarettes. She has a 14.00 pack-year smoking history. She has never used smokeless tobacco. She reports that she does not drink alcohol and does not use drugs.    Family Hx: family history includes Alcohol/Drug in her father; Allergies in her maternal grandmother; Asthma in her brother; C.A.D. (age of onset: 67) in her father; Cardiovascular in her mother; Coronary Artery Disease in her brother and mother; Diabetes in her brother, maternal grandmother, and paternal grandfather; Hyperlipidemia in her mother; Hypertension in her father; Obesity in her brother, sister, and sister; Seasonal/Environmental Allergies in her daughter.    REVIEW OF SYSTEMS: 10 point ROS neg other than the symptoms noted above in the HPI  "and PMH. Notables include  CONSTITUTIONAL:NEGATIVE for fever, chills, change in weight  INTEGUMENTARY/SKIN: NEGATIVE for worrisome rashes, moles or lesions  MUSCULOSKELETAL:See HPI above  NEURO: NEGATIVE for weakness, dizziness or paresthesias    PHYSICAL EXAM:  /87   Pulse 71   Ht 1.645 m (5' 4.75\")   Wt 83.8 kg (184 lb 11.2 oz)   LMP 08/07/2015   BMI 30.97 kg/m     GENERAL APPEARANCE: healthy, alert, no distress  SKIN: no suspicious lesions or rashes  NEURO: Normal strength and tone, mentation intact and speech normal  PSYCH:  mentation appears normal and affect normal, not anxious  RESPIRATORY: No increased work of breathing.  HANDS: no clubbing, nail pitting  LYMPH: no palpable popliteal lymphadenopathy.    BILATERAL LOWER EXTREMITIES:  Gait: antalgic favoring left.  Alignment: slight varus  No gross deformities or masses.  No Quad atrophy, strength weak on left compared to the right, pain limited.  Intact sensation deep peroneal nerve, superficial peroneal nerve, med/lat tibial nerve, sural nerve, saphenous nerve  Intact EHL, EDL, TA, FHL, GS, quadriceps hamstrings and hip flexors  Bilateral calf soft and nttp or squeeze.  DTRs: achilles 2+, patella 2+.  Edema: trace    LEFT KNEE EXAM:    Skin: intact, no ecchymosis or erythema  Squat: 100 %, not limited by pain.     ROM: full extension to 130 flexion, mild posterolateral discomfort.  Tight hamstrings on straight leg raise.  Effusion: small  Tender: posterolateral knee   NTTP med/lat joint line, posteromedial knee, anterior knee  McMurrays: negative    MCL: stable, and non-painful at both 0 and 30 degrees knee flexion  Varus stress: stable, and non-painful at both 0 and 30 degrees knee flexion  Lachmans: neg, firm endpoint  Posterior Drawer stable  Patellofemoral joint:                Apprehension: negative              Crepitations: minimal    RIGHT KNEE EXAM:    Skin: intact, no ecchymosis or erythema  Squat: 100 %, not limited by pain.     ROM: " full extension to 135+ flexion  Tight hamstrings on straight leg raise.  Effusion: none  Tender: NTTP med/lat joint line, anterior or posterior knee  McMurrays: negative    MCL: stable, and non-painful at both 0 and 30 degrees knee flexion  Varus stress: stable, and non-painful at both 0 and 30 degrees knee flexion  Lachmans: neg, firm endpoint  Posterior Drawer stable  Patellofemoral joint:                Apprehension: negative              Crepitations: minimal    X-RAY:  3 views left knee from 1/25/2023 were reviewed in clinic today. On my review, no obvious fractures or dislocations. No acute fracture or malalignment. There is normal joint spacing. No significant knee joint effusion.    MRI:  MRI left knee from 1/25/2023 was reviewed in clinic today.  1. Partial medial meniscus posterior root ligament tear.   a. Sprain of oblique popliteal ligament with possible partial tear.  2. Grade IV chondromalacia areas in the patellofemoral and medial compartments.       ASSESSMENT/PLAN: Yenny Johnson is a 52 year old female with acute left knee pain, popliteal ligament strain, partial medial meniscus posterior root tear, primary osteoarthritis.     * exam, images reviewed  * based on location of pain, suspect more popiteal strain pain rather than medial meniscus root tear pain, but can't be certain for sure  * discussed options, nonsurgical with ice/heat, massage, range of motion, hamstrings stretching, activity modification, over the counter pain medications, bracing, therapy and injections/aspiration  * popliteal strain / pain will resolve on its own with time, nonsurgical  * if there is a medial meniscus root tear, we could potentially fix this or debride with surgery. Unclear based on history, exam that this is really an issue for her at this time,  * she also has some mild osteoarthritis in the knee, more medial and patello-femoral in nature.    * at this time, she'd like to try aspiration/injection, therapy and  see what happens. She states her right knee did well a couple years ago with injection so would like to start there first.    * return to clinic as needed.    * All questions were addressed and answered prior to discharge from clinic today. The patient acknowledges an understanding of and agreement with the plan set forth during today's visit. Patient was advised to call our office or MyChart us if any further questions arise upon leaving our office today.        Bryan Escobar M.D., M.S.  Dept. of Orthopaedic Surgery  [facility]    Large Joint Injection/Arthocentesis: L knee joint    Date/Time: 1/30/2023 3:52 PM  Performed by: Shashank Aguilera PA  Authorized by: Bryan Escobar MD     Indications:  Pain, osteoarthritis and joint swelling  Needle Size:  18 G  Guidance: landmark guided    Approach:  Superolateral  Location:  Knee      Medications:  80 mg methylPREDNISolone 80 MG/ML; 4 mL bupivacaine 0.25 %  Aspirate amount (mL):  20  Aspirate:  Yellow  Outcome:  Tolerated well, no immediate complications  Procedure discussed: discussed risks, benefits, and alternatives    Consent Given by:  Patient  Prep: patient was prepped and draped in usual sterile fashion                Again, thank you for allowing me to participate in the care of your patient.        Sincerely,        Bryan Escobar MD

## 2023-02-01 ENCOUNTER — MYC MEDICAL ADVICE (OUTPATIENT)
Dept: ORTHOPEDICS | Facility: CLINIC | Age: 53
End: 2023-02-01

## 2023-02-01 ENCOUNTER — OFFICE VISIT (OUTPATIENT)
Dept: ORTHOPEDICS | Facility: CLINIC | Age: 53
End: 2023-02-01
Payer: COMMERCIAL

## 2023-02-01 VITALS — BODY MASS INDEX: 30.66 KG/M2 | WEIGHT: 184 LBS | HEIGHT: 65 IN

## 2023-02-01 DIAGNOSIS — M25.562 ACUTE PAIN OF LEFT KNEE: Primary | ICD-10-CM

## 2023-02-01 PROCEDURE — 99213 OFFICE O/P EST LOW 20 MIN: CPT | Performed by: ORTHOPAEDIC SURGERY

## 2023-02-01 ASSESSMENT — PAIN SCALES - GENERAL: PAINLEVEL: WORST PAIN (10)

## 2023-02-01 NOTE — LETTER
February 1, 2023      Yenny Johnson  [address] [facility] [address]        To Whom It May Concern,     Yenny Johnson attended clinic here on Feb 1, 2023 and she should remain off work the rest of this week. We plan for her to return to work on 2/6/23.  We'll be seeing her in follow-up on 2/10/23    If you have questions or concerns, please call the clinic at the number listed above.    Sincerely,         Shashank Aguilera PA-C, CAQ-OS  Dept. of Orthopedic Surgery  [facility]

## 2023-02-01 NOTE — PROGRESS NOTES
"CHIEF COMPLAINT:   Chief Complaint   Patient presents with     Left Knee - Pain     Left knee pain, new injury. She was feeling 100% this morning after cortisone/aspiration on 1/30/23. She twisted on the knee at work and felt very intense pain in the posterior lateral knee where her pain has been. Unable to bear weight . Pain with any twisting motion       HISTORY OF PRESENT ILLNESS    Yenny Johnson is a 52 year old female seen for evaluation of ongoing left knee pain following a known injury. She was seen on 1/30/2023, had a left knee aspiration and injection. Was doing well until this morning, felt great, \"100%\", but at work twisted and heard/felt a \"pop\" in the knee, now can hardly bear weight. Continues to locate pain to the posterolateral knee, no medial pain.    Overall, pain has been present for 6 weeks, mid 12/2022, slipped and fell on the ice, twisting her left knee. She was able to walk afterwards, but pain then started to get worse. She locates pain behind the knee outer aspect, aggravated with extending the knee, walking, sit to stand getting up from a chair.     Treatment has been ice, ibuprofen, brace.    Denies prior left knee problems. Reports right knee problems, osgood schlatter disease. Had a \"fall\" a couple years ago for right knee treated with cortisone and \"chicken injection\".    Present symptoms: pain posterolateral, pain sharp, dull/achy  Pain severity: 10/10  Frequency of symptoms: are constant  Exacerbating Factors: weight bearing, stairs, [id number], prolonged sitting, prolonged standing  Relieving Factors: rest, medications: ibuprofen.  Night Pain: Yes  Pain while at rest: Yes   Numbness or tingling: No   Patient has tried:     NSAIDS: Yes      Physical Therapy: No      Activity modification: Yes      Bracing: Yes , sleeve and velcro hinged.     Injections: Yes, 1/30/2023      Ice: Yes      Assistive device:  No      Other PMH:  has a past medical history of Anemia, BCC (basal cell " carcinoma of skin), Degeneration of lumbar or lumbosacral intervertebral disc, Depressive disorder, Diverticulitis of colon, Elevated fasting glucose, Elevated rheumatoid factor (12/12/2012), GERD (gastroesophageal reflux disease) (11/2005), Hyperlipidemia LDL goal <130, Hypertriglyceridemia, Hypothyroidism, Intermittent asthma, Lumbar disc herniation, Medial meniscus tear, Obesity (03/29/2012), Paroxysmal supraventricular tachycardia (H) (02/16/2021), PMDD (premenstrual dysphoric disorder), and Stress incontinence, female (03/29/2012).    She has no past medical history of Cerebral infarction (H), Congestive heart failure (H), Congestive heart failure, unspecified, COPD (chronic obstructive pulmonary disease) (H), CVA (cerebral infarction), Diabetes (H), History of blood transfusion, or Hypertension.  Patient Active Problem List   Diagnosis     PMDD (premenstrual dysphoric disorder)     Obesity     Stress incontinence, female     GERD (gastroesophageal reflux disease)     Hypothyroidism     Lumbar disc herniation     Intermittent asthma     Hyperlipidemia LDL goal <130     Medial meniscus tear       Surgical Hx:  has a past surgical history that includes cholecystectomy, laporoscopic (1995); tubal ligation; c/section, classical; biopsy; hysterectomy, pap no longer indicated (09/23/2015); Repair MOHS (Left, 01/19/2017); Inject Paravertebral Facet Joint Lumbar / Sacral First (Bilateral, 06/28/2017); Inject Epidural Lumbar / Sacral Single (Bilateral, 08/09/2017); Destruction Of Paravertebral Facet Lumbar / Sacral Single (Bilateral, 09/12/2017); Inject Sacroiliac Joint (Bilateral, 02/07/2018); Inject Paravertebral Facet Joint Lumbar / Sacral Third (Bilateral, 03/09/2018); Radio Frequency Ablation / Destruction of Sacroiloac Joint Lateral Branches (S1/S2/S3) (Bilateral, 05/02/2018); and Colonoscopy with CO2 insufflation (N/A, 09/27/2021).    Medications:   Current Outpatient Medications:      atorvastatin (LIPITOR) 20  "MG tablet, Take 1 tablet (20 mg) by mouth daily, Disp: , Rfl:      FLUoxetine (PROZAC) 20 MG capsule, TAKE 3 CAPSULES BY MOUTH EVERY DAY, Disp: 270 capsule, Rfl: 3     insulin pen needle (31G X 6 MM) 31G X 6 MM miscellaneous, Use 1 pen needle daily or as directed., Disp: 100 each, Rfl: 11     levothyroxine (SYNTHROID/LEVOTHROID) 88 MCG tablet, Take 1 tablet (88 mcg) by mouth daily, Disp: 90 tablet, Rfl: 3     liraglutide - Weight Management (SAXENDA) 18 MG/3ML pen, Inject 3 mg Subcutaneous daily, Disp: 15 mL, Rfl: 1     topiramate (TOPAMAX) 25 MG tablet, Take 1 tablet (25 mg) by mouth daily for 7 days, THEN 2 tablets (50 mg) daily for 7 days, THEN 3 tablets (75 mg) daily for 76 days., Disp: 270 tablet, Rfl: 0    Allergies: No Known Allergies    Social Hx: certified medical assistant.   reports that she quit smoking about 24 years ago. Her smoking use included cigarettes. She has a 14.00 pack-year smoking history. She has never used smokeless tobacco. She reports that she does not drink alcohol and does not use drugs.    Family Hx: family history includes Alcohol/Drug in her father; Allergies in her maternal grandmother; Asthma in her brother; C.A.D. (age of onset: 67) in her father; Cardiovascular in her mother; Coronary Artery Disease in her brother and mother; Diabetes in her brother, maternal grandmother, and paternal grandfather; Hyperlipidemia in her mother; Hypertension in her father; Obesity in her brother, sister, and sister; Seasonal/Environmental Allergies in her daughter.    REVIEW OF SYSTEMS: 10 point ROS neg other than the symptoms noted above in the HPI and PMH. Notables include  CONSTITUTIONAL:NEGATIVE for fever, chills, change in weight  INTEGUMENTARY/SKIN: NEGATIVE for worrisome rashes, moles or lesions  MUSCULOSKELETAL:See HPI above  NEURO: NEGATIVE for weakness, dizziness or paresthesias    PHYSICAL EXAM:  Ht 1.645 m (5' 4.75\")   Wt 83.5 kg (184 lb)   LMP 08/07/2015   BMI 30.86 kg/m     GENERAL " APPEARANCE: healthy, alert, no distress  SKIN: no suspicious lesions or rashes  NEURO: Normal strength and tone, mentation intact and speech normal  PSYCH:  mentation appears normal and affect normal, not anxious  RESPIRATORY: No increased work of breathing.    BILATERAL LOWER EXTREMITIES:  Gait: antalgic favoring left.  Alignment: slight varus  No Quad atrophy, strength weak on left compared to the right, pain limited.  Intact sensation deep peroneal nerve, superficial peroneal nerve, med/lat tibial nerve, sural nerve, saphenous nerve  Intact EHL, EDL, TA, FHL, GS, quadriceps hamstrings and hip flexors  Bilateral calf soft and nttp or squeeze.  Edema: trace    LEFT KNEE EXAM:    Skin: intact, no ecchymosis or erythema  Squat: not assessed given pain.  ROM: full extension with discomfort posterolateral to 130 flexion   Tight hamstrings on straight leg raise.  Effusion: none.  Tender: severe posterolateral knee   NTTP med/lat joint line, posteromedial knee, anterior knee  McMurrays: positive for pain posterolateral knee.    MCL: stable, and non-painful at both 0 and 30 degrees knee flexion  Varus stress: stable, and non-painful at both 0 and 30 degrees knee flexion  Lachmans: neg, firm endpoint  Posterior Drawer stable  Patellofemoral joint:                Apprehension: negative              Crepitations: minimal    RIGHT KNEE EXAM:    Skin: intact, no ecchymosis or erythema      X-RAY: no new images today.    3 views left knee from 1/25/2023 -- no obvious fractures or dislocations. No acute fracture or malalignment. There is normal joint spacing. No significant knee joint effusion.    MRI:  MRI left knee from 1/25/2023 --  1. Partial medial meniscus posterior root ligament tear.   a. Sprain of oblique popliteal ligament with possible partial tear.  2. Grade IV chondromalacia areas in the patellofemoral and medial compartments.       ASSESSMENT/PLAN: Yenny Johnson is a 52 year old female with acute left knee pain,  "oblique popliteal ligament strain, partial medial meniscus posterior root tear, primary osteoarthritis.    * suspect the popliteal ligament completely ruptured, likely the \"pop\" she heard/felt this morning.    * again, based on location of pain, suspect more popiteal ligament strain pain rather than medial meniscus root tear pain, but can't be certain for sure, but doesn't really have any medial or posteromedial pain.    * discussed options, nonsurgical with ice/heat, massage, range of motion, hamstrings stretching, activity modification, over the counter pain medications, bracing, therapy  * will provide a knee immobilizer today, crutches as needed.  * popliteal strain / pain will resolve on its own with time, nonsurgical    * we'll recheck her in a week or so.    * if there is a medial meniscus root tear, we could potentially fix this or debride with surgery. Unclear based on history, exam that this is really an issue for her at this time  * she also has some mild osteoarthritis in the knee, more medial and patello-femoral in nature.      * All questions were addressed and answered prior to discharge from clinic today. The patient acknowledges an understanding of and agreement with the plan set forth during today's visit. Patient was advised to call our office or MyChart us if any further questions arise upon leaving our office today.        Bryan Escobar M.D., M.S.  Dept. of Orthopaedic Surgery  [facility]    "

## 2023-02-01 NOTE — TELEPHONE ENCOUNTER
RN called and spoke to patient. Scheduled appointment for today in [place]. Message sent to MA as a heads up as it is a same day add on.    Mimi HOGAN, Specialty RN 2/1/2023 8:09 AM

## 2023-02-01 NOTE — LETTER
"    2/1/2023         RE: Yenny Johnson  [address] [facility] [address]        Dear Colleague,    Thank you for referring your patient, Yenny Johnson, to the [facility]. Please see a copy of my visit note below.    CHIEF COMPLAINT:   Chief Complaint   Patient presents with     Left Knee - Pain     Left knee pain, new injury. She was feeling 100% this morning after cortisone/aspiration on 1/30/23. She twisted on the knee at work and felt very intense pain in the posterior lateral knee where her pain has been. Unable to bear weight . Pain with any twisting motion       HISTORY OF PRESENT ILLNESS    Yenny Johnson is a 52 year old female seen for evaluation of ongoing left knee pain following a known injury. She was seen on 1/30/2023, had a left knee aspiration and injection. Was doing well until this morning, felt great, \"100%\", but at work twisted and heard/felt a \"pop\" in the knee, now can hardly bear weight. Continues to locate pain to the posterolateral knee, no medial pain.    Overall, pain has been present for 6 weeks, mid 12/2022, slipped and fell on the ice, twisting her left knee. She was able to walk afterwards, but pain then started to get worse. She locates pain behind the knee outer aspect, aggravated with extending the knee, walking, sit to stand getting up from a chair.     Treatment has been ice, ibuprofen, brace.    Denies prior left knee problems. Reports right knee problems, osgood schlatter disease. Had a \"fall\" a couple years ago for right knee treated with cortisone and \"chicken injection\".    Present symptoms: pain posterolateral, pain sharp, dull/achy  Pain severity: 10/10  Frequency of symptoms: are constant  Exacerbating Factors: weight bearing, stairs, [id number], prolonged sitting, prolonged standing  Relieving Factors: rest, medications: ibuprofen.  Night Pain: Yes  Pain while at rest: Yes   Numbness or tingling: No   Patient has tried:     " NSAIDS: Yes      Physical Therapy: No      Activity modification: Yes      Bracing: Yes , sleeve and velcro hinged.     Injections: Yes, 1/30/2023      Ice: Yes      Assistive device:  No      Other PMH:  has a past medical history of Anemia, BCC (basal cell carcinoma of skin), Degeneration of lumbar or lumbosacral intervertebral disc, Depressive disorder, Diverticulitis of colon, Elevated fasting glucose, Elevated rheumatoid factor (12/12/2012), GERD (gastroesophageal reflux disease) (11/2005), Hyperlipidemia LDL goal <130, Hypertriglyceridemia, Hypothyroidism, Intermittent asthma, Lumbar disc herniation, Medial meniscus tear, Obesity (03/29/2012), Paroxysmal supraventricular tachycardia (H) (02/16/2021), PMDD (premenstrual dysphoric disorder), and Stress incontinence, female (03/29/2012).    She has no past medical history of Cerebral infarction (H), Congestive heart failure (H), Congestive heart failure, unspecified, COPD (chronic obstructive pulmonary disease) (H), CVA (cerebral infarction), Diabetes (H), History of blood transfusion, or Hypertension.  Patient Active Problem List   Diagnosis     PMDD (premenstrual dysphoric disorder)     Obesity     Stress incontinence, female     GERD (gastroesophageal reflux disease)     Hypothyroidism     Lumbar disc herniation     Intermittent asthma     Hyperlipidemia LDL goal <130     Medial meniscus tear       Surgical Hx:  has a past surgical history that includes cholecystectomy, laporoscopic (1995); tubal ligation; c/section, classical; biopsy; hysterectomy, pap no longer indicated (09/23/2015); Repair MOHS (Left, 01/19/2017); Inject Paravertebral Facet Joint Lumbar / Sacral First (Bilateral, 06/28/2017); Inject Epidural Lumbar / Sacral Single (Bilateral, 08/09/2017); Destruction Of Paravertebral Facet Lumbar / Sacral Single (Bilateral, 09/12/2017); Inject Sacroiliac Joint (Bilateral, 02/07/2018); Inject Paravertebral Facet Joint Lumbar / Sacral Third (Bilateral,  03/09/2018); Radio Frequency Ablation / Destruction of Sacroiloac Joint Lateral Branches (S1/S2/S3) (Bilateral, 05/02/2018); and Colonoscopy with CO2 insufflation (N/A, 09/27/2021).    Medications:   Current Outpatient Medications:      atorvastatin (LIPITOR) 20 MG tablet, Take 1 tablet (20 mg) by mouth daily, Disp: , Rfl:      FLUoxetine (PROZAC) 20 MG capsule, TAKE 3 CAPSULES BY MOUTH EVERY DAY, Disp: 270 capsule, Rfl: 3     insulin pen needle (31G X 6 MM) 31G X 6 MM miscellaneous, Use 1 pen needle daily or as directed., Disp: 100 each, Rfl: 11     levothyroxine (SYNTHROID/LEVOTHROID) 88 MCG tablet, Take 1 tablet (88 mcg) by mouth daily, Disp: 90 tablet, Rfl: 3     liraglutide - Weight Management (SAXENDA) 18 MG/3ML pen, Inject 3 mg Subcutaneous daily, Disp: 15 mL, Rfl: 1     topiramate (TOPAMAX) 25 MG tablet, Take 1 tablet (25 mg) by mouth daily for 7 days, THEN 2 tablets (50 mg) daily for 7 days, THEN 3 tablets (75 mg) daily for 76 days., Disp: 270 tablet, Rfl: 0    Allergies: No Known Allergies    Social Hx: certified medical assistant.   reports that she quit smoking about 24 years ago. Her smoking use included cigarettes. She has a 14.00 pack-year smoking history. She has never used smokeless tobacco. She reports that she does not drink alcohol and does not use drugs.    Family Hx: family history includes Alcohol/Drug in her father; Allergies in her maternal grandmother; Asthma in her brother; C.A.D. (age of onset: 67) in her father; Cardiovascular in her mother; Coronary Artery Disease in her brother and mother; Diabetes in her brother, maternal grandmother, and paternal grandfather; Hyperlipidemia in her mother; Hypertension in her father; Obesity in her brother, sister, and sister; Seasonal/Environmental Allergies in her daughter.    REVIEW OF SYSTEMS: 10 point ROS neg other than the symptoms noted above in the HPI and PMH. Notables include  CONSTITUTIONAL:NEGATIVE for fever, chills, change in  "weight  INTEGUMENTARY/SKIN: NEGATIVE for worrisome rashes, moles or lesions  MUSCULOSKELETAL:See HPI above  NEURO: NEGATIVE for weakness, dizziness or paresthesias    PHYSICAL EXAM:  Ht 1.645 m (5' 4.75\")   Wt 83.5 kg (184 lb)   LMP 08/07/2015   BMI 30.86 kg/m     GENERAL APPEARANCE: healthy, alert, no distress  SKIN: no suspicious lesions or rashes  NEURO: Normal strength and tone, mentation intact and speech normal  PSYCH:  mentation appears normal and affect normal, not anxious  RESPIRATORY: No increased work of breathing.    BILATERAL LOWER EXTREMITIES:  Gait: antalgic favoring left.  Alignment: slight varus  No Quad atrophy, strength weak on left compared to the right, pain limited.  Intact sensation deep peroneal nerve, superficial peroneal nerve, med/lat tibial nerve, sural nerve, saphenous nerve  Intact EHL, EDL, TA, FHL, GS, quadriceps hamstrings and hip flexors  Bilateral calf soft and nttp or squeeze.  Edema: trace    LEFT KNEE EXAM:    Skin: intact, no ecchymosis or erythema  Squat: not assessed given pain.  ROM: full extension with discomfort posterolateral to 130 flexion   Tight hamstrings on straight leg raise.  Effusion: none.  Tender: severe posterolateral knee   NTTP med/lat joint line, posteromedial knee, anterior knee  McMurrays: positive for pain posterolateral knee.    MCL: stable, and non-painful at both 0 and 30 degrees knee flexion  Varus stress: stable, and non-painful at both 0 and 30 degrees knee flexion  Lachmans: neg, firm endpoint  Posterior Drawer stable  Patellofemoral joint:                Apprehension: negative              Crepitations: minimal    RIGHT KNEE EXAM:    Skin: intact, no ecchymosis or erythema      X-RAY: no new images today.    3 views left knee from 1/25/2023 -- no obvious fractures or dislocations. No acute fracture or malalignment. There is normal joint spacing. No significant knee joint effusion.    MRI:  MRI left knee from 1/25/2023 --  1. Partial medial " "meniscus posterior root ligament tear.   a. Sprain of oblique popliteal ligament with possible partial tear.  2. Grade IV chondromalacia areas in the patellofemoral and medial compartments.       ASSESSMENT/PLAN: Yenny Johnson is a 52 year old female with acute left knee pain, oblique popliteal ligament strain, partial medial meniscus posterior root tear, primary osteoarthritis.    * suspect the popliteal ligament completely ruptured, likely the \"pop\" she heard/felt this morning.    * again, based on location of pain, suspect more popiteal ligament strain pain rather than medial meniscus root tear pain, but can't be certain for sure, but doesn't really have any medial or posteromedial pain.    * discussed options, nonsurgical with ice/heat, massage, range of motion, hamstrings stretching, activity modification, over the counter pain medications, bracing, therapy  * will provide a knee immobilizer today, crutches as needed.  * popliteal strain / pain will resolve on its own with time, nonsurgical    * we'll recheck her in a week or so.    * if there is a medial meniscus root tear, we could potentially fix this or debride with surgery. Unclear based on history, exam that this is really an issue for her at this time  * she also has some mild osteoarthritis in the knee, more medial and patello-femoral in nature.      * All questions were addressed and answered prior to discharge from clinic today. The patient acknowledges an understanding of and agreement with the plan set forth during today's visit. Patient was advised to call our office or MyChart us if any further questions arise upon leaving our office today.        Bryan Escobar M.D., M.S.  Dept. of Orthopaedic Surgery  [facility]        Again, thank you for allowing me to participate in the care of your patient.        Sincerely,        Bryan Escobar MD    "

## 2023-02-01 NOTE — TELEPHONE ENCOUNTER
Patient was referred to Ortho Surgery - Dr Escobar, would defer recommendations on follow up care to Dr Escobar's team.  Patient is already scheduled for work in appointment with Dr Escobar for today.    Silvestre Ramsey ATC

## 2023-02-03 DIAGNOSIS — J32.9 VIRAL SINUSITIS: ICD-10-CM

## 2023-02-03 DIAGNOSIS — B97.89 VIRAL SINUSITIS: ICD-10-CM

## 2023-02-03 RX ORDER — FLUTICASONE PROPIONATE 50 MCG
SPRAY, SUSPENSION (ML) NASAL
Qty: 16 ML | Refills: 0 | OUTPATIENT
Start: 2023-02-03

## 2023-02-07 DIAGNOSIS — J32.9 VIRAL SINUSITIS: ICD-10-CM

## 2023-02-07 DIAGNOSIS — B97.89 VIRAL SINUSITIS: ICD-10-CM

## 2023-02-07 RX ORDER — FLUTICASONE PROPIONATE 50 MCG
SPRAY, SUSPENSION (ML) NASAL
Qty: 16 ML | Refills: 0 | OUTPATIENT
Start: 2023-02-07

## 2023-02-09 DIAGNOSIS — E78.5 HYPERLIPIDEMIA, UNSPECIFIED: ICD-10-CM

## 2023-02-09 RX ORDER — ATORVASTATIN CALCIUM 20 MG/1
TABLET, FILM COATED ORAL
Qty: 90 TABLET | Refills: 0 | Status: SHIPPED | OUTPATIENT
Start: 2023-02-09 | End: 2023-04-17

## 2023-03-14 ENCOUNTER — OFFICE VISIT (OUTPATIENT)
Dept: ORTHOPEDICS | Facility: CLINIC | Age: 53
End: 2023-03-14
Payer: COMMERCIAL

## 2023-03-14 VITALS
BODY MASS INDEX: 30.66 KG/M2 | HEIGHT: 65 IN | DIASTOLIC BLOOD PRESSURE: 78 MMHG | WEIGHT: 184 LBS | SYSTOLIC BLOOD PRESSURE: 138 MMHG

## 2023-03-14 DIAGNOSIS — M17.12 PRIMARY OSTEOARTHRITIS OF LEFT KNEE: ICD-10-CM

## 2023-03-14 DIAGNOSIS — S89.92XD KNEE INJURY, LEFT, SUBSEQUENT ENCOUNTER: Primary | ICD-10-CM

## 2023-03-14 PROCEDURE — 99213 OFFICE O/P EST LOW 20 MIN: CPT | Performed by: PEDIATRICS

## 2023-03-14 NOTE — PROGRESS NOTES
ASSESSMENT & PLAN    Diagnoses and all orders for this visit:    Knee injury, left, subsequent encounter    Primary osteoarthritis of left knee        Proceed with PT as planned.  Questions answered. Discussed signs and symptoms that may indicate more serious issues; the patient was instructed to seek appropriate care if noted. Yenny indicates understanding of these issues and agrees with the plan.      See Patient Instructions  Patient Instructions   Reviewed nature of the left knee issues.  Exam today is very reassuring, without any significant tenderness, no laxity, no significant pain provoked with testing.  We reviewed the previous knee MRI, which demonstrated some medial meniscus tearing, as well as underlying degenerative change.  With the recent slip on the ice, it is possible also to have had mild MCL sprain or medial knee strain, along with aggravation of the underlying cartilage issues in the knee.  However, symptoms appear to have resolved.  For next steps, agree with proceeding with physical therapy as planned.  Should symptoms flare again in the knee, could consider repeat steroid injection, or returning to see orthopedic surgery.  Otherwise, follow-up here is open-ended.  Contact clinic for any questions or concerns.    If you have any further questions for your physician or physician s care team you can contact them thru MyChart or by calling  [phone] and use option 3 to leave a voice message.   Messages received during business hours will be returned same day.          [facility]    SUBJECTIVE- Interim History March 14, 2023    No chief complaint on file.      Yenny Johnson is a 52 year old female who is seen in f/u up for left knee issues. Since last visit with Dr Dalal on 1/25/23, patient has fallen on the ice again about a month ago. When she fell, she hurt both knees with pain on the inside of the knees. Wants to make sure  "that PT is good with current injury. Reports knee giving out and causing the falling.  -    Worsened by: random giving out  Better with: walking straight or totally bent over  Treatments tried: ice, heat, ibuprofen and casting/splinting/bracing, voltaren gel  Associated symptoms:  numbness and tingling somewhat in top of foot    The patient is seen by themselves.      Orthopedic/Surgical history: YES - Date: Fell in December  Social History/Occupation: CMA      **  Goal of returning to walking, ~2 mi daily.  However, more recently had slipped on ice, and feels like left knee aggravated.  Questions around whether PT is still appropriate.    History:  Fell December 2022. Then had MRI Jan 2023, ordered by Dr Dalal. Saw Dr Escobar, had steroid injection. Had improvement with that, then brief set back, saw Dr Escobar again, and monitored. Ultimately had improvement from injection.  Then slipped on ice about a month ago, aggravated left medial knee pain.      Posterior pain has improved compared to previous. However, with the more recent fall, had been nearly 100% better prior to that.   Currently questions whether medial knee symptoms are newer from the recent slip/fall, vs related to other issues.    REVIEW OF SYSTEMS:  Review of Systems      OBJECTIVE:  /78   Ht 1.651 m (5' 5\")   Wt 83.5 kg (184 lb)   LMP 08/07/2015   BMI 30.62 kg/m       GENERAL APPEARANCE: healthy, alert and no distress   GAIT: NORMAL    Left Knee exam    Inspection:   trace effusion   no ecchymosis    ROM:      Full active and passive ROM with flexion and extension  No pain    Patellar Motion:      Crepitus noted in the patellofemoral joint slight    Tender: none    Non Tender:       medial patellar border        lateral patellar border        medial joint line        lateral joint line        along MCL        infrapatellar tendon        tibial tubercle       pes anserine bursa    Special Tests:      neg (-) Adrianne       neg (-) Lachman     "   neg (-) anterior drawer       neg (-) posterior drawer       neg (-) varus       neg (-) valgus       no pain with forced extension        RADIOLOGY:  Final results and radiologist's interpretation, available in the [facility] health record.  Images were reviewed with the patient in the office today.  My personal interpretation of the performed imaging: medial meniscus tearing, medial compartment arthrosis.      MR left knee without contrast 1/25/2023 8:34 AM     Techniques: Multiplanar multisequence imaging of the left knee was  obtained without administration of intra-articular or intravenous  contrast using routing protocol.     History: Knee injury, left, initial encounter     Additional History from EMR: Twisted knee several weeks ago. Some  instability but no locking or swelling.     Comparison: Left knee radiograph 1/25/2023     Findings:     MENISCI:  Medial meniscus: Posterior root ligament partial tear of the medial  meniscus with underlying cystlike change and edema like marrow signal  intensity at its attachment. Regional soft tissue edema including  oblique popliteal ligament course, consistent with sprain and possible  partial tear. Evaluation of these area partially compromised due to  pulsation artifact. Additionally, intrasubstance signal body and  posterior horn of medial meniscus.  Lateral meniscus: Intact.     LIGAMENTS  Cruciate ligaments: Intact.  Medial supporting structures: Intact. Thickened tibial collateral  ligament, consistent with remote trauma.   Lateral supporting structures: Intact.     EXTENSOR MECHANISM  Intact.     FLUID  Small joint effusion. Trace Guevara's cyst.     OSSEOUS and ARTICULAR STRUCTURES  Bones: Edema like marrow signal intensity at the peripheral aspect of  medial tibial plateau, likely reactive. No fracture, contusion, or  osseous lesion is seen.      Patellofemoral compartment: Superficial chondral loss medial patellar  facet articular cartilage. Full-thickness chondral  fissure medial  trochlea caudally with subchondral edema like marrow signal intensity.     Medial compartment: Subchondral cystlike change at the posterior  weightbearing surface of the medial femoral condyle consistent with  overlying full-thickness chondral fissure presence.     Lateral compartment: No high-grade hyaline cartilage disease.     ANCILLARY FINDINGS  None.                                                                      Impression:  1. Partial medial meniscus posterior root ligament tear.   a. Sprain of oblique popliteal ligament with possible partial tear.  2. 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     I have personally reviewed the examination and initial interpretation  and I agree with the findings.     RICHARD SHARMA       Review of prior external note(s) from - ortho surgery  Review of the result(s) of each unique test - imaging  Independent interpretation of a test performed by another physician/other qualified health care professional (not separately reported) - imaging  20 minutes spent on the date of the encounter doing chart review, history and exam, documentation and further activities per the note

## 2023-03-14 NOTE — LETTER
3/14/2023         RE: Yenny Johnson  [address] [facility] [address]        Dear Colleague,    Thank you for referring your patient, Yenny Johnson, to the [facility]. Please see a copy of my visit note below.    ASSESSMENT & PLAN    Diagnoses and all orders for this visit:    Knee injury, left, subsequent encounter    Primary osteoarthritis of left knee        Proceed with PT as planned.  Questions answered. Discussed signs and symptoms that may indicate more serious issues; the patient was instructed to seek appropriate care if noted. Yenny indicates understanding of these issues and agrees with the plan.      See Patient Instructions  Patient Instructions   Reviewed nature of the left knee issues.  Exam today is very reassuring, without any significant tenderness, no laxity, no significant pain provoked with testing.  We reviewed the previous knee MRI, which demonstrated some medial meniscus tearing, as well as underlying degenerative change.  With the recent slip on the ice, it is possible also to have had mild MCL sprain or medial knee strain, along with aggravation of the underlying cartilage issues in the knee.  However, symptoms appear to have resolved.  For next steps, agree with proceeding with physical therapy as planned.  Should symptoms flare again in the knee, could consider repeat steroid injection, or returning to see orthopedic surgery.  Otherwise, follow-up here is open-ended.  Contact clinic for any questions or concerns.    If you have any further questions for your physician or physician s care team you can contact them thru MyChart or by calling  [phone] and use option 3 to leave a voice message.   Messages received during business hours will be returned same day.          Selvin Bahena DO  [facility]    SUBJECTIVE- Interim History March 14, 2023    No chief complaint on  "file.      Yenny Johnson is a 52 year old female who is seen in f/u up for left knee issues. Since last visit with Dr Dalal on 1/25/23, patient has fallen on the ice again about a month ago. When she fell, she hurt both knees with pain on the inside of the knees. Wants to make sure that PT is good with current injury. Reports knee giving out and causing the falling.  -    Worsened by: random giving out  Better with: walking straight or totally bent over  Treatments tried: ice, heat, ibuprofen and casting/splinting/bracing, voltaren gel  Associated symptoms:  numbness and tingling somewhat in top of foot    The patient is seen by themselves.      Orthopedic/Surgical history: YES - Date: Fell in December  Social History/Occupation: CMA      **  Goal of returning to walking, ~2 mi daily.  However, more recently had slipped on ice, and feels like left knee aggravated.  Questions around whether PT is still appropriate.    History:  Fell December 2022. Then had MRI Jan 2023, ordered by Dr Dalal. Saw Dr Escobar, had steroid injection. Had improvement with that, then brief set back, saw Dr Escobar again, and monitored. Ultimately had improvement from injection.  Then slipped on ice about a month ago, aggravated left medial knee pain.      Posterior pain has improved compared to previous. However, with the more recent fall, had been nearly 100% better prior to that.   Currently questions whether medial knee symptoms are newer from the recent slip/fall, vs related to other issues.    REVIEW OF SYSTEMS:  Review of Systems      OBJECTIVE:  /78   Ht 1.651 m (5' 5\")   Wt 83.5 kg (184 lb)   LMP 08/07/2015   BMI 30.62 kg/m       GENERAL APPEARANCE: healthy, alert and no distress   GAIT: NORMAL    Left Knee exam    Inspection:   trace effusion   no ecchymosis    ROM:      Full active and passive ROM with flexion and extension  No pain    Patellar Motion:      Crepitus noted in the patellofemoral joint slight    Tender: " none    Non Tender:       medial patellar border        lateral patellar border        medial joint line        lateral joint line        along MCL        infrapatellar tendon        tibial tubercle       pes anserine bursa    Special Tests:      neg (-) Adrianne       neg (-) Lachman       neg (-) anterior drawer       neg (-) posterior drawer       neg (-) varus       neg (-) valgus       no pain with forced extension        RADIOLOGY:  Final results and radiologist's interpretation, available in the [facility] health record.  Images were reviewed with the patient in the office today.  My personal interpretation of the performed imaging: medial meniscus tearing, medial compartment arthrosis.      MR left knee without contrast 1/25/2023 8:34 AM     Techniques: Multiplanar multisequence imaging of the left knee was  obtained without administration of intra-articular or intravenous  contrast using routing protocol.     History: Knee injury, left, initial encounter     Additional History from EMR: Twisted knee several weeks ago. Some  instability but no locking or swelling.     Comparison: Left knee radiograph 1/25/2023     Findings:     MENISCI:  Medial meniscus: Posterior root ligament partial tear of the medial  meniscus with underlying cystlike change and edema like marrow signal  intensity at its attachment. Regional soft tissue edema including  oblique popliteal ligament course, consistent with sprain and possible  partial tear. Evaluation of these area partially compromised due to  pulsation artifact. Additionally, intrasubstance signal body and  posterior horn of medial meniscus.  Lateral meniscus: Intact.     LIGAMENTS  Cruciate ligaments: Intact.  Medial supporting structures: Intact. Thickened tibial collateral  ligament, consistent with remote trauma.   Lateral supporting structures: Intact.     EXTENSOR MECHANISM  Intact.     FLUID  Small joint effusion. Trace Guevara's cyst.     OSSEOUS and ARTICULAR  STRUCTURES  Bones: Edema like marrow signal intensity at the peripheral aspect of  medial tibial plateau, likely reactive. No fracture, contusion, or  osseous lesion is seen.      Patellofemoral compartment: Superficial chondral loss medial patellar  facet articular cartilage. Full-thickness chondral fissure medial  trochlea caudally with subchondral edema like marrow signal intensity.     Medial compartment: Subchondral cystlike change at the posterior  weightbearing surface of the medial femoral condyle consistent with  overlying full-thickness chondral fissure presence.     Lateral compartment: No high-grade hyaline cartilage disease.     ANCILLARY FINDINGS  None.                                                                      Impression:  1. Partial medial meniscus posterior root ligament tear.   a. Sprain of oblique popliteal ligament with possible partial tear.  2. 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     I have personally reviewed the examination and initial interpretation  and I agree with the findings.     RICHARD SHARMA       Review of prior external note(s) from - ortho surgery  Review of the result(s) of each unique test - imaging  Independent interpretation of a test performed by another physician/other qualified health care professional (not separately reported) - imaging  20 minutes spent on the date of the encounter doing chart review, history and exam, documentation and further activities per the note         Again, thank you for allowing me to participate in the care of your patient.        Sincerely,        Selvin Bahena, DO

## 2023-03-14 NOTE — PATIENT INSTRUCTIONS
Reviewed nature of the left knee issues.  Exam today is very reassuring, without any significant tenderness, no laxity, no significant pain provoked with testing.  We reviewed the previous knee MRI, which demonstrated some medial meniscus tearing, as well as underlying degenerative change.  With the recent slip on the ice, it is possible also to have had mild MCL sprain or medial knee strain, along with aggravation of the underlying cartilage issues in the knee.  However, symptoms appear to have resolved.  For next steps, agree with proceeding with physical therapy as planned.  Should symptoms flare again in the knee, could consider repeat steroid injection, or returning to see orthopedic surgery.  Otherwise, follow-up here is open-ended.  Contact clinic for any questions or concerns.    If you have any further questions for your physician or physician s care team you can contact them thru [employer] or by calling  [phone] and use option 3 to leave a voice message.   Messages received during business hours will be returned same day.

## 2023-03-17 ENCOUNTER — THERAPY VISIT (OUTPATIENT)
Dept: PHYSICAL THERAPY | Facility: CLINIC | Age: 53
End: 2023-03-17
Payer: COMMERCIAL

## 2023-03-17 DIAGNOSIS — M25.562 ACUTE PAIN OF LEFT KNEE: ICD-10-CM

## 2023-03-17 DIAGNOSIS — M25.562 LEFT KNEE PAIN: Primary | ICD-10-CM

## 2023-03-17 DIAGNOSIS — M76.892 POPLITEUS TENDINITIS OF LEFT LOWER EXTREMITY: ICD-10-CM

## 2023-03-17 DIAGNOSIS — M25.462 EFFUSION OF LEFT KNEE: ICD-10-CM

## 2023-03-17 DIAGNOSIS — M17.12 PRIMARY OSTEOARTHRITIS OF LEFT KNEE: ICD-10-CM

## 2023-03-17 PROCEDURE — 97110 THERAPEUTIC EXERCISES: CPT | Mod: GP

## 2023-03-17 PROCEDURE — 97161 PT EVAL LOW COMPLEX 20 MIN: CPT | Mod: GP

## 2023-03-17 PROCEDURE — 97530 THERAPEUTIC ACTIVITIES: CPT | Mod: GP

## 2023-03-17 ASSESSMENT — ACTIVITIES OF DAILY LIVING (ADL)
WALK: ACTIVITY IS FAIRLY DIFFICULT
PAIN: THE SYMPTOM AFFECTS MY ACTIVITY MODERATELY
RISE FROM A CHAIR: ACTIVITY IS SOMEWHAT DIFFICULT
SWELLING: I DO NOT HAVE THE SYMPTOM
KNEE_ACTIVITY_OF_DAILY_LIVING_SCORE: 70.77
KNEEL ON THE FRONT OF YOUR KNEE: ACTIVITY IS NOT DIFFICULT
SQUAT: NOT ANSWERED
AS_A_RESULT_OF_YOUR_KNEE_INJURY,_HOW_WOULD_YOU_RATE_YOUR_CURRENT_LEVEL_OF_DAILY_ACTIVITY?: ABNORMAL
WEAKNESS: I DO NOT HAVE THE SYMPTOM
RAW_SCORE: 49.54
SIT WITH YOUR KNEE BENT: ACTIVITY IS NOT DIFFICULT
GIVING WAY, BUCKLING OR SHIFTING OF KNEE: THE SYMPTOM AFFECTS MY ACTIVITY SEVERELY
GO DOWN STAIRS: ACTIVITY IS NOT DIFFICULT
KNEE_ACTIVITY_OF_DAILY_LIVING_SUM: 46
HOW_WOULD_YOU_RATE_THE_OVERALL_FUNCTION_OF_YOUR_KNEE_DURING_YOUR_USUAL_DAILY_ACTIVITIES?: ABNORMAL
LIMPING: THE SYMPTOM AFFECTS MY ACTIVITY SEVERELY
GO UP STAIRS: ACTIVITY IS NOT DIFFICULT
STIFFNESS: THE SYMPTOM AFFECTS MY ACTIVITY MODERATELY
HOW_WOULD_YOU_RATE_THE_CURRENT_FUNCTION_OF_YOUR_KNEE_DURING_YOUR_USUAL_DAILY_ACTIVITIES_ON_A_SCALE_FROM_0_TO_100_WITH_100_BEING_YOUR_LEVEL_OF_KNEE_FUNCTION_PRIOR_TO_YOUR_INJURY_AND_0_BEING_THE_INABILITY_TO_PERFORM_ANY_OF_YOUR_USUAL_DAILY_ACTIVITIES?: 50
STAND: ACTIVITY IS NOT DIFFICULT

## 2023-03-17 NOTE — PROGRESS NOTES
Physical Therapy Initial Evaluation    Therapist Assessment: Yenny Johnson is a 52 year old female patient presenting to Physical Therapy with L knee pain. Patient demonstrates pain with ambulation and antalgic gait (decreased stance time on L LE) improved with single point cane in R hand, apprehension with squatting interventions, negative special tests at knee, and negative to palpation L knee. Signs and symptoms are consistent with L meniscal pathology vs OA. These impairments limit their ability to walk for extended periods of time and ambulate on stairs as had prior to injury. Skilled PT services are necessary in order to reduce impairments and improve independent function.    Subjective:  The history is provided by the patient.   Therapist Generated HPI Evaluation  Problem details: Patient reports to outpatient physical therapy with L knee pain that originally started mid Dec 2022 when she slipped and fell on the ice twisting her knee. Pain was posterior and associated with swelling and limping due to pain; she had pain with knee extension. On 1/30/2023 patient was feeling 100% after cortisone/aspiration, but later twisted her knee at work and felt a very intense pain in the posterior lateral knee and inability to bear weight. On 2/16 patient also had a slip and fall while cleaning out her car. She felt her knee was getting a little bit better, but then after the fall on 2/16 she states that it is worse. She wore a knee immobilizer for a little, but it was not helping her day to day so she stopped wearing that. She feels like she has instances of her L knee giving out. She feels like she has days where she can walk normal, but 80% of he time she feels like she is limping. She has the most difficulty with walking, but no issues walking up and down stairs (if she takes her time). If patient walks with knee completely straight she has not pain. If she walks with forward flexed position over her LE she also  does not notice the pain.      MRI L knee w/o contrast: 1/25/2023  Impression:  1. Partial medial meniscus posterior root ligament tear.   a. Sprain of oblique popliteal ligament with possible partial tear.  2. 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      Goals:  1) walking 2 miles/day.         Type of problem:  Left knee.    This is a recurrent condition.  Condition occurred with:  A fall/slip.  Where condition occurred: in the community.  Patient reports pain:  Anterior and medial.  Pain is described as aching and sharp (aching is more constant, sharp more intermittent)   Pain is worse during the day.  Since onset symptoms are unchanged.  Associated symptoms:  Buckling/giving out. Symptoms are exacerbated by walking and bending/squatting  Relieved by: Volataren gel.  Special tests included:  MRI.    Restrictions due to condition include:  Working in normal job without restrictions.  Barriers include:  None as reported by patient.    Patient Health History  Yenny Johnson being seen for Left knee.     Problem began: 12/15/2022.   Problem occurred: Slip on ice x2   Pain is reported as 3/10 on pain scale.  General health as reported by patient is good.  Pertinent medical history includes: anemia, chest pain, overweight, osteoarthritis, smoking and thyroid problems.     Medical allergies: none and adhesives.   Surgeries include:  Other. Other surgery history details: Gallbladder, c section hysterectomy.    Current medications:  Anti-depressants and thyroid medication.       Primary job tasks include:  Computer work, lifting/carrying, prolonged standing, pushing/pulling and repetitive tasks.                  Patient Health History  Yenny Johnson being seen for Left knee.     Problem began: 12/15/2022.   Problem occurred: Slip on ice x2   Pain is reported as 3/10 on pain scale.  General health as reported by patient is good.  Pertinent medical history includes: anemia, chest  pain, overweight, osteoarthritis, smoking and thyroid problems.     Medical allergies: none and adhesives.   Surgeries include:  Other. Other surgery history details: Gallbladder, c section hysterectomy.    Current medications:  Anti-depressants and thyroid medication.       Primary job tasks include:  Computer work, lifting/carrying, prolonged standing, pushing/pulling and repetitive tasks.                                    Objective:    Gait:  Decreased stance time L LE, decreased knee flexion in stance phase on L side.     Gait and pain improved with use of single point cane in clinic.  Gait Type:  Antalgic                                                      Hip Evaluation    Hip Strength:      Extension:  Left: 5-/5  -  Pain:Right: 5/5    -  Pain:    Abduction:  Left: 4+/5    -   Pain:Right: 5-/5   -   Pain:                           Knee Evaluation:  ROM:    AROM      Extension:  Left: 0    Right:  0  Flexion: Left: 135    Right: 135        Strength:     Extension:  Left: 5-/5   Pain:      Right: 5/5    Pain:-  Flexion:  Left: 5/5    Pain:-      Right: 5/5    Pain:-    Quad Set Left: Good    Pain:     Ligament Testing:    Varus 0:  Left:  Neg   Right:  Neg  Varus 30:  Left:  Neg  Right:  Neg  Valgus 0:  Left:  Neg  Right:  Neg  Valgus 30:  Left:  Neg    Right:  Neg  Anterior Drawer:  Left:  Neg    Right:  Neg  Posterior Drawer: Left:  Neg  Right:  Neg    Special Tests: Special test for knee: apprehension prior to Thessaly: did not test; Adrianne's: - bilaterally.      Palpation:      Left knee tenderness not present at:  Medial Joint Line; Lateral Joint Line; Patellar Tendon; Patellar Medial; Patellar Lateral; Patellar Superior and Patellar Inferior    Right knee tenderness not present at:  Medial Joint Line; Lateral Joint Line; Patellar Tendon; Patellar Medial; Patellar Lateral; Patellar Superior and Patellar Inferior  Edema:  Edema of the knee: Sweep test: tract to 0 on L side.    Mobility Testing:  Not  Assessed            Functional Testing:  : Dl squat: normal, no increase in pain.                  General     ROS    Assessment/Plan:    Patient is a 52 year old female with left side knee complaints.    Patient has the following significant findings with corresponding treatment plan.                Diagnosis 1:  L knee pain  Pain -  hot/cold therapy, electric stimulation, manual therapy, STS, splint/taping/bracing/orthotics, self management, education, directional preference exercise and home program  Decreased strength - therapeutic exercise, therapeutic activities and home program  Edema - vasopneumatics, electric stimulation, cold therapy, cryocuff and self management/home program  Impaired gait - gait training, assistive devices and home program  Impaired muscle performance - neuro re-education and home program  Decreased function - therapeutic activities and home program    Therapy Evaluation Codes:     Cumulative Therapy Evaluation is: Low complexity.    Previous and current functional limitations:  (See Goal Flow Sheet for this information)    Short term and Long term goals: (See Goal Flow Sheet for this information)     Communication ability:  Patient appears to be able to clearly communicate and understand verbal and written communication and follow directions correctly.  Treatment Explanation - The following has been discussed with the patient:   RX ordered/plan of care  Anticipated outcomes  Possible risks and side effects  This patient would benefit from PT intervention to resume normal activities.   Rehab potential is excellent.    Frequency:  1 X week, once daily progressing to 1 x every other week  Duration:  for 2-3 months  Discharge Plan:  Achieve all LTG.  Independent in home treatment program.  Reach maximal therapeutic benefit.    Please refer to the daily flowsheet for treatment today, total treatment time and time spent performing 1:1 timed codes.

## 2023-03-23 ENCOUNTER — APPOINTMENT (OUTPATIENT)
Dept: CT IMAGING | Facility: CLINIC | Age: 53
End: 2023-03-23
Attending: STUDENT IN AN ORGANIZED HEALTH CARE EDUCATION/TRAINING PROGRAM
Payer: COMMERCIAL

## 2023-03-23 ENCOUNTER — APPOINTMENT (OUTPATIENT)
Dept: GENERAL RADIOLOGY | Facility: CLINIC | Age: 53
End: 2023-03-23
Attending: STUDENT IN AN ORGANIZED HEALTH CARE EDUCATION/TRAINING PROGRAM
Payer: COMMERCIAL

## 2023-03-23 ENCOUNTER — HOSPITAL ENCOUNTER (EMERGENCY)
Facility: CLINIC | Age: 53
Discharge: HOME | End: 2023-03-23
Attending: STUDENT IN AN ORGANIZED HEALTH CARE EDUCATION/TRAINING PROGRAM | Admitting: STUDENT IN AN ORGANIZED HEALTH CARE EDUCATION/TRAINING PROGRAM
Payer: COMMERCIAL

## 2023-03-23 VITALS
OXYGEN SATURATION: 98 % | WEIGHT: 184 LBS | DIASTOLIC BLOOD PRESSURE: 87 MMHG | SYSTOLIC BLOOD PRESSURE: 149 MMHG | TEMPERATURE: 97.6 F | HEIGHT: 65 IN | HEART RATE: 79 BPM | BODY MASS INDEX: 30.66 KG/M2 | RESPIRATION RATE: 18 BRPM

## 2023-03-23 DIAGNOSIS — R07.89 MIDSTERNAL CHEST PAIN: ICD-10-CM

## 2023-03-23 LAB
ALBUMIN SERPL BCG-MCNC: 4.5 G/DL (ref 3.5–5.2)
ALP SERPL-CCNC: 93 U/L (ref 35–104)
ALT SERPL W P-5'-P-CCNC: 75 U/L (ref 10–35)
ANION GAP SERPL CALCULATED.3IONS-SCNC: 13 MMOL/L (ref 7–15)
AST SERPL W P-5'-P-CCNC: 139 U/L (ref 10–35)
ATRIAL RATE - MUSE: 74 BPM
BASOPHILS # BLD AUTO: 0 10E3/UL (ref 0–0.2)
BASOPHILS NFR BLD AUTO: 1 %
BILIRUB SERPL-MCNC: 0.3 MG/DL
BUN SERPL-MCNC: 24.1 MG/DL (ref 6–20)
CALCIUM SERPL-MCNC: 9.3 MG/DL (ref 8.6–10)
CHLORIDE SERPL-SCNC: 100 MMOL/L (ref 98–107)
CREAT SERPL-MCNC: 1.06 MG/DL (ref 0.51–0.95)
D DIMER PPP FEU-MCNC: 0.44 UG/ML FEU (ref 0–0.5)
DEPRECATED HCO3 PLAS-SCNC: 24 MMOL/L (ref 22–29)
DIASTOLIC BLOOD PRESSURE - MUSE: NORMAL MMHG
EOSINOPHIL # BLD AUTO: 0.1 10E3/UL (ref 0–0.7)
EOSINOPHIL NFR BLD AUTO: 1 %
ERYTHROCYTE [DISTWIDTH] IN BLOOD BY AUTOMATED COUNT: 12.4 % (ref 10–15)
GFR SERPL CREATININE-BSD FRML MDRD: 63 ML/MIN/1.73M2
GLUCOSE SERPL-MCNC: 95 MG/DL (ref 70–99)
HCT VFR BLD AUTO: 37.8 % (ref 35–47)
HGB BLD-MCNC: 12 G/DL (ref 11.7–15.7)
HOLD SPECIMEN: NORMAL
IMM GRANULOCYTES # BLD: 0 10E3/UL
IMM GRANULOCYTES NFR BLD: 0 %
INTERPRETATION ECG - MUSE: NORMAL
LIPASE SERPL-CCNC: 68 U/L (ref 13–60)
LYMPHOCYTES # BLD AUTO: 1.5 10E3/UL (ref 0.8–5.3)
LYMPHOCYTES NFR BLD AUTO: 28 %
MCH RBC QN AUTO: 29.2 PG (ref 26.5–33)
MCHC RBC AUTO-ENTMCNC: 31.7 G/DL (ref 31.5–36.5)
MCV RBC AUTO: 92 FL (ref 78–100)
MONOCYTES # BLD AUTO: 0.5 10E3/UL (ref 0–1.3)
MONOCYTES NFR BLD AUTO: 9 %
NEUTROPHILS # BLD AUTO: 3.2 10E3/UL (ref 1.6–8.3)
NEUTROPHILS NFR BLD AUTO: 61 %
NRBC # BLD AUTO: 0 10E3/UL
NRBC BLD AUTO-RTO: 0 /100
P AXIS - MUSE: 47 DEGREES
PLATELET # BLD AUTO: 216 10E3/UL (ref 150–450)
POTASSIUM SERPL-SCNC: 3.8 MMOL/L (ref 3.4–5.3)
PR INTERVAL - MUSE: 176 MS
PROT SERPL-MCNC: 7.2 G/DL (ref 6.4–8.3)
QRS DURATION - MUSE: 74 MS
QT - MUSE: 392 MS
QTC - MUSE: 435 MS
R AXIS - MUSE: -4 DEGREES
RBC # BLD AUTO: 4.11 10E6/UL (ref 3.8–5.2)
SODIUM SERPL-SCNC: 137 MMOL/L (ref 136–145)
SYSTOLIC BLOOD PRESSURE - MUSE: NORMAL MMHG
T AXIS - MUSE: 27 DEGREES
TROPONIN T SERPL HS-MCNC: <6 NG/L
TROPONIN T SERPL HS-MCNC: <6 NG/L
VENTRICULAR RATE- MUSE: 74 BPM
WBC # BLD AUTO: 5.2 10E3/UL (ref 4–11)

## 2023-03-23 PROCEDURE — 93010 ELECTROCARDIOGRAM REPORT: CPT | Performed by: STUDENT IN AN ORGANIZED HEALTH CARE EDUCATION/TRAINING PROGRAM

## 2023-03-23 PROCEDURE — 85025 COMPLETE CBC W/AUTO DIFF WBC: CPT | Performed by: STUDENT IN AN ORGANIZED HEALTH CARE EDUCATION/TRAINING PROGRAM

## 2023-03-23 PROCEDURE — 250N000009 HC RX 250: Performed by: STUDENT IN AN ORGANIZED HEALTH CARE EDUCATION/TRAINING PROGRAM

## 2023-03-23 PROCEDURE — 36415 COLL VENOUS BLD VENIPUNCTURE: CPT | Performed by: STUDENT IN AN ORGANIZED HEALTH CARE EDUCATION/TRAINING PROGRAM

## 2023-03-23 PROCEDURE — 99285 EMERGENCY DEPT VISIT HI MDM: CPT | Mod: 25 | Performed by: STUDENT IN AN ORGANIZED HEALTH CARE EDUCATION/TRAINING PROGRAM

## 2023-03-23 PROCEDURE — 74174 CTA ABD&PLVS W/CONTRAST: CPT

## 2023-03-23 PROCEDURE — 74174 CTA ABD&PLVS W/CONTRAST: CPT | Mod: 26 | Performed by: RADIOLOGY

## 2023-03-23 PROCEDURE — 85379 FIBRIN DEGRADATION QUANT: CPT | Performed by: STUDENT IN AN ORGANIZED HEALTH CARE EDUCATION/TRAINING PROGRAM

## 2023-03-23 PROCEDURE — 80053 COMPREHEN METABOLIC PANEL: CPT | Performed by: STUDENT IN AN ORGANIZED HEALTH CARE EDUCATION/TRAINING PROGRAM

## 2023-03-23 PROCEDURE — 71046 X-RAY EXAM CHEST 2 VIEWS: CPT

## 2023-03-23 PROCEDURE — 99284 EMERGENCY DEPT VISIT MOD MDM: CPT | Mod: 25 | Performed by: STUDENT IN AN ORGANIZED HEALTH CARE EDUCATION/TRAINING PROGRAM

## 2023-03-23 PROCEDURE — 84484 ASSAY OF TROPONIN QUANT: CPT | Performed by: STUDENT IN AN ORGANIZED HEALTH CARE EDUCATION/TRAINING PROGRAM

## 2023-03-23 PROCEDURE — 71046 X-RAY EXAM CHEST 2 VIEWS: CPT | Mod: 26 | Performed by: RADIOLOGY

## 2023-03-23 PROCEDURE — 93005 ELECTROCARDIOGRAM TRACING: CPT | Performed by: STUDENT IN AN ORGANIZED HEALTH CARE EDUCATION/TRAINING PROGRAM

## 2023-03-23 PROCEDURE — 250N000011 HC RX IP 250 OP 636: Performed by: STUDENT IN AN ORGANIZED HEALTH CARE EDUCATION/TRAINING PROGRAM

## 2023-03-23 PROCEDURE — 250N000013 HC RX MED GY IP 250 OP 250 PS 637: Performed by: STUDENT IN AN ORGANIZED HEALTH CARE EDUCATION/TRAINING PROGRAM

## 2023-03-23 PROCEDURE — 71275 CT ANGIOGRAPHY CHEST: CPT | Mod: 26 | Performed by: RADIOLOGY

## 2023-03-23 PROCEDURE — 84484 ASSAY OF TROPONIN QUANT: CPT | Mod: 91 | Performed by: STUDENT IN AN ORGANIZED HEALTH CARE EDUCATION/TRAINING PROGRAM

## 2023-03-23 PROCEDURE — 83690 ASSAY OF LIPASE: CPT | Performed by: STUDENT IN AN ORGANIZED HEALTH CARE EDUCATION/TRAINING PROGRAM

## 2023-03-23 RX ORDER — IOPAMIDOL 755 MG/ML
100 INJECTION, SOLUTION INTRAVASCULAR ONCE
Status: COMPLETED | OUTPATIENT
Start: 2023-03-23 | End: 2023-03-23

## 2023-03-23 RX ORDER — ACETAMINOPHEN 325 MG/1
975 TABLET ORAL ONCE
Status: COMPLETED | OUTPATIENT
Start: 2023-03-23 | End: 2023-03-23

## 2023-03-23 RX ADMIN — ACETAMINOPHEN 975 MG: 325 TABLET ORAL at 17:14

## 2023-03-23 RX ADMIN — IOPAMIDOL 100 ML: 755 INJECTION, SOLUTION INTRAVENOUS at 21:01

## 2023-03-23 RX ADMIN — LIDOCAINE HYDROCHLORIDE 30 ML: 20 SOLUTION ORAL; TOPICAL at 17:14

## 2023-03-23 RX ADMIN — SODIUM CHLORIDE, PRESERVATIVE FREE 90 ML: 5 INJECTION INTRAVENOUS at 21:01

## 2023-03-23 ASSESSMENT — ACTIVITIES OF DAILY LIVING (ADL)
ADLS_ACUITY_SCORE: 35

## 2023-03-23 NOTE — ED PROVIDER NOTES
[place] EMERGENCY DEPARTMENT ([facility])    3/23/23      History     Chief Complaint   Patient presents with     Chest Pain     The history is provided by the patient and medical records.     Yenny Johnson is a 52 year old female who presents to the Emergency Department with chest pain. 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 She states she was unable to talk as well. Patient reports they took her BP in clinic and it was high. She took omeprazole to see if it would help despite stating it does not feel like her typical GERD symptoms. Patient reports she tried laying down as well but had increased pain and got dizzy. Patient reports she has been feeling intermittently lightheaded all week. She denies shortness of breath but states she feels like she needs to catch her breath. Patient reports pain was initially 7-8/10, is now more mild, 3-4/10. Patient denies diaphoresis. No lower abdominal pain. No diarrhea, constipation, or urinary symptoms. Patient notes she has some ongoing right calf tenderness. No leg swelling. Patient denies personal cardiac history or history of diabetes or HTN. She notes history of high cholesterol but is now on meds. Patient reports history of cholecystectomy 26 years ago.    Past Medical History  Past Medical History:   Diagnosis Date     Anemia      BCC (basal cell carcinoma of skin)      Degeneration of lumbar or lumbosacral intervertebral disc      Depressive disorder      Diverticulitis of colon      Elevated fasting glucose      Elevated rheumatoid factor 12/12/2012     GERD (gastroesophageal reflux disease) 11/2005    EGD     Hyperlipidemia LDL goal <130      Hypertriglyceridemia      Hypothyroidism      Intermittent asthma      Lumbar disc  herniation      Medial meniscus tear      Obesity 03/29/2012     Paroxysmal supraventricular tachycardia (H) 02/16/2021     PMDD (premenstrual dysphoric disorder)      Stress incontinence, female 03/29/2012     Past Surgical History:   Procedure Laterality Date     BIOPSY       C/SECTION, CLASSICAL       CHOLECYSTECTOMY, LAPOROSCOPIC  1995     COLONOSCOPY WITH CO2 INSUFFLATION N/A 09/27/2021    Procedure: COLONOSCOPY, WITH CO2 INSUFFLATION;  Surgeon: Dimitrios Cevallos MD;  Location: [facility] OR     DESTRUCTION OF PARAVERTEBRAL FACET LUMBAR / SACRAL SINGLE Bilateral 09/12/2017    Procedure: DESTRUCTION OF PARAVERTEBRAL FACET LUMBAR / SACRAL SINGLE;  Radiofrequency Ablation of the Lumbar Facets /bilateral  heating of nerves around spine bilatteraly for purpose of pain rekief;  Surgeon: Benigno Willams MD;  Location: [facility] OR     HYSTERECTOMY, PAP NO LONGER INDICATED  09/23/2015     INJECT EPIDURAL LUMBAR / SACRAL SINGLE Bilateral 08/09/2017    Procedure: INJECT EPIDURAL LUMBAR / SACRAL SINGLE;  bilateral lumbar medial branch block:injection of local anesthetic into area around spine for purpose of pain relief;  Surgeon: Benigno Willams MD;  Location: [facility] OR     INJECT PARAVERTEBRAL FACET JOINT LUMBAR / SACRAL FIRST Bilateral 06/28/2017    Procedure: INJECT PARAVERTEBRAL FACET JOINT LUMBAR / SACRAL FIRST;  injection of local anesthesthetic into area around spine for purpose of pain relief;  Surgeon: Benigno Willams MD;  Location: [facility] OR     INJECT PARAVERTEBRAL FACET JOINT LUMBAR / SACRAL THIRD Bilateral 03/09/2018    Procedure: INJECT PARAVERTEBRAL FACET JOINT LUMBAR / SACRAL THIRD;  Bilateral S1, S2, S3 Lateral Branch Nerve Block and L5 Dorsal Ramus Nerve Block;  Surgeon: Kristi Gomes MD;  Location: [facility]     INJECT SACROILIAC JOINT Bilateral 02/07/2018    Procedure: INJECT SACROILIAC JOINT;  Bilateral sacroiliac Lateral Branch Block;  Surgeon: Edison Sams MD;  Location: [facility]      "RADIO FREQUENCY ABLATION / DESTRUCTION OF SACROILOAC JOINT LATERAL BRANCHES (S1/S2/S3) Bilateral 2018    Procedure: RADIO FREQUENCY ABLATION / DESTRUCTION OF SACROILOAC JOINT LATERAL BRANCHES (S1/S2/S3);  Bilateral Radiofrequency Ablation of the Lateral Branches;  Surgeon: Benigno Willams MD;  Location: [facility]     REPAIR MOHS Left 2017    Procedure: REPAIR MOHS;  Surgeon: Jorge Eric MD;  Location: [facility] OR     TUBAL LIGATION       atorvastatin (LIPITOR) 20 MG tablet  FLUoxetine (PROZAC) 20 MG capsule  insulin pen needle (31G X 6 MM) 31G X 6 MM miscellaneous  levothyroxine (SYNTHROID/LEVOTHROID) 88 MCG tablet  liraglutide - Weight Management (SAXENDA) 18 MG/3ML pen  topiramate (TOPAMAX) 25 MG tablet      No Known Allergies  Family History  Family History   Problem Relation Age of Onset     Cardiovascular Mother         CHF      Coronary Artery Disease Mother         Cholesterol     Hyperlipidemia Mother      C.A.D. Father 67        MI     Hypertension Father      Alcohol/Drug Father      Obesity Sister      Obesity Sister      Diabetes Brother      Asthma Brother      Obesity Brother      Coronary Artery Disease Brother      Allergies Maternal Grandmother      Diabetes Maternal Grandmother      Diabetes Paternal Grandfather      Seasonal/Environmental Allergies Daughter      Cancer No family hx of      Social History   Social History     Tobacco Use     Smoking status: Former     Packs/day: 1.00     Years: 14.00     Pack years: 14.00     Types: Cigarettes     Quit date: 3/30/1998     Years since quittin.9     Smokeless tobacco: Never   Vaping Use     Vaping Use: Never used   Substance Use Topics     Alcohol use: No     Drug use: No         A medically appropriate review of systems was performed with pertinent positives and negatives noted in the HPI, and all other systems negative.    Physical Exam   BP: (!) 149/87  Pulse: 79  Temp: 97.6  F (36.4  C)  Resp: 18  Height: 165.1 cm (5' 5\")  Weight: " 83.5 kg (184 lb)  SpO2: 98 %  Physical Exam  Vital Signs Reviewed  Gen: Well nourished, well developed, resting comfortably, no acute distress  HEENT: NC/AT, PERRL, EOMI, MMM  Neck: Supple, FROM  CV: Regular Rate, no murmur/rub/gallop. 2+ equal radial pulses  Lungs/Chest: Normal Effort, CTAB  Abd: Non-distended, non-tender  MSK/Back: FROM, no visible deformity  Neuro: A&Ox3, GCS 15, CN II-XII unremarkable  Skin: Warm, Dry, Intact, no visible lesions    ED Course, Procedures, & Data   4:52 PM  The patient was seen and examined by Jorge Jovel MD in [facility].     Patient with extended time in waiting room after evaluation due to capacity issues, reassessed multiple times.    Labs and imaging reviewed with patient.     Procedures       ED Course Selections:        EKG Interpretation:      Interpreted by Jorge Jovel MD  Time reviewed: 1645  Symptoms at time of EKG: chest pain   Rhythm: normal sinus   Rate: normal  Axis: normal  Ectopy: none  Conduction: normal  ST Segments/ T Waves: No ST-T wave changes  Q Waves: none  Comparison to prior: No old EKG available    Clinical Impression: normal EKG                     Results for orders placed or performed during the hospital encounter of 03/23/23   Chest XR,  PA & LAT     Status: None    Narrative    Exam:  Chest 2 view    History: Midsternal upper abdominal pain    Comparison: 10/20/2016    Findings: Lungs and costophrenic angles are clear.  The heart and  mediastinum are unremarkable.    The bones are unremarkable.      Impression    Impression:  Normal Chest    MAGDALENE JORGE MD         SYSTEM ID:  [id number]   CTA Chest Abdomen Pelvis w Contrast     Status: None (Preliminary result)    Impression    RESIDENT PRELIMINARY INTERPRETATION  Impression:  1.  No aortic aneurysm.  2.  No acute findings in the chest, abdomen, or pelvis.   [place] Draw     Status: None    Narrative    The following orders were created for panel order [place] Draw.  Procedure                                Abnormality         Status                     ---------                               -----------         ------                     Extra Blue Top Tube[[id number]]                              Final result               Extra Red Top Tube[[id number]]                               Final result               Extra Green Top (Lithium...[[id number]]                      Final result               Extra Purple Top Tube[[id number]]                            Final result                 Please view results for these tests on the individual orders.   Extra Blue Top Tube     Status: None   Result Value Ref Range    Hold Specimen JIC    Extra Red Top Tube     Status: None   Result Value Ref Range    Hold Specimen JIC    Extra Green Top (Lithium Heparin) Tube     Status: None   Result Value Ref Range    Hold Specimen JIC    Extra Purple Top Tube     Status: None   Result Value Ref Range    Hold Specimen JIC    D dimer quantitative     Status: Normal   Result Value Ref Range    D-Dimer Quantitative 0.44 0.00 - 0.50 ug/mL FEU    Narrative    This D-dimer assay is intended for use in conjunction with a clinical pretest probability assessment model to exclude pulmonary embolism (PE) and deep venous thrombosis (DVT) in outpatients suspected of PE or DVT. The cut-off value is 0.50 ug/mL FEU.   Comprehensive metabolic panel     Status: Abnormal   Result Value Ref Range    Sodium 137 136 - 145 mmol/L    Potassium 3.8 3.4 - 5.3 mmol/L    Chloride 100 98 - 107 mmol/L    Carbon Dioxide (CO2) 24 22 - 29 mmol/L    Anion Gap 13 7 - 15 mmol/L    Urea Nitrogen 24.1 (H) 6.0 - 20.0 mg/dL    Creatinine 1.06 (H) 0.51 - 0.95 mg/dL    Calcium 9.3 8.6 - 10.0 mg/dL    Glucose 95 70 - 99 mg/dL    Alkaline Phosphatase 93 35 - 104 U/L     (H) 10 - 35 U/L    ALT 75 (H) 10 - 35 U/L    Protein Total 7.2 6.4 - 8.3 g/dL    Albumin 4.5 3.5 - 5.2 g/dL    Bilirubin Total 0.3 <=1.2 mg/dL    GFR Estimate 63 >60 mL/min/1.73m2   Lipase      Status: Abnormal   Result Value Ref Range    Lipase 68 (H) 13 - 60 U/L   Troponin T, High Sensitivity     Status: Normal   Result Value Ref Range    Troponin T, High Sensitivity <6 <=14 ng/L   CBC with platelets and differential     Status: None   Result Value Ref Range    WBC Count 5.2 4.0 - 11.0 10e3/uL    RBC Count 4.11 3.80 - 5.20 10e6/uL    Hemoglobin 12.0 11.7 - 15.7 g/dL    Hematocrit 37.8 35.0 - 47.0 %    MCV 92 78 - 100 fL    MCH 29.2 26.5 - 33.0 pg    MCHC 31.7 31.5 - 36.5 g/dL    RDW 12.4 10.0 - 15.0 %    Platelet Count 216 150 - 450 10e3/uL    % Neutrophils 61 %    % Lymphocytes 28 %    % Monocytes 9 %    % Eosinophils 1 %    % Basophils 1 %    % Immature Granulocytes 0 %    NRBCs per 100 WBC 0 <1 /100    Absolute Neutrophils 3.2 1.6 - 8.3 10e3/uL    Absolute Lymphocytes 1.5 0.8 - 5.3 10e3/uL    Absolute Monocytes 0.5 0.0 - 1.3 10e3/uL    Absolute Eosinophils 0.1 0.0 - 0.7 10e3/uL    Absolute Basophils 0.0 0.0 - 0.2 10e3/uL    Absolute Immature Granulocytes 0.0 <=0.4 10e3/uL    Absolute NRBCs 0.0 10e3/uL   Troponin T, High Sensitivity     Status: Normal   Result Value Ref Range    Troponin T, High Sensitivity <6 <=14 ng/L   EKG 12-lead, tracing only     Status: None   Result Value Ref Range    Systolic Blood Pressure  mmHg    Diastolic Blood Pressure  mmHg    Ventricular Rate 74 BPM    Atrial Rate 74 BPM    KS Interval 176 ms    QRS Duration 74 ms     ms    QTc 435 ms    P Axis 47 degrees    R AXIS -4 degrees    T Axis 27 degrees    Interpretation ECG       Sinus rhythm  Cannot rule out Anterior infarct , age undetermined  Abnormal ECG  Unconfirmed report - interpretation of this ECG is computer generated - see medical record for final interpretation  Confirmed by - EMERGENCY ROOM, PHYSICIAN ([id number]),  ROSAURA REDDING ([id number]) on 3/23/2023 8:41:25 PM     CBC with platelets differential     Status: None    Narrative    The following orders were created for panel order CBC with platelets  differential.  Procedure                               Abnormality         Status                     ---------                               -----------         ------                     CBC with platelets and d...[[id number]]                      Final result                 Please view results for these tests on the individual orders.     Medications   lidocaine (viscous) (XYLOCAINE) 2 % 15 mL, alum & mag hydroxide-simethicone (MAALOX) 15 mL GI Cocktail (30 mLs Oral $Given 3/23/23 1714)   acetaminophen (TYLENOL) tablet 975 mg (975 mg Oral $Given 3/23/23 1714)   CT saline (90 mLs Intravenous $Given 3/23/23 2101)   iopamidol (ISOVUE-370) solution 100 mL (100 mLs Intravenous $Given 3/23/23 2101)     Labs Ordered and Resulted from Time of ED Arrival to Time of ED Departure   COMPREHENSIVE METABOLIC PANEL - Abnormal       Result Value    Sodium 137      Potassium 3.8      Chloride 100      Carbon Dioxide (CO2) 24      Anion Gap 13      Urea Nitrogen 24.1 (*)     Creatinine 1.06 (*)     Calcium 9.3      Glucose 95      Alkaline Phosphatase 93       (*)     ALT 75 (*)     Protein Total 7.2      Albumin 4.5      Bilirubin Total 0.3      GFR Estimate 63     LIPASE - Abnormal    Lipase 68 (*)    D DIMER QUANTITATIVE - Normal    D-Dimer Quantitative 0.44     TROPONIN T, HIGH SENSITIVITY - Normal    Troponin T, High Sensitivity <6     TROPONIN T, HIGH SENSITIVITY - Normal    Troponin T, High Sensitivity <6     CBC WITH PLATELETS AND DIFFERENTIAL    WBC Count 5.2      RBC Count 4.11      Hemoglobin 12.0      Hematocrit 37.8      MCV 92      MCH 29.2      MCHC 31.7      RDW 12.4      Platelet Count 216      % Neutrophils 61      % Lymphocytes 28      % Monocytes 9      % Eosinophils 1      % Basophils 1      % Immature Granulocytes 0      NRBCs per 100 WBC 0      Absolute Neutrophils 3.2      Absolute Lymphocytes 1.5      Absolute Monocytes 0.5      Absolute Eosinophils 0.1      Absolute Basophils 0.0      Absolute  Immature Granulocytes 0.0      Absolute NRBCs 0.0       CTA Chest Abdomen Pelvis w Contrast   Preliminary Result   RESIDENT PRELIMINARY INTERPRETATION   Impression:   1.  No aortic aneurysm.   2.  No acute findings in the chest, abdomen, or pelvis.      Chest XR,  PA & LAT   Final Result   Impression:  Normal Chest      MAGDALENE JORGE MD            SYSTEM ID:  [id number]             Critical care was not performed.     Medical Decision Making  The patient's presentation was of high complexity (an acute health issue posing potential threat to life or bodily function).    The patient's evaluation involved:  ordering and/or review of 3+ test(s) in this encounter (see separate area of note for details)  independent interpretation of testing performed by another health professional (see separate area of note for details)    The patient's management necessitated moderate risk (prescription drug management including medications given in the ED).      Assessment & Plan    Yenny Johnson is a 52 year old female who presents to the Emergency Department with chest pain. 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  Triage vitals reviewed and notable for a very mild hypertension.  She still has some persistent symptoms but is otherwise resting comfortably in no acute distress.  Her twelve-lead EKG was reviewed and shows no evidence of COURTNEY or acute ischemia.  Given the onset of pain will get delta troponins.  PE less likely but will get a D-dimer to exclude.  We will get a chest x-ray as well.    Initial labs were negative.  Patient agreed to remain for delta troponin and this too was negative.  ACS excluded.  She did still have some mild discomfort and mention a family history of aortic aneurysm in her mother and  grandmother so CT angiography was performed despite lower suspicion for acute aortic syndrome.  This is preliminarily negative.  Patient is feeling well and would like to discharge.  The patient did ultimately discharged prior to receiving paperwork.  She did not wish to stay for radiology interpretation.  I did independently interpret the images when I had discussion discussion with her.  Her aorta seemed at the upper limits of normal at 4 cm but I do not appreciate obvious aneurysm or dissection which the study was ordered to detect.  She knows she is to follow-up regarding this.  She has her patient portal in place to follow-up on final results.  She has good clinic follow-up in the community.  She knows she can return at any time if symptoms return or worsen.  She did discharge with her IV in place but told RN that she removed it when she got home.    I have reviewed the nursing notes. I have reviewed the findings, diagnosis, plan and need for follow up with the patient.    Discharge Medication List as of 3/23/2023  9:39 PM          Final diagnoses:   Midsternal chest pain     I, Mahogany Hollis, am serving as a trained medical scribe to document services personally performed by Jorge Anderson MD, based on the provider's statements to me.     I, Jorge Anderson MD, was physically present and have reviewed and verified the accuracy of this note documented by Mahogany Hollis.    Jorge Anderson Jr., MD   [facility] EMERGENCY DEPARTMENT  3/23/2023     Jorge Anderson MD  03/23/23 [id number]

## 2023-03-23 NOTE — ED TRIAGE NOTES
Pt ambulatory to triage with chest pain that started approx 30 min pta. Pt was up walking at work when the pain hit her abruptly. States at its greatest it was 8/10 in central lower chest and it was dull and pressure. Did have nausea with the pain. bp 149/87, other VSS     Triage Assessment     Row Name 03/23/23 1629       Triage Assessment (Adult)    Airway WDL WDL       Respiratory WDL    Respiratory WDL WDL       Skin Circulation/Temperature WDL    Skin Circulation/Temperature WDL WDL       Cardiac WDL    Cardiac WDL WDL       Peripheral/Neurovascular WDL    Peripheral Neurovascular WDL WDL       Cognitive/Neuro/Behavioral WDL    Cognitive/Neuro/Behavioral WDL WDL       [place] Coma Scale    Best Eye Response 4-->(E4) spontaneous    Best Motor Response 6-->(M6) obeys commands    Best Verbal Response 5-->(V5) oriented    [place] Coma Scale Score 15

## 2023-03-24 ENCOUNTER — HOSPITAL ENCOUNTER (OUTPATIENT)
Facility: CLINIC | Age: 53
Setting detail: OBSERVATION
Discharge: HOME | End: 2023-03-24
Attending: STUDENT IN AN ORGANIZED HEALTH CARE EDUCATION/TRAINING PROGRAM | Admitting: STUDENT IN AN ORGANIZED HEALTH CARE EDUCATION/TRAINING PROGRAM
Payer: COMMERCIAL

## 2023-03-24 VITALS
RESPIRATION RATE: 16 BRPM | HEART RATE: 66 BPM | OXYGEN SATURATION: 94 % | DIASTOLIC BLOOD PRESSURE: 74 MMHG | TEMPERATURE: 98.4 F | SYSTOLIC BLOOD PRESSURE: 128 MMHG

## 2023-03-24 DIAGNOSIS — R07.9 CHEST PAIN, UNSPECIFIED TYPE: ICD-10-CM

## 2023-03-24 DIAGNOSIS — I77.79 DISSECTION OF LEFT SUBCLAVIAN ARTERY (H): ICD-10-CM

## 2023-03-24 LAB
ANION GAP SERPL CALCULATED.3IONS-SCNC: 10 MMOL/L (ref 7–15)
ATRIAL RATE - MUSE: 64 BPM
BASOPHILS # BLD AUTO: 0 10E3/UL (ref 0–0.2)
BASOPHILS NFR BLD AUTO: 1 %
BUN SERPL-MCNC: 19.1 MG/DL (ref 6–20)
CALCIUM SERPL-MCNC: 9.2 MG/DL (ref 8.6–10)
CHLORIDE SERPL-SCNC: 103 MMOL/L (ref 98–107)
CREAT SERPL-MCNC: 0.88 MG/DL (ref 0.51–0.95)
CRP SERPL-MCNC: <3 MG/L
DEPRECATED HCO3 PLAS-SCNC: 27 MMOL/L (ref 22–29)
DIASTOLIC BLOOD PRESSURE - MUSE: NORMAL MMHG
EOSINOPHIL # BLD AUTO: 0.1 10E3/UL (ref 0–0.7)
EOSINOPHIL NFR BLD AUTO: 3 %
ERYTHROCYTE [DISTWIDTH] IN BLOOD BY AUTOMATED COUNT: 12.7 % (ref 10–15)
ERYTHROCYTE [SEDIMENTATION RATE] IN BLOOD BY WESTERGREN METHOD: 10 MM/HR (ref 0–30)
GFR SERPL CREATININE-BSD FRML MDRD: 79 ML/MIN/1.73M2
GLUCOSE SERPL-MCNC: 116 MG/DL (ref 70–99)
HCT VFR BLD AUTO: 39.4 % (ref 35–47)
HGB BLD-MCNC: 12.3 G/DL (ref 11.7–15.7)
IMM GRANULOCYTES # BLD: 0 10E3/UL
IMM GRANULOCYTES NFR BLD: 0 %
INTERPRETATION ECG - MUSE: NORMAL
LYMPHOCYTES # BLD AUTO: 0.7 10E3/UL (ref 0.8–5.3)
LYMPHOCYTES NFR BLD AUTO: 21 %
MCH RBC QN AUTO: 28.9 PG (ref 26.5–33)
MCHC RBC AUTO-ENTMCNC: 31.2 G/DL (ref 31.5–36.5)
MCV RBC AUTO: 93 FL (ref 78–100)
MONOCYTES # BLD AUTO: 0.4 10E3/UL (ref 0–1.3)
MONOCYTES NFR BLD AUTO: 10 %
NEUTROPHILS # BLD AUTO: 2.4 10E3/UL (ref 1.6–8.3)
NEUTROPHILS NFR BLD AUTO: 65 %
NRBC # BLD AUTO: 0 10E3/UL
NRBC BLD AUTO-RTO: 0 /100
P AXIS - MUSE: 60 DEGREES
PLATELET # BLD AUTO: 208 10E3/UL (ref 150–450)
POTASSIUM SERPL-SCNC: 4.5 MMOL/L (ref 3.4–5.3)
PR INTERVAL - MUSE: 186 MS
QRS DURATION - MUSE: 76 MS
QT - MUSE: 412 MS
QTC - MUSE: 425 MS
R AXIS - MUSE: -10 DEGREES
RBC # BLD AUTO: 4.25 10E6/UL (ref 3.8–5.2)
SODIUM SERPL-SCNC: 140 MMOL/L (ref 136–145)
SYSTOLIC BLOOD PRESSURE - MUSE: NORMAL MMHG
T AXIS - MUSE: 22 DEGREES
TROPONIN T SERPL HS-MCNC: <6 NG/L
VENTRICULAR RATE- MUSE: 64 BPM
WBC # BLD AUTO: 3.6 10E3/UL (ref 4–11)

## 2023-03-24 PROCEDURE — 96375 TX/PRO/DX INJ NEW DRUG ADDON: CPT | Performed by: STUDENT IN AN ORGANIZED HEALTH CARE EDUCATION/TRAINING PROGRAM

## 2023-03-24 PROCEDURE — G0378 HOSPITAL OBSERVATION PER HR: HCPCS

## 2023-03-24 PROCEDURE — 99285 EMERGENCY DEPT VISIT HI MDM: CPT | Mod: 25 | Performed by: STUDENT IN AN ORGANIZED HEALTH CARE EDUCATION/TRAINING PROGRAM

## 2023-03-24 PROCEDURE — 250N000011 HC RX IP 250 OP 636: Performed by: STUDENT IN AN ORGANIZED HEALTH CARE EDUCATION/TRAINING PROGRAM

## 2023-03-24 PROCEDURE — 85652 RBC SED RATE AUTOMATED: CPT | Performed by: STUDENT IN AN ORGANIZED HEALTH CARE EDUCATION/TRAINING PROGRAM

## 2023-03-24 PROCEDURE — 250N000009 HC RX 250: Performed by: STUDENT IN AN ORGANIZED HEALTH CARE EDUCATION/TRAINING PROGRAM

## 2023-03-24 PROCEDURE — 82310 ASSAY OF CALCIUM: CPT | Performed by: STUDENT IN AN ORGANIZED HEALTH CARE EDUCATION/TRAINING PROGRAM

## 2023-03-24 PROCEDURE — 99207 PR SATISFY VISIT NUMBER: CPT | Performed by: NURSE PRACTITIONER

## 2023-03-24 PROCEDURE — 84484 ASSAY OF TROPONIN QUANT: CPT | Performed by: STUDENT IN AN ORGANIZED HEALTH CARE EDUCATION/TRAINING PROGRAM

## 2023-03-24 PROCEDURE — 99253 IP/OBS CNSLTJ NEW/EST LOW 45: CPT | Mod: FS | Performed by: NURSE PRACTITIONER

## 2023-03-24 PROCEDURE — 93010 ELECTROCARDIOGRAM REPORT: CPT | Performed by: STUDENT IN AN ORGANIZED HEALTH CARE EDUCATION/TRAINING PROGRAM

## 2023-03-24 PROCEDURE — 36415 COLL VENOUS BLD VENIPUNCTURE: CPT | Performed by: STUDENT IN AN ORGANIZED HEALTH CARE EDUCATION/TRAINING PROGRAM

## 2023-03-24 PROCEDURE — 86140 C-REACTIVE PROTEIN: CPT | Performed by: STUDENT IN AN ORGANIZED HEALTH CARE EDUCATION/TRAINING PROGRAM

## 2023-03-24 PROCEDURE — 85025 COMPLETE CBC W/AUTO DIFF WBC: CPT | Performed by: STUDENT IN AN ORGANIZED HEALTH CARE EDUCATION/TRAINING PROGRAM

## 2023-03-24 PROCEDURE — 96374 THER/PROPH/DIAG INJ IV PUSH: CPT | Performed by: STUDENT IN AN ORGANIZED HEALTH CARE EDUCATION/TRAINING PROGRAM

## 2023-03-24 PROCEDURE — 93005 ELECTROCARDIOGRAM TRACING: CPT | Performed by: STUDENT IN AN ORGANIZED HEALTH CARE EDUCATION/TRAINING PROGRAM

## 2023-03-24 RX ORDER — ESMOLOL HYDROCHLORIDE 20 MG/ML
50-300 INJECTION, SOLUTION INTRAVENOUS CONTINUOUS
Status: DISCONTINUED | OUTPATIENT
Start: 2023-03-24 | End: 2023-03-24

## 2023-03-24 RX ADMIN — NICARDIPINE HYDROCHLORIDE 2.5 MG/HR: 0.2 INJECTION, SOLUTION INTRAVENOUS at 12:32

## 2023-03-24 RX ADMIN — ESMOLOL HYDROCHLORIDE 50 MCG/KG/MIN: 20 INJECTION INTRAVENOUS at 12:30

## 2023-03-24 ASSESSMENT — ACTIVITIES OF DAILY LIVING (ADL)
ADLS_ACUITY_SCORE: 35
ADLS_ACUITY_SCORE: 33

## 2023-03-24 NOTE — ED PROVIDER NOTES
[place] EMERGENCY DEPARTMENT ([facility])    3/24/23    History     Chief Complaint   Patient presents with     Chest Pain     HPI  Yenny Johnson is a 52 year old female who with a past history of BCC, paroxysmal supraventricular tachycardia, hyperlipidemia, anemia, obesity and GERD presents to the ED with chest pain.  Began having mid/lower chest and upper epigastric pain about 45 minutes prior to being evaluated in the ED.  Pain was severe and occasionally took her breath away, sometimes radiating into her left shoulder.  At the time, she had a work-up that showed normal troponins, normal EKGs, and ended up with a CT scan due to concern with a family history of aortic aneurysm.    Patient left prior to the CT being read.    This morning, the CT angio was over read by the radiology attending, who had concerns for left subclavian artery thickening which could be secondary to arteritis versus dissection versusmural thrombus.  I called the patient this morning and requested that she return to the emergency department for reevaluation.    She is here, and states that she does have a little bit of chest pressure that happened throughout the night, it was primarily on the left side of the chest, and into her left shoulder, but is much better than it was yesterday.  Denies any numbness, tingling or weakness.    Patient was evaluated here in the emergency department yesterday for chest pain.  Per Epic review:   On 3/23/23 at [facility] CT chest, abdomen and pelvis  Impression:   1. Non specific proximal left subclavian artery mural thickening  causes 64% diameter stenosis. No periarterial stranding. Differential  includes mural hematoma, vasculitis, and thrombosed dissection.  2. No aortic dissection. No pulmonary embolus.     Past Medical History  Past Medical History:   Diagnosis Date     Anemia      BCC (basal cell carcinoma of skin)      Degeneration of lumbar or lumbosacral  intervertebral disc      Depressive disorder      Diverticulitis of colon      Elevated fasting glucose      Elevated rheumatoid factor 12/12/2012     GERD (gastroesophageal reflux disease) 11/2005    EGD     Hyperlipidemia LDL goal <130      Hypertriglyceridemia      Hypothyroidism      Intermittent asthma      Lumbar disc herniation      Medial meniscus tear      Obesity 03/29/2012     Paroxysmal supraventricular tachycardia (H) 02/16/2021     PMDD (premenstrual dysphoric disorder)      Stress incontinence, female 03/29/2012     Past Surgical History:   Procedure Laterality Date     BIOPSY       C/SECTION, CLASSICAL       CHOLECYSTECTOMY, LAPOROSCOPIC  1995     COLONOSCOPY WITH CO2 INSUFFLATION N/A 09/27/2021    Procedure: COLONOSCOPY, WITH CO2 INSUFFLATION;  Surgeon: Dimitrios Cevallos MD;  Location: [facility] OR     DESTRUCTION OF PARAVERTEBRAL FACET LUMBAR / SACRAL SINGLE Bilateral 09/12/2017    Procedure: DESTRUCTION OF PARAVERTEBRAL FACET LUMBAR / SACRAL SINGLE;  Radiofrequency Ablation of the Lumbar Facets /bilateral  heating of nerves around spine bilatteraly for purpose of pain rekief;  Surgeon: Benigno Willams MD;  Location: [facility] OR     HYSTERECTOMY, PAP NO LONGER INDICATED  09/23/2015     INJECT EPIDURAL LUMBAR / SACRAL SINGLE Bilateral 08/09/2017    Procedure: INJECT EPIDURAL LUMBAR / SACRAL SINGLE;  bilateral lumbar medial branch block:injection of local anesthetic into area around spine for purpose of pain relief;  Surgeon: Benigno Willams MD;  Location: [facility] OR     INJECT PARAVERTEBRAL FACET JOINT LUMBAR / SACRAL FIRST Bilateral 06/28/2017    Procedure: INJECT PARAVERTEBRAL FACET JOINT LUMBAR / SACRAL FIRST;  injection of local anesthesthetic into area around spine for purpose of pain relief;  Surgeon: Benigno Willams MD;  Location: [facility] OR     INJECT PARAVERTEBRAL FACET JOINT LUMBAR / SACRAL THIRD Bilateral 03/09/2018    Procedure: INJECT PARAVERTEBRAL FACET JOINT LUMBAR / SACRAL THIRD;   Bilateral S1, S2, S3 Lateral Branch Nerve Block and L5 Dorsal Ramus Nerve Block;  Surgeon: Kristi Gomes MD;  Location: [facility] OR     INJECT SACROILIAC JOINT Bilateral 2018    Procedure: INJECT SACROILIAC JOINT;  Bilateral sacroiliac Lateral Branch Block;  Surgeon: Edison Sams MD;  Location: [facility] OR     RADIO FREQUENCY ABLATION / DESTRUCTION OF SACROILOAC JOINT LATERAL BRANCHES (S1/S2/S3) Bilateral 2018    Procedure: RADIO FREQUENCY ABLATION / DESTRUCTION OF SACROILOAC JOINT LATERAL BRANCHES (S1/S2/S3);  Bilateral Radiofrequency Ablation of the Lateral Branches;  Surgeon: Benigno Willams MD;  Location: [facility] OR     REPAIR MOHS Left 2017    Procedure: REPAIR MOHS;  Surgeon: Jorge Eric MD;  Location: [facility] OR     TUBAL LIGATION       atorvastatin (LIPITOR) 20 MG tablet  FLUoxetine (PROZAC) 20 MG capsule  insulin pen needle (31G X 6 MM) 31G X 6 MM miscellaneous  levothyroxine (SYNTHROID/LEVOTHROID) 88 MCG tablet  liraglutide - Weight Management (SAXENDA) 18 MG/3ML pen  topiramate (TOPAMAX) 25 MG tablet      No Known Allergies  Family History  Family History   Problem Relation Age of Onset     Cardiovascular Mother         CHF      Coronary Artery Disease Mother         Cholesterol     Hyperlipidemia Mother      C.A.D. Father 67        MI     Hypertension Father      Alcohol/Drug Father      Obesity Sister      Obesity Sister      Diabetes Brother      Asthma Brother      Obesity Brother      Coronary Artery Disease Brother      Allergies Maternal Grandmother      Diabetes Maternal Grandmother      Diabetes Paternal Grandfather      Seasonal/Environmental Allergies Daughter      Cancer No family hx of      Social History   Social History     Tobacco Use     Smoking status: Former     Packs/day: 1.00     Years: 14.00     Pack years: 14.00     Types: Cigarettes     Quit date: 3/30/1998     Years since quittin.0     Smokeless tobacco: Never   Vaping Use     Vaping Use: Never  used   Substance Use Topics     Alcohol use: No     Drug use: No      Past medical history, past surgical history, medications, allergies, family history, and social history were reviewed with the patient. No additional pertinent items.      A complete review of systems was performed with pertinent positives and negatives noted in the HPI, and all other systems negative.    Physical Exam   BP: (!) 167/91  Pulse: 70  Temp: 98.4  F (36.9  C)  Resp: 19  SpO2: 97 %  Physical Exam  GEN: Well appearing, non toxic, cooperative  HEENT: normocephalic and atraumatic, PERRLA, EOMI  CV: well-perfused, normal skin color for ethnicity, regular rate and rhythm no murmurs, 2+ bilateral radial and ulnar pulses  PULM: breathing comfortably, in no respiratory distress, clear lung fields upper and lower lung fields  ABD: nondistended, soft nontender  EXT: Full range of motion.  No edema.  NEURO: awake, conversant, grossly normal bilateral upper and lower extremity strength & ROM   SKIN: No rashes, ecchymosis, or lacerations  PSYCH: Calm and cooperative, interactive      ED Course, Procedures, & Data      Procedures       ED Course Selections:        EKG Interpretation:      Interpreted by Faustina Tyler MD  Time reviewed: chest pain  Symptoms at time of EKG: chest pain   Rhythm: normal sinus   Rate: normal  Axis: normal  Ectopy: none  Conduction: normal  ST Segments/ T Waves: No ST-T wave changes  Q Waves: lateral, old  Comparison to prior: Unchanged    Clinical Impression: LVH, no changes         Results for orders placed or performed during the hospital encounter of 03/24/23   Basic metabolic panel     Status: Abnormal   Result Value Ref Range    Sodium 140 136 - 145 mmol/L    Potassium 4.5 3.4 - 5.3 mmol/L    Chloride 103 98 - 107 mmol/L    Carbon Dioxide (CO2) 27 22 - 29 mmol/L    Anion Gap 10 7 - 15 mmol/L    Urea Nitrogen 19.1 6.0 - 20.0 mg/dL    Creatinine 0.88 0.51 - 0.95 mg/dL    Calcium 9.2 8.6 - 10.0 mg/dL    Glucose 116 (H)  70 - 99 mg/dL    GFR Estimate 79 >60 mL/min/1.73m2   Troponin T, High Sensitivity     Status: Normal   Result Value Ref Range    Troponin T, High Sensitivity <6 <=14 ng/L   CRP inflammation     Status: Normal   Result Value Ref Range    CRP Inflammation <3.00 <5.00 mg/L   Erythrocyte sedimentation rate auto     Status: Normal   Result Value Ref Range    Erythrocyte Sedimentation Rate 10 0 - 30 mm/hr   CBC with platelets and differential     Status: Abnormal   Result Value Ref Range    WBC Count 3.6 (L) 4.0 - 11.0 10e3/uL    RBC Count 4.25 3.80 - 5.20 10e6/uL    Hemoglobin 12.3 11.7 - 15.7 g/dL    Hematocrit 39.4 35.0 - 47.0 %    MCV 93 78 - 100 fL    MCH 28.9 26.5 - 33.0 pg    MCHC 31.2 (L) 31.5 - 36.5 g/dL    RDW 12.7 10.0 - 15.0 %    Platelet Count 208 150 - 450 10e3/uL    % Neutrophils 65 %    % Lymphocytes 21 %    % Monocytes 10 %    % Eosinophils 3 %    % Basophils 1 %    % Immature Granulocytes 0 %    NRBCs per 100 WBC 0 <1 /100    Absolute Neutrophils 2.4 1.6 - 8.3 10e3/uL    Absolute Lymphocytes 0.7 (L) 0.8 - 5.3 10e3/uL    Absolute Monocytes 0.4 0.0 - 1.3 10e3/uL    Absolute Eosinophils 0.1 0.0 - 0.7 10e3/uL    Absolute Basophils 0.0 0.0 - 0.2 10e3/uL    Absolute Immature Granulocytes 0.0 <=0.4 10e3/uL    Absolute NRBCs 0.0 10e3/uL   EKG 12-lead, tracing only     Status: None (Preliminary result)   Result Value Ref Range    Systolic Blood Pressure  mmHg    Diastolic Blood Pressure  mmHg    Ventricular Rate 64 BPM    Atrial Rate 64 BPM    NJ Interval 186 ms    QRS Duration 76 ms     ms    QTc 425 ms    P Axis 60 degrees    R AXIS -10 degrees    T Axis 22 degrees    Interpretation ECG       Sinus rhythm with sinus arrhythmia  Minimal voltage criteria for LVH, may be normal variant  Septal infarct , age undetermined  Abnormal ECG     CBC with platelets differential     Status: Abnormal    Narrative    The following orders were created for panel order CBC with platelets differential.  Procedure                                Abnormality         Status                     ---------                               -----------         ------                     CBC with platelets and d...[[id number]]  Abnormal            Final result                 Please view results for these tests on the individual orders.     Medications - No data to display  Labs Ordered and Resulted from Time of ED Arrival to Time of ED Departure   BASIC METABOLIC PANEL - Abnormal       Result Value    Sodium 140      Potassium 4.5      Chloride 103      Carbon Dioxide (CO2) 27      Anion Gap 10      Urea Nitrogen 19.1      Creatinine 0.88      Calcium 9.2      Glucose 116 (*)     GFR Estimate 79     CBC WITH PLATELETS AND DIFFERENTIAL - Abnormal    WBC Count 3.6 (*)     RBC Count 4.25      Hemoglobin 12.3      Hematocrit 39.4      MCV 93      MCH 28.9      MCHC 31.2 (*)     RDW 12.7      Platelet Count 208      % Neutrophils 65      % Lymphocytes 21      % Monocytes 10      % Eosinophils 3      % Basophils 1      % Immature Granulocytes 0      NRBCs per 100 WBC 0      Absolute Neutrophils 2.4      Absolute Lymphocytes 0.7 (*)     Absolute Monocytes 0.4      Absolute Eosinophils 0.1      Absolute Basophils 0.0      Absolute Immature Granulocytes 0.0      Absolute NRBCs 0.0     TROPONIN T, HIGH SENSITIVITY - Normal    Troponin T, High Sensitivity <6     CRP INFLAMMATION - Normal    CRP Inflammation <3.00     ERYTHROCYTE SEDIMENTATION RATE AUTO - Normal    Erythrocyte Sedimentation Rate 10       No orders to display          Critical care was not performed.     Medical Decision Making  The patient's presentation was of high complexity (an acute health issue posing potential threat to life or bodily function).    The patient's evaluation involved:  review of external note(s) from 3+ sources (see separate area of note for details)  ordering and/or review of 3+ test(s) in this encounter (see separate area of note for details)  review of 3+ test result(s)  ordered prior to this encounter (see separate area of note for details)  discussion of management or test interpretation with another health professional (see separate area of note for details)    The patient's management necessitated high risk (a decision regarding hospitalization).    I have personally reviewed the relevant previous labs and images.  History and chart review was obtained from the following notes: ED notes, H&P, progress notes      Assessment & Plan    52-year-old female with a past medical history of hypertension, hyperlipidemia, paroxysmal SVT presenting to the emergency department due to chest pain yesterday, with concern for possible aortic pathology on CT that was obtained.    Patient feeling much better at this time, hemodynamically stable with no focal neurological deficits.  Patient does have some mild left anterior chest pain that radiates to her left shoulder at this time.  Low suspicion for ACS, will plan for repeat troponin, EKG.  Otherwise work-up done yesterday was unrevealing aside from the CT.    We will plan for inflammatory markers for evaluation of arteritis, and vascular surgery was consulted and will plan to see the patient.    12:42 PM inflammatory markers within normal limits.  Repeat EKG and troponin also normal.  Vascular surgery evaluated the patient and looked at the CT scan.  There was some concern on their read that there may be a type A aortic dissection.  At that time, cardiac surgery was paged, and the decision was made to start her on esmolol and nicardipine.  Patient was informed of this possible diagnosis.    Vascular surgery then went and had the CT scan looked at again by the radiologist.  They are reassured that the possible aortic dissection reading was actually an artifact.  At this point in time, they believe that this represents a left subclavian aortic dissection that is old and now thrombosed.  They do not believe that this is the cause of her chest pain.  I spoke with cardiac surgery who also do not believe that this is a dissection, but stated that if we were concerned, we should activate the aorta pager.  After reassurance between vascular surgery and radiology, we will hold on this at this point.    Vascular we will plan to sign off at this point in time as they do not believe that her symptoms are caused by her dissection.  She has multiple normal EKGs and troponins, and at this time the cause of her chest pain is undetermined, but unlikely to be cardiac, and vascular does not believe that this is due to her subclavian dissection.  We will plan for discharge and follow-up with her primary care and patient is aware of any return precautions.  She will follow-up with vascular surgery in a couple of weeks.      I have reviewed the nursing notes. I have reviewed the findings, diagnosis, plan and need for follow up with the patient.   New Prescriptions    No medications on file       Final diagnoses:   Chest pain, unspecified type   Dissection of left subclavian artery (H)       Faustina Tyler MD  [facility] EMERGENCY DEPARTMENT  3/24/2023     Faustina Tyler MD  03/24/23 1249       Faustina Tyler MD  03/24/23 1413

## 2023-03-24 NOTE — DISCHARGE INSTRUCTIONS
You were seen in the ED today and found to have an old subclavian artery dissection.  This is a division of the wall of the blood vessel, causing blood to go in between the blood vessel walls.  This can be very serious, and can cause ripping and tearing through the vessels, sometimes extending and other vessels.  However, the one that was found today looks like it was old, and has a blood clot around it.  For this reason, it is unlikely to be the cause of your chest pain, but the cause of your chest pain at this time is unclear.    If you develop worsening chest pain, difficulty breathing feeling as though you are going to pass out, numbness or tingling, change in color of your arm, or any other concerning symptoms, we would recommend returning to the emergency department.     Otherwise follow-up with your primary care doctor in the next 1 to 2 weeks..     Instructions from your doctor today:  Emergency Department testing is focused on the potential causes of your symptoms that are the most dangerous possibilities, and cannot cover every possibility. Based on the evaluation, it was deemed sufficiently safe to discharge and continue management through the clinics. Thus, follow-up is very important to assess for improvement/worsening, potential further testing, and potential treatment adjustments. If you were given opioid pain medications or other medications that can make you drowsy while in the ED, you should not drive for at least several hours and not until you feel completely back to normal.     Please make an appointment to follow up with:  - Vascular surgery clinic in a few weeks.  They will call you, but otherwise their clinic is located at [facility] and [address] [address]  [phone]  - If you do not have a primary care provider, you can be seen in follow-up and establish care by calling any of the clinics below:     - [facility] (phone: [phone])     -  Primary Care / [facility] (phone: [phone])   - Have your clinic provider review the results from today's visit with you again, including any potential follow-up or additional testing that may be needed based on the results. Occasionally, incidental findings are found on later review by radiologists that may need follow-up.

## 2023-03-24 NOTE — ED TRIAGE NOTES
Referred back to ED for abnormal CTA  Seen last night for CP  No changes, CP improved     Triage Assessment     Row Name 03/24/23 1040       Triage Assessment (Adult)    Airway WDL WDL       Respiratory WDL    Respiratory WDL X;rhythm/pattern    Rhythm/Pattern, Respiratory shortness of breath       Skin Circulation/Temperature WDL    Skin Circulation/Temperature WDL WDL       Cardiac WDL    Cardiac WDL X;chest pain       Peripheral/Neurovascular WDL    Peripheral Neurovascular WDL WDL       Cognitive/Neuro/Behavioral WDL    Cognitive/Neuro/Behavioral WDL WDL

## 2023-03-24 NOTE — CONSULTS
"VASCULAR SURGERY INPATIENT CONSULTATION / Initial In-Patient visit    VASCULAR SURGEON: Dr. Rm    LOCATION: [facility]    Yenny Johnson  Medical Record #:  [id number]  YOB: 1970  Age:  52 year old     Date of Service: 3/24/2023    PRIMARY CARE PROVIDER: Cee Biggs    Reason for consultation: Left subclavian artery thickening concern for dissection/mural thrombus    IMPRESSION / RECOMMENDATION:  52 year old female presented to ED with epigastric \"folding\" chest pain, work up negative for cardiac event and CTA completed for family history of aortic dissections. On repeat read this morning, the CTA showed a proximal left subclavian artery mural thickening with 64% diameter stenosis, concerning for potential dissection. Vascular surgery team reviewed images in collaboration with radiology this afternoon and determined that this is an incidental finding. It is unusual to have a localized left subclavian dissection and her stenosis appears chronic. We recommend she is admitted for observation for her chest pain, start aspirin 81mg and follow up with CTA in 3 months. Vascular surgery will sign off.    HPI:  Yenny Johnson is a 52 year old female with a past history of paroxysmal SVT, HLD, anemia, obesity and GERD who presented to ED yesterday with epigastric \"folding pain\" radiating to chest and chest pressure. Her work-up that showed normal troponins, normal EKGs. A CTA was completed due to concern with a family history of aortic aneurysm and the immediate read was negative. She was subsequently discharged to home as her chest pain improved. This morning, her CTA was read by radiology and was found to have proximal left subclavian artery mural thickening with 64% diameter stenosis concerning for mural hematoma, vasculitis, and thrombosed dissection. The patient was called and asked to represent to the ED. Vascular surgery was called due to potential " aortic dissection/IMH. Upon further review of the CTA with radiology team, it was determined that this is a chronic, incidental finding. The patient denies abdominal or active chest pain.     PHH:    Past Medical History:   Diagnosis Date     Anemia      BCC (basal cell carcinoma of skin)      Degeneration of lumbar or lumbosacral intervertebral disc      Depressive disorder      Diverticulitis of colon      Elevated fasting glucose      Elevated rheumatoid factor 12/12/2012     GERD (gastroesophageal reflux disease) 11/2005    EGD     Hyperlipidemia LDL goal <130      Hypertriglyceridemia      Hypothyroidism      Intermittent asthma      Lumbar disc herniation      Medial meniscus tear      Obesity 03/29/2012     Paroxysmal supraventricular tachycardia (H) 02/16/2021     PMDD (premenstrual dysphoric disorder)      Stress incontinence, female 03/29/2012         Past Surgical History:   Procedure Laterality Date     BIOPSY       C/SECTION, CLASSICAL       CHOLECYSTECTOMY, LAPOROSCOPIC  1995     COLONOSCOPY WITH CO2 INSUFFLATION N/A 09/27/2021    Procedure: COLONOSCOPY, WITH CO2 INSUFFLATION;  Surgeon: Dimitrios Cevallos MD;  Location: [facility] OR     DESTRUCTION OF PARAVERTEBRAL FACET LUMBAR / SACRAL SINGLE Bilateral 09/12/2017    Procedure: DESTRUCTION OF PARAVERTEBRAL FACET LUMBAR / SACRAL SINGLE;  Radiofrequency Ablation of the Lumbar Facets /bilateral  heating of nerves around spine bilatteraly for purpose of pain rekief;  Surgeon: Benigno Willams MD;  Location: [facility]     HYSTERECTOMY, PAP NO LONGER INDICATED  09/23/2015     INJECT EPIDURAL LUMBAR / SACRAL SINGLE Bilateral 08/09/2017    Procedure: INJECT EPIDURAL LUMBAR / SACRAL SINGLE;  bilateral lumbar medial branch block:injection of local anesthetic into area around spine for purpose of pain relief;  Surgeon: Benigno Willams MD;  [facility]     INJECT PARAVERTEBRAL FACET JOINT LUMBAR / SACRAL FIRST Bilateral 06/28/2017    Procedure: INJECT  PARAVERTEBRAL FACET JOINT LUMBAR / SACRAL FIRST;  injection of local anesthesthetic into area around spine for purpose of pain relief;  Surgeon: Benigno Willams MD;  [facility]     INJECT PARAVERTEBRAL FACET JOINT LUMBAR / SACRAL THIRD Bilateral 03/09/2018    Procedure: INJECT PARAVERTEBRAL FACET JOINT LUMBAR / SACRAL THIRD;  Bilateral S1, S2, S3 Lateral Branch Nerve Block and L5 Dorsal Ramus Nerve Block;  Surgeon: Kristi Gomes MD;  [facility]     INJECT SACROILIAC JOINT Bilateral 02/07/2018    Procedure: INJECT SACROILIAC JOINT;  Bilateral sacroiliac Lateral Branch Block;  Surgeon: Edison Sams MD;  [facility]     RADIO FREQUENCY ABLATION / DESTRUCTION OF SACROILOAC JOINT LATERAL BRANCHES (S1/S2/S3) Bilateral 05/02/2018    Procedure: RADIO FREQUENCY ABLATION / DESTRUCTION OF SACROILOAC JOINT LATERAL BRANCHES (S1/S2/S3);  Bilateral Radiofrequency Ablation of the Lateral Branches;  Surgeon: Benigno Willams MD;  [facility]     REPAIR MOHS Left 01/19/2017    Procedure: REPAIR MOHS;  Surgeon: Jorge Eric MD;  Location: [facility] OR     TUBAL LIGATION          ALLERGIES:   No Known Allergies     MEDS:  No current facility-administered medications for this encounter.    Current Outpatient Medications:      atorvastatin (LIPITOR) 20 MG tablet, TAKE 1 TABLET BY MOUTH EVERY DAY, Disp: 90 tablet, Rfl: 0     FLUoxetine (PROZAC) 20 MG capsule, TAKE 3 CAPSULES BY MOUTH EVERY DAY, Disp: 270 capsule, Rfl: 3     insulin pen needle (31G X 6 MM) 31G X 6 MM miscellaneous, Use 1 pen needle daily or as directed., Disp: 100 each, Rfl: 11     levothyroxine (SYNTHROID/LEVOTHROID) 88 MCG tablet, Take 1 tablet (88 mcg) by mouth daily, Disp: 90 tablet, Rfl: 3     liraglutide - Weight Management (SAXENDA) 18 MG/3ML pen, Inject 3 mg Subcutaneous daily, Disp: 15 mL, Rfl: 1     topiramate (TOPAMAX) 25 MG tablet, Take 1 tablet (25 mg) by mouth daily for 7 days, THEN 2 tablets (50 mg) daily for 7  "days, THEN 3 tablets (75 mg) daily for 76 days., Disp: 270 tablet, Rfl: 0     SOCIAL HABITS:    Tobacco Use      Smoking status: Former        Packs/day: 1.00        Years: 14.00        Pack years: 14        Types: Cigarettes        Quit date: 3/30/1998        Years since quittin.0      Smokeless tobacco: Never     Social History    Substance and Sexual Activity      Alcohol use: No       History   Drug Use No        FAMILY HISTORY:    Family History   Problem Relation Age of Onset     Cardiovascular Mother         CHF      Coronary Artery Disease Mother         Cholesterol     Hyperlipidemia Mother      C.A.D. Father 67        MI     Hypertension Father      Alcohol/Drug Father      Obesity Sister      Obesity Sister      Diabetes Brother      Asthma Brother      Obesity Brother      Coronary Artery Disease Brother      Allergies Maternal Grandmother      Diabetes Maternal Grandmother      Diabetes Paternal Grandfather      Seasonal/Environmental Allergies Daughter      Cancer No family hx of        REVIEW OF SYSTEMS:    A 12 point ROS was reviewed and except for what is listed in the HPI above, all others are negative    PE:    Vital signs:  Temp: 98.4  F (36.9  C) Temp src: Temporal BP: (!) 164/101 Pulse: 59   Resp: 10 SpO2: 100 % O2 Device: None (Room air)        Estimated body mass index is 30.62 kg/m  as calculated from the following:    Height as of 3/23/23: 1.651 m (5' 5\").    Weight as of 3/23/23: 83.5 kg (184 lb).       Wt Readings from Last 1 Encounters:   23 83.5 kg (184 lb)     There is no height or weight on file to calculate BMI.    EXAM:  GENERAL: This is a well-developed 52 year old female who appears her stated age  CARDIAC:  No chest pain  CHEST/LUNG: Non-labored breathing, no supplemental oxygen required  GASTROINTESINAL (ABDOMEN):Soft, non-tender, B/S present, no pulsatile mass   MUSCULOSKELETAL: Grossly normal and both lower extremities are intact.  HEME/LYMPH: No " lymphedema  NEUROLOGIC: Focally intact, Alert and oriented x 3.   PSYCH: appropriate affect  INTEGUMENT: No open lesions or ulcers  VASCULAR: Palpable pulses in all extremities.    DIAGNOSTIC STUDIES:     CTA Chest Abdomen Pelvis w Contrast 3/24/23  IMPRESSION: 1. Non specific proximal left subclavian artery mural thickening causes 64% diameter stenosis. No periarterial stranding. Differential includes mural hematoma, vasculitis, and thrombosed dissection.   2. No aortic dissection. No pulmonary embolus.     LABS:      Sodium   Date Value Ref Range Status   03/24/2023 140 136 - 145 mmol/L Final   03/23/2023 137 136 - 145 mmol/L Final   12/14/2021 135 133 - 144 mmol/L Final   09/16/2020 137 133 - 144 mmol/L Final   08/14/2018 138 133 - 144 mmol/L Final   01/17/2018 135 133 - 144 mmol/L Final     Urea Nitrogen   Date Value Ref Range Status   03/24/2023 19.1 6.0 - 20.0 mg/dL Final   03/23/2023 24.1 (H) 6.0 - 20.0 mg/dL Final   12/14/2021 18 7 - 30 mg/dL Final   09/16/2020 16 7 - 30 mg/dL Final   08/14/2018 21 7 - 30 mg/dL Final   01/17/2018 13 7 - 30 mg/dL Final     Hemoglobin   Date Value Ref Range Status   03/24/2023 12.3 11.7 - 15.7 g/dL Final   03/23/2023 12.0 11.7 - 15.7 g/dL Final   01/16/2023 12.8 11.7 - 15.7 g/dL Final   08/30/2019 13.5 11.7 - 15.7 g/dL Final   08/14/2018 12.9 11.7 - 15.7 g/dL Final   01/17/2018 12.5 11.7 - 15.7 g/dL Final     Platelet Count   Date Value Ref Range Status   03/24/2023 208 150 - 450 10e3/uL Final   03/23/2023 216 150 - 450 10e3/uL Final   01/16/2023 239 150 - 450 10e3/uL Final   08/30/2019 229 150 - 450 10e9/L Final   10/27/2016 234 150 - 450 10e9/L Final   05/12/2016 281 150 - 450 10e9/L Dang Ren CNP  Vascular Surgery  Pager: [phone]  [email or web address]  Send message or 10 digit call back number Securely via [employer] with the Vocera Web Console (learn more here)

## 2023-03-24 NOTE — ED NOTES
Pt left ED waiting room for home before nursing staff could provide discharge papers and remove IV. Pt called and stated they removed their own IV, Pt is a healthcare worker.

## 2023-03-28 PROBLEM — I77.1 SUBCLAVIAN ARTERIAL STENOSIS (H): Status: ACTIVE | Noted: 2023-03-28

## 2023-03-28 PROBLEM — I77.79: Status: ACTIVE | Noted: 2023-03-28

## 2023-04-01 DIAGNOSIS — E03.9 ACQUIRED HYPOTHYROIDISM: ICD-10-CM

## 2023-04-03 RX ORDER — LEVOTHYROXINE SODIUM 88 UG/1
TABLET ORAL
Qty: 90 TABLET | Refills: 3 | OUTPATIENT
Start: 2023-04-03

## 2023-04-06 ENCOUNTER — TELEPHONE (OUTPATIENT)
Dept: FAMILY MEDICINE | Facility: CLINIC | Age: 53
End: 2023-04-06
Payer: COMMERCIAL

## 2023-04-06 DIAGNOSIS — I77.1 SUBCLAVIAN ARTERIAL STENOSIS (H): Primary | ICD-10-CM

## 2023-04-06 NOTE — TELEPHONE ENCOUNTER
"Called patient, left message to call back at [phone]. Called to relay message below:    \"Please call patient: I can refer you to one of our Vascular Surgeons. Call [phone] to schedule. I am happy to discuss further at a follow-up appointment as well.      Cee Biggs MD\"    MUNDO Johnson RN  [facility]  "

## 2023-04-06 NOTE — TELEPHONE ENCOUNTER
Please call patient: I can refer you to one of our Vascular Surgeons. Call [phone] to schedule. I am happy to discuss further at a follow-up appointment as well.     Cee Biggs MD

## 2023-04-06 NOTE — TELEPHONE ENCOUNTER
Pt returned call. Gave her provider's message as written. Pt wanted Dr. Biggs to know that the comment about how we are doing was not directed towards her. Pt was dissatisfied with the care from the vascular surgeon.    Pt requested message with number for vascular surgeon be sent via [employer] and this was sent to pt.    Lyndsay Lassiter RN  [facility]

## 2023-04-06 NOTE — TELEPHONE ENCOUNTER
Topic: Tell us how we are doing.     No referral or communication since my ED visit. Am I to presume there s no concern for my subclavian artery dissection?  I was told I should see a vascular surgeon. I have called multiple times.

## 2023-04-07 DIAGNOSIS — I77.1 SUBCLAVIAN ARTERIAL STENOSIS (H): Primary | ICD-10-CM

## 2023-04-10 ENCOUNTER — TELEPHONE (OUTPATIENT)
Dept: VASCULAR SURGERY | Facility: CLINIC | Age: 53
End: 2023-04-10
Payer: COMMERCIAL

## 2023-04-10 NOTE — TELEPHONE ENCOUNTER
[facility] Call Center    Phone Message    May a detailed message be left on voicemail: no     Reason for Call: Other: Pt called in regarding a referral that was being requested by Cee Biggs MD for Subclavian arterial stenosis (H) [I77.1].      Action Taken: Message routed to:  [facility] Vascular    Travel Screening: Not Applicable

## 2023-04-10 NOTE — TELEPHONE ENCOUNTER
The pt is scheduled on 7/12 for imaging [place] [facility] and on 7/13 with the [facility] at the [facility].  Judah Carver on 4/10/2023 at 12:46 PM     The pt was on the phone during scheduling of the above appointments and agreed to the dates times and locations of the appointments.

## 2023-04-17 ENCOUNTER — MYC REFILL (OUTPATIENT)
Dept: FAMILY MEDICINE | Facility: CLINIC | Age: 53
End: 2023-04-17
Payer: COMMERCIAL

## 2023-04-17 DIAGNOSIS — E78.5 HYPERLIPIDEMIA, UNSPECIFIED: ICD-10-CM

## 2023-04-18 RX ORDER — ATORVASTATIN CALCIUM 20 MG/1
20 TABLET, FILM COATED ORAL DAILY
Qty: 90 TABLET | Refills: 0 | Status: SHIPPED | OUTPATIENT
Start: 2023-04-18 | End: 2023-06-26

## 2023-04-18 NOTE — PROGRESS NOTES
"Yenny is a 52 year old who is being evaluated via a billable video visit.      The patient has been notified of following:     \"This video visit will be conducted via a call between you and your physician/provider. We have found that certain health care needs can be provided without the need for an in-person physical exam.  This service lets us provide the care you need with a video conversation.  If a prescription is necessary we can send it directly to your pharmacy.  If lab work is needed we can place an order for that and you can then stop by our lab to have the test done at a later time.    Video visits are billed at different rates depending on your insurance coverage.  Please reach out to your insurance provider with any questions.    If during the course of the call the physician/provider feels a video visit is not appropriate, you will not be charged for this service.\"    Patient has given verbal consent for Video visit? Yes    How would you like to obtain your AVS? MyChart    If the video visit is dropped, the invitation should be resent by: Text to cell phone: [phone]    Will anyone else be joining your video visit? No    I    Video-Visit Details    Type of service:  Video Visit    Video Start Time: 12:52 PM    Video End Time:1:08 PM    Originating Location (pt. Location): Other at work in [place]    Distant Location (provider location):  [facility] [place]     Platform used for Video Visit: [facility]            [facility] Non Surgical Follow up Visit   Date of visit: 4/19/2023  Physician: KRISTINE Hong MD, MD  Primary Care is Cee Biggs.  Yenny Johnson   52 year old  female     Initial Weight: 199#  Initial BMI: 33.37  Today's Weight:   Wt Readings from Last 1 Encounters:   04/19/23 185 lb (83.9 kg)     Body mass index is 29.86 kg/m .           Assessment and Plan   Assessment: Yenny is a 52 year old year old female who presents for medical " weight management.      Plan:    1. Overweight  Patient was congratulated on her success thus far. Healthy habits to assist with further weight loss were discussed. She is going to get back to walking more consistently. She will continue the Saxenda.     2. Hyperlipidemia, unspecified hyperlipidemia type  This may improve with healthy habits and weight loss.    3. History of migraine headaches  This may improve with healthy habits and weight loss.    Follow up in 3 months with a provider in the [facility] clinic.           INTERIM HISTORY  Patient restarted  saxenda about 2 weeks ago. She is tolerating it well. She thinks her appetite is slightly decreased.     DIETARY HISTORY  Meals Per Day: 3  Eating Protein First?: usually  Food Diary: B:eggs and fairlife protein shake L:salad with strawberries and protein D:vegetables and protein, often chicken  Snacks Per Day: decreased since restarting saxenda  Typical Snack: almonds  Fluid Intake: 64 oz  Portion Control: yes  Calorie Containing Beverages: none  Meals at Restaurant per week:0-1    Positive Changes Since Last Visit: decreased snacking and healthier snack choices  Struggling With: exercise (knee issues)    Knowledgeable in Reading Food Labels: yes  Getting Adequate Protein: yes  Sleeping 7-8 hours/day : struggles, menopause  Stress management not discussed    PHYSICAL ACTIVITY PATTERNS:  Usually walks 2 miles per day. She has been struggling with knee issues.     REVIEW OF SYSTEMS  GENERAL/CONSTITUTIONAL:  Fatigue: sometimes  HEENT:   glaucoma: no  CARDIOVASCULAR:  History of heart disease: history of SVT  PULMONARY:  Dyspnea on exertion: no  NEUROLOGIC:  Paresthesias: no  PSYCHIATRIC:  Moods: stable  MUSCULOSKELETAL/RHEUMATOLOGIC  Arthralgias: yes  Myalgias: no  ENDOCRINE:  Monitoring Blood Sugars: no  Sugars Well Controlled: na  No personal or family history of medullary thyroid cancer no  :  Birth control: not discussed       Patient Profile   Social  "History     Social History Narrative     Not on file        Past Medical History   Past Medical History:   Diagnosis Date     Anemia      BCC (basal cell carcinoma of skin)      Degeneration of lumbar or lumbosacral intervertebral disc      Depressive disorder      Diverticulitis of colon      Elevated fasting glucose      Elevated rheumatoid factor 12/12/2012     GERD (gastroesophageal reflux disease) 11/2005    EGD     Hyperlipidemia LDL goal <130      Hypertriglyceridemia      Hypothyroidism      Intermittent asthma      Lumbar disc herniation      Medial meniscus tear      Obesity 03/29/2012     Paroxysmal supraventricular tachycardia (H) 02/16/2021     PMDD (premenstrual dysphoric disorder)      Stress incontinence, female 03/29/2012     Subclavian arterial stenosis (H) 3/28/2023     Patient Active Problem List   Diagnosis     PMDD (premenstrual dysphoric disorder)     Obesity     Stress incontinence, female     GERD (gastroesophageal reflux disease)     Hypothyroidism     Lumbar disc herniation     Intermittent asthma     Hyperlipidemia LDL goal <40     Medial meniscus tear     Left knee pain     Subclavian arterial stenosis (H)       Past Surgical History  She has a past surgical history that includes cholecystectomy, laporoscopic (1995); tubal ligation; c/section, classical; biopsy; hysterectomy, pap no longer indicated (09/23/2015); Repair MOHS (Left, 01/19/2017); Inject Paravertebral Facet Joint Lumbar / Sacral First (Bilateral, 06/28/2017); Inject Epidural Lumbar / Sacral Single (Bilateral, 08/09/2017); Destruction Of Paravertebral Facet Lumbar / Sacral Single (Bilateral, 09/12/2017); Inject Sacroiliac Joint (Bilateral, 02/07/2018); Inject Paravertebral Facet Joint Lumbar / Sacral Third (Bilateral, 03/09/2018); Radio Frequency Ablation / Destruction of Sacroiloac Joint Lateral Branches (S1/S2/S3) (Bilateral, 05/02/2018); and Colonoscopy with CO2 insufflation (N/A, 09/27/2021).     Examination   Ht 5' 6\" " (1.676 m)   Wt 185 lb (83.9 kg)   LMP 08/07/2015   BMI 29.86 kg/m    .  Wt Readings from Last 4 Encounters:   04/19/23 185 lb (83.9 kg)   03/23/23 184 lb (83.5 kg)   03/14/23 184 lb (83.5 kg)   02/01/23 184 lb (83.5 kg)     GENERAL: Healthy, alert and no distress  EYES: Eyes grossly normal to inspection.  No discharge or erythema, or obvious scleral/conjunctival abnormalities.  RESP: No audible wheeze, cough, or visible cyanosis.  No visible retractions or increased work of breathing.    SKIN: Visible skin clear. No significant rash, abnormal pigmentation or lesions.  NEURO: Cranial nerves grossly intact.  Mentation and speech appropriate for age.  PSYCH: Mentation appears normal, affect normal/bright, judgement and insight intact, normal speech and appearance well-groomed.       Counseling:   We reviewed the important post op bariatric recommendations:  -eating 3 meals daily  -eating protein first, getting >60gm protein daily  -eating slowly, chewing food well  -avoiding/limiting calorie containing beverages  -limiting starchy vegetables and carbohydrates, choosing wheat, not white with breads,   crackers, pastas, cornel, bagels, tortillas, rice  -limiting restaurant or cafeteria eating to twice a week or less    We discussed the importance of restorative sleep and stress management in maintaining a healthy weight.  We discussed the National Weight Control Registry healthy weight maintenance strategies and ways to optimize metabolism.  We discussed the importance of physical activity including cardiovascular and strength training in maintaining a healthier weight.    Total time spent on the date of this encounter doing: chart review, review of test results, patient visit, physical exam, education, counseling, developing plan of care and documenting = 37 minutes.         KRISTINE Hong MD  [facility]

## 2023-04-19 ENCOUNTER — VIRTUAL VISIT (OUTPATIENT)
Dept: SURGERY | Facility: CLINIC | Age: 53
End: 2023-04-19
Payer: COMMERCIAL

## 2023-04-19 VITALS — HEIGHT: 66 IN | WEIGHT: 185 LBS | BODY MASS INDEX: 29.73 KG/M2

## 2023-04-19 DIAGNOSIS — E66.3 OVERWEIGHT (BMI 25.0-29.9): Primary | ICD-10-CM

## 2023-04-19 DIAGNOSIS — E78.5 HYPERLIPIDEMIA, UNSPECIFIED HYPERLIPIDEMIA TYPE: ICD-10-CM

## 2023-04-19 DIAGNOSIS — Z86.69 HISTORY OF MIGRAINE HEADACHES: ICD-10-CM

## 2023-04-19 PROCEDURE — 99214 OFFICE O/P EST MOD 30 MIN: CPT | Mod: VID | Performed by: FAMILY MEDICINE

## 2023-04-19 RX ORDER — LIRAGLUTIDE 6 MG/ML
3 INJECTION, SOLUTION SUBCUTANEOUS DAILY
Qty: 15 ML | Refills: 1 | Status: SHIPPED | OUTPATIENT
Start: 2023-04-19 | End: 2023-08-28

## 2023-04-19 NOTE — PATIENT INSTRUCTIONS

## 2023-04-23 ENCOUNTER — HEALTH MAINTENANCE LETTER (OUTPATIENT)
Age: 53
End: 2023-04-23

## 2023-05-01 DIAGNOSIS — I77.1 SUBCLAVIAN ARTERIAL STENOSIS (H): Primary | ICD-10-CM

## 2023-05-20 ENCOUNTER — OFFICE VISIT (OUTPATIENT)
Dept: ORTHOPEDICS | Facility: CLINIC | Age: 53
End: 2023-05-20
Payer: COMMERCIAL

## 2023-05-20 VITALS
DIASTOLIC BLOOD PRESSURE: 92 MMHG | BODY MASS INDEX: 29.09 KG/M2 | SYSTOLIC BLOOD PRESSURE: 154 MMHG | WEIGHT: 181 LBS | HEIGHT: 66 IN

## 2023-05-20 DIAGNOSIS — M17.12 PRIMARY OSTEOARTHRITIS OF LEFT KNEE: Primary | ICD-10-CM

## 2023-05-20 PROCEDURE — 20611 DRAIN/INJ JOINT/BURSA W/US: CPT | Mod: LT | Performed by: FAMILY MEDICINE

## 2023-05-20 RX ORDER — BETAMETHASONE SODIUM PHOSPHATE AND BETAMETHASONE ACETATE 3; 3 MG/ML; MG/ML
6 INJECTION, SUSPENSION INTRA-ARTICULAR; INTRALESIONAL; INTRAMUSCULAR; SOFT TISSUE
Status: DISCONTINUED | OUTPATIENT
Start: 2023-05-20 | End: 2023-12-26

## 2023-05-20 RX ORDER — ROPIVACAINE HYDROCHLORIDE 5 MG/ML
4 INJECTION, SOLUTION EPIDURAL; INFILTRATION; PERINEURAL
Status: DISCONTINUED | OUTPATIENT
Start: 2023-05-20 | End: 2023-12-26

## 2023-05-20 RX ADMIN — BETAMETHASONE SODIUM PHOSPHATE AND BETAMETHASONE ACETATE 6 MG: 3; 3 INJECTION, SUSPENSION INTRA-ARTICULAR; INTRALESIONAL; INTRAMUSCULAR; SOFT TISSUE at 08:34

## 2023-05-20 RX ADMIN — ROPIVACAINE HYDROCHLORIDE 4 ML: 5 INJECTION, SOLUTION EPIDURAL; INFILTRATION; PERINEURAL at 08:34

## 2023-05-20 NOTE — PATIENT INSTRUCTIONS
# Left Knee Arthritis: Yenny Johnson  was seen today for left knee arthritis. Symptoms had been going on for years worsening over the past couple of months. On examination there are positive findings of tenderness to palpation over the medial joint line. Imaging findings showed meniscal tear, arthritis. Likely cause of patient's condition due to arthritis flare. Other possible conditions contributing to symptoms include meniscal tearing. The next surgical option would be a knee replacement, patient would like to delay. She does have a history of subclavian stenosis concerned about possible steroid injection. This should be ok given it is remote from the location.  Counseled patient on nature of condition and treatment options.  Given this plan as below, follow-up as needed     Image Findings: reviewed left knee MRI  Treatment: Activities as tolerated, continue home exercises   Medications/Injections: Limited tylenol/ibuprofen for pain for 1-2 weeks, Topical Voltaren gel, left knee joint aspiration/steroid injection  Follow-up: In 3 months if symptoms do not improve, sooner if worsening  Can consider repeat injections    Please call [phone]   Ask for my team if you have any questions or concerns    If you have not yet received the influenza vaccine but would like to get one, please call  [phone] or you can schedule via [employer]    It was great seeing you again today!    Олег Jackson MD, [facility] Injection Discharge Instructions    Procedure: left knee joint aspiration/steroid injection     You may shower, however avoid swimming, tub baths or hot tubs for 24 hours following your procedure  You may have a mild to moderate increase in pain for several days following the injection.  It may take up to 14 days for the steroid medication to start working although you may feel the effect as early as a few days after the procedure.  You may use ice packs for 10-15 minutes, 3 to 4 times a day at the  injection site for comfort  You may use anti-inflammatory medications (such as Ibuprofen or Aleve or Advil) or Tylenol for pain control if necessary  If you were fasting, you may resume your normal diet and medications after the procedure  If you have diabetes, check your blood sugar more frequently than usual as your blood sugar may be higher than normal for 10-14 days following a steroid injection. Contact your doctor who manages your diabetes if your blood sugar is higher than usual    If you experience any of the following, call [facility] @ [phone] or [phone]  -Fever over 100 degree F  -Swelling, bleeding, redness, drainage, warmth at the injection site  - New or worsening pain

## 2023-05-20 NOTE — PROGRESS NOTES
ASSESSMENT & PLAN    Yenny was seen today for pain and follow up.    Diagnoses and all orders for this visit:    Primary osteoarthritis of left knee  -     Large Joint Injection/Arthocentesis: L knee joint        # Left Knee Arthritis: Yenny Johnson  was seen today for left knee arthritis. Symptoms had been going on for years worsening over the past couple of months. On examination there are positive findings of tenderness to palpation over the medial joint line. Imaging findings showed meniscal tear, arthritis. Likely cause of patient's condition due to arthritis flare. Other possible conditions contributing to symptoms include meniscal tearing. The next surgical option would be a knee replacement, patient would like to delay. She does have a history of subclavian stenosis concerned about possible steroid injection. This should be ok given it is remote from the location.  Counseled patient on nature of condition and treatment options.  Given this plan as below, follow-up as needed     Image Findings: reviewed left knee MRI  Treatment: Activities as tolerated, continue home exercises   Medications/Injections: Limited tylenol/ibuprofen for pain for 1-2 weeks, Topical Voltaren gel, left knee joint aspiration/steroid injection  Follow-up: In 3 months if symptoms do not improve, sooner if worsening  Can consider repeat injections    Proceed with PT as planned.  Questions answered. Discussed signs and symptoms that may indicate more serious issues; the patient was instructed to seek appropriate care if noted. Yenny indicates understanding of these issues and agrees with the plan.      See Patient Instructions  Patient Instructions   # Left Knee Arthritis: Yenny Johnson  was seen today for left knee arthritis. Symptoms had been going on for years worsening over the past couple of months. On examination there are positive findings of tenderness to palpation over the medial joint line. Imaging findings showed meniscal  "tear, arthritis. Likely cause of patient's condition due to arthritis flare. Other possible conditions contributing to symptoms include meniscal tearing. The next surgical option would be a knee replacement, patient would like to delay. She does have a history of subclavian stenosis concerned about possible steroid injection. This should be ok given it is remote from the location.  Counseled patient on nature of condition and treatment options.  Given this plan as below, follow-up as needed     Image Findings: reviewed left knee MRI  Treatment: Activities as tolerated, continue home exercises   Medications/Injections: Limited tylenol/ibuprofen for pain for 1-2 weeks, Topical Voltaren gel, left knee joint aspiration/steroid injection  Follow-up: In 3 months if symptoms do not improve, sooner if worsening  Can consider repeat injections    Please call [phone]   Ask for my team if you have any questions or concerns    If you have not yet received the influenza vaccine but would like to get one, please call  [phone] or you can schedule via [employer]    It was great seeing you again today!    Олег Jackson MD, CAQSM          Interim History - May 20, 2023  Since last visit on 3/14/2023 with Dr Bahena patient has waxing and waning left knee pain with swelling over the past 3 -+ 4 weeks.  Left knee steroid injection and aspiration from Dr Escobar completed on 1/30/23 provided relief for ~ 10 weeks.  Patient is desiring repeat aspiration today.  No new injury in the interim. Pain affecting daily activities. 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       REVIEW OF SYSTEMS:  Review of Systems      OBJECTIVE:  BP (!) 154/92   Ht 1.676 m (5' 6\")   Wt 82.1 kg (181 lb)   LMP 08/07/2015   BMI 29.21 kg/m       GENERAL APPEARANCE: healthy, alert and no distress   GAIT: NORMAL    Left Knee exam    Inspection:   trace effusion   no ecchymosis    ROM:      Full active and passive ROM with flexion " and extension  No pain    Patellar Motion:      Crepitus noted in the patellofemoral joint slight    Tender: none    Non Tender:       medial patellar border        lateral patellar border        medial joint line        lateral joint line        along MCL        infrapatellar tendon        tibial tubercle       pes anserine bursa    Special Tests:      neg (-) Adrianne       neg (-) Lachman       neg (-) anterior drawer       neg (-) posterior drawer       neg (-) varus       neg (-) valgus       no pain with forced extension        RADIOLOGY:  Final results and radiologist's interpretation, available in the [facility] health record.  Images were reviewed with the patient in the office today.  My personal interpretation of the performed imaging: medial meniscus tearing, medial compartment arthrosis.      MR left knee without contrast 1/25/2023 8:34 AM     Techniques: Multiplanar multisequence imaging of the left knee was  obtained without administration of intra-articular or intravenous  contrast using routing protocol.     History: Knee injury, left, initial encounter     Additional History from EMR: Twisted knee several weeks ago. Some  instability but no locking or swelling.     Comparison: Left knee radiograph 1/25/2023     Findings:     MENISCI:  Medial meniscus: Posterior root ligament partial tear of the medial  meniscus with underlying cystlike change and edema like marrow signal  intensity at its attachment. Regional soft tissue edema including  oblique popliteal ligament course, consistent with sprain and possible  partial tear. Evaluation of these area partially compromised due to  pulsation artifact. Additionally, intrasubstance signal body and  posterior horn of medial meniscus.  Lateral meniscus: Intact.     LIGAMENTS  Cruciate ligaments: Intact.  Medial supporting structures: Intact. Thickened tibial collateral  ligament, consistent with remote trauma.   Lateral supporting structures: Intact.     EXTENSOR  MECHANISM  Intact.     FLUID  Small joint effusion. Trace Guevara's cyst.     OSSEOUS and ARTICULAR STRUCTURES  Bones: Edema like marrow signal intensity at the peripheral aspect of  medial tibial plateau, likely reactive. No fracture, contusion, or  osseous lesion is seen.      Patellofemoral compartment: Superficial chondral loss medial patellar  facet articular cartilage. Full-thickness chondral fissure medial  trochlea caudally with subchondral edema like marrow signal intensity.     Medial compartment: Subchondral cystlike change at the posterior  weightbearing surface of the medial femoral condyle consistent with  overlying full-thickness chondral fissure presence.     Lateral compartment: No high-grade hyaline cartilage disease.     ANCILLARY FINDINGS  None.                                                                      Impression:  1. Partial medial meniscus posterior root ligament tear.   a. Sprain of oblique popliteal ligament with possible partial tear.  2. 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     I have personally reviewed the examination and initial interpretation  and I agree with the findings.     RICHARD SHARMA       Review of prior external note(s) from - ortho surgery  Review of the result(s) of each unique test - imaging  Independent interpretation of a test performed by another physician/other qualified health care professional (not separately reported) - imaging  20 minutes spent on the date of the encounter doing chart review, history and exam, documentation and further activities per the note    Large Joint Injection/Arthocentesis: L knee joint    Date/Time: 5/20/2023 8:34 AM    Performed by: Олег Jackson MD  Authorized by: Олег Jackson MD    Indications:  Pain, osteoarthritis and joint swelling  Needle Size:  21 G  Guidance: ultrasound    Approach:  Superolateral  Location:  Knee      Medications:  6 mg betamethasone acet & sod  phos 6 (3-3) MG/ML; 4 mL ropivacaine 5 MG/ML  Aspirate amount (mL):  21  Aspirate:  Serous and yellow  Outcome:  Tolerated well, no immediate complications  Procedure discussed: discussed risks, benefits, and alternatives    Consent Given by:  Patient  Timeout: timeout called immediately prior to procedure    Prep: patient was prepped and draped in usual sterile fashion     Ultrasound images of procedure were permanently stored.      Patient reported significant improvement of pain after the numbing portion left knee joint aspiratoin injection.  Ultrasound guided images were permanently stored.   Aftercare instructions given to patient.  Plan to follow-up as discussed above.     Олег Jackson MD [facility]

## 2023-05-20 NOTE — LETTER
5/20/2023         RE: Yenny Johnson  [address] [address]        Dear Colleague,    Thank you for referring your patient, Yenny Johnson, to the [facility]. Please see a copy of my visit note below.    ASSESSMENT & PLAN    Yenny was seen today for pain and follow up.    Diagnoses and all orders for this visit:    Primary osteoarthritis of left knee  -     Large Joint Injection/Arthocentesis: L knee joint        # Left Knee Arthritis: Yenny Johnson  was seen today for left knee arthritis. Symptoms had been going on for years worsening over the past couple of months. On examination there are positive findings of tenderness to palpation over the medial joint line. Imaging findings showed meniscal tear, arthritis. Likely cause of patient's condition due to arthritis flare. Other possible conditions contributing to symptoms include meniscal tearing. The next surgical option would be a knee replacement, patient would like to delay. She does have a history of subclavian stenosis concerned about possible steroid injection. This should be ok given it is remote from the location.  Counseled patient on nature of condition and treatment options.  Given this plan as below, follow-up as needed     Image Findings: reviewed left knee MRI  Treatment: Activities as tolerated, continue home exercises   Medications/Injections: Limited tylenol/ibuprofen for pain for 1-2 weeks, Topical Voltaren gel, left knee joint aspiration/steroid injection  Follow-up: In 3 months if symptoms do not improve, sooner if worsening  Can consider repeat injections    Proceed with PT as planned.  Questions answered. Discussed signs and symptoms that may indicate more serious issues; the patient was instructed to seek appropriate care if noted. Yenny indicates understanding of these issues and agrees with the plan.      See Patient Instructions  Patient Instructions   # Left Knee  Arthritis: Yenny Johnson  was seen today for left knee arthritis. Symptoms had been going on for years worsening over the past couple of months. On examination there are positive findings of tenderness to palpation over the medial joint line. Imaging findings showed meniscal tear, arthritis. Likely cause of patient's condition due to arthritis flare. Other possible conditions contributing to symptoms include meniscal tearing. The next surgical option would be a knee replacement, patient would like to delay. She does have a history of subclavian stenosis concerned about possible steroid injection. This should be ok given it is remote from the location.  Counseled patient on nature of condition and treatment options.  Given this plan as below, follow-up as needed     Image Findings: reviewed left knee MRI  Treatment: Activities as tolerated, continue home exercises   Medications/Injections: Limited tylenol/ibuprofen for pain for 1-2 weeks, Topical Voltaren gel, left knee joint aspiration/steroid injection  Follow-up: In 3 months if symptoms do not improve, sooner if worsening  Can consider repeat injections    Please call [phone]   Ask for my team if you have any questions or concerns    If you have not yet received the influenza vaccine but would like to get one, please call  [phone] or you can schedule via [employer]    It was great seeing you again today!    Олег Jackson MD, CAQSM          Interim History - May 20, 2023  Since last visit on 3/14/2023 with Dr Bahena patient has waxing and waning left knee pain with swelling over the past 3 -+ 4 weeks.  Left knee steroid injection and aspiration from Dr Escobar completed on 1/30/23 provided relief for ~ 10 weeks.  Patient is desiring repeat aspiration today.  No new injury in the interim. Pain affecting daily activities. 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       REVIEW OF SYSTEMS:  Review of Systems      OBJECTIVE:  BP (!)  "154/92   Ht 1.676 m (5' 6\")   Wt 82.1 kg (181 lb)   LMP 08/07/2015   BMI 29.21 kg/m       GENERAL APPEARANCE: healthy, alert and no distress   GAIT: NORMAL    Left Knee exam    Inspection:   trace effusion   no ecchymosis    ROM:      Full active and passive ROM with flexion and extension  No pain    Patellar Motion:      Crepitus noted in the patellofemoral joint slight    Tender: none    Non Tender:       medial patellar border        lateral patellar border        medial joint line        lateral joint line        along MCL        infrapatellar tendon        tibial tubercle       pes anserine bursa    Special Tests:      neg (-) Adrianne       neg (-) Lachman       neg (-) anterior drawer       neg (-) posterior drawer       neg (-) varus       neg (-) valgus       no pain with forced extension        RADIOLOGY:  Final results and radiologist's interpretation, available in the [facility] health record.  Images were reviewed with the patient in the office today.  My personal interpretation of the performed imaging: medial meniscus tearing, medial compartment arthrosis.      MR left knee without contrast 1/25/2023 8:34 AM     Techniques: Multiplanar multisequence imaging of the left knee was  obtained without administration of intra-articular or intravenous  contrast using routing protocol.     History: Knee injury, left, initial encounter     Additional History from EMR: Twisted knee several weeks ago. Some  instability but no locking or swelling.     Comparison: Left knee radiograph 1/25/2023     Findings:     MENISCI:  Medial meniscus: Posterior root ligament partial tear of the medial  meniscus with underlying cystlike change and edema like marrow signal  intensity at its attachment. Regional soft tissue edema including  oblique popliteal ligament course, consistent with sprain and possible  partial tear. Evaluation of these area partially compromised due to  pulsation artifact. Additionally, intrasubstance " signal body and  posterior horn of medial meniscus.  Lateral meniscus: Intact.     LIGAMENTS  Cruciate ligaments: Intact.  Medial supporting structures: Intact. Thickened tibial collateral  ligament, consistent with remote trauma.   Lateral supporting structures: Intact.     EXTENSOR MECHANISM  Intact.     FLUID  Small joint effusion. Trace Guevara's cyst.     OSSEOUS and ARTICULAR STRUCTURES  Bones: Edema like marrow signal intensity at the peripheral aspect of  medial tibial plateau, likely reactive. No fracture, contusion, or  osseous lesion is seen.      Patellofemoral compartment: Superficial chondral loss medial patellar  facet articular cartilage. Full-thickness chondral fissure medial  trochlea caudally with subchondral edema like marrow signal intensity.     Medial compartment: Subchondral cystlike change at the posterior  weightbearing surface of the medial femoral condyle consistent with  overlying full-thickness chondral fissure presence.     Lateral compartment: No high-grade hyaline cartilage disease.     ANCILLARY FINDINGS  None.                                                                      Impression:  1. Partial medial meniscus posterior root ligament tear.   a. Sprain of oblique popliteal ligament with possible partial tear.  2. 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     I have personally reviewed the examination and initial interpretation  and I agree with the findings.     RICHARD SHARMA       Review of prior external note(s) from - ortho surgery  Review of the result(s) of each unique test - imaging  Independent interpretation of a test performed by another physician/other qualified health care professional (not separately reported) - imaging  20 minutes spent on the date of the encounter doing chart review, history and exam, documentation and further activities per the note    Large Joint Injection/Arthocentesis: L knee joint    Date/Time:  5/20/2023 8:34 AM    Performed by: Олег Jackson MD  Authorized by: Олег Jackson MD    Indications:  Pain, osteoarthritis and joint swelling  Needle Size:  21 G  Guidance: ultrasound    Approach:  Superolateral  Location:  Knee      Medications:  6 mg betamethasone acet & sod phos 6 (3-3) MG/ML; 4 mL ropivacaine 5 MG/ML  Aspirate amount (mL):  21  Aspirate:  Serous and yellow  Outcome:  Tolerated well, no immediate complications  Procedure discussed: discussed risks, benefits, and alternatives    Consent Given by:  Patient  Timeout: timeout called immediately prior to procedure    Prep: patient was prepped and draped in usual sterile fashion     Ultrasound images of procedure were permanently stored.      Patient reported significant improvement of pain after the numbing portion left knee joint aspiratoin injection.  Ultrasound guided images were permanently stored.   Aftercare instructions given to patient.  Plan to follow-up as discussed above.     Олег Jackson MD [facility]                 Again, thank you for allowing me to participate in the care of your patient.        Sincerely,        Олег Jackson MD

## 2023-06-25 NOTE — TELEPHONE ENCOUNTER
Called patient to schedule procedure with Dr. Gomes.  Left message offering 5/16/18 (next available).  Provided direct call back number.   
Patient called and left message requesting to schedule her procedure with the Pain Clinic. She requested the next available appointment. She can be reached at [phone].  
26-Jun-2023

## 2023-06-26 ENCOUNTER — MYC MEDICAL ADVICE (OUTPATIENT)
Dept: FAMILY MEDICINE | Facility: CLINIC | Age: 53
End: 2023-06-26
Payer: COMMERCIAL

## 2023-06-26 DIAGNOSIS — Z85.828 HISTORY OF SKIN CANCER: Primary | ICD-10-CM

## 2023-07-05 ENCOUNTER — TRANSFERRED RECORDS (OUTPATIENT)
Dept: HEALTH INFORMATION MANAGEMENT | Facility: CLINIC | Age: 53
End: 2023-07-05
Payer: COMMERCIAL

## 2023-07-12 ENCOUNTER — TRANSFERRED RECORDS (OUTPATIENT)
Dept: HEALTH INFORMATION MANAGEMENT | Facility: CLINIC | Age: 53
End: 2023-07-12

## 2023-07-12 ENCOUNTER — ANCILLARY PROCEDURE (OUTPATIENT)
Dept: CT IMAGING | Facility: CLINIC | Age: 53
End: 2023-07-12
Attending: NURSE PRACTITIONER
Payer: COMMERCIAL

## 2023-07-12 DIAGNOSIS — I77.1 SUBCLAVIAN ARTERIAL STENOSIS (H): ICD-10-CM

## 2023-07-12 LAB
CREAT BLD-MCNC: 1.3 MG/DL (ref 0.5–1)
GFR SERPL CREATININE-BSD FRML MDRD: 49 ML/MIN/1.73M2

## 2023-07-12 PROCEDURE — 82565 ASSAY OF CREATININE: CPT | Performed by: PATHOLOGY

## 2023-07-12 PROCEDURE — 74174 CTA ABD&PLVS W/CONTRAST: CPT | Performed by: RADIOLOGY

## 2023-07-12 PROCEDURE — 71275 CT ANGIOGRAPHY CHEST: CPT | Performed by: RADIOLOGY

## 2023-07-12 RX ORDER — IOPAMIDOL 755 MG/ML
100 INJECTION, SOLUTION INTRAVASCULAR ONCE
Status: COMPLETED | OUTPATIENT
Start: 2023-07-12 | End: 2023-07-12

## 2023-07-12 RX ADMIN — IOPAMIDOL 100 ML: 755 INJECTION, SOLUTION INTRAVASCULAR at 09:09

## 2023-07-12 NOTE — DISCHARGE INSTRUCTIONS

## 2023-07-13 ENCOUNTER — OFFICE VISIT (OUTPATIENT)
Dept: VASCULAR SURGERY | Facility: CLINIC | Age: 53
End: 2023-07-13
Payer: COMMERCIAL

## 2023-07-13 VITALS — HEART RATE: 68 BPM | DIASTOLIC BLOOD PRESSURE: 82 MMHG | OXYGEN SATURATION: 96 % | SYSTOLIC BLOOD PRESSURE: 124 MMHG

## 2023-07-13 DIAGNOSIS — I77.1 SUBCLAVIAN ARTERIAL STENOSIS (H): ICD-10-CM

## 2023-07-13 PROCEDURE — 99204 OFFICE O/P NEW MOD 45 MIN: CPT | Mod: GC | Performed by: SURGERY

## 2023-07-13 NOTE — PROGRESS NOTES
"  Assessment & Plan     53 yo female with recent evaluation for chest pain in March 2023, CTA with findings of proximal left subclavian stenosis, 60-70%, asymptomatic. Now with stable repeat CTA (3 month f/u), remains asymptomatic.    Plan:  -recommend trying different statin due to myalgias on atorvastatin, PCP management and follow up recommended  -cont asa 81mg daily   -f/u vascular surgery PRN      Desiree Ruiz, DO  Fellow Clinic  [facility]D Ramon is a 53 year old, presenting for the following health issues:  New Patient (Subclavian arterial stenosis)    HPI     52 year old female presented to ED 3/2023 with epigastric \"folding\" chest pain, work up negative for cardiac event and CTA completed for family history of aortic dissections, CTA at that time with 60-70% stenosis, pt asymptomatic and likely incidental finding. Follow-up CT scan today with stable area of stenosis, remains asymptomatic. Denies left arm pain or numbness, denies left hand finger discoloration, temperature changes, or cramping. She has not experienced recurrence of chest pain, no specific diagnosis was made. Overall, she fells better and is without complaints today.       Review of Systems   Constitutional, HEENT, cardiovascular, pulmonary, gi and gu systems are negative, except as otherwise noted.      Objective    LMP 08/07/2015   There is no height or weight on file to calculate BMI.  Physical Exam   GENERAL: healthy, alert and no distress  NECK: no adenopathy, no asymmetry, masses, or scars   RESP: normal work of breathing on RA  CV: regular rate and rhythm by palpation  MS: no gross musculoskeletal defects noted, no edema  Vascular: 2+ radial bilaterally      CTA Chest Abdomen Pelvis w Contrast    Result Date: 7/12/2023  CTA CHEST, ABDOMEN, AND PELVIS WITH 3D AND MULTIPLANAR RECONSTRUCTIONS 7/12/2023 9:23 AM CLINICAL HISTORY: Subclavian stenosis. Nonspecific mural thickening causing 64% " diameter stenosis in the left subclavian artery was demonstrated in the previous study. Reevaluate. COMPARISONS: CTA chest, abdomen, and pelvis 3/23/2023 REFERRING PROVIDER: RUDDY ALVARADO TECHNIQUE: Unenhanced CT performed through the chest, abdomen, and pelvis. After the uneventful administration of intravenous contrast, EKG gated CTA through the aortic root performed. CTA performed through the chest, abdomen, and pelvis. Coronal and sagittal reconstructions were produced. Lung MIP produced. 3D and multiplanar reconstructions were produced and reviewed. CONTRAST: 100 mL Isovue 370 DOSE (DLP): 2248 mGy*cm FINDINGS: CTA: Nonspecific mural thickening in the proximal left subclavian artery causes 66% diameter stenosis. No periarterial edema or stranding. Normal aortic branching pattern. Right innominate, subclavian, and carotid and left carotid arteries are patent. Diminutive left vertebral artery. Right vertebral artery patent. Aortic measurements:      Sinuses of Valsalva: 32 mm      Sinotubular junction: 32 mm      Ascendin mm      Arch: 30 mm      Proximal descendin mm      Mid descendin mm      Diaphragm: 21 mm      Infrarenal: 15 mm      Above bifurcation: 15 mm Patent celiac, superior mesenteric, renal, and inferior mesenteric arteries. Accessory left hepatic artery from the left gastric artery not as well demonstrated in today's study due to bolus timing. Bilateral common, internal, and external iliac arteries patent. Bilateral common femoral arteries patent. Circumaortic left renal vein. CT: No suspicious pulmonary nodule. Cholecystectomy. Diverticulosis. Hysterectomy. Right pubis sclerotic focus. Right sacrum sclerotic focus. Iliac sclerosis along the sacroiliac joints.     IMPRESSION: Proximal left subclavian artery 50-69% diameter stenosis by nonspecific mural thickening, unchanged. Differential includes mural hematoma, vasculitis, thrombosed dissection, and atherosclerosis. No periarterial  stranding or edema. TERE STEEL MD   SYSTEM ID:  [id number]

## 2023-07-13 NOTE — PATIENT INSTRUCTIONS
"Thank you so much for choosing us for your care. It was a pleasure to see you at the vascular clinic today.     Follow-up recommendations: We will see you back in the vascular clinic on an \"as-needed\" basis. Please contact our clinic at [phone] if any issues arise. You may also send us a [employer] message with any concerns.    Additional testing/imaging ordered today: None      Our scheduling team will get in touch with you to set up any follow-up testing/imaging and/or appointments. Please be aware that any testing/imaging recommended today will need to completed prior to your next visit with the provider. If testing/imaging is not completed prior to your next visit, your visit may be rescheduled.        If you have any questions, please contact our clinic directly at [phone] and ask for the nurse. We also encourage the use of [employer] to communicate with your HealthCare Provider.      If you have an urgent need after business hours (8:00 am to 4:30 pm) please call [phone], option 4, and ask for the vascular attending on call. For non-urgent after hours needs, please call the vascular clinic at [phone]. For scheduling needs, please call our clinic directly at [phone].    "

## 2023-07-18 ENCOUNTER — TRANSFERRED RECORDS (OUTPATIENT)
Dept: HEALTH INFORMATION MANAGEMENT | Facility: CLINIC | Age: 53
End: 2023-07-18
Payer: COMMERCIAL

## 2023-07-18 DIAGNOSIS — E78.5 HYPERLIPIDEMIA, UNSPECIFIED: ICD-10-CM

## 2023-07-18 PROBLEM — M25.562 LEFT KNEE PAIN: Status: RESOLVED | Noted: 2023-03-17 | Resolved: 2023-07-18

## 2023-07-18 RX ORDER — ATORVASTATIN CALCIUM 20 MG/1
TABLET, FILM COATED ORAL
Qty: 90 TABLET | Refills: 1 | Status: SHIPPED | OUTPATIENT
Start: 2023-07-18 | End: 2023-07-28 | Stop reason: SINTOL

## 2023-07-19 ENCOUNTER — ANCILLARY PROCEDURE (OUTPATIENT)
Dept: MAMMOGRAPHY | Facility: CLINIC | Age: 53
End: 2023-07-19
Attending: FAMILY MEDICINE
Payer: COMMERCIAL

## 2023-07-19 DIAGNOSIS — Z12.31 VISIT FOR SCREENING MAMMOGRAM: ICD-10-CM

## 2023-07-19 PROCEDURE — 77067 SCR MAMMO BI INCL CAD: CPT | Mod: TC | Performed by: RADIOLOGY

## 2023-07-28 DIAGNOSIS — E78.5 HYPERLIPIDEMIA LDL GOAL <130: Primary | ICD-10-CM

## 2023-07-28 DIAGNOSIS — I77.1 SUBCLAVIAN ARTERIAL STENOSIS (H): ICD-10-CM

## 2023-07-28 RX ORDER — ROSUVASTATIN CALCIUM 10 MG/1
10 TABLET, COATED ORAL DAILY
Qty: 90 TABLET | Refills: 3 | Status: SHIPPED | OUTPATIENT
Start: 2023-07-28 | End: 2023-12-18

## 2023-08-23 NOTE — PROGRESS NOTES
[facility] Care Clinic Non Surgical Follow up Visit   Date of visit: 8/28/2023  Physician: KRISTINE Hong MD, MD  Primary Care is Cee Biggs.  Yenny Johnson   53 year old  female     Initial Weight: 199#  Initial BMI: 33.37  Today's Weight:   Wt Readings from Last 1 Encounters:   08/28/23 80.5 kg (177 lb 6.4 oz)     Body mass index is 28.63 kg/m .           Assessment and Plan   Assessment: Yenny is a 53 year old year old female who presents for medical weight management.      Plan:    1. Overweight  Patient was congratulated on her success thus far. Healthy habits to assist with further weight loss were discussed. She will try to increase her activity as tolerated. She will continue the saxenda.     2. Hyperlipidemia LDL goal <40  This may improve with healthy habits and weight loss.     3. Gastroesophageal reflux disease, unspecified whether esophagitis present  This may improve with healthy habits and weight loss.      Follow up in 1 month with the dietician and in 3 months with myself            INTERIM HISTORY  Patient is taking saxenda for appetite and craving control and she thinks that it is helping to control her appetite. She is taking the 2.4 mg dose. She does not have side effects.     DIETARY HISTORY  Meals Per Day: 3  Eating Protein First?: yes  Food Diary: B:2 hard boiled eggs and greek yogurt L:ham and cheese sandwich and grapes D:protein, sometimes with salads  Snacks Per Day: very rare  Typical Snacks: almonds, rare small candy  Fluid Intake: 64 oz  Portion Control: yes  Calorie Containing Beverages: none  Choosing Whole Grains: sometimes  Meals at Restaurant per week:1    Positive Changes Since Last Visit: decreased knee pain, eating anti inflammatory diet  Struggling With: none    Knowledgeable in Reading Food Labels: yes  Getting Adequate Protein: yes  Sleeping 7-8 hours/day yes      PHYSICAL ACTIVITY PATTERNS:  Going for walks with her     REVIEW OF  SYSTEMS  GENERAL/CONSTITUTIONAL:  Fatigue: she was very ill with covid recently but energy is coming back  PULMONARY:  Dyspnea on exertion: no  GI:  Pancreatitis: no  PSYCHIATRIC:  Moods: stable  MUSCULOSKELETAL/RHEUMATOLOGIC  Arthralgias: improved  Myalgias: no  ENDOCRINE:  Monitoring Blood Sugars: no  Sugars Well Controlled: na  No personal or family history of medullary thyroid cancer no  :  Birth control: menopause       Patient Profile   Social History     Social History Narrative    Not on file        Past Medical History   Past Medical History:   Diagnosis Date    Anemia     BCC (basal cell carcinoma of skin)     Degeneration of lumbar or lumbosacral intervertebral disc     Depressive disorder     Diverticulitis of colon     Elevated fasting glucose     Elevated rheumatoid factor 12/12/2012    GERD (gastroesophageal reflux disease) 11/2005    EGD    Hyperlipidemia LDL goal <130     Hypertriglyceridemia     Hypothyroidism     Intermittent asthma     Lumbar disc herniation     Medial meniscus tear     Obesity 03/29/2012    Paroxysmal supraventricular tachycardia (H) 02/16/2021    PMDD (premenstrual dysphoric disorder)     Stress incontinence, female 03/29/2012    Subclavian arterial stenosis (H) 3/28/2023     Patient Active Problem List   Diagnosis    PMDD (premenstrual dysphoric disorder)    Obesity    Stress incontinence, female    GERD (gastroesophageal reflux disease)    Hypothyroidism    Lumbar disc herniation    Intermittent asthma    Hyperlipidemia LDL goal <40    Medial meniscus tear    Subclavian arterial stenosis (H)       Past Surgical History  She has a past surgical history that includes cholecystectomy, laporoscopic (1995); tubal ligation; c/section, classical; biopsy; hysterectomy, pap no longer indicated (09/23/2015); Repair MOHS (Left, 01/19/2017); Inject Paravertebral Facet Joint Lumbar / Sacral First (Bilateral, 06/28/2017); Inject Epidural Lumbar / Sacral Single (Bilateral, 08/09/2017);  "Destruction Of Paravertebral Facet Lumbar / Sacral Single (Bilateral, 09/12/2017); Inject Sacroiliac Joint (Bilateral, 02/07/2018); Inject Paravertebral Facet Joint Lumbar / Sacral Third (Bilateral, 03/09/2018); Radio Frequency Ablation / Destruction of Sacroiloac Joint Lateral Branches (S1/S2/S3) (Bilateral, 05/02/2018); and Colonoscopy with CO2 insufflation (N/A, 09/27/2021).     Examination   Ht 1.676 m (5' 6\")   Wt 80.5 kg (177 lb 6.4 oz)   LMP 08/07/2015   BMI 28.63 kg/m    Wt Readings from Last 4 Encounters:   08/28/23 80.5 kg (177 lb 6.4 oz)   05/20/23 82.1 kg (181 lb)   04/19/23 83.9 kg (185 lb)   03/23/23 83.5 kg (184 lb)      GENERAL: Healthy, alert and no distress  EYES: Eyes grossly normal to inspection.  No discharge or erythema, or obvious scleral/conjunctival abnormalities.  RESP: No audible wheeze, cough, or visible cyanosis.  No visible retractions or increased work of breathing.    SKIN: Visible skin clear. No significant rash, abnormal pigmentation or lesions.  NEURO: Cranial nerves grossly intact.  Mentation and speech appropriate for age.  PSYCH: Mentation appears normal, affect normal/bright, judgement and insight intact, normal speech and appearance well-groomed.        Counseling:   We reviewed the important post op bariatric recommendations:  -eating 3 meals daily  -eating protein first, getting >60gm protein daily  -eating slowly, chewing food well  -avoiding/limiting calorie containing beverages  -limiting starchy vegetables and carbohydrates, choosing wheat, not white with breads,   crackers, pastas, cornel, bagels, tortillas, rice  -limiting restaurant or cafeteria eating to twice a week or less    We discussed the importance of restorative sleep and stress management in maintaining a healthy weight.  We discussed the National Weight Control Registry healthy weight maintenance strategies and ways to optimize metabolism.  We discussed the importance of physical activity including " cardiovascular and strength training in maintaining a healthier weight.    Total time spent on the date of this encounter doing: chart review, review of test results, patient visit, physical exam, education, counseling, developing plan of care and documenting = 38 minutes.         KRISTINE Hong MD  [facility]      Yenny Johnson is 53 year old  female who presents for a billable video visit today.    How would you like to obtain your AVS? MyChart  If dropped from the video visit, the video invitation should be resent by: Send to e-mail at: [email or web address]  Will anyone else be joining your video visit? No      Video Start Time: 3:36 PM    Are there any specific questions or needs that you would like addressed at your visit today? Pt statesd no questions or concerns, follow up has been made.        Video-Visit Details    Type of service:  Video Visit    Platform used for Video Visit: KIT digital    Video End Time (time video stopped): 3:49 PM    Originating Location (pt. Location): Home      Distant Location (provider location):  On-site    Distant Location (provider location):  [facility]IATR[facility] [place]

## 2023-08-28 ENCOUNTER — VIRTUAL VISIT (OUTPATIENT)
Dept: SURGERY | Facility: CLINIC | Age: 53
End: 2023-08-28
Payer: COMMERCIAL

## 2023-08-28 VITALS — HEIGHT: 66 IN | BODY MASS INDEX: 28.51 KG/M2 | WEIGHT: 177.4 LBS

## 2023-08-28 DIAGNOSIS — E78.5 HYPERLIPIDEMIA LDL GOAL <130: ICD-10-CM

## 2023-08-28 DIAGNOSIS — K21.9 GASTROESOPHAGEAL REFLUX DISEASE, UNSPECIFIED WHETHER ESOPHAGITIS PRESENT: ICD-10-CM

## 2023-08-28 DIAGNOSIS — E66.3 OVERWEIGHT: Primary | ICD-10-CM

## 2023-08-28 DIAGNOSIS — E66.3 OVERWEIGHT (BMI 25.0-29.9): ICD-10-CM

## 2023-08-28 PROCEDURE — 99214 OFFICE O/P EST MOD 30 MIN: CPT | Mod: VID | Performed by: FAMILY MEDICINE

## 2023-08-28 RX ORDER — LIRAGLUTIDE 6 MG/ML
3 INJECTION, SOLUTION SUBCUTANEOUS DAILY
Qty: 45 ML | Refills: 0 | Status: SHIPPED | OUTPATIENT
Start: 2023-08-28 | End: 2023-12-18 | Stop reason: ALTCHOICE

## 2023-08-28 NOTE — PATIENT INSTRUCTIONS

## 2023-08-28 NOTE — LETTER
8/28/2023         RE: Yenny Johnson  [address] [address]        Dear Colleague,    Thank you for referring your patient, Yenny Johnson, to the [facility] [place]. Please see a copy of my visit note below.    Bariatric Care Clinic Non Surgical Follow up Visit   Date of visit: 8/28/2023  Physician: KRISTINE Hong MD, MD  Primary Care is Cee Biggs.  Yenny Johnson   53 year old  female     Initial Weight: 199#  Initial BMI: 33.37  Today's Weight:   Wt Readings from Last 1 Encounters:   08/28/23 80.5 kg (177 lb 6.4 oz)     Body mass index is 28.63 kg/m .           Assessment and Plan   Assessment: Yenny is a 53 year old year old female who presents for medical weight management.      Plan:    1. Overweight  Patient was congratulated on her success thus far. Healthy habits to assist with further weight loss were discussed. She will try to increase her activity as tolerated. She will continue the saxenda.     2. Hyperlipidemia LDL goal <40  This may improve with healthy habits and weight loss.     3. Gastroesophageal reflux disease, unspecified whether esophagitis present  This may improve with healthy habits and weight loss.      Follow up in 1 month with the dietician and in 3 months with myself            INTERIM HISTORY  Patient is taking saxenda for appetite and craving control and she thinks that it is helping to control her appetite. She is taking the 2.4 mg dose. She does not have side effects.     DIETARY HISTORY  Meals Per Day: 3  Eating Protein First?: yes  Food Diary: B:2 hard boiled eggs and greek yogurt L:ham and cheese sandwich and grapes D:protein, sometimes with salads  Snacks Per Day: very rare  Typical Snacks: almonds, rare small candy  Fluid Intake: 64 oz  Portion Control: yes  Calorie Containing Beverages: none  Choosing Whole Grains: sometimes  Meals at Restaurant per week:1    Positive Changes Since  Last Visit: decreased knee pain, eating anti inflammatory diet  Struggling With: none    Knowledgeable in Reading Food Labels: yes  Getting Adequate Protein: yes  Sleeping 7-8 hours/day yes      PHYSICAL ACTIVITY PATTERNS:  Going for walks with her     REVIEW OF SYSTEMS  GENERAL/CONSTITUTIONAL:  Fatigue: she was very ill with covid recently but energy is coming back  PULMONARY:  Dyspnea on exertion: no  GI:  Pancreatitis: no  PSYCHIATRIC:  Moods: stable  MUSCULOSKELETAL/RHEUMATOLOGIC  Arthralgias: improved  Myalgias: no  ENDOCRINE:  Monitoring Blood Sugars: no  Sugars Well Controlled: na  No personal or family history of medullary thyroid cancer no  :  Birth control: menopause       Patient Profile   Social History     Social History Narrative     Not on file        Past Medical History   Past Medical History:   Diagnosis Date     Anemia      BCC (basal cell carcinoma of skin)      Degeneration of lumbar or lumbosacral intervertebral disc      Depressive disorder      Diverticulitis of colon      Elevated fasting glucose      Elevated rheumatoid factor 12/12/2012     GERD (gastroesophageal reflux disease) 11/2005    EGD     Hyperlipidemia LDL goal <130      Hypertriglyceridemia      Hypothyroidism      Intermittent asthma      Lumbar disc herniation      Medial meniscus tear      Obesity 03/29/2012     Paroxysmal supraventricular tachycardia (H) 02/16/2021     PMDD (premenstrual dysphoric disorder)      Stress incontinence, female 03/29/2012     Subclavian arterial stenosis (H) 3/28/2023     Patient Active Problem List   Diagnosis     PMDD (premenstrual dysphoric disorder)     Obesity     Stress incontinence, female     GERD (gastroesophageal reflux disease)     Hypothyroidism     Lumbar disc herniation     Intermittent asthma     Hyperlipidemia LDL goal <40     Medial meniscus tear     Subclavian arterial stenosis (H)       Past Surgical History  She has a past surgical history that includes  "cholecystectomy, laporoscopic (1995); tubal ligation; c/section, classical; biopsy; hysterectomy, pap no longer indicated (09/23/2015); Repair MOHS (Left, 01/19/2017); Inject Paravertebral Facet Joint Lumbar / Sacral First (Bilateral, 06/28/2017); Inject Epidural Lumbar / Sacral Single (Bilateral, 08/09/2017); Destruction Of Paravertebral Facet Lumbar / Sacral Single (Bilateral, 09/12/2017); Inject Sacroiliac Joint (Bilateral, 02/07/2018); Inject Paravertebral Facet Joint Lumbar / Sacral Third (Bilateral, 03/09/2018); Radio Frequency Ablation / Destruction of Sacroiloac Joint Lateral Branches (S1/S2/S3) (Bilateral, 05/02/2018); and Colonoscopy with CO2 insufflation (N/A, 09/27/2021).     Examination   Ht 1.676 m (5' 6\")   Wt 80.5 kg (177 lb 6.4 oz)   LMP 08/07/2015   BMI 28.63 kg/m    Wt Readings from Last 4 Encounters:   08/28/23 80.5 kg (177 lb 6.4 oz)   05/20/23 82.1 kg (181 lb)   04/19/23 83.9 kg (185 lb)   03/23/23 83.5 kg (184 lb)      GENERAL: Healthy, alert and no distress  EYES: Eyes grossly normal to inspection.  No discharge or erythema, or obvious scleral/conjunctival abnormalities.  RESP: No audible wheeze, cough, or visible cyanosis.  No visible retractions or increased work of breathing.    SKIN: Visible skin clear. No significant rash, abnormal pigmentation or lesions.  NEURO: Cranial nerves grossly intact.  Mentation and speech appropriate for age.  PSYCH: Mentation appears normal, affect normal/bright, judgement and insight intact, normal speech and appearance well-groomed.        Counseling:   We reviewed the important post op bariatric recommendations:  -eating 3 meals daily  -eating protein first, getting >60gm protein daily  -eating slowly, chewing food well  -avoiding/limiting calorie containing beverages  -limiting starchy vegetables and carbohydrates, choosing wheat, not white with breads,   crackers, pastas, cornel, bagels, tortillas, rice  -limiting restaurant or cafeteria eating to twice " a week or less    We discussed the importance of restorative sleep and stress management in maintaining a healthy weight.  We discussed the National Weight Control Registry healthy weight maintenance strategies and ways to optimize metabolism.  We discussed the importance of physical activity including cardiovascular and strength training in maintaining a healthier weight.    Total time spent on the date of this encounter doing: chart review, review of test results, patient visit, physical exam, education, counseling, developing plan of care and documenting = 38 minutes.         KRISTINE Hong MD  [facility]      Yenny Johnson is 53 year old  female who presents for a billable video visit today.    How would you like to obtain your AVS? MyChart  If dropped from the video visit, the video invitation should be resent by: Send to e-mail at: [email or web address]  Will anyone else be joining your video visit? No      Video Start Time: 3:36 PM    Are there any specific questions or needs that you would like addressed at your visit today? Pt statesd no questions or concerns, follow up has been made.        Video-Visit Details    Type of service:  Video Visit    Platform used for Video Visit: Haier    Video End Time (time video stopped): 3:49 PM    Originating Location (pt. Location): Home      Distant Location (provider location):  On-site    Distant Location (provider location):  [facility] AND BARIATRICS CARE [place]               Again, thank you for allowing me to participate in the care of your patient.        Sincerely,        KRISTINE Hong MD

## 2023-10-03 DIAGNOSIS — E66.3 OVERWEIGHT: Primary | ICD-10-CM

## 2023-11-18 ENCOUNTER — MYC REFILL (OUTPATIENT)
Dept: SURGERY | Facility: CLINIC | Age: 53
End: 2023-11-18
Payer: COMMERCIAL

## 2023-11-18 DIAGNOSIS — E66.3 OVERWEIGHT: ICD-10-CM

## 2023-11-20 ENCOUNTER — PATIENT OUTREACH (OUTPATIENT)
Dept: CARE COORDINATION | Facility: CLINIC | Age: 53
End: 2023-11-20
Payer: COMMERCIAL

## 2023-11-24 ENCOUNTER — TELEPHONE (OUTPATIENT)
Dept: FAMILY MEDICINE | Facility: CLINIC | Age: 53
End: 2023-11-24
Payer: COMMERCIAL

## 2023-11-24 NOTE — TELEPHONE ENCOUNTER
I called and spoke with the patient got her scheduled for a physical on 12/26/2023 at 11:40am. I told the patient to arrive at 11:20am.    Felicity Porter [facility]

## 2023-12-12 NOTE — PROGRESS NOTES
Bariatric Care Clinic Non Surgical Follow up Visit   Date of visit: 12/18/2023  Physician: KRISTINE Hong MD, MD  Primary Care is Cee Biggs.  Yenny Johnson   53 year old  female     Initial Weight: 199#  Initial BMI: 33.37  Today's Weight:   Wt Readings from Last 1 Encounters:   12/18/23 79.8 kg (176 lb)     Body mass index is 30.21 kg/m .           Assessment and Plan   Assessment: Yenny is a 53 year old year old female who presents for medical weight management.      Plan:    1. Overweight  Patient was congratulated on her success thus far. Healthy habits to assist with further weight loss were discussed. She is on track! She will continue the wegovy. We discussed the patient's co-morbid conditions including hyperlipidemia and GERD. These likely will improve with healthy habits and weight loss.     2. Hyperlipidemia LDL goal <40  This may improve with healthy habits and weight loss.     3. Gastroesophageal reflux disease, unspecified whether esophagitis present  This may improve with healthy habits and weight loss.      Follow up in 3 months with myself           INTERIM HISTORY  Patient switched from saxenda to wegovy in October because it is more available. She likes the fact that it is once a week. It is helping with appetite control.     DIETARY HISTORY  Meals Per Day: 3  Eating Protein First?: usually  Food Diary: B:skips or protein shake L:leftovers D:not discussed  Fluid Intake: water 80 plus oz  Portion Control: yes  Calorie Containing Beverages: none  Choosing Whole Grains: usually  Meals at Restaurant per week:more on the weekends    Positive Changes Since Last Visit: portion control, less snacking  Struggling With: none    Knowledgeable in Reading Food Labels: yes  Getting Adequate Protein: working on it  Sleeping 7-8 hours/day sometimes    PHYSICAL ACTIVITY PATTERNS:  Joined [employer], she is committed to going 2 x per week, circuit weight training    REVIEW OF  SYSTEMS  GENERAL/CONSTITUTIONAL:  Fatigue: sometimes  GI:  Pancreatitis: no  PSYCHIATRIC:  Moods: she is very happy with her weight loss  MUSCULOSKELETAL/RHEUMATOLOGIC  Arthralgias: knee pain has improved  Myalgias: no  ENDOCRINE:  Monitoring Blood Sugars: no  Sugars Well Controlled: na  No personal or family history of medullary thyroid cancer no       Patient Profile   Social History     Social History Narrative    Not on file        Past Medical History   Past Medical History:   Diagnosis Date    Anemia     BCC (basal cell carcinoma of skin)     Degeneration of lumbar or lumbosacral intervertebral disc     Depressive disorder     Diverticulitis of colon     Elevated fasting glucose     Elevated rheumatoid factor 12/12/2012    GERD (gastroesophageal reflux disease) 11/2005    EGD    Hyperlipidemia LDL goal <130     Hypertriglyceridemia     Hypothyroidism     Intermittent asthma     Lumbar disc herniation     Medial meniscus tear     Obesity 03/29/2012    Paroxysmal supraventricular tachycardia 02/16/2021    PMDD (premenstrual dysphoric disorder)     Stress incontinence, female 03/29/2012    Subclavian arterial stenosis (H24) 3/28/2023     Patient Active Problem List   Diagnosis    PMDD (premenstrual dysphoric disorder)    Obesity    Stress incontinence, female    GERD (gastroesophageal reflux disease)    Hypothyroidism    Lumbar disc herniation    Intermittent asthma    Hyperlipidemia LDL goal <40    Medial meniscus tear    Subclavian arterial stenosis (H24)       Past Surgical History  She has a past surgical history that includes cholecystectomy, laporoscopic (1995); tubal ligation; c/section, classical; biopsy; hysterectomy, pap no longer indicated (09/23/2015); Repair MOHS (Left, 01/19/2017); Inject Paravertebral Facet Joint Lumbar / Sacral First (Bilateral, 06/28/2017); Inject Epidural Lumbar / Sacral Single (Bilateral, 08/09/2017); Destruction Of Paravertebral Facet Lumbar / Sacral Single (Bilateral,  "09/12/2017); Inject Sacroiliac Joint (Bilateral, 02/07/2018); Inject Paravertebral Facet Joint Lumbar / Sacral Third (Bilateral, 03/09/2018); Radio Frequency Ablation / Destruction of Sacroiloac Joint Lateral Branches (S1/S2/S3) (Bilateral, 05/02/2018); and Colonoscopy with CO2 insufflation (N/A, 09/27/2021).     Examination   Ht 1.626 m (5' 4\")   Wt 79.8 kg (176 lb)   LMP 08/07/2015   BMI 30.21 kg/m    Wt Readings from Last 4 Encounters:   12/18/23 79.8 kg (176 lb)   08/28/23 80.5 kg (177 lb 6.4 oz)   05/20/23 82.1 kg (181 lb)   04/19/23 83.9 kg (185 lb)    GENERAL: Healthy, alert and no distress  EYES: Eyes grossly normal to inspection.  No discharge or erythema, or obvious scleral/conjunctival abnormalities.  RESP: No audible wheeze, cough, or visible cyanosis.  No visible retractions or increased work of breathing.    SKIN: Visible skin clear. No significant rash, abnormal pigmentation or lesions.  NEURO: Cranial nerves grossly intact.  Mentation and speech appropriate for age.  PSYCH: Mentation appears normal, affect normal/bright, judgement and insight intact, normal speech and appearance well-groomed.        Counseling:   We reviewed the important post op bariatric recommendations:  -eating 3 meals daily  -eating protein first, getting >60gm protein daily  -eating slowly, chewing food well  -avoiding/limiting calorie containing beverages  -limiting starchy vegetables and carbohydrates, choosing wheat, not white with breads,   crackers, pastas, cornel, bagels, tortillas, rice  -limiting restaurant or cafeteria eating to twice a week or less    We discussed the importance of restorative sleep and stress management in maintaining a healthy weight.  We discussed the National Weight Control Registry healthy weight maintenance strategies and ways to optimize metabolism.  We discussed the importance of physical activity including cardiovascular and strength training in maintaining a healthier weight.    Total time " spent on the date of this encounter doing: chart review, review of test results, patient visit, physical exam, education, counseling, developing plan of care and documenting = 24 minutes.         KRISTINE Hong MD  [facility]

## 2023-12-18 ENCOUNTER — VIRTUAL VISIT (OUTPATIENT)
Dept: SURGERY | Facility: CLINIC | Age: 53
End: 2023-12-18
Payer: COMMERCIAL

## 2023-12-18 VITALS — WEIGHT: 176 LBS | BODY MASS INDEX: 30.05 KG/M2 | HEIGHT: 64 IN

## 2023-12-18 DIAGNOSIS — E78.5 HYPERLIPIDEMIA LDL GOAL <130: ICD-10-CM

## 2023-12-18 DIAGNOSIS — K21.9 GASTROESOPHAGEAL REFLUX DISEASE, UNSPECIFIED WHETHER ESOPHAGITIS PRESENT: ICD-10-CM

## 2023-12-18 DIAGNOSIS — E66.3 OVERWEIGHT: Primary | ICD-10-CM

## 2023-12-18 PROCEDURE — 99213 OFFICE O/P EST LOW 20 MIN: CPT | Mod: VID | Performed by: FAMILY MEDICINE

## 2023-12-18 ASSESSMENT — PAIN SCALES - GENERAL: PAINLEVEL: NO PAIN (0)

## 2023-12-18 NOTE — LETTER
12/18/2023         RE: Yenny Johnson  [address] [facility] [address]        Dear Colleague,    Thank you for referring your patient, Yenny Johnson, to the [facility] [place]. Please see a copy of my visit note below.    Bariatric Care Clinic Non Surgical Follow up Visit   Date of visit: 12/18/2023  Physician: KRISTINE Hong MD, MD  Primary Care is Cee Biggs.  Yenny Johnson   53 year old  female     Initial Weight: 199#  Initial BMI: 33.37  Today's Weight:   Wt Readings from Last 1 Encounters:   12/18/23 79.8 kg (176 lb)     Body mass index is 30.21 kg/m .           Assessment and Plan   Assessment: Yenny is a 53 year old year old female who presents for medical weight management.      Plan:    1. Overweight  Patient was congratulated on her success thus far. Healthy habits to assist with further weight loss were discussed. She is on track! She will continue the wegovy. We discussed the patient's co-morbid conditions including hyperlipidemia and GERD. These likely will improve with healthy habits and weight loss.     2. Hyperlipidemia LDL goal <40  This may improve with healthy habits and weight loss.     3. Gastroesophageal reflux disease, unspecified whether esophagitis present  This may improve with healthy habits and weight loss.      Follow up in 3 months with myself           INTERIM HISTORY  Patient switched from saxenda to wegovy in October because it is more available. She likes the fact that it is once a week. It is helping with appetite control.     DIETARY HISTORY  Meals Per Day: 3  Eating Protein First?: usually  Food Diary: B:skips or protein shake L:leftovers D:not discussed  Fluid Intake: water 80 plus oz  Portion Control: yes  Calorie Containing Beverages: none  Choosing Whole Grains: usually  Meals at Restaurant per week:more on the weekends    Positive Changes Since Last Visit: portion control, less  snacking  Struggling With: none    Knowledgeable in Reading Food Labels: yes  Getting Adequate Protein: working on it  Sleeping 7-8 hours/day sometimes    PHYSICAL ACTIVITY PATTERNS:  Joined [employer], she is committed to going 2 x per week, circuit weight training    REVIEW OF SYSTEMS  GENERAL/CONSTITUTIONAL:  Fatigue: sometimes  GI:  Pancreatitis: no  PSYCHIATRIC:  Moods: she is very happy with her weight loss  MUSCULOSKELETAL/RHEUMATOLOGIC  Arthralgias: knee pain has improved  Myalgias: no  ENDOCRINE:  Monitoring Blood Sugars: no  Sugars Well Controlled: na  No personal or family history of medullary thyroid cancer no       Patient Profile   Social History     Social History Narrative     Not on file        Past Medical History   Past Medical History:   Diagnosis Date     Anemia      BCC (basal cell carcinoma of skin)      Degeneration of lumbar or lumbosacral intervertebral disc      Depressive disorder      Diverticulitis of colon      Elevated fasting glucose      Elevated rheumatoid factor 12/12/2012     GERD (gastroesophageal reflux disease) 11/2005    EGD     Hyperlipidemia LDL goal <130      Hypertriglyceridemia      Hypothyroidism      Intermittent asthma      Lumbar disc herniation      Medial meniscus tear      Obesity 03/29/2012     Paroxysmal supraventricular tachycardia 02/16/2021     PMDD (premenstrual dysphoric disorder)      Stress incontinence, female 03/29/2012     Subclavian arterial stenosis (H24) 3/28/2023     Patient Active Problem List   Diagnosis     PMDD (premenstrual dysphoric disorder)     Obesity     Stress incontinence, female     GERD (gastroesophageal reflux disease)     Hypothyroidism     Lumbar disc herniation     Intermittent asthma     Hyperlipidemia LDL goal <40     Medial meniscus tear     Subclavian arterial stenosis (H24)       Past Surgical History  She has a past surgical history that includes cholecystectomy, laporoscopic (1995); tubal ligation; c/section, classical;  "biopsy; hysterectomy, pap no longer indicated (09/23/2015); Repair MOHS (Left, 01/19/2017); Inject Paravertebral Facet Joint Lumbar / Sacral First (Bilateral, 06/28/2017); Inject Epidural Lumbar / Sacral Single (Bilateral, 08/09/2017); Destruction Of Paravertebral Facet Lumbar / Sacral Single (Bilateral, 09/12/2017); Inject Sacroiliac Joint (Bilateral, 02/07/2018); Inject Paravertebral Facet Joint Lumbar / Sacral Third (Bilateral, 03/09/2018); Radio Frequency Ablation / Destruction of Sacroiloac Joint Lateral Branches (S1/S2/S3) (Bilateral, 05/02/2018); and Colonoscopy with CO2 insufflation (N/A, 09/27/2021).     Examination   Ht 1.626 m (5' 4\")   Wt 79.8 kg (176 lb)   LMP 08/07/2015   BMI 30.21 kg/m    Wt Readings from Last 4 Encounters:   12/18/23 79.8 kg (176 lb)   08/28/23 80.5 kg (177 lb 6.4 oz)   05/20/23 82.1 kg (181 lb)   04/19/23 83.9 kg (185 lb)    GENERAL: Healthy, alert and no distress  EYES: Eyes grossly normal to inspection.  No discharge or erythema, or obvious scleral/conjunctival abnormalities.  RESP: No audible wheeze, cough, or visible cyanosis.  No visible retractions or increased work of breathing.    SKIN: Visible skin clear. No significant rash, abnormal pigmentation or lesions.  NEURO: Cranial nerves grossly intact.  Mentation and speech appropriate for age.  PSYCH: Mentation appears normal, affect normal/bright, judgement and insight intact, normal speech and appearance well-groomed.        Counseling:   We reviewed the important post op bariatric recommendations:  -eating 3 meals daily  -eating protein first, getting >60gm protein daily  -eating slowly, chewing food well  -avoiding/limiting calorie containing beverages  -limiting starchy vegetables and carbohydrates, choosing wheat, not white with breads,   crackers, pastas, cornel, bagels, tortillas, rice  -limiting restaurant or cafeteria eating to twice a week or less    We discussed the importance of restorative sleep and stress " management in maintaining a healthy weight.  We discussed the National Weight Control Registry healthy weight maintenance strategies and ways to optimize metabolism.  We discussed the importance of physical activity including cardiovascular and strength training in maintaining a healthier weight.    Total time spent on the date of this encounter doing: chart review, review of test results, patient visit, physical exam, education, counseling, developing plan of care and documenting = 24 minutes.         KRISTINE Hong MD  [facility]           Virtual Visit Details    Type of service:  Video Visit   Video start time: 2:47 pm  Video end time: 3:02 pm  Originating Location (pt. Location): Other at work in [place]    Distant Location (provider location):  Off-site  Platform used for Video Visit: Dex      Again, thank you for allowing me to participate in the care of your patient.        Sincerely,        KRISTINE Hong MD

## 2023-12-18 NOTE — NURSING NOTE
Is the patient currently in the state of [place]? YES    Visit mode:VIDEO    If the visit is dropped, the patient can be reconnected by: VIDEO VISIT: Text to cell phone:   Telephone Information:   Mobile [phone]       Will anyone else be joining the visit? NO  (If patient encounters technical issues they should call [phone]    How would you like to obtain your AVS? MyChart    Are changes needed to the allergy or medication list? No, Pt stated no changes to allergies, and Pt stated no med changes    Reason for visit: RECHECK (LIZETH )    Delmy WALKER

## 2023-12-18 NOTE — PROGRESS NOTES
Virtual Visit Details    Type of service:  Video Visit   Video start time: 2:47 pm  Video end time: 3:02 pm  Originating Location (pt. Location): Other at work in [place]    Distant Location (provider location):  Off-site  Platform used for Video Visit: Dex

## 2023-12-26 ENCOUNTER — OFFICE VISIT (OUTPATIENT)
Dept: FAMILY MEDICINE | Facility: CLINIC | Age: 53
End: 2023-12-26
Payer: COMMERCIAL

## 2023-12-26 VITALS
HEIGHT: 65 IN | SYSTOLIC BLOOD PRESSURE: 135 MMHG | BODY MASS INDEX: 29.99 KG/M2 | DIASTOLIC BLOOD PRESSURE: 86 MMHG | TEMPERATURE: 98.1 F | OXYGEN SATURATION: 98 % | WEIGHT: 180 LBS | RESPIRATION RATE: 18 BRPM | HEART RATE: 65 BPM

## 2023-12-26 DIAGNOSIS — E66.09 CLASS 1 OBESITY DUE TO EXCESS CALORIES WITHOUT SERIOUS COMORBIDITY WITH BODY MASS INDEX (BMI) OF 30.0 TO 30.9 IN ADULT: ICD-10-CM

## 2023-12-26 DIAGNOSIS — M67.442 GANGLION CYST OF FINGER OF LEFT HAND: ICD-10-CM

## 2023-12-26 DIAGNOSIS — D17.30 LIPOMA OF SKIN AND SUBCUTANEOUS TISSUE: ICD-10-CM

## 2023-12-26 DIAGNOSIS — N95.1 PERIMENOPAUSAL SYMPTOMS: ICD-10-CM

## 2023-12-26 DIAGNOSIS — I77.1 SUBCLAVIAN ARTERIAL STENOSIS (H): ICD-10-CM

## 2023-12-26 DIAGNOSIS — E66.811 CLASS 1 OBESITY DUE TO EXCESS CALORIES WITHOUT SERIOUS COMORBIDITY WITH BODY MASS INDEX (BMI) OF 30.0 TO 30.9 IN ADULT: ICD-10-CM

## 2023-12-26 DIAGNOSIS — E03.9 ACQUIRED HYPOTHYROIDISM: ICD-10-CM

## 2023-12-26 DIAGNOSIS — Z00.00 ROUTINE GENERAL MEDICAL EXAMINATION AT A HEALTH CARE FACILITY: Primary | ICD-10-CM

## 2023-12-26 DIAGNOSIS — E78.5 HYPERLIPIDEMIA LDL GOAL <130: ICD-10-CM

## 2023-12-26 LAB
CHOLEST SERPL-MCNC: 201 MG/DL
FASTING STATUS PATIENT QL REPORTED: NO
FASTING STATUS PATIENT QL REPORTED: NO
GLUCOSE SERPL-MCNC: 87 MG/DL (ref 70–99)
HBA1C MFR BLD: 5 % (ref 0–5.6)
HDLC SERPL-MCNC: 39 MG/DL
LDLC SERPL CALC-MCNC: 109 MG/DL
NONHDLC SERPL-MCNC: 162 MG/DL
TRIGL SERPL-MCNC: 266 MG/DL
TSH SERPL DL<=0.005 MIU/L-ACNC: 0.76 UIU/ML (ref 0.3–4.2)

## 2023-12-26 PROCEDURE — 84443 ASSAY THYROID STIM HORMONE: CPT | Performed by: FAMILY MEDICINE

## 2023-12-26 PROCEDURE — 83036 HEMOGLOBIN GLYCOSYLATED A1C: CPT | Performed by: FAMILY MEDICINE

## 2023-12-26 PROCEDURE — 99396 PREV VISIT EST AGE 40-64: CPT | Performed by: FAMILY MEDICINE

## 2023-12-26 PROCEDURE — 82947 ASSAY GLUCOSE BLOOD QUANT: CPT | Performed by: FAMILY MEDICINE

## 2023-12-26 PROCEDURE — 80061 LIPID PANEL: CPT | Performed by: FAMILY MEDICINE

## 2023-12-26 PROCEDURE — 36415 COLL VENOUS BLD VENIPUNCTURE: CPT | Performed by: FAMILY MEDICINE

## 2023-12-26 PROCEDURE — 99214 OFFICE O/P EST MOD 30 MIN: CPT | Mod: 25 | Performed by: FAMILY MEDICINE

## 2023-12-26 RX ORDER — LEVOTHYROXINE SODIUM 88 UG/1
88 TABLET ORAL DAILY
Qty: 90 TABLET | Refills: 3 | Status: SHIPPED | OUTPATIENT
Start: 2023-12-26

## 2023-12-26 ASSESSMENT — ENCOUNTER SYMPTOMS
HEMATOCHEZIA: 0
DYSURIA: 0
JOINT SWELLING: 0
HEARTBURN: 0
NERVOUS/ANXIOUS: 0
CONSTIPATION: 0
EYE PAIN: 0
PARESTHESIAS: 0
HEADACHES: 0
SHORTNESS OF BREATH: 0
FREQUENCY: 0
DIZZINESS: 0
PALPITATIONS: 0
HEMATURIA: 0
ARTHRALGIAS: 0
CHILLS: 0
DIARRHEA: 0
COUGH: 0
MYALGIAS: 0
NAUSEA: 0
SORE THROAT: 0
FEVER: 0
WEAKNESS: 0
ABDOMINAL PAIN: 0
BREAST MASS: 0

## 2023-12-26 ASSESSMENT — ASTHMA QUESTIONNAIRES: ACT_TOTALSCORE: 25

## 2023-12-26 NOTE — RESULT ENCOUNTER NOTE
Yenny,    Your blood glucose and thyroid tests are fine.     You are still a candidate to take a cholesterol lowering medication. Let me know when you're ready.     Cee Biggs MD

## 2023-12-26 NOTE — LETTER
My Asthma Action Plan    Name: Yenny Johnson   YOB: 1970  Date: 12/26/2023   My doctor: Cee Biggs MD   My clinic: [facility]        My Rescue Medicine:   Albuterol inhaler (Proair/Ventolin/Proventil HFA)  2-4 puffs EVERY 4 HOURS as needed. Use a spacer if recommended by your provider.   My Asthma Severity:   Intermittent / Exercise Induced  Know your asthma triggers: upper respiratory infections             GREEN ZONE   Good Control  I feel good  No cough or wheeze  Can work, sleep and play without asthma symptoms       Take your asthma control medicine every day.     If exercise triggers your asthma, take your rescue medication  15 minutes before exercise or sports, and  During exercise if you have asthma symptoms  Spacer to use with inhaler: If you have a spacer, make sure to use it with your inhaler             YELLOW ZONE Getting Worse  I have ANY of these:  I do not feel good  Cough or wheeze  Chest feels tight  Wake up at night   Keep taking your Green Zone medications  Start taking your rescue medicine:  every 20 minutes for up to 1 hour. Then every 4 hours for 24-48 hours.  If you stay in the Yellow Zone for more than 12-24 hours, contact your doctor.  If you do not return to the Green Zone in 12-24 hours or you get worse, start taking your oral steroid medicine if prescribed by your provider.           RED ZONE Medical Alert - Get Help  I have ANY of these:  I feel awful  Medicine is not helping  Breathing getting harder  Trouble walking or talking  Nose opens wide to breathe       Take your rescue medicine NOW  If your provider has prescribed an oral steroid medicine, start taking it NOW  Call your doctor NOW  If you are still in the Red Zone after 20 minutes and you have not reached your doctor:  Take your rescue medicine again and  Call 911 or go to the emergency room right away    See your regular doctor within 2 weeks of an Emergency Room or Urgent Care  visit for follow-up treatment.          Annual Reminders:  Meet with Asthma Educator,  Flu Shot in the Fall, consider Pneumonia Vaccination for patients with asthma (aged 19 and older).    Pharmacy: [facility] [id number] [place] [address]    Electronically signed by Cee Biggs MD   Date: 12/26/23                    Asthma Triggers  How To Control Things That Make Your Asthma Worse    Triggers are things that make your asthma worse.  Look at the list below to help you find your triggers and   what you can do about them. You can help prevent asthma flare-ups by staying away from your triggers.      Trigger                                                          What you can do   Cigarette Smoke  Tobacco smoke can make asthma worse. Do not allow smoking in your home, car or around you.  Be sure no one smokes at a child s day care or school.  If you smoke, ask your health care provider for ways to help you quit.  Ask family members to quit too.  Ask your health care provider for a referral to Quit Plan to help you quit smoking, or call 6-627-426-PLAN.     Colds, Flu, Bronchitis  These are common triggers of asthma. Wash your hands often.  Don t touch your eyes, nose or mouth.  Get a flu shot every year.     Dust Mites  These are tiny bugs that live in cloth or carpet. They are too small to see. Wash sheets and blankets in hot water every week.   Encase pillows and mattress in dust mite proof covers.  Avoid having carpet if you can. If you have carpet, vacuum weekly.   Use a dust mask and HEPA vacuum.   Pollen and Outdoor Mold  Some people are allergic to trees, grass, or weed pollen, or molds. Try to keep your windows closed.  Limit time out doors when pollen count is high.   Ask you health care provider about taking medicine during allergy season.     Animal Dander  Some people are allergic to skin flakes, urine or saliva from pets with fur or feathers. Keep pets with fur or feathers out of  your home.    If you can t keep the pet outdoors, then keep the pet out of your bedroom.  Keep the bedroom door closed.  Keep pets off cloth furniture and away from stuffed toys.     Mice, Rats, and Cockroaches  Some people are allergic to the waste from these pests.   Cover food and garbage.  Clean up spills and food crumbs.  Store grease in the refrigerator.   Keep food out of the bedroom.   Indoor Mold  This can be a trigger if your home has high moisture. Fix leaking faucets, pipes, or other sources of water.   Clean moldy surfaces.  Dehumidify basement if it is damp and smelly.   Smoke, Strong Odors, and Sprays  These can reduce air quality. Stay away from strong odors and sprays, such as perfume, powder, hair spray, paints, smoke incense, paint, cleaning products, candles and new carpet.   Exercise or Sports  Some people with asthma have this trigger. Be active!  Ask your doctor about taking medicine before sports or exercise to prevent symptoms.    Warm up for 5-10 minutes before and after sports or exercise.     Other Triggers of Asthma  Cold air:  Cover your nose and mouth with a scarf.  Sometimes laughing or crying can be a trigger.  Some medicines and food can trigger asthma.

## 2023-12-26 NOTE — RESULT ENCOUNTER NOTE
Your results are normal.  Your final test results are pending.  Please check your chart again within 2 - 3 days. You will receive further instruction when your full test result panel is complete.     Cee Biggs MD

## 2023-12-29 NOTE — PROGRESS NOTES
Sports Medicine Clinic           ASSESSMENT and PLAN:     Yenny was seen today for pain.    Diagnoses and all orders for this visit:    Foreign body (FB) in soft tissue  Small foreign body in the soft tissue superficial to the tendon in the 4th digit. Discussed treatment options including referral to hand surgeon for removal. At this point she prefers to just leave it be, but if she changes her mind will follow up with hand surgery. No additional imaging ordered given she is unsure if she wants to have it removed.   - follow up with hand surgery as needed    Dupuytren's disease of palm with nodules without contracture  Mild disease with nodule without contracture. Discussed treatment options for when it becomes a contracture. Patient in agreement with plan.   - follow up with hand surgery if contracture occurs    Return sooner if develops new or worsening symptoms.    Options for treatment and/or follow-up care were reviewed with the patient was actively involved in the decision making process. Patient verbalized understanding and was in agreement with the plan.    Jud Alarcon MD, [facility]  Primary Care Sports Medicine             SUBJECTIVE       Yenny Johnson is a 53 year old female presenting to clinic today with a chief complaint of left hand pain, referred by Cee Biggs MD.    Onset: 4 month(s) ago. Reports insidious onset without acute precipitating event.  Location of Pain: left dubon hand, near 4th PIP 3rd Metacarpal  Rating of Pain at worst: 3/10  Rating of Pain Currently: 0/10  Worsened by: usage of the hand.   Better with: nothing  Treatments tried: no treatment tried to date  Associated symptoms: aching pain in 4th finger.   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 It was irritated and looked like there might be something in it. Overtime it has improved but continues  to be painful and achying sometimes. Occasionally it will become more prominent and almost like there is a shallow ulceration that will then heal.     Palm has also noticed over the last several months. It is more prominent but not painful or bothersome. No family history of dupuytren's.         PMH, Medications and Allergies were reviewed and updated as needed.    ROS:  As noted above otherwise negative.    Patient Active Problem List   Diagnosis    Obesity    Stress incontinence, female    GERD (gastroesophageal reflux disease)    Hypothyroidism    Lumbar disc herniation    Intermittent asthma    Hyperlipidemia LDL goal <40    Medial meniscus tear    Subclavian arterial stenosis (H24)    Perimenopausal symptoms       Current Outpatient Medications   Medication Sig Dispense Refill    aspirin (ASA) 81 MG EC tablet Take 1 tablet (81 mg) by mouth daily 90 tablet 3    FLUoxetine (PROZAC) 20 MG capsule TAKE 3 CAPSULES BY MOUTH EVERY  capsule 3    levothyroxine (SYNTHROID/LEVOTHROID) 88 MCG tablet Take 1 tablet (88 mcg) by mouth daily 90 tablet 3    Semaglutide-Weight Management (WEGOVY) 2.4 MG/0.75ML pen Inject 2.4 mg Subcutaneous once a week 9 mL 1            OBJECTIVE:       Vitals:   Vitals:    12/30/23 0752   BP: 124/83   Weight: 81.6 kg (180 lb)     BMI: Body mass index is 30.42 kg/m .    Gen:  Well nourished and in no acute distress  HEENT: Extraocular movement intact  Neck: Supple  Pulm:  Breathing Comfortably. No increased respiratory effort.  Psych: Euthymic. Appropriately answers questions    MSK:   LEFT HAND    No swelling, bruising, discoloration. Dupytryen's nodule over the palmar surface of the 3rd digit. Slight swelling on the ulnar side of the 4th finger at the PIP. Tender to palpation on the 4th finger with slightly mobile mass. Full range of motion and strength, able to lay keller flat on the table. Neurovascularly intact.     Ultrasound:  Limited ultrasound performed which shows small angular  foreign body on the palmar surface of the 4th digit distal to the PIP and above the level of the flexor tendon.

## 2023-12-30 ENCOUNTER — OFFICE VISIT (OUTPATIENT)
Dept: ORTHOPEDICS | Facility: CLINIC | Age: 53
End: 2023-12-30
Attending: FAMILY MEDICINE
Payer: COMMERCIAL

## 2023-12-30 VITALS — WEIGHT: 180 LBS | BODY MASS INDEX: 30.42 KG/M2 | SYSTOLIC BLOOD PRESSURE: 124 MMHG | DIASTOLIC BLOOD PRESSURE: 83 MMHG

## 2023-12-30 DIAGNOSIS — M79.5 FOREIGN BODY (FB) IN SOFT TISSUE: Primary | ICD-10-CM

## 2023-12-30 DIAGNOSIS — M72.0 DUPUYTREN'S DISEASE OF PALM WITH NODULES WITHOUT CONTRACTURE: ICD-10-CM

## 2023-12-30 PROCEDURE — 76882 US LMTD JT/FCL EVL NVASC XTR: CPT | Mod: LT | Performed by: STUDENT IN AN ORGANIZED HEALTH CARE EDUCATION/TRAINING PROGRAM

## 2023-12-30 PROCEDURE — 99213 OFFICE O/P EST LOW 20 MIN: CPT | Mod: 25 | Performed by: STUDENT IN AN ORGANIZED HEALTH CARE EDUCATION/TRAINING PROGRAM

## 2023-12-30 NOTE — LETTER
12/30/2023         RE: Yenny Johnson  [address] [facility] [address]        Dear Colleague,    Thank you for referring your patient, Yenny Johnson, to the [facility]ICINE [facility] [place]. Please see a copy of my visit note below.    Sports Medicine Clinic           ASSESSMENT and PLAN:     Yenny was seen today for pain.    Diagnoses and all orders for this visit:    Foreign body (FB) in soft tissue  Small foreign body in the soft tissue superficial to the tendon in the 4th digit. Discussed treatment options including referral to hand surgeon for removal. At this point she prefers to just leave it be, but if she changes her mind will follow up with hand surgery. No additional imaging ordered given she is unsure if she wants to have it removed.   - follow up with hand surgery as needed    Dupuytren's disease of palm with nodules without contracture  Mild disease with nodule without contracture. Discussed treatment options for when it becomes a contracture. Patient in agreement with plan.   - follow up with hand surgery if contracture occurs    Return sooner if develops new or worsening symptoms.    Options for treatment and/or follow-up care were reviewed with the patient was actively involved in the decision making process. Patient verbalized understanding and was in agreement with the plan.    Jud Alarcon MD, [facility]  Primary Care Sports Medicine             SUBJECTIVE       Yenny Johnson is a 53 year old female presenting to clinic today with a chief complaint of left hand pain, referred by Cee Biggs MD.    Onset: 4 month(s) ago. Reports insidious onset without acute precipitating event.  Location of Pain: left dubon hand, near 4th PIP 3rd Metacarpal  Rating of Pain at worst: 3/10  Rating of Pain Currently: 0/10  Worsened by: usage of the hand.   Better with: nothing  Treatments tried: no treatment tried to date  Associated symptoms: aching pain in 4th finger.    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 It was irritated and looked like there might be something in it. Overtime it has improved but continues to be painful and achying sometimes. Occasionally it will become more prominent and almost like there is a shallow ulceration that will then heal.     Palm has also noticed over the last several months. It is more prominent but not painful or bothersome. No family history of dupuytren's.         PMH, Medications and Allergies were reviewed and updated as needed.    ROS:  As noted above otherwise negative.    Patient Active Problem List   Diagnosis     Obesity     Stress incontinence, female     GERD (gastroesophageal reflux disease)     Hypothyroidism     Lumbar disc herniation     Intermittent asthma     Hyperlipidemia LDL goal <40     Medial meniscus tear     Subclavian arterial stenosis (H24)     Perimenopausal symptoms       Current Outpatient Medications   Medication Sig Dispense Refill     aspirin (ASA) 81 MG EC tablet Take 1 tablet (81 mg) by mouth daily 90 tablet 3     FLUoxetine (PROZAC) 20 MG capsule TAKE 3 CAPSULES BY MOUTH EVERY  capsule 3     levothyroxine (SYNTHROID/LEVOTHROID) 88 MCG tablet Take 1 tablet (88 mcg) by mouth daily 90 tablet 3     Semaglutide-Weight Management (WEGOVY) 2.4 MG/0.75ML pen Inject 2.4 mg Subcutaneous once a week 9 mL 1            OBJECTIVE:       Vitals:   Vitals:    12/30/23 0752   BP: 124/83   Weight: 81.6 kg (180 lb)     BMI: Body mass index is 30.42 kg/m .    Gen:  Well nourished and in no acute distress  HEENT: Extraocular movement intact  Neck: Supple  Pulm:  Breathing Comfortably. No increased respiratory effort.  Psych: Euthymic. Appropriately answers questions    MSK:   LEFT HAND    No swelling, bruising, discoloration. Dupytryen's nodule over the palmar surface of the 3rd digit. Slight  swelling on the ulnar side of the 4th finger at the PIP. Tender to palpation on the 4th finger with slightly mobile mass. Full range of motion and strength, able to lay keller flat on the table. Neurovascularly intact.     Ultrasound:  Limited ultrasound performed which shows small angular foreign body on the palmar surface of the 4th digit distal to the PIP and above the level of the flexor tendon.         Again, thank you for allowing me to participate in the care of your patient.        Sincerely,        Jud Alarcon MD

## 2024-03-07 ENCOUNTER — MYC REFILL (OUTPATIENT)
Dept: SURGERY | Facility: CLINIC | Age: 54
End: 2024-03-07
Payer: COMMERCIAL

## 2024-03-07 DIAGNOSIS — E66.3 OVERWEIGHT: ICD-10-CM

## 2024-03-18 ENCOUNTER — VIRTUAL VISIT (OUTPATIENT)
Dept: FAMILY MEDICINE | Facility: CLINIC | Age: 54
End: 2024-03-18
Payer: COMMERCIAL

## 2024-03-18 DIAGNOSIS — L30.4 INTERTRIGO: Primary | ICD-10-CM

## 2024-03-18 PROCEDURE — 99213 OFFICE O/P EST LOW 20 MIN: CPT | Mod: 95 | Performed by: PHYSICIAN ASSISTANT

## 2024-03-18 RX ORDER — FLUCONAZOLE 150 MG/1
150 TABLET ORAL
Qty: 3 TABLET | Refills: 0 | Status: SHIPPED | OUTPATIENT
Start: 2024-03-18 | End: 2024-03-25

## 2024-03-18 RX ORDER — NYSTATIN 100000 [USP'U]/G
POWDER TOPICAL DAILY PRN
Qty: 60 G | Refills: 11 | Status: SHIPPED | OUTPATIENT
Start: 2024-03-18

## 2024-03-18 NOTE — PROGRESS NOTES
Yenny is a 53 year old who is being evaluated via a billable video visit.          Assessment & Plan     Intertrigo  Will treat with diflucan for 3 days and nystatin powder for prevention.   - nystatin (MYCOSTATIN) 132079 UNIT/GM external powder; Apply topically daily as needed for dry skin  - fluconazole (DIFLUCAN) 150 MG tablet; Take 1 tablet (150 mg) by mouth every 3 days for 3 doses                  Subjective   Yenny is a 53 year old, presenting for the following health issues:  Derm Problem      3/18/2024     5:24 PM   Additional Questions   Roomed by gideon melgar     History of Present Illness       Reason for visit:  Yeast infection under breast  Symptom onset:  1-3 days ago  Symptoms include:  Red raised itchy raw rash  Symptom intensity:  Moderate  Symptom progression:  Worsening  Had these symptoms before:  Yes  Has tried/received treatment for these symptoms:  Yes  Previous treatment was successful:  Yes  Prior treatment description:  Lotrimin for small yeast area  What makes it worse:  No  What makes it better:  No    She eats 2-3 servings of fruits and vegetables daily.She consumes 0 sweetened beverage(s) daily.She exercises with enough effort to increase her heart rate 20 to 29 minutes per day.  She exercises with enough effort to increase her heart rate 3 or less days per week.   She is taking medications regularly.     Started 8 days ago, lotrimin helped  2 sweaty days and worsened.   Hot flashes, making her sweat.                 Objective           Vitals:  No vitals were obtained today due to virtual visit.    Physical Exam   GENERAL: alert and no distress  EYES: Eyes grossly normal to inspection.  No discharge or erythema, or obvious scleral/conjunctival abnormalities.  RESP: No audible wheeze, cough, or visible cyanosis.    SKIN: Visible skin clear.   NEURO: Cranial nerves grossly intact.  Mentation and speech appropriate for age.  PSYCH: Appropriate affect, tone, and pace of words           Video-Visit Details    Type of service:  Video Visit   Originating Location (pt. Location): Home    Distant Location (provider location):  On-site  Platform used for Video Visit: Dex  Signed Electronically by: Laura Maria PA-C

## 2024-03-18 NOTE — PROGRESS NOTES
"Yenny is a 53 year old who is being evaluated via a billable video visit.    How would you like to obtain your AVS? MyChart  If the video visit is dropped, the invitation should be resent by: Text to cell phone: [phone]  Will anyone else be joining your video visit? No  {If patient encounters technical issues they should call [phone] :481357}    {PROVIDER CHARTING PREFERENCE:694762}    Subjective   Yenny is a 53 year old, presenting for the following health issues:  Derm Problem      3/18/2024     5:24 PM   Additional Questions   Roomed by gideon melgar     History of Present Illness       Reason for visit:  Yeast infection under breast  Symptom onset:  1-3 days ago  Symptoms include:  Red raised itchy raw rash  Symptom intensity:  Moderate  Symptom progression:  Worsening  Had these symptoms before:  Yes  Has tried/received treatment for these symptoms:  Yes  Previous treatment was successful:  Yes  Prior treatment description:  Lotrimin for small yeast area  What makes it worse:  No  What makes it better:  No    She eats 2-3 servings of fruits and vegetables daily.She consumes 0 sweetened beverage(s) daily.She exercises with enough effort to increase her heart rate 20 to 29 minutes per day.  She exercises with enough effort to increase her heart rate 3 or less days per week.   She is taking medications regularly.       {SUPERLIST (Optional):193681}  {additonal problems for provider to add (Optional):968127}    {ROS Picklists (Optional):253518}      Objective           Vitals:  No vitals were obtained today due to virtual visit.    Physical Exam   {video visit exam brief selected:469238}    {Diagnostic Test Results (Optional):952586}      Video-Visit Details    Type of service:  Video Visit   Originating Location (pt. Location): {video visit patient location:666571::\"Home\"}  {PROVIDER LOCATION On-site should be selected for visits conducted from your clinic location or adjoining [facility], academic office, " "or other nearby [facility] building. Off-site should be selected for all other provider locations, including home:543884}  Distant Location (provider location):  {virtual location provider:625676}  Platform used for Video Visit: {Virtual Visit Platforms:517380::\"[employer]\"}  Signed Electronically by: Laura Maria PA-C  {Email feedback regarding this note to [email or web address]   :275687}  "

## 2024-03-28 NOTE — PROGRESS NOTES
Bariatric Care Clinic Non Surgical Follow up Visit   Date of visit: 4/3/2024  Physician: KRISTINE Hong MD, MD  Primary Care is Cee Biggs.  Yenny Johnson   53 year old  female     Initial Weight: 199#  Initial BMI: 33.37  Today's Weight:   Wt Readings from Last 1 Encounters:   04/03/24 80.9 kg (178 lb 4.8 oz)     Body mass index is 30.61 kg/m .           Assessment and Plan   Assessment: Yenny is a 53 year old year old female who presents for medical weight management.      Plan:    1. Obesity  Patient was congratulated on her success thus far. Healthy habits to assist with further weight loss were discussed. She is going to start doing more exercise and is excited for this. She will continue the wegovy.     2. Hyperlipidemia LDL goal <40  This may improve with healthy habits and weight loss.     3. Gastroesophageal reflux disease, unspecified whether esophagitis present  This may improve with healthy habits and weight loss.      Follow up in 3-4 months with myself           INTERIM HISTORY  Patient is taking wegovy for appetite and craving control and she thinks it is helping. She joined a gym and is going twice a week. Now that the weather is improving she will be walking more. She finds that the food chatter is gone.     DIETARY HISTORY  Meals Per Day: 3  Eating Protein First?: yes  Food Diary: B:hard boiled eggs and coffee with protein shake L:sandwich with veggies and protein, sometimes salad D:protein and vegetable and sometime starch, sometimes just a small snack  Typical Snack: yogurt  Fluid Intake: 64 oz most days  Portion Control: yes  Calorie Containing Beverages: none    Positive Changes Since Last Visit: on track  Struggling With: her exercise was curtailed for awhile due to her knee, she is happy to start doing more    Knowledgeable in Reading Food Labels: yes  Getting Adequate Protein: yes      PHYSICAL ACTIVITY PATTERNS:  Just started going to the gym    REVIEW OF  SYSTEMS  GENERAL/CONSTITUTIONAL:  Fatigue: yes  HEENT:   glaucoma: no  CARDIOVASCULAR:  History of heart disease: no  GI:  Pancreatitis: no  MUSCULOSKELETAL/RHEUMATOLOGIC  Arthralgias: no- knee is feeling much better  Myalgias: no  ENDOCRINE:  Monitoring Blood Sugars: no  Sugars Well Controlled: na  No personal or family history of medullary thyroid cancer no  :  Birth control: hysterectomy  History of kidney stones: years ago     Patient Profile   Social History     Social History Narrative    Not on file        Past Medical History   Past Medical History:   Diagnosis Date    Anemia     BCC (basal cell carcinoma of skin)     Degeneration of lumbar or lumbosacral intervertebral disc     Depressive disorder     Diverticulitis of colon     Elevated fasting glucose     Elevated rheumatoid factor 12/12/2012    GERD (gastroesophageal reflux disease) 11/2005    EGD    Hyperlipidemia LDL goal <130     Hypertriglyceridemia     Hypothyroidism     Intermittent asthma     Lumbar disc herniation     Medial meniscus tear     Obesity 03/29/2012    Paroxysmal supraventricular tachycardia (H24) 02/16/2021    PMDD (premenstrual dysphoric disorder)     Stress incontinence, female 03/29/2012    Subclavian arterial stenosis (H24) 3/28/2023     Patient Active Problem List   Diagnosis    Obesity    Stress incontinence, female    GERD (gastroesophageal reflux disease)    Hypothyroidism    Lumbar disc herniation    Intermittent asthma    Hyperlipidemia LDL goal <40    Medial meniscus tear    Subclavian arterial stenosis (H24)    Perimenopausal symptoms       Past Surgical History  She has a past surgical history that includes cholecystectomy, laporoscopic (1995); tubal ligation; c/section, classical; biopsy; hysterectomy, pap no longer indicated (09/23/2015); Repair MOHS (Left, 01/19/2017); Inject Paravertebral Facet Joint Lumbar / Sacral First (Bilateral, 06/28/2017); Inject Epidural Lumbar / Sacral Single (Bilateral, 08/09/2017);  "Destruction Of Paravertebral Facet Lumbar / Sacral Single (Bilateral, 09/12/2017); Inject Sacroiliac Joint (Bilateral, 02/07/2018); Inject Paravertebral Facet Joint Lumbar / Sacral Third (Bilateral, 03/09/2018); Radio Frequency Ablation / Destruction of Sacroiloac Joint Lateral Branches (S1/S2/S3) (Bilateral, 05/02/2018); and Colonoscopy with CO2 insufflation (N/A, 09/27/2021).     Examination   Ht 1.626 m (5' 4\")   Wt 80.9 kg (178 lb 4.8 oz)   LMP 08/07/2015   BMI 30.61 kg/m    Wt Readings from Last 4 Encounters:   04/03/24 80.9 kg (178 lb 4.8 oz)   12/30/23 81.6 kg (180 lb)   12/26/23 81.6 kg (180 lb)   12/18/23 79.8 kg (176 lb)      BP Readings from Last 3 Encounters:   12/30/23 124/83   12/26/23 135/86   07/13/23 124/82      GENERAL: alert and no distress  EYES: Eyes grossly normal to inspection.  No discharge or erythema, or obvious scleral/conjunctival abnormalities.  RESP: No audible wheeze, cough, or visible cyanosis.    SKIN: Visible skin clear. No significant rash, abnormal pigmentation or lesions.  NEURO: Cranial nerves grossly intact.  Mentation and speech appropriate for age.  PSYCH: Appropriate affect, tone, and pace of words        Counseling:   We reviewed the important post op bariatric recommendations:  -eating 3 meals daily  -eating protein first, getting >60gm protein daily  -eating slowly, chewing food well  -avoiding/limiting calorie containing beverages  -limiting starchy vegetables and carbohydrates, choosing wheat, not white with breads,   crackers, pastas, cornel, bagels, tortillas, rice  -limiting restaurant or cafeteria eating to twice a week or less    We discussed the importance of restorative sleep and stress management in maintaining a healthy weight.  We discussed the National Weight Control Registry healthy weight maintenance strategies and ways to optimize metabolism.  We discussed the importance of physical activity including cardiovascular and strength training in maintaining a " healthier weight.    Total time spent on the date of this encounter doing: chart review, review of test results, patient visit, physical exam, education, counseling, developing plan of care and documenting = 31 minutes.         KRISTINE Hong MD  [facility]

## 2024-04-03 ENCOUNTER — VIRTUAL VISIT (OUTPATIENT)
Dept: SURGERY | Facility: CLINIC | Age: 54
End: 2024-04-03
Payer: COMMERCIAL

## 2024-04-03 VITALS — BODY MASS INDEX: 30.44 KG/M2 | HEIGHT: 64 IN | WEIGHT: 178.3 LBS

## 2024-04-03 DIAGNOSIS — K21.9 GASTROESOPHAGEAL REFLUX DISEASE, UNSPECIFIED WHETHER ESOPHAGITIS PRESENT: ICD-10-CM

## 2024-04-03 DIAGNOSIS — E78.5 HYPERLIPIDEMIA LDL GOAL <130: ICD-10-CM

## 2024-04-03 DIAGNOSIS — E66.811 OBESITY (BMI 30.0-34.9): Primary | ICD-10-CM

## 2024-04-03 PROCEDURE — 99214 OFFICE O/P EST MOD 30 MIN: CPT | Mod: 95 | Performed by: FAMILY MEDICINE

## 2024-04-03 NOTE — PROGRESS NOTES
Virtual Visit Details    Type of service:  Video Visit     Originating Location (pt. Location): Home    Distant Location (provider location):  On-site  Platform used for Video Visit: [employer]  Video start time: 3:42 pm  Video end time: 4:00 pm

## 2024-04-03 NOTE — LETTER
4/3/2024         RE: Yenny Johnson  [address] [address]        Dear Colleague,    Thank you for referring your patient, Yenny Johnson, to the [facility] [place]. Please see a copy of my visit note below.    Bariatric Care Clinic Non Surgical Follow up Visit   Date of visit: 4/3/2024  Physician: KRISTINE Hong MD, MD  Primary Care is Cee Biggs.  Yenny Johnson   53 year old  female     Initial Weight: 199#  Initial BMI: 33.37  Today's Weight:   Wt Readings from Last 1 Encounters:   04/03/24 80.9 kg (178 lb 4.8 oz)     Body mass index is 30.61 kg/m .           Assessment and Plan   Assessment: Yenny is a 53 year old year old female who presents for medical weight management.      Plan:    1. Obesity  Patient was congratulated on her success thus far. Healthy habits to assist with further weight loss were discussed. She is going to start doing more exercise and is excited for this. She will continue the wegovy.     2. Hyperlipidemia LDL goal <40  This may improve with healthy habits and weight loss.     3. Gastroesophageal reflux disease, unspecified whether esophagitis present  This may improve with healthy habits and weight loss.      Follow up in 3-4 months with myself           INTERIM HISTORY  Patient is taking wegovy for appetite and craving control and she thinks it is helping. She joined a gym and is going twice a week. Now that the weather is improving she will be walking more. She finds that the food chatter is gone.     DIETARY HISTORY  Meals Per Day: 3  Eating Protein First?: yes  Food Diary: B:hard boiled eggs and coffee with protein shake L:sandwich with veggies and protein, sometimes salad D:protein and vegetable and sometime starch, sometimes just a small snack  Typical Snack: yogurt  Fluid Intake: 64 oz most days  Portion Control: yes  Calorie Containing Beverages: none    Positive Changes Since Last  Visit: on track  Struggling With: her exercise was curtailed for awhile due to her knee, she is happy to start doing more    Knowledgeable in Reading Food Labels: yes  Getting Adequate Protein: yes      PHYSICAL ACTIVITY PATTERNS:  Just started going to the gym    REVIEW OF SYSTEMS  GENERAL/CONSTITUTIONAL:  Fatigue: yes  HEENT:   glaucoma: no  CARDIOVASCULAR:  History of heart disease: no  GI:  Pancreatitis: no  MUSCULOSKELETAL/RHEUMATOLOGIC  Arthralgias: no- knee is feeling much better  Myalgias: no  ENDOCRINE:  Monitoring Blood Sugars: no  Sugars Well Controlled: na  No personal or family history of medullary thyroid cancer no  :  Birth control: hysterectomy  History of kidney stones: years ago     Patient Profile   Social History     Social History Narrative     Not on file        Past Medical History   Past Medical History:   Diagnosis Date     Anemia      BCC (basal cell carcinoma of skin)      Degeneration of lumbar or lumbosacral intervertebral disc      Depressive disorder      Diverticulitis of colon      Elevated fasting glucose      Elevated rheumatoid factor 12/12/2012     GERD (gastroesophageal reflux disease) 11/2005    EGD     Hyperlipidemia LDL goal <130      Hypertriglyceridemia      Hypothyroidism      Intermittent asthma      Lumbar disc herniation      Medial meniscus tear      Obesity 03/29/2012     Paroxysmal supraventricular tachycardia (H24) 02/16/2021     PMDD (premenstrual dysphoric disorder)      Stress incontinence, female 03/29/2012     Subclavian arterial stenosis (H24) 3/28/2023     Patient Active Problem List   Diagnosis     Obesity     Stress incontinence, female     GERD (gastroesophageal reflux disease)     Hypothyroidism     Lumbar disc herniation     Intermittent asthma     Hyperlipidemia LDL goal <40     Medial meniscus tear     Subclavian arterial stenosis (H24)     Perimenopausal symptoms       Past Surgical History  She has a past surgical history that includes  "cholecystectomy, laporoscopic (1995); tubal ligation; c/section, classical; biopsy; hysterectomy, pap no longer indicated (09/23/2015); Repair MOHS (Left, 01/19/2017); Inject Paravertebral Facet Joint Lumbar / Sacral First (Bilateral, 06/28/2017); Inject Epidural Lumbar / Sacral Single (Bilateral, 08/09/2017); Destruction Of Paravertebral Facet Lumbar / Sacral Single (Bilateral, 09/12/2017); Inject Sacroiliac Joint (Bilateral, 02/07/2018); Inject Paravertebral Facet Joint Lumbar / Sacral Third (Bilateral, 03/09/2018); Radio Frequency Ablation / Destruction of Sacroiloac Joint Lateral Branches (S1/S2/S3) (Bilateral, 05/02/2018); and Colonoscopy with CO2 insufflation (N/A, 09/27/2021).     Examination   Ht 1.626 m (5' 4\")   Wt 80.9 kg (178 lb 4.8 oz)   LMP 08/07/2015   BMI 30.61 kg/m    Wt Readings from Last 4 Encounters:   04/03/24 80.9 kg (178 lb 4.8 oz)   12/30/23 81.6 kg (180 lb)   12/26/23 81.6 kg (180 lb)   12/18/23 79.8 kg (176 lb)      BP Readings from Last 3 Encounters:   12/30/23 124/83   12/26/23 135/86   07/13/23 124/82      GENERAL: alert and no distress  EYES: Eyes grossly normal to inspection.  No discharge or erythema, or obvious scleral/conjunctival abnormalities.  RESP: No audible wheeze, cough, or visible cyanosis.    SKIN: Visible skin clear. No significant rash, abnormal pigmentation or lesions.  NEURO: Cranial nerves grossly intact.  Mentation and speech appropriate for age.  PSYCH: Appropriate affect, tone, and pace of words        Counseling:   We reviewed the important post op bariatric recommendations:  -eating 3 meals daily  -eating protein first, getting >60gm protein daily  -eating slowly, chewing food well  -avoiding/limiting calorie containing beverages  -limiting starchy vegetables and carbohydrates, choosing wheat, not white with breads,   crackers, pastas, cornel, bagels, tortillas, rice  -limiting restaurant or cafeteria eating to twice a week or less    We discussed the importance " of restorative sleep and stress management in maintaining a healthy weight.  We discussed the National Weight Control Registry healthy weight maintenance strategies and ways to optimize metabolism.  We discussed the importance of physical activity including cardiovascular and strength training in maintaining a healthier weight.    Total time spent on the date of this encounter doing: chart review, review of test results, patient visit, physical exam, education, counseling, developing plan of care and documenting = 31 minutes.         KRISTINE Hong MD  [facility]           Virtual Visit Details    Type of service:  Video Visit     Originating Location (pt. Location): Home    Distant Location (provider location):  On-site  Platform used for Video Visit: [employer]  Video start time: 3:42 pm  Video end time: 4:00 pm      Again, thank you for allowing me to participate in the care of your patient.        Sincerely,        KRISTINE Hong MD

## 2024-04-03 NOTE — NURSING NOTE
Is the patient currently in the state of [place]? YES    Visit mode:VIDEO    If the visit is dropped, the patient can be reconnected by: VIDEO VISIT: Text to cell phone:   Telephone Information:   Mobile [phone]       Will anyone else be joining the visit? NO  (If patient encounters technical issues they should call [phone] :[phone])    How would you like to obtain your AVS? MyChart    Are changes needed to the allergy or medication list? No    Reason for visit: Follow Up    Ivania KENDRICKF

## 2024-05-29 DIAGNOSIS — I77.1 SUBCLAVIAN ARTERIAL STENOSIS (H): ICD-10-CM

## 2024-05-29 RX ORDER — ASPIRIN 81 MG/1
81 TABLET, COATED ORAL DAILY
Qty: 90 TABLET | Refills: 1 | Status: SHIPPED | OUTPATIENT
Start: 2024-05-29

## 2024-07-23 NOTE — PROGRESS NOTES
[facility] Non Surgical Follow up Visit   Date of visit: 7/29/2024  Physician: KRISTINE Hong MD, MD  Primary Care is Cee Biggs.  Yenny Johnson   54 year old  female     Initial Weight: 199#  Initial BMI: 33.37  Today's Weight:   Wt Readings from Last 1 Encounters:   07/29/24 82.6 kg (182 lb)     Body mass index is 30.77 kg/m .           Assessment and Plan   Assessment: Yenny is a 54 year old year old female who presents for medical weight management.      Plan:    1. Obesity (BMI 30.0-34.9)  Patient was congratulated on her success thus far. Healthy habits to assist with further weight loss were discussed. She is getting back on track with meal planning and prepping and less eating out. She will get back to her walking and will start circuit training again. She will continue the wegovy. We discussed the patient's co-morbid conditions including hyperlipidemia and GERD. These likely will improve with healthy habits and weight loss.     2. Hyperlipidemia LDL goal <40  This may improve with healthy habits and weight loss.     3. Gastroesophageal reflux disease, unspecified whether esophagitis present  This may improve with healthy habits and weight loss.      Follow up in 3-4 months with myself           INTERIM HISTORY  Patient is taking wegovy for appetite and craving control and she thinks it is helping. Her side effects have gone away. She has been busy with social activities.      DIETARY HISTORY  Meals Per Day: 3  Eating Protein First?: yes  Food Diary: B:protein shake in her coffee and hard boiled eggs or greek yogurt L:chicken salad and a couple of cornel chips and carrot sticks D:chili- protein and vegetables and sometimes starch  Snacks Per Day: rare  Fluid Intake: 64 oz  Portion Control: yes  Calorie Containing Beverages: rare white claw  Meals at Restaurant per week:1-2    Positive Changes Since Last Visit: increased protein, decreased starch  Struggling With:  exercise    Knowledgeable in Reading Food Labels: yes  Getting Adequate Protein: yes    PHYSICAL ACTIVITY PATTERNS:  Walking 0-1 days per week, was doing 5  Active in general. No muscle building activity- she plans to restart circuit training    REVIEW OF SYSTEMS  GENERAL/CONSTITUTIONAL:  Fatigue: sometimes  GI:  Pancreatitis: no  PSYCHIATRIC:  Moods: stable  MUSCULOSKELETAL/RHEUMATOLOGIC  Arthralgias: no  Myalgias: no  ENDOCRINE:  Monitoring Blood Sugars: no  Sugars Well Controlled: na  No personal or family history of medullary thyroid cancer no  No personal or family history of MEN2   :  Birth control: menopause  History of kidney stones: no     Patient Profile   Social History     Social History Narrative    Not on file        Past Medical History   Past Medical History:   Diagnosis Date    Anemia     BCC (basal cell carcinoma of skin)     Degeneration of lumbar or lumbosacral intervertebral disc     Depressive disorder     Diverticulitis of colon     Elevated fasting glucose     Elevated rheumatoid factor 12/12/2012    GERD (gastroesophageal reflux disease) 11/2005    EGD    Hyperlipidemia LDL goal <130     Hypertriglyceridemia     Hypothyroidism     Intermittent asthma     Lumbar disc herniation     Medial meniscus tear     Obesity 03/29/2012    Paroxysmal supraventricular tachycardia (H24) 02/16/2021    PMDD (premenstrual dysphoric disorder)     Stress incontinence, female 03/29/2012    Subclavian arterial stenosis (H24) 3/28/2023     Patient Active Problem List   Diagnosis    Obesity    Stress incontinence, female    GERD (gastroesophageal reflux disease)    Hypothyroidism    Lumbar disc herniation    Intermittent asthma    Hyperlipidemia LDL goal <40    Medial meniscus tear    Subclavian arterial stenosis (H24)    Perimenopausal symptoms       Past Surgical History  She has a past surgical history that includes cholecystectomy, laporoscopic (1995); tubal ligation; c/section, classical; biopsy;  "hysterectomy, pap no longer indicated (09/23/2015); Repair MOHS (Left, 01/19/2017); Inject Paravertebral Facet Joint Lumbar / Sacral First (Bilateral, 06/28/2017); Inject Epidural Lumbar / Sacral Single (Bilateral, 08/09/2017); Destruction Of Paravertebral Facet Lumbar / Sacral Single (Bilateral, 09/12/2017); Inject Sacroiliac Joint (Bilateral, 02/07/2018); Inject Paravertebral Facet Joint Lumbar / Sacral Third (Bilateral, 03/09/2018); Radio Frequency Ablation / Destruction of Sacroiloac Joint Lateral Branches (S1/S2/S3) (Bilateral, 05/02/2018); and Colonoscopy with CO2 insufflation (N/A, 09/27/2021).     Examination   Ht 1.638 m (5' 4.49\")   Wt 82.6 kg (182 lb)   LMP 08/07/2015   BMI 30.77 kg/m    Wt Readings from Last 4 Encounters:   07/29/24 82.6 kg (182 lb)   04/03/24 80.9 kg (178 lb 4.8 oz)   12/30/23 81.6 kg (180 lb)   12/26/23 81.6 kg (180 lb)      BP Readings from Last 3 Encounters:   12/30/23 124/83   12/26/23 135/86   07/13/23 124/82      GENERAL: alert and no distress  EYES: Eyes grossly normal to inspection.  No discharge or erythema, or obvious scleral/conjunctival abnormalities.  RESP: No audible wheeze, cough, or visible cyanosis.    SKIN: Visible skin clear. No significant rash, abnormal pigmentation or lesions.  NEURO: Cranial nerves grossly intact.  Mentation and speech appropriate for age.  PSYCH: Appropriate affect, tone, and pace of words        Counseling:   We reviewed the important post op bariatric recommendations:  -eating 3 meals daily  -eating protein first, getting >60gm protein daily  -eating slowly, chewing food well  -avoiding/limiting calorie containing beverages  -limiting starchy vegetables and carbohydrates, choosing wheat, not white with breads,   crackers, pastas, cornel, bagels, tortillas, rice  -limiting restaurant or cafeteria eating to twice a week or less    We discussed the importance of restorative sleep and stress management in maintaining a healthy weight.  We " discussed the National Weight Control Registry healthy weight maintenance strategies and ways to optimize metabolism.  We discussed the importance of physical activity including cardiovascular and strength training in maintaining a healthier weight.    Total time spent on the date of this encounter doing: chart review, review of test results, patient visit, physical exam, education, counseling, developing plan of care and documenting = 31 minutes.         KRISTINE Hong MD  [facility]

## 2024-07-29 ENCOUNTER — VIRTUAL VISIT (OUTPATIENT)
Dept: SURGERY | Facility: CLINIC | Age: 54
End: 2024-07-29
Payer: COMMERCIAL

## 2024-07-29 VITALS — HEIGHT: 64 IN | BODY MASS INDEX: 31.07 KG/M2 | WEIGHT: 182 LBS

## 2024-07-29 DIAGNOSIS — K21.9 GASTROESOPHAGEAL REFLUX DISEASE, UNSPECIFIED WHETHER ESOPHAGITIS PRESENT: ICD-10-CM

## 2024-07-29 DIAGNOSIS — E78.5 HYPERLIPIDEMIA LDL GOAL <130: ICD-10-CM

## 2024-07-29 DIAGNOSIS — E66.811 OBESITY (BMI 30.0-34.9): Primary | ICD-10-CM

## 2024-07-29 PROCEDURE — 99214 OFFICE O/P EST MOD 30 MIN: CPT | Mod: 95 | Performed by: FAMILY MEDICINE

## 2024-07-29 ASSESSMENT — PAIN SCALES - GENERAL: PAINLEVEL: NO PAIN (0)

## 2024-07-29 NOTE — PROGRESS NOTES
Virtual Visit Details    Type of service:  Video Visit     Originating Location (pt. Location): Other at work in [place]    Distant Location (provider location):  Off-site  Platform used for Video Visit: Dex  Video start time: 3:13 pm  Video end time: 3:25 pm

## 2024-07-29 NOTE — NURSING NOTE
Current patient location: [address] [facility] [address] [address]    Is the patient currently in the state of [place]? YES    Visit mode:VIDEO    If the visit is dropped, the patient can be reconnected by: VIDEO VISIT: Text to cell phone:   Telephone Information:   Mobile [phone]       Will anyone else be joining the visit? NO  (If patient encounters technical issues they should call [phone]    How would you like to obtain your AVS? MyChart    Are changes needed to the allergy or medication list? No    Are refills needed on medications prescribed by this physician? YES    Reason for visit: Follow Up    Melissa WALKER

## 2024-07-29 NOTE — LETTER
7/29/2024      Yenny Johnson  [address] [facility] [address] [address]      Dear Colleague,    Thank you for referring your patient, Yenny Johnson, to the [facility] [place]. Please see a copy of my visit note below.    Bariatric Care Clinic Non Surgical Follow up Visit   Date of visit: 7/29/2024  Physician: KRISTINE Hong MD, MD  Primary Care is Cee Biggs.  Yenny Johnson   54 year old  female     Initial Weight: 199#  Initial BMI: 33.37  Today's Weight:   Wt Readings from Last 1 Encounters:   07/29/24 82.6 kg (182 lb)     Body mass index is 30.77 kg/m .           Assessment and Plan   Assessment: Yenny is a 54 year old year old female who presents for medical weight management.      Plan:    1. Obesity (BMI 30.0-34.9)  Patient was congratulated on her success thus far. Healthy habits to assist with further weight loss were discussed. She is getting back on track with meal planning and prepping and less eating out. She will get back to her walking and will start circuit training again. She will continue the wegovy. We discussed the patient's co-morbid conditions including hyperlipidemia and GERD. These likely will improve with healthy habits and weight loss.     2. Hyperlipidemia LDL goal <40  This may improve with healthy habits and weight loss.     3. Gastroesophageal reflux disease, unspecified whether esophagitis present  This may improve with healthy habits and weight loss.      Follow up in 3-4 months with myself           INTERIM HISTORY  Patient is taking wegovy for appetite and craving control and she thinks it is helping. Her side effects have gone away. She has been busy with social activities.      DIETARY HISTORY  Meals Per Day: 3  Eating Protein First?: yes  Food Diary: B:protein shake in her coffee and hard boiled eggs or greek yogurt L:chicken salad and a couple of cornel chips and carrot sticks D:chili- protein and vegetables  and sometimes starch  Snacks Per Day: rare  Fluid Intake: 64 oz  Portion Control: yes  Calorie Containing Beverages: rare white claw  Meals at Restaurant per week:1-2    Positive Changes Since Last Visit: increased protein, decreased starch  Struggling With: exercise    Knowledgeable in Reading Food Labels: yes  Getting Adequate Protein: yes    PHYSICAL ACTIVITY PATTERNS:  Walking 0-1 days per week, was doing 5  Active in general. No muscle building activity- she plans to restart circuit training    REVIEW OF SYSTEMS  GENERAL/CONSTITUTIONAL:  Fatigue: sometimes  GI:  Pancreatitis: no  PSYCHIATRIC:  Moods: stable  MUSCULOSKELETAL/RHEUMATOLOGIC  Arthralgias: no  Myalgias: no  ENDOCRINE:  Monitoring Blood Sugars: no  Sugars Well Controlled: na  No personal or family history of medullary thyroid cancer no  No personal or family history of MEN2   :  Birth control: menopause  History of kidney stones: no     Patient Profile   Social History     Social History Narrative     Not on file        Past Medical History   Past Medical History:   Diagnosis Date     Anemia      BCC (basal cell carcinoma of skin)      Degeneration of lumbar or lumbosacral intervertebral disc      Depressive disorder      Diverticulitis of colon      Elevated fasting glucose      Elevated rheumatoid factor 12/12/2012     GERD (gastroesophageal reflux disease) 11/2005    EGD     Hyperlipidemia LDL goal <130      Hypertriglyceridemia      Hypothyroidism      Intermittent asthma      Lumbar disc herniation      Medial meniscus tear      Obesity 03/29/2012     Paroxysmal supraventricular tachycardia (H24) 02/16/2021     PMDD (premenstrual dysphoric disorder)      Stress incontinence, female 03/29/2012     Subclavian arterial stenosis (H24) 3/28/2023     Patient Active Problem List   Diagnosis     Obesity     Stress incontinence, female     GERD (gastroesophageal reflux disease)     Hypothyroidism     Lumbar disc herniation     Intermittent asthma      "Hyperlipidemia LDL goal <40     Medial meniscus tear     Subclavian arterial stenosis (H24)     Perimenopausal symptoms       Past Surgical History  She has a past surgical history that includes cholecystectomy, laporoscopic (1995); tubal ligation; c/section, classical; biopsy; hysterectomy, pap no longer indicated (09/23/2015); Repair MOHS (Left, 01/19/2017); Inject Paravertebral Facet Joint Lumbar / Sacral First (Bilateral, 06/28/2017); Inject Epidural Lumbar / Sacral Single (Bilateral, 08/09/2017); Destruction Of Paravertebral Facet Lumbar / Sacral Single (Bilateral, 09/12/2017); Inject Sacroiliac Joint (Bilateral, 02/07/2018); Inject Paravertebral Facet Joint Lumbar / Sacral Third (Bilateral, 03/09/2018); Radio Frequency Ablation / Destruction of Sacroiloac Joint Lateral Branches (S1/S2/S3) (Bilateral, 05/02/2018); and Colonoscopy with CO2 insufflation (N/A, 09/27/2021).     Examination   Ht 1.638 m (5' 4.49\")   Wt 82.6 kg (182 lb)   LMP 08/07/2015   BMI 30.77 kg/m    Wt Readings from Last 4 Encounters:   07/29/24 82.6 kg (182 lb)   04/03/24 80.9 kg (178 lb 4.8 oz)   12/30/23 81.6 kg (180 lb)   12/26/23 81.6 kg (180 lb)      BP Readings from Last 3 Encounters:   12/30/23 124/83   12/26/23 135/86   07/13/23 124/82      GENERAL: alert and no distress  EYES: Eyes grossly normal to inspection.  No discharge or erythema, or obvious scleral/conjunctival abnormalities.  RESP: No audible wheeze, cough, or visible cyanosis.    SKIN: Visible skin clear. No significant rash, abnormal pigmentation or lesions.  NEURO: Cranial nerves grossly intact.  Mentation and speech appropriate for age.  PSYCH: Appropriate affect, tone, and pace of words        Counseling:   We reviewed the important post op bariatric recommendations:  -eating 3 meals daily  -eating protein first, getting >60gm protein daily  -eating slowly, chewing food well  -avoiding/limiting calorie containing beverages  -limiting starchy vegetables and " carbohydrates, choosing wheat, not white with breads,   crackers, pastas, cornel, bagels, tortillas, rice  -limiting restaurant or cafeteria eating to twice a week or less    We discussed the importance of restorative sleep and stress management in maintaining a healthy weight.  We discussed the National Weight Control Registry healthy weight maintenance strategies and ways to optimize metabolism.  We discussed the importance of physical activity including cardiovascular and strength training in maintaining a healthier weight.    Total time spent on the date of this encounter doing: chart review, review of test results, patient visit, physical exam, education, counseling, developing plan of care and documenting = 31 minutes.         KRISTINE Hong MD  [facility]           Virtual Visit Details    Type of service:  Video Visit     Originating Location (pt. Location): Other at work in [place]    Distant Location (provider location):  Off-site  Platform used for Video Visit: [employer]  Video start time: 3:13 pm  Video end time: 3:25 pm      Again, thank you for allowing me to participate in the care of your patient.        Sincerely,        KRISTINE Hong MD

## 2024-09-03 ENCOUNTER — OFFICE VISIT (OUTPATIENT)
Dept: URGENT CARE | Facility: URGENT CARE | Age: 54
End: 2024-09-03
Payer: COMMERCIAL

## 2024-09-03 VITALS
OXYGEN SATURATION: 97 % | HEART RATE: 68 BPM | RESPIRATION RATE: 16 BRPM | DIASTOLIC BLOOD PRESSURE: 86 MMHG | BODY MASS INDEX: 31.07 KG/M2 | TEMPERATURE: 98.5 F | SYSTOLIC BLOOD PRESSURE: 143 MMHG | WEIGHT: 183.8 LBS

## 2024-09-03 DIAGNOSIS — T63.461A WASP STING, ACCIDENTAL OR UNINTENTIONAL, INITIAL ENCOUNTER: Primary | ICD-10-CM

## 2024-09-03 PROCEDURE — 99213 OFFICE O/P EST LOW 20 MIN: CPT | Performed by: INTERNAL MEDICINE

## 2024-09-03 RX ORDER — CEPHALEXIN 500 MG/1
500 CAPSULE ORAL 4 TIMES DAILY
Qty: 28 CAPSULE | Refills: 0 | Status: SHIPPED | OUTPATIENT
Start: 2024-09-03 | End: 2024-09-10

## 2024-09-03 NOTE — PROGRESS NOTES
SUBJECTIVE:  Yenny Johnson is an 54 year old female who presents for wasp stings. Was stung two days ago including on the abdomen and arm.  Stings initially hurt.  Has had increased area of redness and warmth on abdomen.  Area is itchy.  She had marked the area of redness and it has expanded past that.  The area is itchy and warm but doesn't hurt.  Has iced the area.  No fevers, chills, or sweats. No swelling of lips, tongue, or throat.  No trouble with breathing.    PMH:   has a past medical history of Anemia, BCC (basal cell carcinoma of skin), Degeneration of lumbar or lumbosacral intervertebral disc, Depressive disorder, Diverticulitis of colon, Elevated fasting glucose, Elevated rheumatoid factor (12/12/2012), GERD (gastroesophageal reflux disease) (11/2005), Hyperlipidemia LDL goal <130, Hypertriglyceridemia, Hypothyroidism, Intermittent asthma, Lumbar disc herniation, Medial meniscus tear, Obesity (03/29/2012), Paroxysmal supraventricular tachycardia (H24) (02/16/2021), PMDD (premenstrual dysphoric disorder), Stress incontinence, female (03/29/2012), and Subclavian arterial stenosis (H24) (3/28/2023).  Patient Active Problem List   Diagnosis    Obesity    Stress incontinence, female    GERD (gastroesophageal reflux disease)    Hypothyroidism    Lumbar disc herniation    Intermittent asthma    Hyperlipidemia LDL goal <40    Medial meniscus tear    Subclavian arterial stenosis (H24)    Perimenopausal symptoms     Social History     Socioeconomic History    Marital status:      Spouse name: Pasquale    Number of children: 4    Years of education: 15    Highest education level: None   Occupational History    Occupation: Certified Medical Assistant     Employer: [employer]   Tobacco Use    Smoking status: Former     Current packs/day: 0.00     Average packs/day: 1 pack/day for 14.0 years (14.0 ttl pk-yrs)     Types: Cigarettes     Start date: 3/30/1984     Quit date: 3/30/1998     Years since  quittin.4    Smokeless tobacco: Never   Vaping Use    Vaping status: Never Used   Substance and Sexual Activity    Alcohol use: No    Drug use: No    Sexual activity: Yes     Partners: Male     Birth control/protection: Female Surgical     Comment: tubal ligation   Other Topics Concern    Parent/sibling w/ CABG, MI or angioplasty before 65F 55M? Yes     Service No    Blood Transfusions No    Caffeine Concern No    Occupational Exposure No    Hobby Hazards No    Sleep Concern No    Stress Concern No    Weight Concern No    Special Diet No    Back Care No    Exercise Yes    Bike Helmet No    Seat Belt Yes    Self-Exams Yes     Social Determinants of Health     Financial Resource Strain: Low Risk  (2023)    Financial Resource Strain     Within the past 12 months, have you or your family members you live with been unable to get utilities (heat, electricity) when it was really needed?: No   Food Insecurity: Low Risk  (2023)    Food Insecurity     Within the past 12 months, did you worry that your food would run out before you got money to buy more?: No     Within the past 12 months, did the food you bought just not last and you didn t have money to get more?: No   Transportation Needs: Low Risk  (2023)    Transportation Needs     Within the past 12 months, has lack of transportation kept you from medical appointments, getting your medicines, non-medical meetings or appointments, work, or from getting things that you need?: No   Interpersonal Safety: Low Risk  (2023)    Interpersonal Safety     Do you feel physically and emotionally safe where you currently live?: Yes     Within the past 12 months, have you been hit, slapped, kicked or otherwise physically hurt by someone?: No     Within the past 12 months, have you been humiliated or emotionally abused in other ways by your partner or ex-partner?: No   Housing Stability: Low Risk  (2023)    Housing Stability     Do you have  housing? : Yes     Are you worried about losing your housing?: No     Family History   Problem Relation Age of Onset    Cardiovascular Mother         CHF     Coronary Artery Disease Mother         Cholesterol    Hyperlipidemia Mother     C.A.D. Father 67        MI    Hypertension Father     Alcohol/Drug Father     Obesity Sister     Obesity Sister     Diabetes Brother     Asthma Brother     Obesity Brother     Coronary Artery Disease Brother     Allergies Maternal Grandmother     Diabetes Maternal Grandmother     Diabetes Paternal Grandfather     Seasonal/Environmental Allergies Daughter     Cancer No family hx of        ALLERGIES:  Atorvastatin    Current Outpatient Medications   Medication Sig Dispense Refill    aspirin (ASPIRIN LOW DOSE) 81 MG EC tablet TAKE 1 TABLET BY MOUTH EVERY DAY 90 tablet 1    FLUoxetine (PROZAC) 20 MG capsule TAKE 3 CAPSULES BY MOUTH EVERY  capsule 3    levothyroxine (SYNTHROID/LEVOTHROID) 88 MCG tablet Take 1 tablet (88 mcg) by mouth daily 90 tablet 3    nystatin (MYCOSTATIN) 501390 UNIT/GM external powder Apply topically daily as needed for dry skin 60 g 11    Semaglutide-Weight Management (WEGOVY) 2.4 MG/0.75ML pen Inject 2.4 mg subcutaneously once a week 9 mL 1     No current facility-administered medications for this visit.         ROS:  ROS is done and is negative for general/constitutional, eye, ENT, Respiratory, cardiovascular, GI, , Skin, musculoskeletal except as noted elsewhere.  All other review of systems negative except as noted elsewhere.      OBJECTIVE:  BP (!) 143/86   Pulse 68   Temp 98.5  F (36.9  C) (Tympanic)   Resp 16   Wt 83.4 kg (183 lb 12.8 oz)   LMP 08/07/2015   SpO2 97%   BMI 31.07 kg/m    GENERAL APPEARANCE: Alert, in no acute distress  EYES: normal  NOSE:normal  OROPHARYNX:normal  NECK:No adenopathy,masses or thyromegaly  RESP: normal and clear to auscultation  CV:regular rate and rhythm and no murmurs, clicks, or gallops  ABDOMEN: left lower  abdomen with large area of mild to moderate erythema approx 25cm which is warm to touch but not tender, no drainage or fluctuance.  Abdomen soft, non-tender. BS normal. No masses, organomegaly  SKIN: abdomen as above.  MUSCULOSKELETAL:Musculoskeletal normal      RESULTS  No results found for any visits on 09/03/24..  No results found for this or any previous visit (from the past 48 hour(s)).    ASSESSMENT/PLAN:    ASSESSMENT / PLAN:  (T64.405P) Wasp sting, accidental or unintentional, initial encounter  (primary encounter diagnosis)  Comment: currently area of redness and warmth on abdomen are most c/with reaction to sting, as cellulitis would be expected to cause more pain.  However, pt is to monitor for changes and if becomes painful or if erythema continues to expand or if has fevers or chills, is to start cephalexin  Plan: cephALEXin (KEFLEX) 500 MG capsule       Reviewed medication instructions, including if and when to start it,  and side effects. Follow up if experiences side effects.. I reviewed supportive care, otc meds to use if needed, expected course, and signs of concern.  Follow up as needed or if does not improve within 5 day(s) or if worsens in any way.  Reviewed red flag symptoms and is to go to the ER if experiences any of these. Advised zyrtec, ibuprofen, and icing for current sxs.    See [facility] for orders, medications, letters, patient instructions    Laura Her M.D.

## 2024-09-03 NOTE — PATIENT INSTRUCTIONS
If the red area on your abdomen becomes painful or continues to get bigger, start the cephalexin antibiotic.

## 2024-10-07 ENCOUNTER — ANCILLARY PROCEDURE (OUTPATIENT)
Dept: MAMMOGRAPHY | Facility: CLINIC | Age: 54
End: 2024-10-07
Attending: FAMILY MEDICINE
Payer: COMMERCIAL

## 2024-10-07 DIAGNOSIS — Z12.31 VISIT FOR SCREENING MAMMOGRAM: ICD-10-CM

## 2024-10-07 PROCEDURE — 77067 SCR MAMMO BI INCL CAD: CPT | Mod: TC | Performed by: RADIOLOGY

## 2024-10-07 PROCEDURE — 77063 BREAST TOMOSYNTHESIS BI: CPT | Mod: TC | Performed by: RADIOLOGY

## 2024-11-24 NOTE — PROGRESS NOTES
[facility] Non Surgical Follow up Visit   Date of visit: 11/27/2024  Physician: KRISTINE Hong MD, MD  Primary Care is Cee Biggs.  Yenny Johnson   54 year old  female     Initial Weight: 199#  Initial BMI: 33.37  Today's Weight:   Wt Readings from Last 1 Encounters:   11/27/24 82.8 kg (182 lb 8 oz)     Body mass index is 30.84 kg/m .           Assessment and Plan   Assessment: Yenny is a 54 year old year old female who presents for medical weight management.      Plan:    1. Obesity (BMI 30.0-34.9) (Primary)  Patient was congratulated on her success thus far. Healthy habits to assist with further weight loss were discussed. She will try to ramp up her muscle building exercise. she will continue the wegovy. We discussed the patient's co-morbid conditions including hyperlipidemia and GERD. These likely will improve with healthy habits and weight loss.     2. Hyperlipidemia LDL goal <40  This may improve with healthy habits and weight loss.     3. Gastroesophageal reflux disease, unspecified whether esophagitis present  This may improve with healthy habits and weight loss.      Follow up in 4 months with myself           INTERIM HISTORY  Patient is taking wegovy for appetite and craving control and she thinks it is helping. She is having some muscle spasms. Her cravings are reduced. She is full with a smaller amount of food.     DIETARY HISTORY  Meals Per Day: 3  Eating Protein First?: yes  Food Diary: B:protein shake in coffee with 2 hard boiled eggs, rare bagel L:protein and vegetable D:sandwich or salad, lighter meal  Snacks Per Day: occasional, more at work  Typical Snack: almonds, yogurt  Fluid Intake: working on it  Portion Control: yes  Calorie Containing Beverages: alcohol 2 x per months  Meals at Restaurant per week:2, making good chices    Positive Changes Since Last Visit: healthy meal choices, portion control, rare snacking  Struggling With: getting adequate exercise, cramping  in her muscles at times    Knowledgeable in Reading Food Labels: yes  Getting Adequate Protein: yes    PHYSICAL ACTIVITY PATTERNS:  Walking or chair exercise 2-3 times a week      REVIEW OF SYSTEMS  GENERAL/CONSTITUTIONAL:  Fatigue: no  GI:  Pancreatitis: no  NEUROLOGIC:  Paresthesias: no  History of migraine headaches: not discussed  PSYCHIATRIC:  Moods: happy with her weight loss and maintenance  MUSCULOSKELETAL/RHEUMATOLOGIC  Arthralgias: yes  Myalgias: no  ENDOCRINE:  Monitoring Blood Sugars: no  Sugars Well Controlled: na  No personal or family history of medullary thyroid cancer no  No personal or family history of MEN2        Patient Profile   Social History     Social History Narrative    Not on file        Past Medical History   Past Medical History:   Diagnosis Date    Anemia     BCC (basal cell carcinoma of skin)     Degeneration of lumbar or lumbosacral intervertebral disc     Depressive disorder     Diverticulitis of colon     Elevated fasting glucose     Elevated rheumatoid factor 12/12/2012    GERD (gastroesophageal reflux disease) 11/2005    EGD    Hyperlipidemia LDL goal <130     Hypertriglyceridemia     Hypothyroidism     Intermittent asthma     Lumbar disc herniation     Medial meniscus tear     Obesity 03/29/2012    Paroxysmal supraventricular tachycardia (H) 02/16/2021    PMDD (premenstrual dysphoric disorder)     Stress incontinence, female 03/29/2012    Subclavian arterial stenosis (H) 3/28/2023     Patient Active Problem List   Diagnosis    Obesity    Stress incontinence, female    GERD (gastroesophageal reflux disease)    Hypothyroidism    Lumbar disc herniation    Intermittent asthma    Hyperlipidemia LDL goal <40    Medial meniscus tear    Subclavian arterial stenosis (H)    Perimenopausal symptoms       Past Surgical History  She has a past surgical history that includes cholecystectomy, laporoscopic (1995); tubal ligation; c/section, classical; biopsy; hysterectomy, pap no longer  "indicated (09/23/2015); Repair MOHS (Left, 01/19/2017); Inject Paravertebral Facet Joint Lumbar / Sacral First (Bilateral, 06/28/2017); Inject Epidural Lumbar / Sacral Single (Bilateral, 08/09/2017); Destruction Of Paravertebral Facet Lumbar / Sacral Single (Bilateral, 09/12/2017); Inject Sacroiliac Joint (Bilateral, 02/07/2018); Inject Paravertebral Facet Joint Lumbar / Sacral Third (Bilateral, 03/09/2018); Radio Frequency Ablation / Destruction of Sacroiloac Joint Lateral Branches (S1/S2/S3) (Bilateral, 05/02/2018); and Colonoscopy with CO2 insufflation (N/A, 09/27/2021).     Examination   Ht 1.638 m (5' 4.5\")   Wt 82.8 kg (182 lb 8 oz)   LMP 08/07/2015   BMI 30.84 kg/m    Wt Readings from Last 4 Encounters:   11/27/24 82.8 kg (182 lb 8 oz)   09/03/24 83.4 kg (183 lb 12.8 oz)   07/29/24 82.6 kg (182 lb)   04/03/24 80.9 kg (178 lb 4.8 oz)      BP Readings from Last 3 Encounters:   09/03/24 (!) 143/86   12/30/23 124/83   12/26/23 135/86      GENERAL: alert and no distress  EYES: Eyes grossly normal to inspection.  No discharge or erythema, or obvious scleral/conjunctival abnormalities.  RESP: No audible wheeze, cough, or visible cyanosis.    SKIN: Visible skin clear. No significant rash, abnormal pigmentation or lesions.  NEURO: Cranial nerves grossly intact.  Mentation and speech appropriate for age.  PSYCH: Appropriate affect, tone, and pace of words        Counseling:   We reviewed the important post op bariatric recommendations:  -eating 3 meals daily  -eating protein first, getting >60gm protein daily  -eating slowly, chewing food well  -avoiding/limiting calorie containing beverages  -limiting starchy vegetables and carbohydrates, choosing wheat, not white with breads,   crackers, pastas, cornel, bagels, tortillas, rice  -limiting restaurant or cafeteria eating to twice a week or less    We discussed the importance of restorative sleep and stress management in maintaining a healthy weight.  We discussed the " National Weight Control Registry healthy weight maintenance strategies and ways to optimize metabolism.  We discussed the importance of physical activity including cardiovascular and strength training in maintaining a healthier weight.    Total time spent on the date of this encounter doing: chart review, review of test results, patient visit, physical exam, education, counseling, developing plan of care and documenting = 23 minutes.         KRISTINE Hong MD  [facility]

## 2024-11-27 ENCOUNTER — VIRTUAL VISIT (OUTPATIENT)
Dept: SURGERY | Facility: CLINIC | Age: 54
End: 2024-11-27
Payer: COMMERCIAL

## 2024-11-27 VITALS — BODY MASS INDEX: 30.41 KG/M2 | WEIGHT: 182.5 LBS | HEIGHT: 65 IN

## 2024-11-27 DIAGNOSIS — I77.1 SUBCLAVIAN ARTERIAL STENOSIS (H): ICD-10-CM

## 2024-11-27 DIAGNOSIS — E78.5 HYPERLIPIDEMIA LDL GOAL <130: ICD-10-CM

## 2024-11-27 DIAGNOSIS — K21.9 GASTROESOPHAGEAL REFLUX DISEASE, UNSPECIFIED WHETHER ESOPHAGITIS PRESENT: ICD-10-CM

## 2024-11-27 DIAGNOSIS — E66.811 OBESITY (BMI 30.0-34.9): Primary | ICD-10-CM

## 2024-11-27 PROCEDURE — 99213 OFFICE O/P EST LOW 20 MIN: CPT | Mod: 95 | Performed by: FAMILY MEDICINE

## 2024-11-27 ASSESSMENT — PAIN SCALES - GENERAL: PAINLEVEL_OUTOF10: NO PAIN (0)

## 2024-11-27 NOTE — PROGRESS NOTES
Virtual Visit Details    Type of service:  Video Visit     Originating Location (pt. Location): Other at work in [place]    Distant Location (provider location):  Off-site  Platform used for Video Visit: Dex    Video start time: 9:46 am  Video end time: 10:01 am

## 2024-11-27 NOTE — PATIENT INSTRUCTIONS

## 2024-11-27 NOTE — NURSING NOTE
Current patient location: work     Is the patient currently in the state of [place]? YES    Visit mode:VIDEO    If the visit is dropped, the patient can be reconnected by: VIDEO VISIT: Text to cell phone:   Telephone Information:   Mobile [phone]       Will anyone else be joining the visit? NO  (If patient encounters technical issues they should call [phone]    Are changes needed to the allergy or medication list? Pt stated no changes to allergies and Pt stated no med changes    Are refills needed on medications prescribed by this physician? NO    Rooming Documentation:  Questionnaire(s) completed      Reason for visit: RECHECK    Wt other than 24 hrs:    Pain more than one location:  no  Bess WALKER

## 2024-11-27 NOTE — LETTER
11/27/2024      Yenny Johnson  [address] [address]      Dear Colleague,    Thank you for referring your patient, Yenny Johnson, to the [facility] [place]. Please see a copy of my visit note below.    Bariatric Care Clinic Non Surgical Follow up Visit   Date of visit: 11/27/2024  Physician: KRISTINE Hong MD, MD  Primary Care is Cee Biggs.  Yenny Johnson   54 year old  female     Initial Weight: 199#  Initial BMI: 33.37  Today's Weight:   Wt Readings from Last 1 Encounters:   11/27/24 82.8 kg (182 lb 8 oz)     Body mass index is 30.84 kg/m .           Assessment and Plan   Assessment: Yenny is a 54 year old year old female who presents for medical weight management.      Plan:    1. Obesity (BMI 30.0-34.9) (Primary)  Patient was congratulated on her success thus far. Healthy habits to assist with further weight loss were discussed. She will try to ramp up her muscle building exercise. she will continue the wegovy. We discussed the patient's co-morbid conditions including hyperlipidemia and GERD. These likely will improve with healthy habits and weight loss.     2. Hyperlipidemia LDL goal <40  This may improve with healthy habits and weight loss.     3. Gastroesophageal reflux disease, unspecified whether esophagitis present  This may improve with healthy habits and weight loss.      Follow up in 4 months with myself           INTERIM HISTORY  Patient is taking wegovy for appetite and craving control and she thinks it is helping. She is having some muscle spasms. Her cravings are reduced. She is full with a smaller amount of food.     DIETARY HISTORY  Meals Per Day: 3  Eating Protein First?: yes  Food Diary: B:protein shake in coffee with 2 hard boiled eggs, rare bagel L:protein and vegetable D:sandwich or salad, lighter meal  Snacks Per Day: occasional, more at work  Typical Snack: almonds, yogurt  Fluid Intake: working on  it  Portion Control: yes  Calorie Containing Beverages: alcohol 2 x per months  Meals at Restaurant per week:2, making good chices    Positive Changes Since Last Visit: healthy meal choices, portion control, rare snacking  Struggling With: getting adequate exercise, cramping in her muscles at times    Knowledgeable in Reading Food Labels: yes  Getting Adequate Protein: yes    PHYSICAL ACTIVITY PATTERNS:  Walking or chair exercise 2-3 times a week      REVIEW OF SYSTEMS  GENERAL/CONSTITUTIONAL:  Fatigue: no  GI:  Pancreatitis: no  NEUROLOGIC:  Paresthesias: no  History of migraine headaches: not discussed  PSYCHIATRIC:  Moods: happy with her weight loss and maintenance  MUSCULOSKELETAL/RHEUMATOLOGIC  Arthralgias: yes  Myalgias: no  ENDOCRINE:  Monitoring Blood Sugars: no  Sugars Well Controlled: na  No personal or family history of medullary thyroid cancer no  No personal or family history of MEN2        Patient Profile   Social History     Social History Narrative     Not on file        Past Medical History   Past Medical History:   Diagnosis Date     Anemia      BCC (basal cell carcinoma of skin)      Degeneration of lumbar or lumbosacral intervertebral disc      Depressive disorder      Diverticulitis of colon      Elevated fasting glucose      Elevated rheumatoid factor 12/12/2012     GERD (gastroesophageal reflux disease) 11/2005    EGD     Hyperlipidemia LDL goal <130      Hypertriglyceridemia      Hypothyroidism      Intermittent asthma      Lumbar disc herniation      Medial meniscus tear      Obesity 03/29/2012     Paroxysmal supraventricular tachycardia (H) 02/16/2021     PMDD (premenstrual dysphoric disorder)      Stress incontinence, female 03/29/2012     Subclavian arterial stenosis (H) 3/28/2023     Patient Active Problem List   Diagnosis     Obesity     Stress incontinence, female     GERD (gastroesophageal reflux disease)     Hypothyroidism     Lumbar disc herniation     Intermittent asthma      "Hyperlipidemia LDL goal <40     Medial meniscus tear     Subclavian arterial stenosis (H)     Perimenopausal symptoms       Past Surgical History  She has a past surgical history that includes cholecystectomy, laporoscopic (1995); tubal ligation; c/section, classical; biopsy; hysterectomy, pap no longer indicated (09/23/2015); Repair MOHS (Left, 01/19/2017); Inject Paravertebral Facet Joint Lumbar / Sacral First (Bilateral, 06/28/2017); Inject Epidural Lumbar / Sacral Single (Bilateral, 08/09/2017); Destruction Of Paravertebral Facet Lumbar / Sacral Single (Bilateral, 09/12/2017); Inject Sacroiliac Joint (Bilateral, 02/07/2018); Inject Paravertebral Facet Joint Lumbar / Sacral Third (Bilateral, 03/09/2018); Radio Frequency Ablation / Destruction of Sacroiloac Joint Lateral Branches (S1/S2/S3) (Bilateral, 05/02/2018); and Colonoscopy with CO2 insufflation (N/A, 09/27/2021).     Examination   Ht 1.638 m (5' 4.5\")   Wt 82.8 kg (182 lb 8 oz)   LMP 08/07/2015   BMI 30.84 kg/m    Wt Readings from Last 4 Encounters:   11/27/24 82.8 kg (182 lb 8 oz)   09/03/24 83.4 kg (183 lb 12.8 oz)   07/29/24 82.6 kg (182 lb)   04/03/24 80.9 kg (178 lb 4.8 oz)      BP Readings from Last 3 Encounters:   09/03/24 (!) 143/86   12/30/23 124/83   12/26/23 135/86      GENERAL: alert and no distress  EYES: Eyes grossly normal to inspection.  No discharge or erythema, or obvious scleral/conjunctival abnormalities.  RESP: No audible wheeze, cough, or visible cyanosis.    SKIN: Visible skin clear. No significant rash, abnormal pigmentation or lesions.  NEURO: Cranial nerves grossly intact.  Mentation and speech appropriate for age.  PSYCH: Appropriate affect, tone, and pace of words        Counseling:   We reviewed the important post op bariatric recommendations:  -eating 3 meals daily  -eating protein first, getting >60gm protein daily  -eating slowly, chewing food well  -avoiding/limiting calorie containing beverages  -limiting starchy " vegetables and carbohydrates, choosing wheat, not white with breads,   crackers, pastas, cornel, bagels, tortillas, rice  -limiting restaurant or cafeteria eating to twice a week or less    We discussed the importance of restorative sleep and stress management in maintaining a healthy weight.  We discussed the National Weight Control Registry healthy weight maintenance strategies and ways to optimize metabolism.  We discussed the importance of physical activity including cardiovascular and strength training in maintaining a healthier weight.    Total time spent on the date of this encounter doing: chart review, review of test results, patient visit, physical exam, education, counseling, developing plan of care and documenting = 23 minutes.         KRISTINE Hong MD  [facility]           Virtual Visit Details    Type of service:  Video Visit     Originating Location (pt. Location): Other at work in [place]    Distant Location (provider location):  Off-site  Platform used for Video Visit: Filtec    Video start time: 9:46 am  Video end time: 10:01 am       Again, thank you for allowing me to participate in the care of your patient.        Sincerely,        KRISTINE Hong MD

## 2024-12-11 NOTE — PROGRESS NOTES
[facility] Dermatology Note  Encounter Date: Dec 16, 2024  Office Visit     Reviewed patients past medical history and pertinent chart review prior to patients visit today.     Dermatology Problem List:    History of nonmelanoma skin cancer   - nose, bcc, s/p mohs approx 4 years ago  2. Actinic cheilitis   -efudex/gabriela    Social: Patient grew up in [facility]  _________________________________________    Assessment & Plan:     # Actinic cheilitis, upper lip  -Discussed that this represents actinic keratoses on the lips.  Discussed premalignant nature of this.  Discussed treatment with either cryotherapy versus Efudex/calcipotriene.  Patient would like to proceed with topical Efudex/calcipotriene.    Start Efudex calcipotriene twice daily x7 days. Counseled that patient should expect erythema and scale, but should discontinue this medication if significant oozing or pain greater than 5/10 is to occur. Recommend the patient wash hands after use or use gloves to apply. Keep medication away from pets.     # seborrheic keratoses, right conchal bowl  - Benign, no further treatment needed.  Continue with observation at this time.  If lesions become bothersome, patient return to clinic for reevaluation      Follow-up: For reevaluation of upper lip and full-body skin examination    Brooke Andersen PA-C  [facility]  Dermatology     ____________________________________________    CC: No chief complaint on file.    HPI:  Ms. Yenny Johnson is a(n) 54 year old female who presents today as a new patient for a spot check.  Patient has concern of dryness on her upper lip that has been present for several years.  She believes that many years ago she had this treated with cryotherapy.  She has been diligent with applying Aquaphor to the lips with this has not improved anything.  She is wondering if this is of concern.  Additionally, patient has lesion of concern in her right conchal bowl.  This  lesion has been present for about a year and can occasionally be bothersome.    Patient is otherwise feeling well, without additional skin concerns.     Physical Exam:  Vitals: LMP 08/07/2015   SKIN: focused examination of the face and right conchal bowl performed today  -right conchal bowl, flesh colored, waxy stuck on papule  - upper lip, scaly white to erythematous plaque    - No other lesions of concern on areas examined.     Medications:  Current Outpatient Medications   Medication Sig Dispense Refill    aspirin (ASPIRIN LOW DOSE) 81 MG EC tablet TAKE 1 TABLET BY MOUTH EVERY DAY 90 tablet 0    FLUoxetine (PROZAC) 20 MG capsule TAKE 3 CAPSULES BY MOUTH EVERY  capsule 3    levothyroxine (SYNTHROID/LEVOTHROID) 88 MCG tablet Take 1 tablet (88 mcg) by mouth daily 90 tablet 3    nystatin (MYCOSTATIN) 904848 UNIT/GM external powder Apply topically daily as needed for dry skin 60 g 11    Semaglutide-Weight Management (WEGOVY) 2.4 MG/0.75ML pen Inject 2.4 mg subcutaneously once a week 9 mL 1     No current facility-administered medications for this visit.      Past Medical History:   Patient Active Problem List   Diagnosis    Obesity    Stress incontinence, female    GERD (gastroesophageal reflux disease)    Hypothyroidism    Lumbar disc herniation    Intermittent asthma    Hyperlipidemia LDL goal <40    Medial meniscus tear    Subclavian arterial stenosis (H)    Perimenopausal symptoms     Past Medical History:   Diagnosis Date    Anemia     BCC (basal cell carcinoma of skin)     Degeneration of lumbar or lumbosacral intervertebral disc     Depressive disorder     Diverticulitis of colon     Elevated fasting glucose     Elevated rheumatoid factor 12/12/2012    GERD (gastroesophageal reflux disease) 11/2005    EGD    Hyperlipidemia LDL goal <130     Hypertriglyceridemia     Hypothyroidism     Intermittent asthma     Lumbar disc herniation     Medial meniscus tear     Obesity 03/29/2012    Paroxysmal  supraventricular tachycardia (H) 02/16/2021    PMDD (premenstrual dysphoric disorder)     Stress incontinence, female 03/29/2012    Subclavian arterial stenosis (H) 3/28/2023       CC Referred Self, MD  No address on file on close of this encounter.

## 2024-12-16 ENCOUNTER — OFFICE VISIT (OUTPATIENT)
Dept: DERMATOLOGY | Facility: CLINIC | Age: 54
End: 2024-12-16
Payer: COMMERCIAL

## 2024-12-16 DIAGNOSIS — L82.1 SEBORRHEIC KERATOSIS: ICD-10-CM

## 2024-12-16 DIAGNOSIS — L56.8 ACTINIC CHEILITIS: Primary | ICD-10-CM

## 2024-12-16 ASSESSMENT — PAIN SCALES - GENERAL: PAINLEVEL_OUTOF10: NO PAIN (0)

## 2024-12-16 NOTE — LETTER
12/16/2024      Yenny Johnson  [address] [address]      Dear Colleague,    Thank you for referring your patient, Yenny Johnson, to the [facility]. Please see a copy of my visit note below.    [facility] Dermatology Note  Encounter Date: Dec 16, 2024  Office Visit     Reviewed patients past medical history and pertinent chart review prior to patients visit today.     Dermatology Problem List:    History of nonmelanoma skin cancer   - nose, bcc, s/p mohs approx 4 years ago  2. Actinic cheilitis   -efudex/gabriela    Social: Patient grew up in [facility]  _________________________________________    Assessment & Plan:     # Actinic cheilitis, upper lip  -Discussed that this represents actinic keratoses on the lips.  Discussed premalignant nature of this.  Discussed treatment with either cryotherapy versus Efudex/calcipotriene.  Patient would like to proceed with topical Efudex/calcipotriene.    Start Efudex calcipotriene twice daily x7 days. Counseled that patient should expect erythema and scale, but should discontinue this medication if significant oozing or pain greater than 5/10 is to occur. Recommend the patient wash hands after use or use gloves to apply. Keep medication away from pets.     # seborrheic keratoses, right conchal bowl  - Benign, no further treatment needed.  Continue with observation at this time.  If lesions become bothersome, patient return to clinic for reevaluation      Follow-up: For reevaluation of upper lip and full-body skin examination    Brooke Andersen PA-C  [facility]  Dermatology     ____________________________________________    CC: No chief complaint on file.    HPI:  Ms. Yenny Johnson is a(n) 54 year old female who presents today as a new patient for a spot check.  Patient has concern of dryness on her upper lip that has been present for several years.  She believes that many years ago she  had this treated with cryotherapy.  She has been diligent with applying Aquaphor to the lips with this has not improved anything.  She is wondering if this is of concern.  Additionally, patient has lesion of concern in her right conchal bowl.  This lesion has been present for about a year and can occasionally be bothersome.    Patient is otherwise feeling well, without additional skin concerns.     Physical Exam:  Vitals: LMP 08/07/2015   SKIN: focused examination of the face and right conchal bowl performed today  -right conchal bowl, flesh colored, waxy stuck on papule  - upper lip, scaly white to erythematous plaque    - No other lesions of concern on areas examined.     Medications:  Current Outpatient Medications   Medication Sig Dispense Refill     aspirin (ASPIRIN LOW DOSE) 81 MG EC tablet TAKE 1 TABLET BY MOUTH EVERY DAY 90 tablet 0     FLUoxetine (PROZAC) 20 MG capsule TAKE 3 CAPSULES BY MOUTH EVERY  capsule 3     levothyroxine (SYNTHROID/LEVOTHROID) 88 MCG tablet Take 1 tablet (88 mcg) by mouth daily 90 tablet 3     nystatin (MYCOSTATIN) 569933 UNIT/GM external powder Apply topically daily as needed for dry skin 60 g 11     Semaglutide-Weight Management (WEGOVY) 2.4 MG/0.75ML pen Inject 2.4 mg subcutaneously once a week 9 mL 1     No current facility-administered medications for this visit.      Past Medical History:   Patient Active Problem List   Diagnosis     Obesity     Stress incontinence, female     GERD (gastroesophageal reflux disease)     Hypothyroidism     Lumbar disc herniation     Intermittent asthma     Hyperlipidemia LDL goal <40     Medial meniscus tear     Subclavian arterial stenosis (H)     Perimenopausal symptoms     Past Medical History:   Diagnosis Date     Anemia      BCC (basal cell carcinoma of skin)      Degeneration of lumbar or lumbosacral intervertebral disc      Depressive disorder      Diverticulitis of colon      Elevated fasting glucose      Elevated rheumatoid factor  12/12/2012     GERD (gastroesophageal reflux disease) 11/2005    EGD     Hyperlipidemia LDL goal <130      Hypertriglyceridemia      Hypothyroidism      Intermittent asthma      Lumbar disc herniation      Medial meniscus tear      Obesity 03/29/2012     Paroxysmal supraventricular tachycardia (H) 02/16/2021     PMDD (premenstrual dysphoric disorder)      Stress incontinence, female 03/29/2012     Subclavian arterial stenosis (H) 3/28/2023       CC Referred Self, MD  No address on file on close of this encounter.      Again, thank you for allowing me to participate in the care of your patient.        Sincerely,      Brooke Andersen PA-C

## 2024-12-16 NOTE — NURSING NOTE
"Yenny Johnson's chief complaint for this visit includes:  Chief Complaint   Patient presents with    Skin Check     Spot check on right upper lip \"skin is always graham and flaky\" \"It doesn't feel chapped.\"  Inside right ear: \"feels raised and smooth, and will get bloody sometimes but is just always there.\"  Patient has had Mohs on her nose, however she does not remember if it was basal cell or squamous cell carcinoma.     PCP: Cee Biggs    Referring Provider:  Referred Self, MD  No address on file    LMP 08/07/2015   No Pain (0)        Allergies   Allergen Reactions    Atorvastatin Muscle Pain (Myalgia)         Do you need any medication refills at today's visit? No    Yue Duron CMA        "

## 2025-01-06 ENCOUNTER — OFFICE VISIT (OUTPATIENT)
Dept: FAMILY MEDICINE | Facility: CLINIC | Age: 55
End: 2025-01-06
Attending: FAMILY MEDICINE
Payer: COMMERCIAL

## 2025-01-06 VITALS
OXYGEN SATURATION: 99 % | DIASTOLIC BLOOD PRESSURE: 84 MMHG | RESPIRATION RATE: 16 BRPM | SYSTOLIC BLOOD PRESSURE: 134 MMHG | TEMPERATURE: 98 F | WEIGHT: 188 LBS | HEIGHT: 65 IN | BODY MASS INDEX: 31.32 KG/M2 | HEART RATE: 82 BPM

## 2025-01-06 DIAGNOSIS — G56.03 BILATERAL CARPAL TUNNEL SYNDROME: ICD-10-CM

## 2025-01-06 DIAGNOSIS — I73.9 CLAUDICATION: ICD-10-CM

## 2025-01-06 DIAGNOSIS — I77.1 SUBCLAVIAN ARTERIAL STENOSIS: ICD-10-CM

## 2025-01-06 DIAGNOSIS — N95.1 PERIMENOPAUSAL SYMPTOMS: ICD-10-CM

## 2025-01-06 DIAGNOSIS — E03.9 ACQUIRED HYPOTHYROIDISM: ICD-10-CM

## 2025-01-06 DIAGNOSIS — J45.20 MILD INTERMITTENT ASTHMA WITHOUT COMPLICATION: ICD-10-CM

## 2025-01-06 DIAGNOSIS — Z00.00 ROUTINE GENERAL MEDICAL EXAMINATION AT A HEALTH CARE FACILITY: Primary | ICD-10-CM

## 2025-01-06 DIAGNOSIS — E78.5 HYPERLIPIDEMIA LDL GOAL <130: ICD-10-CM

## 2025-01-06 DIAGNOSIS — E78.1 HYPERTRIGLYCERIDEMIA: ICD-10-CM

## 2025-01-06 DIAGNOSIS — L30.4 INTERTRIGO: ICD-10-CM

## 2025-01-06 PROBLEM — M72.0 DUPUYTREN CONTRACTURE: Status: ACTIVE | Noted: 2025-01-06

## 2025-01-06 PROCEDURE — 90471 IMMUNIZATION ADMIN: CPT | Performed by: FAMILY MEDICINE

## 2025-01-06 PROCEDURE — 99396 PREV VISIT EST AGE 40-64: CPT | Mod: 25 | Performed by: FAMILY MEDICINE

## 2025-01-06 PROCEDURE — 80061 LIPID PANEL: CPT | Performed by: FAMILY MEDICINE

## 2025-01-06 PROCEDURE — 99214 OFFICE O/P EST MOD 30 MIN: CPT | Mod: 25 | Performed by: FAMILY MEDICINE

## 2025-01-06 PROCEDURE — 36415 COLL VENOUS BLD VENIPUNCTURE: CPT | Performed by: FAMILY MEDICINE

## 2025-01-06 PROCEDURE — 84443 ASSAY THYROID STIM HORMONE: CPT | Performed by: FAMILY MEDICINE

## 2025-01-06 PROCEDURE — 90677 PCV20 VACCINE IM: CPT | Performed by: FAMILY MEDICINE

## 2025-01-06 PROCEDURE — 84439 ASSAY OF FREE THYROXINE: CPT | Performed by: FAMILY MEDICINE

## 2025-01-06 PROCEDURE — 83721 ASSAY OF BLOOD LIPOPROTEIN: CPT | Mod: 59 | Performed by: FAMILY MEDICINE

## 2025-01-06 PROCEDURE — 82947 ASSAY GLUCOSE BLOOD QUANT: CPT | Performed by: FAMILY MEDICINE

## 2025-01-06 RX ORDER — LEVOTHYROXINE SODIUM 88 UG/1
88 TABLET ORAL DAILY
Qty: 90 TABLET | Refills: 4 | Status: SHIPPED | OUTPATIENT
Start: 2025-01-06 | End: 2025-01-07

## 2025-01-06 RX ORDER — ALBUTEROL SULFATE 90 UG/1
1-2 INHALANT RESPIRATORY (INHALATION) EVERY 4 HOURS PRN
Qty: 18 G | Refills: 5 | Status: SHIPPED | OUTPATIENT
Start: 2025-01-06

## 2025-01-06 RX ORDER — NYSTATIN 100000 [USP'U]/G
POWDER TOPICAL DAILY PRN
Qty: 60 G | Refills: 11 | Status: SHIPPED | OUTPATIENT
Start: 2025-01-06

## 2025-01-06 RX ORDER — ASPIRIN 81 MG/1
81 TABLET ORAL DAILY
Qty: 90 TABLET | Refills: 4 | Status: SHIPPED | OUTPATIENT
Start: 2025-01-06

## 2025-01-06 SDOH — HEALTH STABILITY: PHYSICAL HEALTH: ON AVERAGE, HOW MANY MINUTES DO YOU ENGAGE IN EXERCISE AT THIS LEVEL?: 30 MIN

## 2025-01-06 SDOH — HEALTH STABILITY: PHYSICAL HEALTH: ON AVERAGE, HOW MANY DAYS PER WEEK DO YOU ENGAGE IN MODERATE TO STRENUOUS EXERCISE (LIKE A BRISK WALK)?: 2 DAYS

## 2025-01-06 ASSESSMENT — ASTHMA QUESTIONNAIRES
ACT_TOTALSCORE: 23
QUESTION_5 LAST FOUR WEEKS HOW WOULD YOU RATE YOUR ASTHMA CONTROL: COMPLETELY CONTROLLED
QUESTION_3 LAST FOUR WEEKS HOW OFTEN DID YOUR ASTHMA SYMPTOMS (WHEEZING, COUGHING, SHORTNESS OF BREATH, CHEST TIGHTNESS OR PAIN) WAKE YOU UP AT NIGHT OR EARLIER THAN USUAL IN THE MORNING: NOT AT ALL
QUESTION_2 LAST FOUR WEEKS HOW OFTEN HAVE YOU HAD SHORTNESS OF BREATH: ONCE OR TWICE A WEEK
QUESTION_4 LAST FOUR WEEKS HOW OFTEN HAVE YOU USED YOUR RESCUE INHALER OR NEBULIZER MEDICATION (SUCH AS ALBUTEROL): ONCE A WEEK OR LESS
QUESTION_1 LAST FOUR WEEKS HOW MUCH OF THE TIME DID YOUR ASTHMA KEEP YOU FROM GETTING AS MUCH DONE AT WORK, SCHOOL OR AT HOME: NONE OF THE TIME
ACT_TOTALSCORE: 23

## 2025-01-06 ASSESSMENT — SOCIAL DETERMINANTS OF HEALTH (SDOH): HOW OFTEN DO YOU GET TOGETHER WITH FRIENDS OR RELATIVES?: TWICE A WEEK

## 2025-01-06 NOTE — PROGRESS NOTES
Preventive Care Visit  [facility] KEIKO Biggs MD, Family Medicine  Jan 6, 2025      Assessment & Plan     Routine general medical examination at a health care facility    Bilateral carpal tunnel syndrome  Use wrist splints at night for carpal tunnel syndrome.  If ineffective, also use during day.  Follow up in one month or sooner for worsening of symptoms or side effects for consideration of EMG   - Wrist/Arm/Hand Bracing Supplies Order Wrist Brace; Bilateral; non-thumb spica    Claudication (H)  - US SCOTT Doppler No Exercise; Future    Subclavian arterial stenosis (H)  - aspirin (ASPIRIN LOW DOSE) 81 MG EC tablet; Take 1 tablet (81 mg) by mouth daily.  - OFFICE/OUTPT VISIT,ESTLEVL IV    Hyperlipidemia LDL goal <130  - Lipid panel reflex to direct LDL Non-fasting; Future  - Glucose; Future  - OFFICE/OUTPT VISIT,ESTLEVL IV    Acquired hypothyroidism  Euthyroid on replacement   - TSH WITH FREE T4 REFLEX; Future  - levothyroxine (SYNTHROID/LEVOTHROID) 88 MCG tablet; Take 1 tablet (88 mcg) by mouth daily.  - OFFICE/OUTPT VISIT,ESTLEVL IV    Mild intermittent asthma without complication  - albuterol (PROAIR HFA/PROVENTIL HFA/VENTOLIN HFA) 108 (90 Base) MCG/ACT inhaler; Inhale 1-2 puffs into the lungs every 4 hours as needed for shortness of breath.  - OFFICE/OUTPT VISIT,ESTLEVL IV    Perimenopausal symptoms  - FLUoxetine (PROZAC) 20 MG capsule; TAKE 3 CAPSULES BY MOUTH EVERY DAY  - OFFICE/OUTPT VISIT,ESTLEVL IV    Intertrigo  - nystatin (MYCOSTATIN) 437822 UNIT/GM external powder; Apply topically daily as needed for dry skin.  - OFFICE/OUTPT VISIT,MAKAYLALEVL IV        The longitudinal plan of care for the diagnosis(es)/condition(s) as documented were addressed during this visit. Due to the added complexity in care, I will continue to support Yenny in the subsequent management and with ongoing continuity of care.    BMI  Estimated body mass index is 31.71 kg/m  as calculated from the  "following:    Height as of this encounter: 1.64 m (5' 4.57\").    Weight as of this encounter: 85.3 kg (188 lb).   Weight management plan: Discussed healthy diet and exercise guidelines    Counseling  Appropriate preventive services were addressed with this patient via screening, questionnaire, or discussion as appropriate for fall prevention, nutrition, physical activity, Tobacco-use cessation, social engagement, weight loss and cognition.  Checklist reviewing preventive services available has been given to the patient.  Reviewed patient's diet, addressing concerns and/or questions.   She is at risk for lack of exercise and has been provided with information to increase physical activity for the benefit of her well-being.       Follow-up yearly     Subjective   Yenny is a 54 year old, presenting for the following:  Physical        1/6/2025     4:36 PM   Additional Questions   Roomed by Ivonne BLAIR CMA   Accompanied by Self         1/6/2025     4:36 PM   Patient Reported Additional Medications   Patient reports taking the following new medications None          HPI  Yenny Johnson is a 54 year old female who complains of numbness of lateral aspect of bilateral hand, especially with use of the hand and at night.     Hypercholesterolemia well controlled with current treatment plan without side effects. She endorses calf pain with exercise and worries about her risk for PVD.     Patient also presents for follow-up of hypothyroidism, controlled on current medication.  Denies symptoms of hypo- or hyperthyroidism.     She needs refills of other medications.       Health Care Directive  Patient does not have a Health Care Directive: Discussed advance care planning with patient; information given to patient to review.      1/6/2025   General Health   How would you rate your overall physical health? Good   Feel stress (tense, anxious, or unable to sleep) Only a little   (!) STRESS CONCERN      1/6/2025   Nutrition   Three or " more servings of calcium each day? (!) I DON'T KNOW   Diet: Other   If other, please elaborate: High protein   How many servings of fruit and vegetables per day? (!) 2-3   How many sweetened beverages each day? 0-1         2025   Exercise   Days per week of moderate/strenous exercise 2 days   Average minutes spent exercising at this level 30 min   (!) EXERCISE CONCERN      2025   Social Factors   Frequency of gathering with friends or relatives Twice a week   Worry food won't last until get money to buy more No   Food not last or not have enough money for food? No   Do you have housing? (Housing is defined as stable permanent housing and does not include staying ouside in a car, in a tent, in an abandoned building, in an overnight shelter, or couch-surfing.) Yes   Are you worried about losing your housing? No   Lack of transportation? No   Unable to get utilities (heat,electricity)? No         2025   Fall Risk   Fallen 2 or more times in the past year? No   Trouble with walking or balance? No          2025   Dental   Dentist two times every year? Yes         2025   TB Screening   Were you born outside of the [place]? No         Today's PHQ-2 Score:       2025    11:48 AM   PHQ-2 (  [employer])   Q1: Little interest or pleasure in doing things 0   Q2: Feeling down, depressed or hopeless 0   PHQ-2 Score 0    Q1: Little interest or pleasure in doing things Not at all   Q2: Feeling down, depressed or hopeless Not at all   PHQ-2 Score 0       Patient-reported           2025   Substance Use   Alcohol more than 3/day or more than 7/wk No   Do you use any other substances recreationally? No     Social History     Tobacco Use    Smoking status: Former     Current packs/day: 0.00     Average packs/day: 1 pack/day for 14.0 years (14.0 ttl pk-yrs)     Types: Cigarettes     Start date: 3/30/1984     Quit date: 3/30/1998     Years since quittin.7     Passive exposure: Past    Smokeless tobacco: Never    Vaping Use    Vaping status: Never Used   Substance Use Topics    Alcohol use: No    Drug use: No           10/7/2024   LAST FHS-7 RESULTS   1st degree relative breast or ovarian cancer No   Any relative bilateral breast cancer No   Any male have breast cancer No   Any ONE woman have BOTH breast AND ovarian cancer No   Any woman with breast cancer before 50yrs No   2 or more relatives with breast AND/OR ovarian cancer No   2 or more relatives with breast AND/OR bowel cancer No        Mammogram Screening - Mammogram every 1-2 years updated in Health Maintenance based on mutual decision making        2025   STI Screening   New sexual partner(s) since last STI/HIV test? No     History of abnormal Pap smear: Status post hysterectomy with removal of cervix and no history of CIN2 or greater or cervical cancer. Health Maintenance and Surgical History updated.        3/29/2012     2:00 PM   PAP / HPV   PAP (Historical) NIL      ASCVD Risk   The 10-year ASCVD risk score (Bryan PANTOJA, et al., 2019) is: 2.8%    Values used to calculate the score:      Age: 54 years      Sex: Female      Is Non- : No      Diabetic: No      Tobacco smoker: No      Systolic Blood Pressure: 134 mmHg      Is BP treated: No      HDL Cholesterol: 39 mg/dL      Total Cholesterol: 201 mg/dL    Fracture Risk Assessment Tool  Link to Frax Calculator  Use the information below to complete the Frax calculator  : 1970  Sex: female  Weight (kg): 85.3 kg (actual weight)  Height (cm): 164 cm  Previous Fragility Fracture:  No  History of parent with fractured hip:  No  Current Smoking:  No  Patient has been on glucocorticoids for more than 3 months (5mg/day or more): No  Rheumatoid Arthritis on Problem List:  No  Secondary Osteoporosis on Problem List:  No  Consumes 3 or more units of alcohol per day: No  Femoral Neck BMD (g/cm2)           Reviewed and updated as needed this visit by Provider   Tobacco  Allergies   Meds  Problems  Med Hx  Surg Hx  Fam Hx            Patient Active Problem List   Diagnosis    Obesity    Stress incontinence, female    GERD (gastroesophageal reflux disease)    Hypothyroidism    Lumbar disc herniation    Intermittent asthma    Hyperlipidemia LDL goal <40    Medial meniscus tear    Subclavian arterial stenosis (H)    Perimenopausal symptoms    Dupuytren contracture    Intertrigo     Past Surgical History:   Procedure Laterality Date    BIOPSY      C/SECTION, CLASSICAL      CHOLECYSTECTOMY, LAPOROSCOPIC  1995    COLONOSCOPY WITH CO2 INSUFFLATION N/A 09/27/2021    Procedure: COLONOSCOPY, WITH CO2 INSUFFLATION;  Surgeon: Dimitrios Cevallos MD;  Location: [facility] OR    DESTRUCTION OF PARAVERTEBRAL FACET LUMBAR / SACRAL SINGLE Bilateral 09/12/2017    Procedure: DESTRUCTION OF PARAVERTEBRAL FACET LUMBAR / SACRAL SINGLE;  Radiofrequency Ablation of the Lumbar Facets /bilateral  heating of nerves around spine bilatteraly for purpose of pain rekief;  Surgeon: Benigno Willams MD;  Location: [facility]    HYSTERECTOMY, PAP NO LONGER INDICATED  09/23/2015    INJECT EPIDURAL LUMBAR / SACRAL SINGLE Bilateral 08/09/2017    Procedure: INJECT EPIDURAL LUMBAR / SACRAL SINGLE;  bilateral lumbar medial branch block:injection of local anesthetic into area around spine for purpose of pain relief;  Surgeon: Benigno Willams MD;  Location: [facility]    INJECT PARAVERTEBRAL FACET JOINT LUMBAR / SACRAL FIRST Bilateral 06/28/2017    Procedure: INJECT PARAVERTEBRAL FACET JOINT LUMBAR / SACRAL FIRST;  injection of local anesthesthetic into area around spine for purpose of pain relief;  Surgeon: Benigno Willams MD;  Location: [facility]    INJECT PARAVERTEBRAL FACET JOINT LUMBAR / SACRAL THIRD Bilateral 03/09/2018    Procedure: INJECT PARAVERTEBRAL FACET JOINT LUMBAR / SACRAL THIRD;  Bilateral S1, S2, S3 Lateral Branch Nerve Block and L5 Dorsal Ramus Nerve Block;  Surgeon: Kristi Gomes MD;  Location: [facility]    INJECT  "SACROILIAC JOINT Bilateral 2018    Procedure: INJECT SACROILIAC JOINT;  Bilateral sacroiliac Lateral Branch Block;  Surgeon: Edison Sams MD;  Location: [facility]    RADIO FREQUENCY ABLATION / DESTRUCTION OF SACROILOAC JOINT LATERAL BRANCHES (S1/S2/S3) Bilateral 2018    Procedure: RADIO FREQUENCY ABLATION / DESTRUCTION OF SACROILOAC JOINT LATERAL BRANCHES (S1/S2/S3);  Bilateral Radiofrequency Ablation of the Lateral Branches;  Surgeon: Benigno Willams MD;  Location: [facility]    REPAIR MOHS Left 2017    Procedure: REPAIR MOHS;  Surgeon: Jorge Eric MD;  Location: [facility] OR    TUBAL LIGATION         Social History     Tobacco Use    Smoking status: Former     Current packs/day: 0.00     Average packs/day: 1 pack/day for 14.0 years (14.0 ttl pk-yrs)     Types: Cigarettes     Start date: 3/30/1984     Quit date: 3/30/1998     Years since quittin.7     Passive exposure: Past    Smokeless tobacco: Never   Substance Use Topics    Alcohol use: No     Family History   Problem Relation Age of Onset    Cardiovascular Mother         CHF     Coronary Artery Disease Mother         Cholesterol    Hyperlipidemia Mother     C.A.D. Father 67        MI    Hypertension Father     Alcohol/Drug Father     Obesity Sister     Obesity Sister     Diabetes Brother     Asthma Brother     Obesity Brother     Coronary Artery Disease Brother     Allergies Maternal Grandmother     Diabetes Maternal Grandmother     Diabetes Paternal Grandfather     Seasonal/Environmental Allergies Daughter     Cancer No family hx of                 Objective    Exam  /84 (BP Location: Right arm, Patient Position: Sitting, Cuff Size: Adult Regular)   Pulse 82   Temp 98  F (36.7  C) (Oral)   Resp 16   Ht 1.64 m (5' 4.57\")   Wt 85.3 kg (188 lb)   LMP 2015   SpO2 99%   BMI 31.71 kg/m     Estimated body mass index is 31.71 kg/m  as calculated from the following:    Height as of this encounter: 1.64 m (5' " "4.57\").    Weight as of this encounter: 85.3 kg (188 lb).    Physical Exam  GENERAL: alert and no distress  EYES: Eyes grossly normal to inspection, PERRL and conjunctivae and sclerae normal  HENT: ear canals and TM's normal, nose and mouth without ulcers or lesions  NECK: no adenopathy, no asymmetry, masses, or scars  RESP: lungs clear to auscultation - no rales, rhonchi or wheezes  CV: regular rate and rhythm, normal S1 S2, no S3 or S4, no murmur, click or rub, no peripheral edema  ABDOMEN: soft, nontender, no hepatosplenomegaly, no masses and bowel sounds normal  MS: normal gait; positive Tinnel's and Phalen's of wrist   SKIN: no suspicious lesions or rashes  NEURO: Normal strength and tone, mentation intact and speech normal  PSYCH: mentation appears normal, affect normal/bright        Signed Electronically by: Cee Biggs MD    "

## 2025-01-06 NOTE — PROGRESS NOTES
DME (Durable Medical Equipment) Orders and Documentation  Orders Placed This Encounter   Procedures     Wrist/Arm/Hand Bracing Supplies Order Wrist Brace; Bilateral; non-thumb spica        The patient was assessed and it was determined the patient is in need of the following listed DME Supplies/Equipment. Please complete supporting documentation below to demonstrate medical necessity.

## 2025-01-06 NOTE — NURSING NOTE
Prior to immunization administration, verified patients identity using patient s name and date of birth. Please see Immunization Activity for additional information.     Screening Questionnaire for Adult Immunization    Are you sick today?   No   Do you have allergies to medications, food, a vaccine component or latex?   No   Have you ever had a serious reaction after receiving a vaccination?   No   Do you have a long-term health problem with heart, lung, kidney, or metabolic disease (e.g., diabetes), asthma, a blood disorder, no spleen, complement component deficiency, a cochlear implant, or a spinal fluid leak?  Are you on long-term aspirin therapy?   No   Do you have cancer, leukemia, HIV/AIDS, or any other immune system problem?   No   Do you have a parent, brother, or sister with an immune system problem?   No   In the past 3 months, have you taken medications that affect  your immune system, such as prednisone, other steroids, or anticancer drugs; drugs for the treatment of rheumatoid arthritis, Crohn s disease, or psoriasis; or have you had radiation treatments?   No   Have you had a seizure, or a brain or other nervous system problem?   No   During the past year, have you received a transfusion of blood or blood    products, or been given immune (gamma) globulin or antiviral drug?   No   For women: Are you pregnant or is there a chance you could become       pregnant during the next month?   No   Have you received any vaccinations in the past 4 weeks?   No     Immunization questionnaire answers were all negative.      Patient instructed to remain in clinic for 15 minutes afterwards, and to report any adverse reactions.     Screening performed by Belkis García CMA on 1/6/2025 at 5:11 PM.

## 2025-01-06 NOTE — LETTER
My Asthma Action Plan    Name: Yenny Johnson   YOB: 1970  Date: 1/6/2025   My doctor: Cee Biggs MD   My clinic: [facility]        My Rescue Medicine:   Albuterol inhaler (Proair/Ventolin/Proventil HFA)  2-4 puffs EVERY 4 HOURS as needed. Use a spacer if recommended by your provider.   My Asthma Severity:   Intermittent / Exercise Induced  Know your asthma triggers: upper respiratory infections             GREEN ZONE   Good Control  I feel good  No cough or wheeze  Can work, sleep and play without asthma symptoms       Take your asthma control medicine every day.     If exercise triggers your asthma, take your rescue medication  15 minutes before exercise or sports, and  During exercise if you have asthma symptoms  Spacer to use with inhaler: If you have a spacer, make sure to use it with your inhaler             YELLOW ZONE Getting Worse  I have ANY of these:  I do not feel good  Cough or wheeze  Chest feels tight  Wake up at night   Keep taking your Green Zone medications  Start taking your rescue medicine:  every 20 minutes for up to 1 hour. Then every 4 hours for 24-48 hours.  If you stay in the Yellow Zone for more than 12-24 hours, contact your doctor.  If you do not return to the Green Zone in 12-24 hours or you get worse, start taking your oral steroid medicine if prescribed by your provider.           RED ZONE Medical Alert - Get Help  I have ANY of these:  I feel awful  Medicine is not helping  Breathing getting harder  Trouble walking or talking  Nose opens wide to breathe       Take your rescue medicine NOW  If your provider has prescribed an oral steroid medicine, start taking it NOW  Call your doctor NOW  If you are still in the Red Zone after 20 minutes and you have not reached your doctor:  Take your rescue medicine again and  Call 911 or go to the emergency room right away    See your regular doctor within 2 weeks of an Emergency Room or Urgent Care visit  for follow-up treatment.          Annual Reminders:  Meet with Asthma Educator,  Flu Shot in the Fall, consider Pneumonia Vaccination for patients with asthma (aged 19 and older).    Pharmacy:    [id number] - [place], [place] - [address] [facility] - [place], [place] - [address] [place] [address]    Electronically signed by Cee Biggs MD   Date: 01/06/25                    Asthma Triggers  How To Control Things That Make Your Asthma Worse    Triggers are things that make your asthma worse.  Look at the list below to help you find your triggers and   what you can do about them. You can help prevent asthma flare-ups by staying away from your triggers.      Trigger                                                          What you can do   Cigarette Smoke  Tobacco smoke can make asthma worse. Do not allow smoking in your home, car or around you.  Be sure no one smokes at a child s day care or school.  If you smoke, ask your health care provider for ways to help you quit.  Ask family members to quit too.  Ask your health care provider for a referral to Quit Plan to help you quit smoking, or call 4-803-058-PLAN.     Colds, Flu, Bronchitis  These are common triggers of asthma. Wash your hands often.  Don t touch your eyes, nose or mouth.  Get a flu shot every year.     Dust Mites  These are tiny bugs that live in cloth or carpet. They are too small to see. Wash sheets and blankets in hot water every week.   Encase pillows and mattress in dust mite proof covers.  Avoid having carpet if you can. If you have carpet, vacuum weekly.   Use a dust mask and HEPA vacuum.   Pollen and Outdoor Mold  Some people are allergic to trees, grass, or weed pollen, or molds. Try to keep your windows closed.  Limit time out doors when pollen count is high.   Ask you health care provider about taking medicine during allergy season.     Animal Dander  Some people are allergic to skin flakes, urine or saliva from  pets with fur or feathers. Keep pets with fur or feathers out of your home.    If you can t keep the pet outdoors, then keep the pet out of your bedroom.  Keep the bedroom door closed.  Keep pets off cloth furniture and away from stuffed toys.     Mice, Rats, and Cockroaches  Some people are allergic to the waste from these pests.   Cover food and garbage.  Clean up spills and food crumbs.  Store grease in the refrigerator.   Keep food out of the bedroom.   Indoor Mold  This can be a trigger if your home has high moisture. Fix leaking faucets, pipes, or other sources of water.   Clean moldy surfaces.  Dehumidify basement if it is damp and smelly.   Smoke, Strong Odors, and Sprays  These can reduce air quality. Stay away from strong odors and sprays, such as perfume, powder, hair spray, paints, smoke incense, paint, cleaning products, candles and new carpet.   Exercise or Sports  Some people with asthma have this trigger. Be active!  Ask your doctor about taking medicine before sports or exercise to prevent symptoms.    Warm up for 5-10 minutes before and after sports or exercise.     Other Triggers of Asthma  Cold air:  Cover your nose and mouth with a scarf.  Sometimes laughing or crying can be a trigger.  Some medicines and food can trigger asthma.

## 2025-01-06 NOTE — PATIENT INSTRUCTIONS
Patient Education   Preventive Care Advice   This is general advice given by our system to help you stay healthy. However, your care team may have specific advice just for you. Please talk to your care team about your preventive care needs.  Nutrition  Eat 5 or more servings of fruits and vegetables each day.  Try wheat bread, brown rice and whole grain pasta (instead of white bread, rice, and pasta).  Get enough calcium and vitamin D. Check the label on foods and aim for 100% of the RDA (recommended daily allowance).  Lifestyle  Exercise at least 150 minutes each week  (30 minutes a day, 5 days a week).  Do muscle strengthening activities 2 days a week. These help control your weight and prevent disease.  No smoking.  Wear sunscreen to prevent skin cancer.  Have a dental exam and cleaning every 6 months.  Yearly exams  See your health care team every year to talk about:  Any changes in your health.  Any medicines your care team has prescribed.  Preventive care, family planning, and ways to prevent chronic diseases.  Shots (vaccines)   HPV shots (up to age 26), if you've never had them before.  Hepatitis B shots (up to age 59), if you've never had them before.  COVID-19 shot: Get this shot when it's due.  Flu shot: Get a flu shot every year.  Tetanus shot: Get a tetanus shot every 10 years.  Pneumococcal, hepatitis A, and RSV shots: Ask your care team if you need these based on your risk.  Shingles shot (for age 50 and up)  General health tests  Diabetes screening:  Starting at age 35, Get screened for diabetes at least every 3 years.  If you are younger than age 35, ask your care team if you should be screened for diabetes.  Cholesterol test: At age 39, start having a cholesterol test every 5 years, or more often if advised.  Bone density scan (DEXA): At age 50, ask your care team if you should have this scan for osteoporosis (brittle bones).  Hepatitis C: Get tested at least once in your life.  STIs (sexually  transmitted infections)  Before age 24: Ask your care team if you should be screened for STIs.  After age 24: Get screened for STIs if you're at risk. You are at risk for STIs (including HIV) if:  You are sexually active with more than one person.  You don't use condoms every time.  You or a partner was diagnosed with a sexually transmitted infection.  If you are at risk for HIV, ask about PrEP medicine to prevent HIV.  Get tested for HIV at least once in your life, whether you are at risk for HIV or not.  Cancer screening tests  Cervical cancer screening: If you have a cervix, begin getting regular cervical cancer screening tests starting at age 21.  Breast cancer scan (mammogram): If you've ever had breasts, begin having regular mammograms starting at age 40. This is a scan to check for breast cancer.  Colon cancer screening: It is important to start screening for colon cancer at age 45.  Have a colonoscopy test every 10 years (or more often if you're at risk) Or, ask your provider about stool tests like a FIT test every year or Cologuard test every 3 years.  To learn more about your testing options, visit:   .  For help making a decision, visit:   https://bit.ly/jy77358.  Prostate cancer screening test: If you have a prostate, ask your care team if a prostate cancer screening test (PSA) at age 55 is right for you.  Lung cancer screening: If you are a current or former smoker ages 50 to 80, ask your care team if ongoing lung cancer screenings are right for you.  For informational purposes only. Not to replace the advice of your health care provider. Copyright   2023 [employer] All rights reserved. Clinically reviewed by the [facility] Transitions Program. [employer] 101737 - REV 01/24.

## 2025-01-07 DIAGNOSIS — E03.9 ACQUIRED HYPOTHYROIDISM: ICD-10-CM

## 2025-01-07 LAB
CHOLEST SERPL-MCNC: 235 MG/DL
FASTING STATUS PATIENT QL REPORTED: ABNORMAL
FASTING STATUS PATIENT QL REPORTED: NORMAL
GLUCOSE SERPL-MCNC: 89 MG/DL (ref 70–99)
HDLC SERPL-MCNC: 34 MG/DL
LDLC SERPL CALC-MCNC: ABNORMAL MG/DL
LDLC SERPL DIRECT ASSAY-MCNC: 119 MG/DL
NONHDLC SERPL-MCNC: 201 MG/DL
T4 FREE SERPL-MCNC: 1.15 NG/DL (ref 0.9–1.7)
TRIGL SERPL-MCNC: 551 MG/DL
TSH SERPL DL<=0.005 MIU/L-ACNC: 5.14 UIU/ML (ref 0.3–4.2)

## 2025-01-07 RX ORDER — LEVOTHYROXINE SODIUM 100 UG/1
100 TABLET ORAL DAILY
Qty: 90 TABLET | Refills: 4 | Status: SHIPPED | OUTPATIENT
Start: 2025-01-07

## 2025-01-07 NOTE — RESULT ENCOUNTER NOTE
Called pt's sister Suma to update regarding plan of care, she did not answer. I left a VM informing her of his discharge today, his stabilization with his treatment plan, and the nursing facility he will be staying at along with the address and phone number. [address] [address]. Ph: [phone]     Yenny,    Your thyroid dose needs to be increased. A new prescription has been sent to your pharmacy.      Your blood glucose is normal. Your cholesterol looks high. Eat 4 - 5 servings of vegetables and fruits every day. Eat nuts, seeds, and whole grains (such as wheat pasta and wheat bread) instead of chips, fries, crackers and baked goods. Drink water instead of pop and juice. Exercise regularly, with a goal of 150 total minutes a week.     Return for lab only recheck in 4 - 6 weeks while fasting (nothing but water and medications for at least 8 hours.)  You may call [phone] or go to [email or web address].    Cee Biggs MD

## 2025-01-15 ENCOUNTER — ANCILLARY PROCEDURE (OUTPATIENT)
Dept: ULTRASOUND IMAGING | Facility: CLINIC | Age: 55
End: 2025-01-15
Attending: FAMILY MEDICINE
Payer: COMMERCIAL

## 2025-01-15 DIAGNOSIS — I73.9 CLAUDICATION: ICD-10-CM

## 2025-01-15 PROCEDURE — 93922 UPR/L XTREMITY ART 2 LEVELS: CPT | Performed by: RADIOLOGY

## 2025-01-15 NOTE — RESULT ENCOUNTER NOTE
Yenny,    Your ankle brachial index test is mildly abnormal on the right, with the possibility of right lower extremity artery disease.     Follow-up for repeat cholesterol testing as recommended.     Follow-up with me if you are having leg pain to discuss another test called MRA.     Cee Biggs MD

## 2025-01-30 DIAGNOSIS — E03.9 ACQUIRED HYPOTHYROIDISM: ICD-10-CM

## 2025-01-30 RX ORDER — LEVOTHYROXINE SODIUM 88 UG/1
88 TABLET ORAL DAILY
Qty: 90 TABLET | Refills: 3 | OUTPATIENT
Start: 2025-01-30

## 2025-02-08 ENCOUNTER — MYC REFILL (OUTPATIENT)
Dept: SURGERY | Facility: CLINIC | Age: 55
End: 2025-02-08
Payer: COMMERCIAL

## 2025-02-08 DIAGNOSIS — E66.811 OBESITY (BMI 30.0-34.9): ICD-10-CM

## 2025-03-07 NOTE — PROGRESS NOTES
[facility] Dermatology Note  Encounter Date: Mar 19, 2025  Office Visit     Reviewed patients past medical history and pertinent chart review prior to patients visit today.     Dermatology Problem List:  Last skin check: 03/19/2025    History of nonmelanoma skin cancer   - nose, bcc, s/p mohs approx 4 years ago  2. Actinic cheilitis   -efudex/gabriela     Social: Patient grew up in [facility]  Family Hx: Family history of skin cancer - Mother and Father, SCC  ____________________________________________    Assessment & Plan:     # Actinic cheilitis, upper lip  -Treated appropriately with topical Efudex/calcipotriene.  Patient to continue with observation at site.  Encouraged diligent photoprotection of the lip with a lip balm of SPF of 30 or more multiple times throughout the day.    # Personal history of nonmelanoma skin cancer  - No signs of recurrence. Continued observation recommended.   - Sun protection: Counseled SPF 30+ sunscreen, UPF clothing, sun avoidance, tanning bed avoidance.    # Multiple nevi, trunk and extremities  # Solar lentigines  - Nevi demonstrate no concerning features on dermoscopy. We discussed the importance of self exams at home.   - ABCDEs: Counseled ABCDEs of melanoma: Asymmetry, Border (irregularity), Color (not uniform, changes in color), Diameter (greater than 6 mm which is about the size of a pencil eraser), and Evolving (any changes in preexisting moles).    # Cherry angiomas  # Seborrheic keratoses  - We discussed the benign nature of the skin lesions. No treatment required. Continued observation recommended. Follow up with any concerns.      Follow-up: 1 year for follow up full body skin exam, as needed for new or changing lesions or new concerns    All risks, benefits and alternatives were discussed with patient.  Patient is in agreement and understands the assessment and plan.  All questions were answered.  Brooke Andersen PA-C  [facility]  Dermatology    ____________________________________________    CC: Skin Check (FBSC- area of concern: left calf. /Recheck upper lip- did not get raw when used the topical. )    HPI:  Ms. Yenny Johnson is a(n) 54 year old female who presents today as a return patient for a full body skin cancer screening. The patient has a history of nonmelanoma skin cancer. The patient was last seen in dermatology 12/16/2024. Today, the patient has a concern about a mole on her left calf.  She is not sure how long this lesion has been present for both like to make sure is not of concern.  Additionally, patient is here to follow-up on actinic cheilitis of the upper lip.  She treated this with a 7-day course of topical Efudex/calcipotriene.  She feels that the roughness has improved since treating this.  No other cutaneous concerns. Patient is being diligent with photoprotection in the summer.       Physical Exam:  Vitals: [facility] 08/07/2015    SKIN: Total skin excluding the genitalia areas was performed. The exam included the scalp, face, neck, bilateral arms, chest, back, abdomen, bilateral legs, digits, mons pubis, buttocks, and nails.   - Velasco II.  -upper lip, no overlying epidermal skin changes  - Left Calf, pink, waxy, stuck on appearing macule  - Multiple tan/brown macules and papules scattered throughout exam, consistent with benign nevi. No concerning features on dermoscopy.   - Scattered tan, homogenous macules scattered on sun exposed skin, consistent with solar lentigines.   - Scattered waxy, stuck on appearing papules and patches, consistent with seborrheic keratoses.  - Several 1-2 mm red dome shaped symmetric papules, consistent with cherry angiomas.     - No other lesions of concern on areas examined.     Medications:  Current Outpatient Medications   Medication Sig Dispense Refill    albuterol (PROAIR HFA/PROVENTIL HFA/VENTOLIN HFA) 108 (90 Base) MCG/ACT inhaler Inhale 1-2 puffs into the lungs every 4 hours as  needed for shortness of breath. 18 g 5    aspirin (ASPIRIN LOW DOSE) 81 MG EC tablet Take 1 tablet (81 mg) by mouth daily. 90 tablet 4    FLUoxetine (PROZAC) 20 MG capsule TAKE 3 CAPSULES BY MOUTH EVERY  capsule 4    levothyroxine (SYNTHROID/LEVOTHROID) 100 MCG tablet Take 1 tablet (100 mcg) by mouth daily. 90 tablet 4    nystatin (MYCOSTATIN) 790985 UNIT/GM external powder Apply topically daily as needed for dry skin. 60 g 11     No current facility-administered medications for this visit.      Past Medical History:   Patient Active Problem List   Diagnosis    Obesity    Stress incontinence, female    GERD (gastroesophageal reflux disease)    Hypothyroidism    Lumbar disc herniation    Intermittent asthma    Hyperlipidemia LDL goal <40    Medial meniscus tear    Subclavian arterial stenosis    Perimenopausal symptoms    Dupuytren contracture    Intertrigo     Past Medical History:   Diagnosis Date    Anemia     BCC (basal cell carcinoma of skin)     Degeneration of lumbar or lumbosacral intervertebral disc     Depressive disorder     Diverticulitis of colon     Dupuytren contracture 01/06/2025    Elevated fasting glucose     Elevated rheumatoid factor 12/12/2012    GERD (gastroesophageal reflux disease) 11/2005    EGD    Hyperlipidemia LDL goal <130     Hypertriglyceridemia     Hypothyroidism     Intermittent asthma     Lumbar disc herniation     Medial meniscus tear     Obesity 03/29/2012    Paroxysmal supraventricular tachycardia 02/16/2021    PMDD (premenstrual dysphoric disorder)     Stress incontinence, female 03/29/2012    Subclavian arterial stenosis 03/28/2023       CC Brooke Andersen PA-C  [address] [place] [address] on close of this encounter.

## 2025-03-19 ENCOUNTER — OFFICE VISIT (OUTPATIENT)
Dept: DERMATOLOGY | Facility: CLINIC | Age: 55
End: 2025-03-19
Payer: COMMERCIAL

## 2025-03-19 DIAGNOSIS — L81.4 LENTIGINES: ICD-10-CM

## 2025-03-19 DIAGNOSIS — L82.1 SEBORRHEIC KERATOSIS: Primary | ICD-10-CM

## 2025-03-19 DIAGNOSIS — D22.9 MULTIPLE BENIGN NEVI: ICD-10-CM

## 2025-03-19 DIAGNOSIS — D18.01 CHERRY ANGIOMA: ICD-10-CM

## 2025-03-19 ASSESSMENT — PAIN SCALES - GENERAL: PAINLEVEL_OUTOF10: NO PAIN (0)

## 2025-03-19 NOTE — NURSING NOTE
Yenny Johnson's chief complaint for this visit includes:  Chief Complaint   Patient presents with    Skin Check     FBSC- area of concern: left calf.   Recheck upper lip- did not get raw when used the topical.      PCP: Cee Biggs    Referring Provider:  Brooke Andersen PA-C  [address] [place] [address]    [facility] 08/07/2015   No Pain (0)        Allergies   Allergen Reactions    Atorvastatin Muscle Pain (Myalgia)         Do you need any medication refills at today's visit?   No.    Faustina Bradshaw RN on 3/19/2025 at 3:21 PM

## 2025-03-19 NOTE — LETTER
3/19/2025      Yenny Johnson  [address] [facility] [address]      Dear Colleague,    Thank you for referring your patient, Yenny Johnson, to the [facility]. Please see a copy of my visit note below.    [facility] Dermatology Note  Encounter Date: Mar 19, 2025  Office Visit     Reviewed patients past medical history and pertinent chart review prior to patients visit today.     Dermatology Problem List:  Last skin check: 03/19/2025    History of nonmelanoma skin cancer   - nose, bcc, s/p mohs approx 4 years ago  2. Actinic cheilitis   -efudex/gabriela     Social: Patient grew up in [facility]  Family Hx: Family history of skin cancer - Mother and Father, SCC  ____________________________________________    Assessment & Plan:     # Actinic cheilitis, upper lip  -Treated appropriately with topical Efudex/calcipotriene.  Patient to continue with observation at site.  Encouraged diligent photoprotection of the lip with a lip balm of SPF of 30 or more multiple times throughout the day.    # Personal history of nonmelanoma skin cancer  - No signs of recurrence. Continued observation recommended.   - Sun protection: Counseled SPF 30+ sunscreen, UPF clothing, sun avoidance, tanning bed avoidance.    # Multiple nevi, trunk and extremities  # Solar lentigines  - Nevi demonstrate no concerning features on dermoscopy. We discussed the importance of self exams at home.   - ABCDEs: Counseled ABCDEs of melanoma: Asymmetry, Border (irregularity), Color (not uniform, changes in color), Diameter (greater than 6 mm which is about the size of a pencil eraser), and Evolving (any changes in preexisting moles).    # Cherry angiomas  # Seborrheic keratoses  - We discussed the benign nature of the skin lesions. No treatment required. Continued observation recommended. Follow up with any concerns.      Follow-up: 1 year for follow up full body skin exam, as needed for new  or changing lesions or new concerns    All risks, benefits and alternatives were discussed with patient.  Patient is in agreement and understands the assessment and plan.  All questions were answered.  Brooke Andersen PA-C  [facility] Dermatology    ____________________________________________    CC: Skin Check (FBSC- area of concern: left calf. /Recheck upper lip- did not get raw when used the topical. )    HPI:  Ms. Yenny Johnson is a(n) 54 year old female who presents today as a return patient for a full body skin cancer screening. The patient has a history of nonmelanoma skin cancer. The patient was last seen in dermatology 12/16/2024. Today, the patient has a concern about a mole on her left calf.  She is not sure how long this lesion has been present for both like to make sure is not of concern.  Additionally, patient is here to follow-up on actinic cheilitis of the upper lip.  She treated this with a 7-day course of topical Efudex/calcipotriene.  She feels that the roughness has improved since treating this.  No other cutaneous concerns. Patient is being diligent with photoprotection in the summer.       Physical Exam:  Vitals: [facility] 08/07/2015    SKIN: Total skin excluding the genitalia areas was performed. The exam included the scalp, face, neck, bilateral arms, chest, back, abdomen, bilateral legs, digits, mons pubis, buttocks, and nails.   - Velasco II.  -upper lip, no overlying epidermal skin changes  - Left Calf, pink, waxy, stuck on appearing macule  - Multiple tan/brown macules and papules scattered throughout exam, consistent with benign nevi. No concerning features on dermoscopy.   - Scattered tan, homogenous macules scattered on sun exposed skin, consistent with solar lentigines.   - Scattered waxy, stuck on appearing papules and patches, consistent with seborrheic keratoses.  - Several 1-2 mm red dome shaped symmetric papules, consistent with cherry angiomas.     - No other lesions  of concern on areas examined.     Medications:  Current Outpatient Medications   Medication Sig Dispense Refill     albuterol (PROAIR HFA/PROVENTIL HFA/VENTOLIN HFA) 108 (90 Base) MCG/ACT inhaler Inhale 1-2 puffs into the lungs every 4 hours as needed for shortness of breath. 18 g 5     aspirin (ASPIRIN LOW DOSE) 81 MG EC tablet Take 1 tablet (81 mg) by mouth daily. 90 tablet 4     FLUoxetine (PROZAC) 20 MG capsule TAKE 3 CAPSULES BY MOUTH EVERY  capsule 4     levothyroxine (SYNTHROID/LEVOTHROID) 100 MCG tablet Take 1 tablet (100 mcg) by mouth daily. 90 tablet 4     nystatin (MYCOSTATIN) 665713 UNIT/GM external powder Apply topically daily as needed for dry skin. 60 g 11     No current facility-administered medications for this visit.      Past Medical History:   Patient Active Problem List   Diagnosis     Obesity     Stress incontinence, female     GERD (gastroesophageal reflux disease)     Hypothyroidism     Lumbar disc herniation     Intermittent asthma     Hyperlipidemia LDL goal <40     Medial meniscus tear     Subclavian arterial stenosis     Perimenopausal symptoms     Dupuytren contracture     Intertrigo     Past Medical History:   Diagnosis Date     Anemia      BCC (basal cell carcinoma of skin)      Degeneration of lumbar or lumbosacral intervertebral disc      Depressive disorder      Diverticulitis of colon      Dupuytren contracture 01/06/2025     Elevated fasting glucose      Elevated rheumatoid factor 12/12/2012     GERD (gastroesophageal reflux disease) 11/2005    EGD     Hyperlipidemia LDL goal <130      Hypertriglyceridemia      Hypothyroidism      Intermittent asthma      Lumbar disc herniation      Medial meniscus tear      Obesity 03/29/2012     Paroxysmal supraventricular tachycardia 02/16/2021     PMDD (premenstrual dysphoric disorder)      Stress incontinence, female 03/29/2012     Subclavian arterial stenosis 03/28/2023       CC Brooke Andersen PA-C  [address]  [place]   [place] [address] on close of this encounter.      Again, thank you for allowing me to participate in the care of your patient.        Sincerely,        Brooke Andersen PA-C    Electronically signed

## 2025-03-20 DIAGNOSIS — N95.1 PERIMENOPAUSAL SYMPTOMS: ICD-10-CM

## 2025-04-22 NOTE — PROGRESS NOTES
[facility] Care Clinic Non Surgical Follow up Visit   Date of visit: 4/30/2025  Physician: KRISTINE Hong MD, MD  Primary Care is Cee Biggs.  Yenny Johnson   54 year old  female     Initial Weight: 199#  Initial BMI: 33.37  Today's Weight:   Wt Readings from Last 1 Encounters:   04/30/25 87.9 kg (193 lb 11.2 oz)     Body mass index is 32.48 kg/m .           Assessment and Plan   Assessment: Yenny is a 54 year old year old female who presents for medical weight management.      Plan:    1. Obesity (BMI 30.0-34.9) (Primary)  Patient was congratulated on her success thus far. Healthy habits to assist with further weight loss were discussed. She will work on decreasing her portions and making healthy food choices. She will try to stay away from the candy at work. We will try restarting wegovy.  Risks, benefits and possible side effects discussed  If it is not covered by her insurance she would like to try phentermine.  We discussed the patient's co-morbid conditions including hyperlipidemia and GERD. These likely will improve with healthy habits and weight loss.     2. Hyperlipidemia LDL goal <40  This may improve with healthy habits and weight loss.     3. Gastroesophageal reflux disease, unspecified whether esophagitis present  This may improve with healthy habits and weight loss.      Follow up in 4 months with myself           INTERIM HISTORY  Patient was doing well on wegovy but her pharmacy cancelled it for unknown reasons. She sent us a message stating that it was no longer covered. She now says that she doesn't know that it wasn't covered. She has been off if it since February and has gained some weight. Her appetite has increased.    DIETARY HISTORY  Meals Per Day: 3  Eating Protein First?: yes  Food Diary: B:protein shake and coffee and hard boiled eggs L:chicken and fruit and vegetables or chocolate covered almonds D:chicken and salad  Snacks Per Day: after work  Typical Snack: struggles  with candy (2-3 mini candy bars) at work  Fluid Intake: 64 oz  Portion Control: getting bigger since stopping medication  Calorie Containing Beverages: none    Meals at Restaurant per week:1-2    Positive Changes Since Last Visit: protein intake  Struggling With: occasional sweets    Knowledgeable in Reading Food Labels: yes  Getting Adequate Protein: yes      PHYSICAL ACTIVITY PATTERNS:  Walking 2 miles 5 days per week. She is also doing pilates videos    REVIEW OF SYSTEMS  GENERAL/CONSTITUTIONAL:  Fatigue: no  HEENT:   glaucoma: no  CARDIOVASCULAR:  History of heart disease: no  GI:  Pancreatitis: no  NEUROLOGIC:  Paresthesias: no  History of migraine headaches: no  PSYCHIATRIC:  Moods: stable  ENDOCRINE:  Monitoring Blood Sugars: no  Sugars Well Controlled: na  No personal or family history of medullary thyroid cancer no  No personal or family history of MEN2   :  Birth control: hysterectomy  History of kidney stones: yes during pregnancy     Patient Profile   Social History     Social History Narrative    Not on file        Past Medical History   Past Medical History:   Diagnosis Date    Anemia     BCC (basal cell carcinoma of skin)     Degeneration of lumbar or lumbosacral intervertebral disc     Depressive disorder     Diverticulitis of colon     Dupuytren contracture 01/06/2025    Elevated fasting glucose     Elevated rheumatoid factor 12/12/2012    GERD (gastroesophageal reflux disease) 11/2005    EGD    Hyperlipidemia LDL goal <130     Hypertriglyceridemia     Hypothyroidism     Intermittent asthma     Lumbar disc herniation     Medial meniscus tear     Obesity 03/29/2012    Paroxysmal supraventricular tachycardia 02/16/2021    PMDD (premenstrual dysphoric disorder)     Stress incontinence, female 03/29/2012    Subclavian arterial stenosis 03/28/2023     Patient Active Problem List   Diagnosis    Obesity    Stress incontinence, female    GERD (gastroesophageal reflux disease)    Hypothyroidism    Lumbar  "disc herniation    Intermittent asthma    Hyperlipidemia LDL goal <40    Medial meniscus tear    Subclavian arterial stenosis    Perimenopausal symptoms    Dupuytren contracture    Intertrigo       Past Surgical History  She has a past surgical history that includes cholecystectomy, laporoscopic (1995); tubal ligation; c/section, classical; biopsy; hysterectomy, pap no longer indicated (09/23/2015); Repair MOHS (Left, 01/19/2017); Inject Paravertebral Facet Joint Lumbar / Sacral First (Bilateral, 06/28/2017); Inject Epidural Lumbar / Sacral Single (Bilateral, 08/09/2017); Destruction Of Paravertebral Facet Lumbar / Sacral Single (Bilateral, 09/12/2017); Inject Sacroiliac Joint (Bilateral, 02/07/2018); Inject Paravertebral Facet Joint Lumbar / Sacral Third (Bilateral, 03/09/2018); Radio Frequency Ablation / Destruction of Sacroiloac Joint Lateral Branches (S1/S2/S3) (Bilateral, 05/02/2018); and Colonoscopy with CO2 insufflation (N/A, 09/27/2021).     Examination   Ht 1.645 m (5' 4.75\")   Wt 87.9 kg (193 lb 11.2 oz)   LMP 08/07/2015   BMI 32.48 kg/m    Wt Readings from Last 4 Encounters:   04/30/25 87.9 kg (193 lb 11.2 oz)   01/06/25 85.3 kg (188 lb)   11/27/24 82.8 kg (182 lb 8 oz)   09/03/24 83.4 kg (183 lb 12.8 oz)      BP Readings from Last 3 Encounters:   01/06/25 134/84   09/03/24 (!) 143/86   12/30/23 124/83      GENERAL: alert and no distress  EYES: Eyes grossly normal to inspection.  No discharge or erythema, or obvious scleral/conjunctival abnormalities.  RESP: No audible wheeze, cough, or visible cyanosis.    SKIN: Visible skin clear. No significant rash, abnormal pigmentation or lesions.  NEURO: Cranial nerves grossly intact.  Mentation and speech appropriate for age.  PSYCH: Appropriate affect, tone, and pace of words        Counseling:   We reviewed the important post op bariatric recommendations:  -eating 3 meals daily  -eating protein first, getting >60gm protein daily  -eating slowly, chewing food " well  -avoiding/limiting calorie containing beverages  -limiting starchy vegetables and carbohydrates, choosing wheat, not white with breads,   crackers, pastas, cornel, bagels, tortillas, rice  -limiting restaurant or cafeteria eating to twice a week or less    We discussed the importance of restorative sleep and stress management in maintaining a healthy weight.  We discussed the National Weight Control Registry healthy weight maintenance strategies and ways to optimize metabolism.  We discussed the importance of physical activity including cardiovascular and strength training in maintaining a healthier weight.    Total time spent on the date of this encounter doing: chart review, review of test results, patient visit, physical exam, education, counseling, developing plan of care and documenting = 42 minutes.         KRISTINE Hong MD  [facility]

## 2025-04-30 ENCOUNTER — VIRTUAL VISIT (OUTPATIENT)
Dept: SURGERY | Facility: CLINIC | Age: 55
End: 2025-04-30
Payer: COMMERCIAL

## 2025-04-30 VITALS — BODY MASS INDEX: 32.27 KG/M2 | WEIGHT: 193.7 LBS | HEIGHT: 65 IN

## 2025-04-30 DIAGNOSIS — E66.811 OBESITY (BMI 30.0-34.9): Primary | ICD-10-CM

## 2025-04-30 DIAGNOSIS — E78.5 HYPERLIPIDEMIA LDL GOAL <130: ICD-10-CM

## 2025-04-30 DIAGNOSIS — K21.9 GASTROESOPHAGEAL REFLUX DISEASE, UNSPECIFIED WHETHER ESOPHAGITIS PRESENT: ICD-10-CM

## 2025-04-30 PROCEDURE — 1126F AMNT PAIN NOTED NONE PRSNT: CPT | Performed by: FAMILY MEDICINE

## 2025-04-30 PROCEDURE — 98007 SYNCH AUDIO-VIDEO EST HI 40: CPT | Performed by: FAMILY MEDICINE

## 2025-04-30 RX ORDER — PHENTERMINE HYDROCHLORIDE 37.5 MG/1
TABLET ORAL
Qty: 90 TABLET | Refills: 0 | Status: SHIPPED | OUTPATIENT
Start: 2025-04-30

## 2025-04-30 RX ORDER — SEMAGLUTIDE 0.25 MG/.5ML
0.25 INJECTION, SOLUTION SUBCUTANEOUS WEEKLY
Qty: 2 ML | Refills: 0 | Status: SHIPPED | OUTPATIENT
Start: 2025-04-30 | End: 2025-05-28

## 2025-04-30 ASSESSMENT — PAIN SCALES - GENERAL: PAINLEVEL_OUTOF10: NO PAIN (0)

## 2025-04-30 NOTE — LETTER
4/30/2025      Yenny Johnson  [address] [address]      Dear Colleague,    Thank you for referring your patient, Yenny Johnson, to the [facility] [place]. Please see a copy of my visit note below.    Bariatric Care Clinic Non Surgical Follow up Visit   Date of visit: 4/30/2025  Physician: KRISTINE Hong MD, MD  Primary Care is Cee Biggs.  Yenny Johnson   54 year old  female     Initial Weight: 199#  Initial BMI: 33.37  Today's Weight:   Wt Readings from Last 1 Encounters:   04/30/25 87.9 kg (193 lb 11.2 oz)     Body mass index is 32.48 kg/m .           Assessment and Plan   Assessment: Yenny is a 54 year old year old female who presents for medical weight management.      Plan:    1. Obesity (BMI 30.0-34.9) (Primary)  Patient was congratulated on her success thus far. Healthy habits to assist with further weight loss were discussed. She will work on decreasing her portions and making healthy food choices. She will try to stay away from the candy at work. We will try restarting wegovy.  Risks, benefits and possible side effects discussed  If it is not covered by her insurance she would like to try phentermine.  We discussed the patient's co-morbid conditions including hyperlipidemia and GERD. These likely will improve with healthy habits and weight loss.     2. Hyperlipidemia LDL goal <40  This may improve with healthy habits and weight loss.     3. Gastroesophageal reflux disease, unspecified whether esophagitis present  This may improve with healthy habits and weight loss.      Follow up in 4 months with myself           INTERIM HISTORY  Patient was doing well on wegovy but her pharmacy cancelled it for unknown reasons. She sent us a message stating that it was no longer covered. She now says that she doesn't know that it wasn't covered. She has been off if it since February and has gained some weight. Her appetite has  increased.    DIETARY HISTORY  Meals Per Day: 3  Eating Protein First?: yes  Food Diary: B:protein shake and coffee and hard boiled eggs L:chicken and fruit and vegetables or chocolate covered almonds D:chicken and salad  Snacks Per Day: after work  Typical Snack: struggles with candy (2-3 mini candy bars) at work  Fluid Intake: 64 oz  Portion Control: getting bigger since stopping medication  Calorie Containing Beverages: none    Meals at Restaurant per week:1-2    Positive Changes Since Last Visit: protein intake  Struggling With: occasional sweets    Knowledgeable in Reading Food Labels: yes  Getting Adequate Protein: yes      PHYSICAL ACTIVITY PATTERNS:  Walking 2 miles 5 days per week. She is also doing pilates videos    REVIEW OF SYSTEMS  GENERAL/CONSTITUTIONAL:  Fatigue: no  HEENT:   glaucoma: no  CARDIOVASCULAR:  History of heart disease: no  GI:  Pancreatitis: no  NEUROLOGIC:  Paresthesias: no  History of migraine headaches: no  PSYCHIATRIC:  Moods: stable  ENDOCRINE:  Monitoring Blood Sugars: no  Sugars Well Controlled: na  No personal or family history of medullary thyroid cancer no  No personal or family history of MEN2   :  Birth control: hysterectomy  History of kidney stones: yes during pregnancy     Patient Profile   Social History     Social History Narrative     Not on file        Past Medical History   Past Medical History:   Diagnosis Date     Anemia      BCC (basal cell carcinoma of skin)      Degeneration of lumbar or lumbosacral intervertebral disc      Depressive disorder      Diverticulitis of colon      Dupuytren contracture 01/06/2025     Elevated fasting glucose      Elevated rheumatoid factor 12/12/2012     GERD (gastroesophageal reflux disease) 11/2005    EGD     Hyperlipidemia LDL goal <130      Hypertriglyceridemia      Hypothyroidism      Intermittent asthma      Lumbar disc herniation      Medial meniscus tear      Obesity 03/29/2012     Paroxysmal supraventricular tachycardia  "02/16/2021     PMDD (premenstrual dysphoric disorder)      Stress incontinence, female 03/29/2012     Subclavian arterial stenosis 03/28/2023     Patient Active Problem List   Diagnosis     Obesity     Stress incontinence, female     GERD (gastroesophageal reflux disease)     Hypothyroidism     Lumbar disc herniation     Intermittent asthma     Hyperlipidemia LDL goal <40     Medial meniscus tear     Subclavian arterial stenosis     Perimenopausal symptoms     Dupuytren contracture     Intertrigo       Past Surgical History  She has a past surgical history that includes cholecystectomy, laporoscopic (1995); tubal ligation; c/section, classical; biopsy; hysterectomy, pap no longer indicated (09/23/2015); Repair MOHS (Left, 01/19/2017); Inject Paravertebral Facet Joint Lumbar / Sacral First (Bilateral, 06/28/2017); Inject Epidural Lumbar / Sacral Single (Bilateral, 08/09/2017); Destruction Of Paravertebral Facet Lumbar / Sacral Single (Bilateral, 09/12/2017); Inject Sacroiliac Joint (Bilateral, 02/07/2018); Inject Paravertebral Facet Joint Lumbar / Sacral Third (Bilateral, 03/09/2018); Radio Frequency Ablation / Destruction of Sacroiloac Joint Lateral Branches (S1/S2/S3) (Bilateral, 05/02/2018); and Colonoscopy with CO2 insufflation (N/A, 09/27/2021).     Examination   Ht 1.645 m (5' 4.75\")   Wt 87.9 kg (193 lb 11.2 oz)   LMP 08/07/2015   BMI 32.48 kg/m    Wt Readings from Last 4 Encounters:   04/30/25 87.9 kg (193 lb 11.2 oz)   01/06/25 85.3 kg (188 lb)   11/27/24 82.8 kg (182 lb 8 oz)   09/03/24 83.4 kg (183 lb 12.8 oz)      BP Readings from Last 3 Encounters:   01/06/25 134/84   09/03/24 (!) 143/86   12/30/23 124/83      GENERAL: alert and no distress  EYES: Eyes grossly normal to inspection.  No discharge or erythema, or obvious scleral/conjunctival abnormalities.  RESP: No audible wheeze, cough, or visible cyanosis.    SKIN: Visible skin clear. No significant rash, abnormal pigmentation or lesions.  NEURO: " Cranial nerves grossly intact.  Mentation and speech appropriate for age.  PSYCH: Appropriate affect, tone, and pace of words        Counseling:   We reviewed the important post op bariatric recommendations:  -eating 3 meals daily  -eating protein first, getting >60gm protein daily  -eating slowly, chewing food well  -avoiding/limiting calorie containing beverages  -limiting starchy vegetables and carbohydrates, choosing wheat, not white with breads,   crackers, pastas, cornel, bagels, tortillas, rice  -limiting restaurant or cafeteria eating to twice a week or less    We discussed the importance of restorative sleep and stress management in maintaining a healthy weight.  We discussed the National Weight Control Registry healthy weight maintenance strategies and ways to optimize metabolism.  We discussed the importance of physical activity including cardiovascular and strength training in maintaining a healthier weight.    Total time spent on the date of this encounter doing: chart review, review of test results, patient visit, physical exam, education, counseling, developing plan of care and documenting = 42 minutes.         KRISTINE Hong MD  [facility]           Virtual Visit Details    Type of service:  Video Visit     Originating Location (pt. Location): Home    Distant Location (provider location):  Off-site  Platform used for Video Visit: [employer]  Video start time: 10:10 am  Video end time: 10:30 am       Again, thank you for allowing me to participate in the care of your patient.        Sincerely,        KRISTINE Hong MD    Electronically signed

## 2025-04-30 NOTE — NURSING NOTE
Current patient location: home     Is the patient currently in the state of [place]? YES    Visit mode: VIDEO    If the visit is dropped, the patient can be reconnected by:VIDEO VISIT: Text to cell phone:   Telephone Information:   Mobile [phone]       Will anyone else be joining the visit? NO  (If patient encounters technical issues they should call [phone]    Are changes needed to the allergy or medication list? Pt stated no changes to allergies and Pt stated no med changes    Are refills needed on medications prescribed by this physician? Discuss with provider    Rooming Documentation:  Questionnaire(s) completed      Reason for visit: RECHECK    Wt other than 24 hrs:    Pain more than one location:  no  Bess WALKER

## 2025-04-30 NOTE — PROGRESS NOTES
Virtual Visit Details    Type of service:  Video Visit     Originating Location (pt. Location): Home    Distant Location (provider location):  Off-site  Platform used for Video Visit: [employer]  Video start time: 10:10 am  Video end time: 10:30 am

## 2025-04-30 NOTE — PATIENT INSTRUCTIONS
Eat Better ? Move More ? Live Well    Eat 3 nutrient-rich meals each day    Don t skip meals--it will cause you to overeat later in the day!    Eating fiber (vegetables/fruits/whole grains) and protein with meals helps you stay full longer    Choose foods with less than 10 grams of sugar and 5 grams of fat per serving to prevent excess calories and weight gain  Eat around the same times each day to develop a routine eating schedule   Avoid snacking unless physically hungry.   Planned snacks: 1-2 times per day and no more than 150 calories    Eat protein first   Protein helps with healing, maintaining adequate muscle mass, reducing hunger and optimizing nutritional status   Aim for 60-80 grams of protein per day   Fill up on Fiber   Fiber comes from plants--fruits, veggies, whole grains, nuts/seeds and beans   Fiber is low in calories, high in phytonutrients and helps you stay full longer   Aim for 25-35 grams per day by eating fiber with meals and snacks  Eat [id number]   Take 20-30 minutes to eat each meal by taking small bites, chewing foods to applesauce consistency or 20-30 times before you swallow   Eating foods too fast can delay satiety/fullness signals and increase overeating   Slow down your eating by using toddler utensils, putting your fork/spoon down between bites and not watching TV or emailing during meals!   Keep a Journal         Writing down what you eat, how you feel and when you are active helps you identify new changes to work on from week to week         Look for ways to cut 100 calories from your current diet 2-3 times per day  Drink 64 ounces of 0-Calorie drinks between meals   Water   Zero calorie Propel  or Vitamin Water     So[employer]  Zero Calories   Crystal Light , Sugar-Free Jacob-Aid , and other sugar-free lemonade or flavored ocampo   Keep Caffeine to less than 300mg per day ie: 3-6oz cups coffee     Work up to 45-60 minutes of physical activity most days of the week   Helps with  losing weight and prevent regaining those extra pounds!    Do a combo of cardio (walking/water exercises) and strength training (lifting weights/Vinyasa yoga)    Avoid Mindless Eating   Be present when you eat--take note of the smell, taste and quality of your food   Make a list of alternative activities you could do to prevent eating out of boredom/stress  Go for a walk, call a friend, chew gum, paint your nails, re-organize the garage, etc       Your provider prescribed wegovy weekly injections to help assist you with your weight loss efforts. Please follow the instructions on your prescription as the dose will be gradually tapered up. Do not use if you have a personal or family history of medullary thyroid carcinoma or a personal history of Multiple Endocrine Neoplasia syndrome type 2. Pancreatitis has occurred in some clinical trials. If you develop abdominal pain and fever please stop this medication and call your provider.     1.  Start Wegovy (semaglutide) 0.25 mg once weekly for 4 weeks, then if tolerating increase to 0.5 mg weekly for 4 weeks, then if tolerating increase to 1 mg weekly for 4 weeks, then if tolerating increase to 1.7 mg weekly for 4 weeks, then if tolerating increase to 2.4 mg weekly thereafter.   -Each Wegovy pen is a different color to help identify the different dose strengths   -Each Wegovy pen is a once weekly single-dose prefilled pen with a pen injector already built within the pen. Discard the Wegovy pen after use in sharps container.     If you are struggling with side effects you can taper the dose up more slowly.    2. Storage: make sure that when you get the prescription that you store the prescription in the refrigerator until it is time to use the Wegovy pen.  Once it is time to use the Wegovy pen, you can keep the pen at room temperature and it is good for up to 28 days at room temperature. D    3.  Potential common side effects: nausea, headache, diarrhea, stomach upset.  If  these become unmanageable or concerning symptoms, please make sure to call or mychart.        Using Your Wegovy Pen  Medicine (semaglutide)    Storing your pens  Store your pens in the refrigerator until you are ready to use them. Don't let them freeze.  Your pen may be stored at room temperature for 28 days or fewer. Just make sure the temperature doesn't get higher than 86 or lower than 46 degrees Fahrenheit.   Protect the pens from light.  Never use any pens that have .    Check your pen before use:  The liquid in the pen window should be clear and colorless. Bubbles are okay to see.   Do not use the pen if you can see the window contains any specks or it is cloudy or has changed color.  Make sure you have the medication and dose your health care provider prescribed.    Getting ready to inject:   1. Wash and dry your hands well.  2. Make sure the counter you use to place your supplies on is clean.  3. Make sure your injection time will not be interrupted by children or pets.  4. Have alcohol wipes or alcohol and cotton balls available to clean the injection site.   5. Choose your injection site. It can be on your stomach, back of upper arms, or upper legs. Remember to change your injection site each time you inject. Try to be at least 1 inch away from the previous one. Stay at least 1 inch away from your belly button.     Inject your dose:  1. Pull off the grey cap off the pen. Throw it in the trash. Do not put the cap back on the pen.   2. Clean the injection site with alcohol.   3. Push the grey part of the pen firmly against your skin. This will start the injection.  4. When the pen is injecting, you will hear 2 clicks. The first click tells you the injection has started. The second one tells you the injection is continuing.   5. The injection is done when the yellow bar in the glass window at the bottom of the pen has stopped moving.   6. This injection is given 1 day a week. The pens come in  5 doses  ranging from 0.25 mg to 2.4 mg. Each dose comes in a different color pen.  7. If you miss a dose, take is as soon as you remember. Do not take a missed dose, if it is within 2 days of the next dose.    8. Your injection may be best tolerated if given at night.     Disposing of your pens:  Dispose of your pens in a puncture-resistant container (hard plastic bottle) or Sharps container.  Check with the county you live in on how to dispose of the container.  Do not recycle the container with used pens.     Of note, you may not be able to  your medication right away. It may require a prior authorization from your insurance company. This may take a week or more.

## 2025-05-18 ENCOUNTER — OFFICE VISIT (OUTPATIENT)
Dept: URGENT CARE | Facility: URGENT CARE | Age: 55
End: 2025-05-18
Payer: COMMERCIAL

## 2025-05-18 VITALS
DIASTOLIC BLOOD PRESSURE: 85 MMHG | HEART RATE: 97 BPM | BODY MASS INDEX: 32.83 KG/M2 | RESPIRATION RATE: 19 BRPM | SYSTOLIC BLOOD PRESSURE: 138 MMHG | WEIGHT: 195.8 LBS | TEMPERATURE: 98.2 F | OXYGEN SATURATION: 98 %

## 2025-05-18 DIAGNOSIS — Z20.818 PERTUSSIS EXPOSURE: ICD-10-CM

## 2025-05-18 DIAGNOSIS — J98.8 RESPIRATORY INFECTION: Primary | ICD-10-CM

## 2025-05-18 PROCEDURE — 99213 OFFICE O/P EST LOW 20 MIN: CPT | Performed by: FAMILY MEDICINE

## 2025-05-18 PROCEDURE — 3079F DIAST BP 80-89 MM HG: CPT | Performed by: FAMILY MEDICINE

## 2025-05-18 PROCEDURE — 87798 DETECT AGENT NOS DNA AMP: CPT | Performed by: FAMILY MEDICINE

## 2025-05-18 PROCEDURE — 3075F SYST BP GE 130 - 139MM HG: CPT | Performed by: FAMILY MEDICINE

## 2025-05-18 RX ORDER — AZITHROMYCIN 250 MG/1
TABLET, FILM COATED ORAL
Qty: 6 TABLET | Refills: 0 | Status: SHIPPED | OUTPATIENT
Start: 2025-05-18 | End: 2025-05-23

## 2025-05-18 NOTE — PROGRESS NOTES
Urgent Care Clinic Visit  Chief Complaint   Patient presents with    Cough     Cough, sore throat, chest congestion, mucus/phelgm. Sx started Friday. Exposed to pertussis through work 5/5, pt took azithromycin from 5/7-5/11.                5/18/2025     4:20 PM   Additional Questions   Roomed by Nava   Accompanied by Self       (J98.8) Respiratory infection  (primary encounter diagnosis)  Comment:     Onset in last 2 to 3 days.      Plan: B. pertussis/parapertussis PCR-NP, azithromycin        (ZITHROMAX) 250 MG tablet            (Z20.818) Pertussis exposure  Comment:     Exposure may 5, prophylactic Z-Dionte May 7.    Plan: B. pertussis/parapertussis PCR-NP, azithromycin        (ZITHROMAX) 250 MG tablet    Plan will be to test today for pertussis acutely.  Restart Z-Dionte in any case.  Observe with follow-up for worsening symptoms.              CHIEF COMPLAINT    Respiratory infection.      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    She has had some symptoms of initially sore throat and headache and then cough in the last 2 to 3 days.  She has not noticed a fever.  Recent context is that she had a pertussis exposure back on May 5, was placed on prophylaxis May 7.        REVIEW OF SYSTEMS    No fever.  No SOB.  No chest pain.  No nausea or abdominal pain.      EXAM  /85 (BP Location: Left arm, Cuff Size: Adult Regular)   Pulse 97   Temp 98.2  F (36.8  C) (Tympanic)   Resp 19   Wt 88.8 kg (195 lb 12.8 oz)   LMP 08/07/2015   SpO2 98%   BMI 32.83 kg/m      Pharynx without significant inflammation or swelling.  Neck no adenopathy or thyromegaly.  Lungs are clear.  Occasional cough noted.

## 2025-05-20 LAB
B PARAPERT DNA SPEC QL NAA+PROBE: NOT DETECTED
B PERT DNA SPEC QL NAA+PROBE: NOT DETECTED

## 2025-06-13 ENCOUNTER — LAB (OUTPATIENT)
Dept: LAB | Facility: CLINIC | Age: 55
End: 2025-06-13
Attending: FAMILY MEDICINE
Payer: COMMERCIAL

## 2025-06-13 ENCOUNTER — RESULTS FOLLOW-UP (OUTPATIENT)
Dept: FAMILY MEDICINE | Facility: CLINIC | Age: 55
End: 2025-06-13

## 2025-06-13 ENCOUNTER — MYC REFILL (OUTPATIENT)
Dept: FAMILY MEDICINE | Facility: CLINIC | Age: 55
End: 2025-06-13

## 2025-06-13 DIAGNOSIS — E03.9 ACQUIRED HYPOTHYROIDISM: ICD-10-CM

## 2025-06-13 DIAGNOSIS — N95.1 PERIMENOPAUSAL SYMPTOMS: ICD-10-CM

## 2025-06-13 DIAGNOSIS — E78.1 HYPERTRIGLYCERIDEMIA: ICD-10-CM

## 2025-06-13 DIAGNOSIS — E78.5 HYPERLIPIDEMIA LDL GOAL <130: Primary | ICD-10-CM

## 2025-06-13 LAB
CHOLEST SERPL-MCNC: 265 MG/DL
FASTING STATUS PATIENT QL REPORTED: YES
HDLC SERPL-MCNC: 44 MG/DL
LDLC SERPL CALC-MCNC: 155 MG/DL
NONHDLC SERPL-MCNC: 221 MG/DL
T4 FREE SERPL-MCNC: 1.29 NG/DL (ref 0.9–1.7)
TRIGL SERPL-MCNC: 331 MG/DL
TSH SERPL DL<=0.005 MIU/L-ACNC: 4.53 UIU/ML (ref 0.3–4.2)

## 2025-06-13 PROCEDURE — 80061 LIPID PANEL: CPT

## 2025-06-13 PROCEDURE — 36415 COLL VENOUS BLD VENIPUNCTURE: CPT

## 2025-06-13 PROCEDURE — 84443 ASSAY THYROID STIM HORMONE: CPT

## 2025-06-13 PROCEDURE — 84439 ASSAY OF FREE THYROXINE: CPT

## 2025-06-16 RX ORDER — LEVOTHYROXINE SODIUM 112 UG/1
112 TABLET ORAL DAILY
Qty: 90 TABLET | Refills: 4 | Status: SHIPPED | OUTPATIENT
Start: 2025-06-16

## 2025-06-16 RX ORDER — ROSUVASTATIN CALCIUM 5 MG/1
5 TABLET, COATED ORAL DAILY
Qty: 90 TABLET | Refills: 4 | Status: SHIPPED | OUTPATIENT
Start: 2025-06-16

## 2025-07-27 DIAGNOSIS — E66.811 OBESITY (BMI 30.0-34.9): ICD-10-CM

## 2025-07-28 RX ORDER — PHENTERMINE HYDROCHLORIDE 37.5 MG/1
TABLET ORAL
Qty: 90 TABLET | Refills: 0 | Status: SHIPPED | OUTPATIENT
Start: 2025-07-28

## (undated) DEVICE — SYR 10ML LL W/O NDL

## (undated) DEVICE — DRAPE SPLIT EENT 76X124" 3X28" 9447

## (undated) DEVICE — PREP CHLORAPREP 26ML TINTED ORANGE  260815

## (undated) DEVICE — SYR 05ML LL W/O NDL

## (undated) DEVICE — LABEL MEDICATION SYSTEM 3303-P

## (undated) DEVICE — CANNULA MONOPOLAR CVD 100ML 20GA 0406-630-125

## (undated) DEVICE — PREP POVIDONE IODINE USP 10% TOPICAL SOL 64537161

## (undated) DEVICE — GLOVE PROTEXIS POWDER FREE SMT 8.0  2D72PT80X

## (undated) DEVICE — BLADE KNIFE SURG 15 371115

## (undated) DEVICE — PAIN PACK

## (undated) DEVICE — DRSG PRIMAPORE 02X3" 7133

## (undated) DEVICE — PEN MARKING SKIN TYCO DEVON DUAL TIP 31145868

## (undated) DEVICE — GLOVE ESTEEM POWDER FREE SMT 7.5  2D72PT75

## (undated) DEVICE — GLOVE PROTEXIS MICRO 8.0  2D73PM80

## (undated) DEVICE — DRSG GAUZE 4X4" 2187

## (undated) DEVICE — SYR 03ML LL W/O NDL 309657

## (undated) DEVICE — TUBING IV EXT SET MICRO VOLUME MALE LL ADAPTER 36" 2N3345

## (undated) DEVICE — NDL 27GA 1.25" 305136

## (undated) DEVICE — DRAPE BACK TABLE  44X90" 8377

## (undated) DEVICE — PREP POVIDONE IODINE SOLUTION 10% 120ML

## (undated) DEVICE — PAD CHUX UNDERPAD 23X24" 7136

## (undated) DEVICE — GLOVE PROTEXIS W/NEU-THERA 7.5  2D73TE75

## (undated) DEVICE — NDL COUNTER 20CT 31142493

## (undated) DEVICE — LINEN TOWEL PACK X5 5464

## (undated) DEVICE — GLOVE PROTEXIS W/NEU-THERA 7.0  2D73TE70

## (undated) DEVICE — NDL 18GAX1.5" 305185

## (undated) DEVICE — NDL SPINAL 22GA 3.5" QUINCKE 405181

## (undated) DEVICE — GLOVE PROTEXIS POWDER FREE SMT 7.0  2D72PT70X

## (undated) DEVICE — PREP CHLORAPREP W/ORANGE TINT 10.5ML 260715

## (undated) DEVICE — DRSG TELFA 3X8" 1238

## (undated) DEVICE — NDL 30GA 1" 305128

## (undated) DEVICE — NDL 25GA 1.5" 305127

## (undated) DEVICE — DRSG BANDAID 1X3" FABRIC CURITY LATEX FREE KC44101

## (undated) DEVICE — PACK MINOR SBA15MIFSE

## (undated) DEVICE — NDL BLUNT 18GA 1" W/O FILTER 305181

## (undated) DEVICE — NDL SPINAL 22GA 5" QUINCKE 405148

## (undated) DEVICE — GLOVE ESTEEM POWDER FREE SMT 7.0  2D72PT70

## (undated) DEVICE — PREP SKIN SCRUB TRAY 4461A

## (undated) DEVICE — KIT ENDO FIRST STEP DISINFECTANT 200ML W/POUCH EP-4

## (undated) DEVICE — SYR 10ML FINGER CONTROL W/O NDL 309695

## (undated) DEVICE — ESU GROUND PAD ADULT W/CORD E7507

## (undated) RX ORDER — DIAZEPAM 5 MG
TABLET ORAL
Status: DISPENSED
Start: 2018-05-02

## (undated) RX ORDER — FENTANYL CITRATE 50 UG/ML
INJECTION, SOLUTION INTRAMUSCULAR; INTRAVENOUS
Status: DISPENSED
Start: 2018-02-07

## (undated) RX ORDER — FENTANYL CITRATE 50 UG/ML
INJECTION, SOLUTION INTRAMUSCULAR; INTRAVENOUS
Status: DISPENSED
Start: 2021-09-27

## (undated) RX ORDER — FENTANYL CITRATE 50 UG/ML
INJECTION, SOLUTION INTRAMUSCULAR; INTRAVENOUS
Status: DISPENSED
Start: 2017-09-12